# Patient Record
Sex: MALE | Race: WHITE | NOT HISPANIC OR LATINO | URBAN - METROPOLITAN AREA
[De-identification: names, ages, dates, MRNs, and addresses within clinical notes are randomized per-mention and may not be internally consistent; named-entity substitution may affect disease eponyms.]

---

## 2017-04-28 ENCOUNTER — INPATIENT (INPATIENT)
Facility: HOSPITAL | Age: 82
LOS: 0 days | Discharge: ANOTHER HOSPITAL | DRG: 305 | End: 2017-04-29
Attending: INTERNAL MEDICINE | Admitting: INTERNAL MEDICINE
Payer: MEDICARE

## 2017-04-28 VITALS
HEART RATE: 63 BPM | OXYGEN SATURATION: 99 % | WEIGHT: 145.95 LBS | TEMPERATURE: 98 F | DIASTOLIC BLOOD PRESSURE: 78 MMHG | SYSTOLIC BLOOD PRESSURE: 144 MMHG | HEIGHT: 63 IN | RESPIRATION RATE: 18 BRPM

## 2017-04-28 DIAGNOSIS — I25.10 ATHEROSCLEROTIC HEART DISEASE OF NATIVE CORONARY ARTERY WITHOUT ANGINA PECTORIS: ICD-10-CM

## 2017-04-28 DIAGNOSIS — Z90.5 ACQUIRED ABSENCE OF KIDNEY: Chronic | ICD-10-CM

## 2017-04-28 DIAGNOSIS — Z90.79 ACQUIRED ABSENCE OF OTHER GENITAL ORGAN(S): Chronic | ICD-10-CM

## 2017-04-28 DIAGNOSIS — Z95.1 PRESENCE OF AORTOCORONARY BYPASS GRAFT: Chronic | ICD-10-CM

## 2017-04-28 DIAGNOSIS — I10 ESSENTIAL (PRIMARY) HYPERTENSION: ICD-10-CM

## 2017-04-28 DIAGNOSIS — E03.9 HYPOTHYROIDISM, UNSPECIFIED: ICD-10-CM

## 2017-04-28 DIAGNOSIS — R53.1 WEAKNESS: ICD-10-CM

## 2017-04-28 DIAGNOSIS — K21.9 GASTRO-ESOPHAGEAL REFLUX DISEASE WITHOUT ESOPHAGITIS: ICD-10-CM

## 2017-04-28 DIAGNOSIS — I21.4 NON-ST ELEVATION (NSTEMI) MYOCARDIAL INFARCTION: ICD-10-CM

## 2017-04-28 DIAGNOSIS — Z29.9 ENCOUNTER FOR PROPHYLACTIC MEASURES, UNSPECIFIED: ICD-10-CM

## 2017-04-28 DIAGNOSIS — E11.9 TYPE 2 DIABETES MELLITUS WITHOUT COMPLICATIONS: ICD-10-CM

## 2017-04-28 DIAGNOSIS — N17.9 ACUTE KIDNEY FAILURE, UNSPECIFIED: ICD-10-CM

## 2017-04-28 DIAGNOSIS — D64.9 ANEMIA, UNSPECIFIED: ICD-10-CM

## 2017-04-28 LAB
ALBUMIN SERPL ELPH-MCNC: 3.2 G/DL — LOW (ref 3.5–5)
ALP SERPL-CCNC: 71 U/L — SIGNIFICANT CHANGE UP (ref 40–120)
ALT FLD-CCNC: 16 U/L DA — SIGNIFICANT CHANGE UP (ref 10–60)
ANION GAP SERPL CALC-SCNC: 6 MMOL/L — SIGNIFICANT CHANGE UP (ref 5–17)
APTT BLD: 29.3 SEC — SIGNIFICANT CHANGE UP (ref 27.5–37.4)
AST SERPL-CCNC: 13 U/L — SIGNIFICANT CHANGE UP (ref 10–40)
BASOPHILS # BLD AUTO: 0.1 K/UL — SIGNIFICANT CHANGE UP (ref 0–0.2)
BASOPHILS NFR BLD AUTO: 1.9 % — SIGNIFICANT CHANGE UP (ref 0–2)
BILIRUB SERPL-MCNC: 0.3 MG/DL — SIGNIFICANT CHANGE UP (ref 0.2–1.2)
BUN SERPL-MCNC: 31 MG/DL — HIGH (ref 7–18)
CALCIUM SERPL-MCNC: 9.3 MG/DL — SIGNIFICANT CHANGE UP (ref 8.4–10.5)
CHLORIDE SERPL-SCNC: 113 MMOL/L — HIGH (ref 96–108)
CK MB BLD-MCNC: 1.9 % — SIGNIFICANT CHANGE UP (ref 0–3.5)
CK MB BLD-MCNC: 2 % — SIGNIFICANT CHANGE UP (ref 0–3.5)
CK MB CFR SERPL CALC: 1.1 NG/ML — SIGNIFICANT CHANGE UP (ref 0–3.6)
CK MB CFR SERPL CALC: 1.1 NG/ML — SIGNIFICANT CHANGE UP (ref 0–3.6)
CK SERPL-CCNC: 54 U/L — SIGNIFICANT CHANGE UP (ref 35–232)
CK SERPL-CCNC: 57 U/L — SIGNIFICANT CHANGE UP (ref 35–232)
CO2 SERPL-SCNC: 24 MMOL/L — SIGNIFICANT CHANGE UP (ref 22–31)
CREAT SERPL-MCNC: 1.32 MG/DL — HIGH (ref 0.5–1.3)
EOSINOPHIL # BLD AUTO: 0.1 K/UL — SIGNIFICANT CHANGE UP (ref 0–0.5)
EOSINOPHIL NFR BLD AUTO: 2 % — SIGNIFICANT CHANGE UP (ref 0–6)
GLUCOSE SERPL-MCNC: 136 MG/DL — HIGH (ref 70–99)
HCT VFR BLD CALC: 30.9 % — LOW (ref 39–50)
HGB BLD-MCNC: 9.6 G/DL — LOW (ref 13–17)
INR BLD: 0.98 RATIO — SIGNIFICANT CHANGE UP (ref 0.88–1.16)
LIDOCAIN IGE QN: 572 U/L — HIGH (ref 73–393)
LYMPHOCYTES # BLD AUTO: 0.9 K/UL — LOW (ref 1–3.3)
LYMPHOCYTES # BLD AUTO: 16.1 % — SIGNIFICANT CHANGE UP (ref 13–44)
MCHC RBC-ENTMCNC: 29.6 PG — SIGNIFICANT CHANGE UP (ref 27–34)
MCHC RBC-ENTMCNC: 31 GM/DL — LOW (ref 32–36)
MCV RBC AUTO: 95.4 FL — SIGNIFICANT CHANGE UP (ref 80–100)
MONOCYTES # BLD AUTO: 0.4 K/UL — SIGNIFICANT CHANGE UP (ref 0–0.9)
MONOCYTES NFR BLD AUTO: 8.4 % — SIGNIFICANT CHANGE UP (ref 2–14)
NEUTROPHILS # BLD AUTO: 3.8 K/UL — SIGNIFICANT CHANGE UP (ref 1.8–7.4)
NEUTROPHILS NFR BLD AUTO: 71.6 % — SIGNIFICANT CHANGE UP (ref 43–77)
PLATELET # BLD AUTO: 188 K/UL — SIGNIFICANT CHANGE UP (ref 150–400)
POTASSIUM SERPL-MCNC: 4.1 MMOL/L — SIGNIFICANT CHANGE UP (ref 3.5–5.3)
POTASSIUM SERPL-SCNC: 4.1 MMOL/L — SIGNIFICANT CHANGE UP (ref 3.5–5.3)
PROT SERPL-MCNC: 6.4 G/DL — SIGNIFICANT CHANGE UP (ref 6–8.3)
PROTHROM AB SERPL-ACNC: 10.7 SEC — SIGNIFICANT CHANGE UP (ref 9.8–12.7)
RAPID RVP RESULT: DETECTED
RBC # BLD: 3.24 M/UL — LOW (ref 4.2–5.8)
RBC # FLD: 15.5 % — HIGH (ref 10.3–14.5)
RV+EV RNA SPEC QL NAA+PROBE: DETECTED
SODIUM SERPL-SCNC: 143 MMOL/L — SIGNIFICANT CHANGE UP (ref 135–145)
TROPONIN I SERPL-MCNC: 0.05 NG/ML — HIGH (ref 0–0.04)
TROPONIN I SERPL-MCNC: 0.07 NG/ML — HIGH (ref 0–0.04)
WBC # BLD: 5.3 K/UL — SIGNIFICANT CHANGE UP (ref 3.8–10.5)
WBC # FLD AUTO: 5.3 K/UL — SIGNIFICANT CHANGE UP (ref 3.8–10.5)

## 2017-04-28 PROCEDURE — 99285 EMERGENCY DEPT VISIT HI MDM: CPT

## 2017-04-28 PROCEDURE — 74176 CT ABD & PELVIS W/O CONTRAST: CPT | Mod: 26

## 2017-04-28 PROCEDURE — 71020: CPT | Mod: 26

## 2017-04-28 RX ORDER — DEXTROSE 50 % IN WATER 50 %
25 SYRINGE (ML) INTRAVENOUS ONCE
Qty: 0 | Refills: 0 | Status: DISCONTINUED | OUTPATIENT
Start: 2017-04-28 | End: 2017-04-29

## 2017-04-28 RX ORDER — PANTOPRAZOLE SODIUM 20 MG/1
40 TABLET, DELAYED RELEASE ORAL
Qty: 0 | Refills: 0 | Status: DISCONTINUED | OUTPATIENT
Start: 2017-04-28 | End: 2017-04-29

## 2017-04-28 RX ORDER — GLUCAGON INJECTION, SOLUTION 0.5 MG/.1ML
1 INJECTION, SOLUTION SUBCUTANEOUS ONCE
Qty: 0 | Refills: 0 | Status: DISCONTINUED | OUTPATIENT
Start: 2017-04-28 | End: 2017-04-29

## 2017-04-28 RX ORDER — PSYLLIUM SEED (WITH DEXTROSE)
0 POWDER (GRAM) ORAL
Qty: 0 | Refills: 0 | COMMUNITY

## 2017-04-28 RX ORDER — SODIUM CHLORIDE 9 MG/ML
3 INJECTION INTRAMUSCULAR; INTRAVENOUS; SUBCUTANEOUS ONCE
Qty: 0 | Refills: 0 | Status: COMPLETED | OUTPATIENT
Start: 2017-04-28 | End: 2017-04-28

## 2017-04-28 RX ORDER — SODIUM CHLORIDE 9 MG/ML
1000 INJECTION, SOLUTION INTRAVENOUS
Qty: 0 | Refills: 0 | Status: DISCONTINUED | OUTPATIENT
Start: 2017-04-28 | End: 2017-04-29

## 2017-04-28 RX ORDER — DEXTROSE 50 % IN WATER 50 %
1 SYRINGE (ML) INTRAVENOUS ONCE
Qty: 0 | Refills: 0 | Status: DISCONTINUED | OUTPATIENT
Start: 2017-04-28 | End: 2017-04-29

## 2017-04-28 RX ORDER — DAPAGLIFLOZIN 10 MG/1
0 TABLET, FILM COATED ORAL
Qty: 0 | Refills: 0 | COMMUNITY

## 2017-04-28 RX ORDER — ATORVASTATIN CALCIUM 80 MG/1
80 TABLET, FILM COATED ORAL AT BEDTIME
Qty: 0 | Refills: 0 | Status: DISCONTINUED | OUTPATIENT
Start: 2017-04-28 | End: 2017-04-29

## 2017-04-28 RX ORDER — LOSARTAN POTASSIUM 100 MG/1
0 TABLET, FILM COATED ORAL
Qty: 0 | Refills: 0 | COMMUNITY

## 2017-04-28 RX ORDER — LEVOTHYROXINE SODIUM 125 MCG
0 TABLET ORAL
Qty: 0 | Refills: 0 | COMMUNITY

## 2017-04-28 RX ORDER — SENNA PLUS 8.6 MG/1
2 TABLET ORAL AT BEDTIME
Qty: 0 | Refills: 0 | Status: DISCONTINUED | OUTPATIENT
Start: 2017-04-28 | End: 2017-04-29

## 2017-04-28 RX ORDER — ASPIRIN/CALCIUM CARB/MAGNESIUM 324 MG
300 TABLET ORAL ONCE
Qty: 0 | Refills: 0 | Status: COMPLETED | OUTPATIENT
Start: 2017-04-28 | End: 2017-04-28

## 2017-04-28 RX ORDER — LEVOTHYROXINE SODIUM 125 MCG
50 TABLET ORAL DAILY
Qty: 0 | Refills: 0 | Status: DISCONTINUED | OUTPATIENT
Start: 2017-04-28 | End: 2017-04-29

## 2017-04-28 RX ORDER — DOCUSATE SODIUM 100 MG
100 CAPSULE ORAL
Qty: 0 | Refills: 0 | Status: DISCONTINUED | OUTPATIENT
Start: 2017-04-28 | End: 2017-04-29

## 2017-04-28 RX ORDER — METFORMIN HYDROCHLORIDE 850 MG/1
0 TABLET ORAL
Qty: 0 | Refills: 0 | COMMUNITY

## 2017-04-28 RX ORDER — DEXTROSE 50 % IN WATER 50 %
12.5 SYRINGE (ML) INTRAVENOUS ONCE
Qty: 0 | Refills: 0 | Status: DISCONTINUED | OUTPATIENT
Start: 2017-04-28 | End: 2017-04-29

## 2017-04-28 RX ORDER — SODIUM CHLORIDE 9 MG/ML
1000 INJECTION INTRAMUSCULAR; INTRAVENOUS; SUBCUTANEOUS ONCE
Qty: 0 | Refills: 0 | Status: COMPLETED | OUTPATIENT
Start: 2017-04-28 | End: 2017-04-28

## 2017-04-28 RX ORDER — INSULIN LISPRO 100/ML
VIAL (ML) SUBCUTANEOUS
Qty: 0 | Refills: 0 | Status: DISCONTINUED | OUTPATIENT
Start: 2017-04-28 | End: 2017-04-29

## 2017-04-28 RX ORDER — PANTOPRAZOLE SODIUM 20 MG/1
0 TABLET, DELAYED RELEASE ORAL
Qty: 0 | Refills: 0 | COMMUNITY

## 2017-04-28 RX ORDER — SODIUM CHLORIDE 9 MG/ML
500 INJECTION INTRAMUSCULAR; INTRAVENOUS; SUBCUTANEOUS
Qty: 0 | Refills: 0 | Status: DISCONTINUED | OUTPATIENT
Start: 2017-04-28 | End: 2017-04-29

## 2017-04-28 RX ADMIN — Medication 300 MILLIGRAM(S): at 16:31

## 2017-04-28 RX ADMIN — ATORVASTATIN CALCIUM 80 MILLIGRAM(S): 80 TABLET, FILM COATED ORAL at 21:17

## 2017-04-28 RX ADMIN — SENNA PLUS 2 TABLET(S): 8.6 TABLET ORAL at 21:17

## 2017-04-28 RX ADMIN — SODIUM CHLORIDE 1000 MILLILITER(S): 9 INJECTION INTRAMUSCULAR; INTRAVENOUS; SUBCUTANEOUS at 12:26

## 2017-04-28 RX ADMIN — SODIUM CHLORIDE 3 MILLILITER(S): 9 INJECTION INTRAMUSCULAR; INTRAVENOUS; SUBCUTANEOUS at 12:26

## 2017-04-28 NOTE — H&P ADULT. - PROBLEM SELECTOR PLAN 9
DVT prophylaxis- IMPROVE VTE score-2, no indication for chemical prophylaxis, will continue SCD for now.

## 2017-04-28 NOTE — H&P ADULT. - NEGATIVE NEUROLOGICAL SYMPTOMS
no loss of sensation/no tremors/no generalized seizures/no syncope/no headache/no loss of consciousness/no paresthesias/no focal seizures/no transient paralysis

## 2017-04-28 NOTE — H&P ADULT. - NEGATIVE MUSCULOSKELETAL SYMPTOMS
no arthritis/no joint swelling/no muscle cramps/no arthralgia/no myalgia/no neck pain/no muscle weakness/no stiffness

## 2017-04-28 NOTE — H&P ADULT. - PROBLEM SELECTOR PLAN 1
Patient presented with generalized weakness, with dizziness and difficulty ambulation since 3 weeks  likely due to viral etiology vs underlying anemia vs underlying ? malignancy given Hx of unintentional weight loss  Will f/u RVP  CXR- negative, will f/u UA to r/o infectious etiology.  Ct abdomen and pelvis s/o Thickening of the walls of the second portion of the duodenum with slight infiltration of the adjacent fat likely reflective of a duodenitis.  Moderate-sized left inguinal hernia. Large right-sided inguinal hernia. Reniform shaped structure overlying the right psoas musculature below the right kidney measuring up to 4.4 x 3.7 cm which may represent an accessory kidney, old hematoma, or may represent a mass with internal macroscopic fat.   primary team to get surgery consult in AM Patient presented with generalized weakness, with dizziness and difficulty ambulation since 3 weeks  likely due to viral etiology vs underlying anemia vs underlying ? malignancy given Hx of unintentional weight loss  Will f/u RVP  CXR- negative, will f/u UA to r/o infectious etiology.  Ct abdomen and pelvis s/o Thickening of the walls of the second portion of the duodenum with slight infiltration of the adjacent fat likely reflective of a duodenitis.  Moderate-sized left inguinal hernia. Large right-sided inguinal hernia. Reniform shaped structure overlying the right psoas musculature below the right kidney measuring up to 4.4 x 3.7 cm which may represent an accessory kidney, old hematoma, or may represent a mass with internal macroscopic fat.   primary team to get surgery consult in AM  patient has slightly elevated lipase but clinically no symptoms and CT abdomen no findings s/o pancreatitis, so will start on diet.

## 2017-04-28 NOTE — H&P ADULT. - PROBLEM SELECTOR PLAN 2
Patient had endoscopy yesterday as outpatient which was said to be normal except for duodenal ulcer but patient is unsure, primary team to reach GI for results.  Patient H/H on admission is 9.6/30.9  will get anemia panel and f/u stool for occult blood.  plan for transfusion if H/H below 8.  Patient scheduled for tentative colonoscopy next month with GI as outpatient.

## 2017-04-28 NOTE — H&P ADULT. - MUSCULOSKELETAL
details… detailed exam no joint erythema/normal strength/no joint warmth/no joint swelling/no calf tenderness/ROM intact

## 2017-04-28 NOTE — ED PROVIDER NOTE - OBJECTIVE STATEMENT
88 y/o male with PMHx of DM, HTN, Hypothyroid, GERD and PSHx of Bypass, Partial Nephrectomy, and TURP presents to the ED c/o weakness to legs and abd pain x 3 weeks, worsening. Pt has recent endoscopy done which was normal. Pt denies fever, chills, CP, SOB, or     any other complaints. NKDA.

## 2017-04-28 NOTE — H&P ADULT. - ATTENDING COMMENTS
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

## 2017-04-28 NOTE — H&P ADULT. - HISTORY OF PRESENT ILLNESS
A 87 year old male from home, ambulates without support, PMH of DM, HTN, CAD s/p CABG, RCC s/p partial nephrectomy, Basal cell carcinoma, s/p TURP, presented with c/o worsening weakness, dizziness and difficulty ambulation for last 3 weeks. Patient has been experiencing these symptoms for last 3 weeks, but still able to carry on with his work, works as  at [employer], this morning his symptoms got more worse and so finally he decided to come to ED. patient also c/o associated chest pain - retrosternal, mild 2-3 intensity, non radiating, associated with exertional dyspnea, no aggravating or relieving factors, not associated with palpitations, diaphoresis. patient also endorses that 2 weeks ago he had diarrhea which lasted for 2-3 days after he took laxatives for constipation, which resolved with medications. patient reports unintentional weight loss of about 30 pounds in last 2 years. patient had scheduled endoscopy with GI yesterday and was said that he had duodenal ulcer, but unsure about the exact findings. He had colonoscopy 5 years ago, which was said to be normal and next month he have an appointment with GI to decide regarding colonoscopy. Patient denies fever, chills, abdominal pain, currently no diarrhea but c/o constipation since 2 days, no urinary symptoms, no weakness or tingling sensation in extremities.    in ED, patient had EKG s/o sinus bradycardia to 51 bpm with 1st degree AV block and PAC., Q waves in 1, 2, avl, v5,v6, given 1 dose of aspirin and 1 L bolus of normal saline.

## 2017-04-28 NOTE — H&P ADULT. - PROBLEM SELECTOR PLAN 5
Patient had Rt partial nephrectomy for RCC, unknown baseline creatinine levels.  BUN/Creatinine levels are 31/1.32  likely pre renal, will give gentle hydration and f/u BMP in AM.  if renal function worsens, consider urine lytes and USG renal.

## 2017-04-28 NOTE — ACUTE INTERFACILITY TRANSFER NOTE - HOSPITAL COURSE
A 87 year old male from home, ambulates without support, PMH of DM, HTN, CAD s/p CABG, RCC s/p partial nephrectomy, Basal cell carcinoma, s/p TURP, presented with c/o worsening weakness, dizziness and difficulty ambulation for last 3 weeks. Patient has been experiencing these symptoms for last 3 weeks, but still able to carry on with his work, works as  at [employer], this morning his symptoms got more worse and so finally he decided to come to ED. patient also c/o associated chest pain - retrosternal, mild 2-3 intensity, non radiating, associated with exertional dyspnea, no aggravating or relieving factors, not associated with palpitations, diaphoresis. patient also endorses that 2 weeks ago he had diarrhea which lasted for 2-3 days after he took laxatives for constipation, which resolved with medications. patient reports unintentional weight loss of about 30 pounds in last 2 years. patient had scheduled endoscopy with GI yesterday and was said that he had duodenal ulcer, but unsure about the exact findings. He had colonoscopy 5 years ago, which was said to be normal and next month he have an appointment with GI to decide regarding colonoscopy. Patient denies fever, chills, abdominal pain, currently no diarrhea but c/o constipation since 2 days, no urinary symptoms, no weakness or tingling sensation in extremities.    in ED, patient had EKG s/o sinus bradycardia to 51 bpm with 1st degree AV block and PAC., Q waves in 1, 2, avl, v5,v6, given 1 dose of aspirin and 1 L bolus of normal saline. Pt later showed heart block type 2 and will be transfer to [facility] for possible pacemaker placement. A 87 year old male from home, ambulates without support, PMH of DM, HTN, CAD s/p CABG, RCC s/p partial nephrectomy, Basal cell carcinoma, s/p TURP, presented with c/o worsening weakness, dizziness and difficulty ambulation for last 3 weeks. Patient has been experiencing these symptoms for last 3 weeks, but still able to carry on with his work, works as  at [employer], this morning his symptoms got more worse and so finally he decided to come to ED. patient also c/o associated chest pain - retrosternal, mild 2-3 intensity, non radiating, associated with exertional dyspnea, no aggravating or relieving factors, not associated with palpitations, diaphoresis. patient also endorses that 2 weeks ago he had diarrhea which lasted for 2-3 days after he took laxatives for constipation, which resolved with medications. patient reports unintentional weight loss of about 30 pounds in last 2 years. patient had scheduled endoscopy with GI yesterday and was said that he had duodenal ulcer, but unsure about the exact findings. He had colonoscopy 5 years ago, which was said to be normal and next month he have an appointment with GI to decide regarding colonoscopy. Patient denies fever, chills, abdominal pain, currently no diarrhea but c/o constipation since 2 days, no urinary symptoms, no weakness or tingling sensation in extremities.    in ED, patient had EKG s/o sinus bradycardia to 51 bpm with 1st degree AV block and PAC., Q waves in 1, 2, avl, v5,v6, given 1 dose of aspirin and 1 L bolus of normal saline. Pt later showed heart block type 2 and will be transfer to [facility] for possible EP eval and possible pacemaker placement.

## 2017-04-28 NOTE — H&P ADULT. - PROBLEM SELECTOR PLAN 3
Patient has Hx of CAD s/p CABG.  on rosuvastatin at home, will give lipitor 80 mg once daily and will hold aspirin for concern for likely Duodenal ulcer.  please follow up with primary cardiologist.  EKG s/o sinus bradycardia to 51 bpm with 1st degree AV block and PAC, Q waves in 1, 2, avl, v5,v6, given 1 dose of aspirin suppository in ED.  cardiac enzymes T1-0.047, T2-0.074, will f/u T3.  f/u Echo  Dr. Manuel is cardiology. Patient has Hx of CAD s/p CABG.  on rosuvastatin at home, will give lipitor 80 mg once daily and will hold aspirin for concern for likely Duodenal ulcer.  please follow up with primary cardiologist.  EKG s/o sinus bradycardia to 51 bpm with 1st degree AV block and PAC, Q waves in 1, 2, avl, v5,v6, given 1 dose of aspirin suppository in ED.  cardiac enzymes T1-0.047, T2-0.074, will f/u T3.  will avoid beta blockers.  f/u Echo  Dr. Manuel is cardiology.

## 2017-04-28 NOTE — H&P ADULT. - PRIMARY CARE PROVIDER
Dr. Crow Casarez  [phone], Dr. Yoel Markham PCP [phone], Dr. Dorene Candelario cardio [phone]

## 2017-04-28 NOTE — H&P ADULT. - ASSESSMENT
A 87 year old male from home, ambulates without support, PMH of DM, HTN, CAD s/p CABG, RCC s/p partial nephrectomy, Basal cell carcinoma, s/p TURP, presented with c/o worsening weakness, dizziness and difficulty ambulation for last 3 weeks.    In ED, patient had EKG s/o sinus bradycardia to 51 bpm with 1st degree AV block and PAC, Q waves in 1, 2, avl, v5,v6, given 1 dose of aspirin and 1 L bolus of normal saline. Patient is being admitted to telemetry.

## 2017-04-28 NOTE — H&P ADULT. - PROBLEM SELECTOR PLAN 8
Patient takes metformin 1 gm BID and farxiga 5 mg 1 tab OD.  will hold oral medications and start on humalog sliding scale.  f/u Hba1c Patient takes metformin 1 gm BID, januvia 100mg once daily and farxiga 5 mg 1 tab OD.  will hold oral medications and start on humalog sliding scale.  f/u Hba1c

## 2017-04-28 NOTE — H&P ADULT. - RS GEN PE MLT RESP DETAILS PC
normal/airway patent/no chest wall tenderness/good air movement/breath sounds equal/clear to auscultation bilaterally

## 2017-04-28 NOTE — ED PROVIDER NOTE - CARE PLAN
Principal Discharge DX:	NSTEMI (non-ST elevated myocardial infarction)  Secondary Diagnosis:	Pancreatitis

## 2017-04-28 NOTE — H&P ADULT. - PROBLEM SELECTOR PLAN 7
Patient takes losartan 50 mg once daily at home, will hold it for now due to LIGIA and please restart if BP is uncontrolled and kidney function improves.

## 2017-04-28 NOTE — ED PROVIDER NOTE - NS ED MD SCRIBE ATTENDING SCRIBE SECTIONS
DISPOSITION/VITAL SIGNS( Pullset)/HISTORY OF PRESENT ILLNESS/REVIEW OF SYSTEMS/HIV/PHYSICAL EXAM/PAST MEDICAL/SURGICAL/SOCIAL HISTORY

## 2017-04-28 NOTE — H&P ADULT. - NEGATIVE OPHTHALMOLOGIC SYMPTOMS
no blurred vision L/no lacrimation R/no photophobia/no blurred vision R/no diplopia/no lacrimation L

## 2017-04-28 NOTE — H&P ADULT. - PROBLEM SELECTOR PLAN 6
patient has Hx of GERD and takes protonix at home, and was diagnosed with ?duodenal ulcer, CT abdomen s/o duodenitis.  will continue protonix 40 mg once daily.  Avoid NSAIDS.

## 2017-04-29 ENCOUNTER — INPATIENT (INPATIENT)
Facility: HOSPITAL | Age: 82
LOS: 2 days | Discharge: HOME | End: 2017-05-02
Attending: INTERNAL MEDICINE | Admitting: INTERNAL MEDICINE
Payer: COMMERCIAL

## 2017-04-29 VITALS
RESPIRATION RATE: 15 BRPM | SYSTOLIC BLOOD PRESSURE: 159 MMHG | DIASTOLIC BLOOD PRESSURE: 88 MMHG | TEMPERATURE: 98 F | OXYGEN SATURATION: 100 % | HEART RATE: 62 BPM

## 2017-04-29 VITALS
OXYGEN SATURATION: 99 % | SYSTOLIC BLOOD PRESSURE: 121 MMHG | TEMPERATURE: 98 F | HEART RATE: 59 BPM | RESPIRATION RATE: 18 BRPM | DIASTOLIC BLOOD PRESSURE: 55 MMHG

## 2017-04-29 DIAGNOSIS — E03.9 HYPOTHYROIDISM, UNSPECIFIED: ICD-10-CM

## 2017-04-29 DIAGNOSIS — Z90.5 ACQUIRED ABSENCE OF KIDNEY: Chronic | ICD-10-CM

## 2017-04-29 DIAGNOSIS — I50.9 HEART FAILURE, UNSPECIFIED: ICD-10-CM

## 2017-04-29 DIAGNOSIS — K21.9 GASTRO-ESOPHAGEAL REFLUX DISEASE WITHOUT ESOPHAGITIS: ICD-10-CM

## 2017-04-29 DIAGNOSIS — I10 ESSENTIAL (PRIMARY) HYPERTENSION: ICD-10-CM

## 2017-04-29 DIAGNOSIS — I45.9 CONDUCTION DISORDER, UNSPECIFIED: ICD-10-CM

## 2017-04-29 DIAGNOSIS — E11.9 TYPE 2 DIABETES MELLITUS WITHOUT COMPLICATIONS: ICD-10-CM

## 2017-04-29 DIAGNOSIS — Z90.79 ACQUIRED ABSENCE OF OTHER GENITAL ORGAN(S): Chronic | ICD-10-CM

## 2017-04-29 DIAGNOSIS — Z95.1 PRESENCE OF AORTOCORONARY BYPASS GRAFT: Chronic | ICD-10-CM

## 2017-04-29 LAB
ALBUMIN SERPL ELPH-MCNC: 3.4 G/DL — SIGNIFICANT CHANGE UP (ref 3.3–5)
ALP SERPL-CCNC: 55 U/L — SIGNIFICANT CHANGE UP (ref 40–120)
ALT FLD-CCNC: 10 U/L — SIGNIFICANT CHANGE UP (ref 4–41)
APTT BLD: 29.5 SEC — SIGNIFICANT CHANGE UP (ref 27.5–37.4)
AST SERPL-CCNC: 14 U/L — SIGNIFICANT CHANGE UP (ref 4–40)
BASOPHILS # BLD AUTO: 0.02 K/UL — SIGNIFICANT CHANGE UP (ref 0–0.2)
BASOPHILS NFR BLD AUTO: 0.4 % — SIGNIFICANT CHANGE UP (ref 0–2)
BILIRUB SERPL-MCNC: 0.3 MG/DL — SIGNIFICANT CHANGE UP (ref 0.2–1.2)
BUN SERPL-MCNC: 26 MG/DL — HIGH (ref 7–23)
C DIFF TOX GENS STL QL NAA+PROBE: SIGNIFICANT CHANGE UP
CALCIUM SERPL-MCNC: 8.9 MG/DL — SIGNIFICANT CHANGE UP (ref 8.4–10.5)
CHLORIDE SERPL-SCNC: 108 MMOL/L — HIGH (ref 98–107)
CHOLEST SERPL-MCNC: 110 MG/DL — LOW (ref 120–199)
CHOLEST SERPL-MCNC: 116 MG/DL — LOW (ref 120–199)
CK MB BLD-MCNC: 1.97 NG/ML — SIGNIFICANT CHANGE UP (ref 1–6.6)
CK MB BLD-MCNC: 2 % — SIGNIFICANT CHANGE UP (ref 0–3.5)
CK MB BLD-MCNC: SIGNIFICANT CHANGE UP (ref 0–2.5)
CK MB CFR SERPL CALC: 1.1 NG/ML — SIGNIFICANT CHANGE UP (ref 0–3.6)
CK SERPL-CCNC: 52 U/L — SIGNIFICANT CHANGE UP (ref 30–200)
CK SERPL-CCNC: 55 U/L — SIGNIFICANT CHANGE UP (ref 35–232)
CO2 SERPL-SCNC: 20 MMOL/L — LOW (ref 22–31)
CREAT SERPL-MCNC: 1.12 MG/DL — SIGNIFICANT CHANGE UP (ref 0.5–1.3)
EOSINOPHIL # BLD AUTO: 0.15 K/UL — SIGNIFICANT CHANGE UP (ref 0–0.5)
EOSINOPHIL NFR BLD AUTO: 3 % — SIGNIFICANT CHANGE UP (ref 0–6)
GLUCOSE SERPL-MCNC: 132 MG/DL — HIGH (ref 70–99)
HBA1C BLD-MCNC: 6.6 % — HIGH (ref 4–5.6)
HCT VFR BLD CALC: 27.7 % — LOW (ref 39–50)
HCT VFR BLD CALC: 29.4 % — LOW (ref 39–50)
HDLC SERPL-MCNC: 48 MG/DL — SIGNIFICANT CHANGE UP (ref 35–55)
HDLC SERPL-MCNC: 49 MG/DL — SIGNIFICANT CHANGE UP (ref 35–55)
HGB BLD-MCNC: 8.6 G/DL — LOW (ref 13–17)
HGB BLD-MCNC: 9.1 G/DL — LOW (ref 13–17)
IMM GRANULOCYTES NFR BLD AUTO: 0.2 % — SIGNIFICANT CHANGE UP (ref 0–1.5)
INR BLD: 0.99 — SIGNIFICANT CHANGE UP (ref 0.88–1.17)
LIPID PNL WITH DIRECT LDL SERPL: 46 MG/DL — SIGNIFICANT CHANGE UP
LIPID PNL WITH DIRECT LDL SERPL: 50 MG/DL — SIGNIFICANT CHANGE UP
LYMPHOCYTES # BLD AUTO: 0.92 K/UL — LOW (ref 1–3.3)
LYMPHOCYTES # BLD AUTO: 18.5 % — SIGNIFICANT CHANGE UP (ref 13–44)
MAGNESIUM SERPL-MCNC: 1.7 MG/DL — SIGNIFICANT CHANGE UP (ref 1.6–2.6)
MCHC RBC-ENTMCNC: 29.4 PG — SIGNIFICANT CHANGE UP (ref 27–34)
MCHC RBC-ENTMCNC: 29.5 PG — SIGNIFICANT CHANGE UP (ref 27–34)
MCHC RBC-ENTMCNC: 31 % — LOW (ref 32–36)
MCHC RBC-ENTMCNC: 31 % — LOW (ref 32–36)
MCV RBC AUTO: 94.8 FL — SIGNIFICANT CHANGE UP (ref 80–100)
MCV RBC AUTO: 94.9 FL — SIGNIFICANT CHANGE UP (ref 80–100)
MONOCYTES # BLD AUTO: 0.41 K/UL — SIGNIFICANT CHANGE UP (ref 0–0.9)
MONOCYTES NFR BLD AUTO: 8.3 % — SIGNIFICANT CHANGE UP (ref 2–14)
NEUTROPHILS # BLD AUTO: 3.45 K/UL — SIGNIFICANT CHANGE UP (ref 1.8–7.4)
NEUTROPHILS NFR BLD AUTO: 69.6 % — SIGNIFICANT CHANGE UP (ref 43–77)
PHOSPHATE SERPL-MCNC: 2.7 MG/DL — SIGNIFICANT CHANGE UP (ref 2.5–4.5)
PLATELET # BLD AUTO: 176 K/UL — SIGNIFICANT CHANGE UP (ref 150–400)
PLATELET # BLD AUTO: 193 K/UL — SIGNIFICANT CHANGE UP (ref 150–400)
PMV BLD: 9.7 FL — SIGNIFICANT CHANGE UP (ref 7–13)
PMV BLD: 9.9 FL — SIGNIFICANT CHANGE UP (ref 7–13)
POTASSIUM SERPL-MCNC: 4.5 MMOL/L — SIGNIFICANT CHANGE UP (ref 3.5–5.3)
POTASSIUM SERPL-SCNC: 4.5 MMOL/L — SIGNIFICANT CHANGE UP (ref 3.5–5.3)
PROT SERPL-MCNC: 5.7 G/DL — LOW (ref 6–8.3)
PROTHROM AB SERPL-ACNC: 11.1 SEC — SIGNIFICANT CHANGE UP (ref 9.8–13.1)
RBC # BLD: 2.92 M/UL — LOW (ref 4.2–5.8)
RBC # BLD: 3.1 M/UL — LOW (ref 4.2–5.8)
RBC # FLD: 15.9 % — HIGH (ref 10.3–14.5)
RBC # FLD: 16.1 % — HIGH (ref 10.3–14.5)
SODIUM SERPL-SCNC: 140 MMOL/L — SIGNIFICANT CHANGE UP (ref 135–145)
T3 SERPL-MCNC: 56.9 NG/DL — LOW (ref 80–200)
T4 FREE SERPL-MCNC: 1.01 NG/DL — SIGNIFICANT CHANGE UP (ref 0.9–1.8)
TRIGL SERPL-MCNC: 121 MG/DL — SIGNIFICANT CHANGE UP (ref 10–149)
TRIGL SERPL-MCNC: 98 MG/DL — SIGNIFICANT CHANGE UP (ref 10–149)
TROPONIN I SERPL-MCNC: 0.07 NG/ML — HIGH (ref 0–0.04)
TROPONIN T SERPL-MCNC: < 0.06 NG/ML — SIGNIFICANT CHANGE UP (ref 0–0.06)
TSH SERPL-MCNC: 7.15 UIU/ML — HIGH (ref 0.27–4.2)
WBC # BLD: 4.96 K/UL — SIGNIFICANT CHANGE UP (ref 3.8–10.5)
WBC # BLD: 5.08 K/UL — SIGNIFICANT CHANGE UP (ref 3.8–10.5)
WBC # FLD AUTO: 4.96 K/UL — SIGNIFICANT CHANGE UP (ref 3.8–10.5)
WBC # FLD AUTO: 5.08 K/UL — SIGNIFICANT CHANGE UP (ref 3.8–10.5)

## 2017-04-29 PROCEDURE — 85610 PROTHROMBIN TIME: CPT

## 2017-04-29 PROCEDURE — 74176 CT ABD & PELVIS W/O CONTRAST: CPT

## 2017-04-29 PROCEDURE — 99223 1ST HOSP IP/OBS HIGH 75: CPT | Mod: GC

## 2017-04-29 PROCEDURE — 71046 X-RAY EXAM CHEST 2 VIEWS: CPT

## 2017-04-29 PROCEDURE — 82550 ASSAY OF CK (CPK): CPT

## 2017-04-29 PROCEDURE — 93005 ELECTROCARDIOGRAM TRACING: CPT

## 2017-04-29 PROCEDURE — 87486 CHLMYD PNEUM DNA AMP PROBE: CPT

## 2017-04-29 PROCEDURE — 82553 CREATINE MB FRACTION: CPT

## 2017-04-29 PROCEDURE — 87633 RESP VIRUS 12-25 TARGETS: CPT

## 2017-04-29 PROCEDURE — 87798 DETECT AGENT NOS DNA AMP: CPT

## 2017-04-29 PROCEDURE — 99285 EMERGENCY DEPT VISIT HI MDM: CPT | Mod: 25

## 2017-04-29 PROCEDURE — 85730 THROMBOPLASTIN TIME PARTIAL: CPT

## 2017-04-29 PROCEDURE — 87581 M.PNEUMON DNA AMP PROBE: CPT

## 2017-04-29 PROCEDURE — 83690 ASSAY OF LIPASE: CPT

## 2017-04-29 PROCEDURE — 93306 TTE W/DOPPLER COMPLETE: CPT | Mod: 26

## 2017-04-29 PROCEDURE — 93010 ELECTROCARDIOGRAM REPORT: CPT

## 2017-04-29 PROCEDURE — 80053 COMPREHEN METABOLIC PANEL: CPT

## 2017-04-29 PROCEDURE — 85027 COMPLETE CBC AUTOMATED: CPT

## 2017-04-29 PROCEDURE — 84484 ASSAY OF TROPONIN QUANT: CPT

## 2017-04-29 RX ORDER — MAGNESIUM SULFATE 500 MG/ML
2 VIAL (ML) INJECTION ONCE
Qty: 0 | Refills: 0 | Status: COMPLETED | OUTPATIENT
Start: 2017-04-29 | End: 2017-04-29

## 2017-04-29 RX ORDER — PANTOPRAZOLE SODIUM 20 MG/1
40 TABLET, DELAYED RELEASE ORAL
Qty: 0 | Refills: 0 | Status: DISCONTINUED | OUTPATIENT
Start: 2017-04-29 | End: 2017-04-29

## 2017-04-29 RX ORDER — DEXTROSE 50 % IN WATER 50 %
1 SYRINGE (ML) INTRAVENOUS ONCE
Qty: 0 | Refills: 0 | Status: DISCONTINUED | OUTPATIENT
Start: 2017-04-29 | End: 2017-04-30

## 2017-04-29 RX ORDER — DEXTROSE 50 % IN WATER 50 %
25 SYRINGE (ML) INTRAVENOUS ONCE
Qty: 0 | Refills: 0 | Status: DISCONTINUED | OUTPATIENT
Start: 2017-04-29 | End: 2017-05-02

## 2017-04-29 RX ORDER — INSULIN LISPRO 100/ML
VIAL (ML) SUBCUTANEOUS AT BEDTIME
Qty: 0 | Refills: 0 | Status: DISCONTINUED | OUTPATIENT
Start: 2017-04-29 | End: 2017-05-02

## 2017-04-29 RX ORDER — HEPARIN SODIUM 5000 [USP'U]/ML
5000 INJECTION INTRAVENOUS; SUBCUTANEOUS EVERY 12 HOURS
Qty: 0 | Refills: 0 | Status: DISCONTINUED | OUTPATIENT
Start: 2017-04-29 | End: 2017-04-30

## 2017-04-29 RX ORDER — DEXTROSE 50 % IN WATER 50 %
12.5 SYRINGE (ML) INTRAVENOUS ONCE
Qty: 0 | Refills: 0 | Status: DISCONTINUED | OUTPATIENT
Start: 2017-04-29 | End: 2017-04-30

## 2017-04-29 RX ORDER — GLUCAGON INJECTION, SOLUTION 0.5 MG/.1ML
1 INJECTION, SOLUTION SUBCUTANEOUS ONCE
Qty: 0 | Refills: 0 | Status: DISCONTINUED | OUTPATIENT
Start: 2017-04-29 | End: 2017-04-30

## 2017-04-29 RX ORDER — DEXTROSE 50 % IN WATER 50 %
25 SYRINGE (ML) INTRAVENOUS ONCE
Qty: 0 | Refills: 0 | Status: DISCONTINUED | OUTPATIENT
Start: 2017-04-29 | End: 2017-04-30

## 2017-04-29 RX ORDER — LOPERAMIDE HCL 2 MG
2 TABLET ORAL ONCE
Qty: 0 | Refills: 0 | Status: COMPLETED | OUTPATIENT
Start: 2017-04-29 | End: 2017-04-29

## 2017-04-29 RX ORDER — SENNA PLUS 8.6 MG/1
2 TABLET ORAL AT BEDTIME
Qty: 0 | Refills: 0 | Status: DISCONTINUED | OUTPATIENT
Start: 2017-04-29 | End: 2017-05-02

## 2017-04-29 RX ORDER — ATORVASTATIN CALCIUM 80 MG/1
80 TABLET, FILM COATED ORAL AT BEDTIME
Qty: 0 | Refills: 0 | Status: DISCONTINUED | OUTPATIENT
Start: 2017-04-29 | End: 2017-05-02

## 2017-04-29 RX ORDER — LOSARTAN POTASSIUM 100 MG/1
50 TABLET, FILM COATED ORAL DAILY
Qty: 0 | Refills: 0 | Status: DISCONTINUED | OUTPATIENT
Start: 2017-04-29 | End: 2017-05-02

## 2017-04-29 RX ORDER — SODIUM CHLORIDE 9 MG/ML
1000 INJECTION, SOLUTION INTRAVENOUS
Qty: 0 | Refills: 0 | Status: DISCONTINUED | OUTPATIENT
Start: 2017-04-29 | End: 2017-04-30

## 2017-04-29 RX ORDER — INSULIN LISPRO 100/ML
VIAL (ML) SUBCUTANEOUS
Qty: 0 | Refills: 0 | Status: DISCONTINUED | OUTPATIENT
Start: 2017-04-29 | End: 2017-05-02

## 2017-04-29 RX ORDER — PANTOPRAZOLE SODIUM 20 MG/1
40 TABLET, DELAYED RELEASE ORAL
Qty: 0 | Refills: 0 | Status: DISCONTINUED | OUTPATIENT
Start: 2017-04-29 | End: 2017-05-02

## 2017-04-29 RX ORDER — LEVOTHYROXINE SODIUM 125 MCG
50 TABLET ORAL DAILY
Qty: 0 | Refills: 0 | Status: DISCONTINUED | OUTPATIENT
Start: 2017-04-29 | End: 2017-05-02

## 2017-04-29 RX ORDER — DOCUSATE SODIUM 100 MG
100 CAPSULE ORAL
Qty: 0 | Refills: 0 | Status: DISCONTINUED | OUTPATIENT
Start: 2017-04-29 | End: 2017-05-02

## 2017-04-29 RX ADMIN — Medication 100 MILLIGRAM(S): at 06:32

## 2017-04-29 RX ADMIN — PANTOPRAZOLE SODIUM 40 MILLIGRAM(S): 20 TABLET, DELAYED RELEASE ORAL at 08:10

## 2017-04-29 RX ADMIN — HEPARIN SODIUM 5000 UNIT(S): 5000 INJECTION INTRAVENOUS; SUBCUTANEOUS at 06:32

## 2017-04-29 RX ADMIN — Medication 50 GRAM(S): at 06:32

## 2017-04-29 RX ADMIN — Medication 50 MICROGRAM(S): at 08:10

## 2017-04-29 RX ADMIN — Medication 2 MILLIGRAM(S): at 11:37

## 2017-04-29 RX ADMIN — ATORVASTATIN CALCIUM 80 MILLIGRAM(S): 80 TABLET, FILM COATED ORAL at 22:07

## 2017-04-29 RX ADMIN — HEPARIN SODIUM 5000 UNIT(S): 5000 INJECTION INTRAVENOUS; SUBCUTANEOUS at 18:02

## 2017-04-29 RX ADMIN — LOSARTAN POTASSIUM 50 MILLIGRAM(S): 100 TABLET, FILM COATED ORAL at 06:33

## 2017-04-29 NOTE — TRANSFER ACCEPTANCE NOTE - ASSESSMENT
This is a 87yoM with PMHx DM, HTN, CAD s/p CABG, RCC s/p partial nephrectomy, basal cell carcinoma, h/o TURP presented [place] [facility] with worsening weakness and difficulty ambulating x 3 weeks a/w non radiating retrosternal chest pain and TEMPLETON.  In [place] ED found to have sinus bradycardia HR 50's

## 2017-04-29 NOTE — TRANSFER ACCEPTANCE NOTE - HISTORY OF PRESENT ILLNESS
This is a 87yoM with PMHx DM, HTN, CAD s/p CABG, RCC s/p partial nephrectomy, basal cell carcinoma, h/o TURP presented [facility] with worsening weakness and difficulty ambulating x 3 weeks a/w non radiating retrosternal chest pain and TEMPLETON.  In [place] ED found to have sinus bradycardia HR 50's, given 1 dose of ASA and 1L NS bolus.  Patient transferred to [facility] CCU for further management of bradycardia.  Upon arrival to unit, patient was asymptomatic denies chest pain or discomfort, lightheadedness, dizziness.  EKG shows 1st degree AV block with occasional escape beats.  /63 HR 40-70's. This is a 87yoM with PMHx DM, HTN, CAD s/p CABG, RCC s/p partial nephrectomy, basal cell carcinoma, h/o TURP presented [facility] with worsening weakness and difficulty ambulating x 3 weeks a/w non radiating retrosternal chest pain and TEMPLETON.  In [place] ED found to have sinus bradycardia HR 50's, given 1 dose of ASA and 1L NS bolus.  Patient transferred to [facility] CCU for further management of bradycardia.  Upon arrival to unit, patient was asymptomatic denies chest pain or discomfort, lightheadedness, dizziness.  EKG shows 2nd  degree AV block type I .  /63 HR 40-70's.

## 2017-04-29 NOTE — TRANSFER ACCEPTANCE NOTE - PROBLEM SELECTOR PLAN 1
Patient found to be in 1st degree HB with frequent escape beats; asymptomatic   -Transfer to CCU for close monitoring  -Keeps pads on patient attached to Zoll   -Have atropine at the bedside  -EP workup for possible PPM  -Transcutaneous pacing/TVR for symptomatic bradycardia Patient found to be in 2nd degree HB type I , asymptomatic   -Transfer to CCU for close monitoring  -Keeps pads on patient attached to Zoll   -Have atropine at the bedside  -EP workup for possible PPM  -Transcutaneous pacing/TVR for symptomatic bradycardia

## 2017-04-30 LAB
ALBUMIN SERPL ELPH-MCNC: 3.8 G/DL — SIGNIFICANT CHANGE UP (ref 3.3–5)
ALP SERPL-CCNC: 64 U/L — SIGNIFICANT CHANGE UP (ref 40–120)
ALT FLD-CCNC: 10 U/L — SIGNIFICANT CHANGE UP (ref 4–41)
AST SERPL-CCNC: 14 U/L — SIGNIFICANT CHANGE UP (ref 4–40)
BASOPHILS # BLD AUTO: 0.03 K/UL — SIGNIFICANT CHANGE UP (ref 0–0.2)
BASOPHILS NFR BLD AUTO: 0.5 % — SIGNIFICANT CHANGE UP (ref 0–2)
BILIRUB SERPL-MCNC: 0.5 MG/DL — SIGNIFICANT CHANGE UP (ref 0.2–1.2)
BUN SERPL-MCNC: 23 MG/DL — SIGNIFICANT CHANGE UP (ref 7–23)
CALCIUM SERPL-MCNC: 9.4 MG/DL — SIGNIFICANT CHANGE UP (ref 8.4–10.5)
CHLORIDE SERPL-SCNC: 105 MMOL/L — SIGNIFICANT CHANGE UP (ref 98–107)
CO2 SERPL-SCNC: 24 MMOL/L — SIGNIFICANT CHANGE UP (ref 22–31)
CREAT SERPL-MCNC: 1.12 MG/DL — SIGNIFICANT CHANGE UP (ref 0.5–1.3)
EOSINOPHIL # BLD AUTO: 0.18 K/UL — SIGNIFICANT CHANGE UP (ref 0–0.5)
EOSINOPHIL NFR BLD AUTO: 3.1 % — SIGNIFICANT CHANGE UP (ref 0–6)
GLUCOSE SERPL-MCNC: 124 MG/DL — HIGH (ref 70–99)
HCT VFR BLD CALC: 32.5 % — LOW (ref 39–50)
HGB BLD-MCNC: 10.3 G/DL — LOW (ref 13–17)
IMM GRANULOCYTES NFR BLD AUTO: 0.2 % — SIGNIFICANT CHANGE UP (ref 0–1.5)
LYMPHOCYTES # BLD AUTO: 1.02 K/UL — SIGNIFICANT CHANGE UP (ref 1–3.3)
LYMPHOCYTES # BLD AUTO: 17.4 % — SIGNIFICANT CHANGE UP (ref 13–44)
MAGNESIUM SERPL-MCNC: 2 MG/DL — SIGNIFICANT CHANGE UP (ref 1.6–2.6)
MCHC RBC-ENTMCNC: 30.3 PG — SIGNIFICANT CHANGE UP (ref 27–34)
MCHC RBC-ENTMCNC: 31.7 % — LOW (ref 32–36)
MCV RBC AUTO: 95.6 FL — SIGNIFICANT CHANGE UP (ref 80–100)
MONOCYTES # BLD AUTO: 0.58 K/UL — SIGNIFICANT CHANGE UP (ref 0–0.9)
MONOCYTES NFR BLD AUTO: 9.9 % — SIGNIFICANT CHANGE UP (ref 2–14)
NEUTROPHILS # BLD AUTO: 4.04 K/UL — SIGNIFICANT CHANGE UP (ref 1.8–7.4)
NEUTROPHILS NFR BLD AUTO: 68.9 % — SIGNIFICANT CHANGE UP (ref 43–77)
PHOSPHATE SERPL-MCNC: 3 MG/DL — SIGNIFICANT CHANGE UP (ref 2.5–4.5)
PLATELET # BLD AUTO: 196 K/UL — SIGNIFICANT CHANGE UP (ref 150–400)
PMV BLD: 10.1 FL — SIGNIFICANT CHANGE UP (ref 7–13)
POTASSIUM SERPL-MCNC: 4.1 MMOL/L — SIGNIFICANT CHANGE UP (ref 3.5–5.3)
POTASSIUM SERPL-SCNC: 4.1 MMOL/L — SIGNIFICANT CHANGE UP (ref 3.5–5.3)
PROT SERPL-MCNC: 6.4 G/DL — SIGNIFICANT CHANGE UP (ref 6–8.3)
RBC # BLD: 3.4 M/UL — LOW (ref 4.2–5.8)
RBC # FLD: 15.9 % — HIGH (ref 10.3–14.5)
SODIUM SERPL-SCNC: 142 MMOL/L — SIGNIFICANT CHANGE UP (ref 135–145)
WBC # BLD: 5.86 K/UL — SIGNIFICANT CHANGE UP (ref 3.8–10.5)
WBC # FLD AUTO: 5.86 K/UL — SIGNIFICANT CHANGE UP (ref 3.8–10.5)

## 2017-04-30 PROCEDURE — 93010 ELECTROCARDIOGRAM REPORT: CPT

## 2017-04-30 RX ORDER — HEPARIN SODIUM 5000 [USP'U]/ML
5000 INJECTION INTRAVENOUS; SUBCUTANEOUS EVERY 12 HOURS
Qty: 0 | Refills: 0 | Status: DISCONTINUED | OUTPATIENT
Start: 2017-04-30 | End: 2017-04-30

## 2017-04-30 RX ORDER — LOPERAMIDE HCL 2 MG
2 TABLET ORAL ONCE
Qty: 0 | Refills: 0 | Status: COMPLETED | OUTPATIENT
Start: 2017-04-30 | End: 2017-04-30

## 2017-04-30 RX ADMIN — HEPARIN SODIUM 5000 UNIT(S): 5000 INJECTION INTRAVENOUS; SUBCUTANEOUS at 06:33

## 2017-04-30 RX ADMIN — HEPARIN SODIUM 5000 UNIT(S): 5000 INJECTION INTRAVENOUS; SUBCUTANEOUS at 17:56

## 2017-04-30 RX ADMIN — Medication 50 MICROGRAM(S): at 06:33

## 2017-04-30 RX ADMIN — PANTOPRAZOLE SODIUM 40 MILLIGRAM(S): 20 TABLET, DELAYED RELEASE ORAL at 06:33

## 2017-04-30 RX ADMIN — Medication 2 MILLIGRAM(S): at 22:51

## 2017-04-30 RX ADMIN — LOSARTAN POTASSIUM 50 MILLIGRAM(S): 100 TABLET, FILM COATED ORAL at 06:33

## 2017-05-01 PROCEDURE — 71010: CPT | Mod: 26

## 2017-05-01 PROCEDURE — 93010 ELECTROCARDIOGRAM REPORT: CPT | Mod: 77

## 2017-05-01 PROCEDURE — 93010 ELECTROCARDIOGRAM REPORT: CPT

## 2017-05-01 RX ORDER — MAGNESIUM SULFATE 500 MG/ML
2 VIAL (ML) INJECTION ONCE
Qty: 0 | Refills: 0 | Status: COMPLETED | OUTPATIENT
Start: 2017-05-01 | End: 2017-05-01

## 2017-05-01 RX ORDER — VANCOMYCIN HCL 1 G
1000 VIAL (EA) INTRAVENOUS ONCE
Qty: 0 | Refills: 0 | Status: COMPLETED | OUTPATIENT
Start: 2017-05-01 | End: 2017-05-01

## 2017-05-01 RX ADMIN — ATORVASTATIN CALCIUM 80 MILLIGRAM(S): 80 TABLET, FILM COATED ORAL at 22:03

## 2017-05-01 RX ADMIN — Medication 50 MICROGRAM(S): at 06:26

## 2017-05-01 RX ADMIN — Medication 1: at 13:42

## 2017-05-01 RX ADMIN — LOSARTAN POTASSIUM 50 MILLIGRAM(S): 100 TABLET, FILM COATED ORAL at 06:26

## 2017-05-01 RX ADMIN — Medication 250 MILLIGRAM(S): at 22:04

## 2017-05-01 RX ADMIN — Medication 50 GRAM(S): at 06:25

## 2017-05-01 RX ADMIN — PANTOPRAZOLE SODIUM 40 MILLIGRAM(S): 20 TABLET, DELAYED RELEASE ORAL at 06:26

## 2017-05-01 NOTE — DISCHARGE NOTE ADULT - MEDICATION SUMMARY - MEDICATIONS TO TAKE
I will START or STAY ON the medications listed below when I get home from the hospital:    losartan 50 mg oral tablet  -- 1 tab(s) by mouth once a day  -- Indication: For HTN (hypertension)    metFORMIN 1000 mg oral tablet  -- 1 tab(s) by mouth 2 times a day  -- Indication: For Diabetes    Farxiga 5 mg oral tablet  -- 1 tab(s) by mouth once a day  -- Indication: For Diabetes    Januvia 100 mg oral tablet  -- 1 tab(s) by mouth once a day  -- Indication: For Diabetes    atorvastatin 80 mg oral tablet  -- 1 tab(s) by mouth once a day (at bedtime)  -- Indication: For Hyperlipidemia     pantoprazole 40 mg oral delayed release tablet  -- 1 tab(s) by mouth once a day  -- Indication: For GERD (gastroesophageal reflux disease)    levothyroxine 50 mcg (0.05 mg) oral tablet  -- 1 tab(s) by mouth once a day  -- Indication: For Hypothyroid

## 2017-05-01 NOTE — DISCHARGE NOTE ADULT - CARE PLAN
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 Follow up with  () within a week after discharge.  Secondary Diagnosis:	Diabetes  Instructions for follow-up, activity and diet:	Continue with your home diabetic medications. Follow up with your primary physician within a week after discharge.  Secondary Diagnosis:	GERD (gastroesophageal reflux disease)  Instructions for follow-up, activity and diet:	Continue with PPI as prescribed.  Secondary Diagnosis:	HTN (hypertension)  Instructions for follow-up, activity and diet:	Continue with losartan.  Secondary Diagnosis:	Hypothyroid  Instructions for follow-up, activity and diet:	Continue with synthroid. 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 Follow up with Dr. Wright 5/11 at 1:40pm  Secondary Diagnosis:	Diabetes  Instructions for follow-up, activity and diet:	Continue with your home diabetic medications. Follow up with your primary physician within a week after discharge.  Secondary Diagnosis:	GERD (gastroesophageal reflux disease)  Instructions for follow-up, activity and diet:	Continue with PPI as prescribed.  Secondary Diagnosis:	HTN (hypertension)  Instructions for follow-up, activity and diet:	Continue with losartan.  Secondary Diagnosis:	Hypothyroid  Instructions for follow-up, activity and diet:	Continue with synthroid.

## 2017-05-01 NOTE — DISCHARGE NOTE ADULT - NS AS ACTIVITY OBS
Walking-Indoors allowed/activity as tolerated/Showering allowed/Stairs allowed/Walking-Outdoors allowed

## 2017-05-01 NOTE — DISCHARGE NOTE ADULT - PATIENT PORTAL LINK FT
“You can access the [employer] Patient Portal, offered by [facility], by registering with the following website: [email or web address]”

## 2017-05-01 NOTE — DISCHARGE NOTE ADULT - CARE PROVIDER_API CALL
Ema Arellano), Cardiovascular Disease; Internal Medicine  [address] [facility] [address] [place] [address]  Phone: [phone]  Fax: [phone]

## 2017-05-01 NOTE — DISCHARGE NOTE ADULT - PLAN OF CARE
No bradycardia Pt got pacemaker placed during this hospitalization. Follow up with  () within a week after discharge. Continue with your home diabetic medications. Follow up with your primary physician within a week after discharge. Continue with PPI as prescribed. Continue with losartan. Continue with synthroid. Pt got pacemaker placed during this hospitalization. Follow up with Dr. Wright 5/11 at 1:40pm

## 2017-05-02 VITALS — WEIGHT: 146.83 LBS

## 2017-05-02 LAB
ALBUMIN SERPL ELPH-MCNC: 3.4 G/DL — SIGNIFICANT CHANGE UP (ref 3.3–5)
ALP SERPL-CCNC: 59 U/L — SIGNIFICANT CHANGE UP (ref 40–120)
ALT FLD-CCNC: 9 U/L — SIGNIFICANT CHANGE UP (ref 4–41)
AST SERPL-CCNC: 13 U/L — SIGNIFICANT CHANGE UP (ref 4–40)
BASOPHILS # BLD AUTO: 0.03 K/UL — SIGNIFICANT CHANGE UP (ref 0–0.2)
BASOPHILS NFR BLD AUTO: 0.5 % — SIGNIFICANT CHANGE UP (ref 0–2)
BILIRUB SERPL-MCNC: 0.4 MG/DL — SIGNIFICANT CHANGE UP (ref 0.2–1.2)
BUN SERPL-MCNC: 21 MG/DL — SIGNIFICANT CHANGE UP (ref 7–23)
CALCIUM SERPL-MCNC: 9 MG/DL — SIGNIFICANT CHANGE UP (ref 8.4–10.5)
CHLORIDE SERPL-SCNC: 105 MMOL/L — SIGNIFICANT CHANGE UP (ref 98–107)
CO2 SERPL-SCNC: 23 MMOL/L — SIGNIFICANT CHANGE UP (ref 22–31)
CREAT SERPL-MCNC: 1.03 MG/DL — SIGNIFICANT CHANGE UP (ref 0.5–1.3)
EOSINOPHIL # BLD AUTO: 0.19 K/UL — SIGNIFICANT CHANGE UP (ref 0–0.5)
EOSINOPHIL NFR BLD AUTO: 3.4 % — SIGNIFICANT CHANGE UP (ref 0–6)
GLUCOSE SERPL-MCNC: 126 MG/DL — HIGH (ref 70–99)
HCT VFR BLD CALC: 29.2 % — LOW (ref 39–50)
HGB BLD-MCNC: 9.1 G/DL — LOW (ref 13–17)
IMM GRANULOCYTES NFR BLD AUTO: 0.2 % — SIGNIFICANT CHANGE UP (ref 0–1.5)
LYMPHOCYTES # BLD AUTO: 0.83 K/UL — LOW (ref 1–3.3)
LYMPHOCYTES # BLD AUTO: 15.1 % — SIGNIFICANT CHANGE UP (ref 13–44)
MAGNESIUM SERPL-MCNC: 2 MG/DL — SIGNIFICANT CHANGE UP (ref 1.6–2.6)
MCHC RBC-ENTMCNC: 29.6 PG — SIGNIFICANT CHANGE UP (ref 27–34)
MCHC RBC-ENTMCNC: 31.2 % — LOW (ref 32–36)
MCV RBC AUTO: 95.1 FL — SIGNIFICANT CHANGE UP (ref 80–100)
MONOCYTES # BLD AUTO: 0.48 K/UL — SIGNIFICANT CHANGE UP (ref 0–0.9)
MONOCYTES NFR BLD AUTO: 8.7 % — SIGNIFICANT CHANGE UP (ref 2–14)
NEUTROPHILS # BLD AUTO: 3.97 K/UL — SIGNIFICANT CHANGE UP (ref 1.8–7.4)
NEUTROPHILS NFR BLD AUTO: 72.1 % — SIGNIFICANT CHANGE UP (ref 43–77)
PHOSPHATE SERPL-MCNC: 3.1 MG/DL — SIGNIFICANT CHANGE UP (ref 2.5–4.5)
PLATELET # BLD AUTO: 199 K/UL — SIGNIFICANT CHANGE UP (ref 150–400)
PMV BLD: 9.6 FL — SIGNIFICANT CHANGE UP (ref 7–13)
POTASSIUM SERPL-MCNC: 4 MMOL/L — SIGNIFICANT CHANGE UP (ref 3.5–5.3)
POTASSIUM SERPL-SCNC: 4 MMOL/L — SIGNIFICANT CHANGE UP (ref 3.5–5.3)
PROT SERPL-MCNC: 5.7 G/DL — LOW (ref 6–8.3)
RBC # BLD: 3.07 M/UL — LOW (ref 4.2–5.8)
RBC # FLD: 16.1 % — HIGH (ref 10.3–14.5)
SODIUM SERPL-SCNC: 140 MMOL/L — SIGNIFICANT CHANGE UP (ref 135–145)
WBC # BLD: 5.51 K/UL — SIGNIFICANT CHANGE UP (ref 3.8–10.5)
WBC # FLD AUTO: 5.51 K/UL — SIGNIFICANT CHANGE UP (ref 3.8–10.5)

## 2017-05-02 RX ORDER — PSYLLIUM SEED (WITH DEXTROSE)
4 POWDER (GRAM) ORAL
Qty: 0 | Refills: 0 | COMMUNITY

## 2017-05-02 RX ORDER — ATORVASTATIN CALCIUM 80 MG/1
1 TABLET, FILM COATED ORAL
Qty: 40 | Refills: 0
Start: 2017-05-02 | End: 2017-06-11

## 2017-05-02 RX ADMIN — Medication 1: at 11:34

## 2017-05-02 RX ADMIN — LOSARTAN POTASSIUM 50 MILLIGRAM(S): 100 TABLET, FILM COATED ORAL at 05:29

## 2017-05-02 RX ADMIN — PANTOPRAZOLE SODIUM 40 MILLIGRAM(S): 20 TABLET, DELAYED RELEASE ORAL at 05:29

## 2017-05-02 RX ADMIN — Medication 50 MICROGRAM(S): at 05:29

## 2017-05-12 DIAGNOSIS — I10 ESSENTIAL (PRIMARY) HYPERTENSION: ICD-10-CM

## 2017-09-18 ENCOUNTER — EMERGENCY (EMERGENCY)
Facility: HOSPITAL | Age: 82
LOS: 1 days | Discharge: HOME | End: 2017-09-18
Attending: EMERGENCY MEDICINE
Payer: COMMERCIAL

## 2017-09-18 VITALS
WEIGHT: 145.06 LBS | HEIGHT: 63 IN | SYSTOLIC BLOOD PRESSURE: 167 MMHG | DIASTOLIC BLOOD PRESSURE: 83 MMHG | RESPIRATION RATE: 18 BRPM | OXYGEN SATURATION: 98 % | TEMPERATURE: 98 F | HEART RATE: 88 BPM

## 2017-09-18 VITALS
OXYGEN SATURATION: 98 % | DIASTOLIC BLOOD PRESSURE: 70 MMHG | RESPIRATION RATE: 18 BRPM | HEART RATE: 80 BPM | TEMPERATURE: 98 F | SYSTOLIC BLOOD PRESSURE: 144 MMHG

## 2017-09-18 DIAGNOSIS — Z90.5 ACQUIRED ABSENCE OF KIDNEY: ICD-10-CM

## 2017-09-18 DIAGNOSIS — I10 ESSENTIAL (PRIMARY) HYPERTENSION: ICD-10-CM

## 2017-09-18 DIAGNOSIS — Z90.5 ACQUIRED ABSENCE OF KIDNEY: Chronic | ICD-10-CM

## 2017-09-18 DIAGNOSIS — Z95.1 PRESENCE OF AORTOCORONARY BYPASS GRAFT: ICD-10-CM

## 2017-09-18 DIAGNOSIS — Z95.1 PRESENCE OF AORTOCORONARY BYPASS GRAFT: Chronic | ICD-10-CM

## 2017-09-18 DIAGNOSIS — E11.9 TYPE 2 DIABETES MELLITUS WITHOUT COMPLICATIONS: ICD-10-CM

## 2017-09-18 DIAGNOSIS — Z79.84 LONG TERM (CURRENT) USE OF ORAL HYPOGLYCEMIC DRUGS: ICD-10-CM

## 2017-09-18 DIAGNOSIS — K59.00 CONSTIPATION, UNSPECIFIED: ICD-10-CM

## 2017-09-18 DIAGNOSIS — Z90.79 ACQUIRED ABSENCE OF OTHER GENITAL ORGAN(S): Chronic | ICD-10-CM

## 2017-09-18 DIAGNOSIS — E03.9 HYPOTHYROIDISM, UNSPECIFIED: ICD-10-CM

## 2017-09-18 PROCEDURE — 99284 EMERGENCY DEPT VISIT MOD MDM: CPT

## 2017-09-18 PROCEDURE — 99283 EMERGENCY DEPT VISIT LOW MDM: CPT

## 2017-09-18 RX ORDER — SOD SULF/SODIUM/NAHCO3/KCL/PEG
4000 SOLUTION, RECONSTITUTED, ORAL ORAL ONCE
Qty: 0 | Refills: 0 | Status: COMPLETED | OUTPATIENT
Start: 2017-09-18 | End: 2017-09-18

## 2017-09-18 RX ADMIN — Medication 4000 MILLILITER(S): at 18:26

## 2017-09-18 RX ADMIN — Medication 1 ENEMA: at 18:26

## 2017-09-18 NOTE — ED ADULT NURSE NOTE - ED STAT RN HANDOFF DETAILS
endorsed to FUAD Nathan in G1 in stable condition for continuation of care. pt a&ox3, here for constipation. fleet enema and go-lytely given. pending MD dispo.

## 2017-09-18 NOTE — ED ADULT NURSE NOTE - OBJECTIVE STATEMENT
pt a&ox3, here with c/o abdominal pain. pt states constipation this morning. last BM was yesterday. took 2 senna tabs this morning. denies n/v, abdominal pain.

## 2017-09-18 NOTE — ED PROVIDER NOTE - OBJECTIVE STATEMENT
88 y/o M pt with PMHx of DM, GERD, HTN, and Hypothyroidism and PSHx of Heart Bypass Surgery, Partial Nephrectomy, and TURP presents to ED c/o constant abd pain x3 days and constipation x4 days. Pt denies nausea, vomiting, diarrhea, or any other complaints. NKDA.

## 2017-09-18 NOTE — ED PROVIDER NOTE - MEDICAL DECISION MAKING DETAILS
86 y/o M pt presents with abd pain and constipation. Plan for fleet enema and Golytely, and then reassess.

## 2018-08-07 ENCOUNTER — APPOINTMENT (OUTPATIENT)
Dept: HEART AND VASCULAR | Facility: CLINIC | Age: 83
End: 2018-08-07
Payer: COMMERCIAL

## 2018-08-07 VITALS — SYSTOLIC BLOOD PRESSURE: 118 MMHG | DIASTOLIC BLOOD PRESSURE: 60 MMHG | HEART RATE: 76 BPM

## 2018-08-07 VITALS
HEIGHT: 61 IN | HEART RATE: 76 BPM | DIASTOLIC BLOOD PRESSURE: 62 MMHG | SYSTOLIC BLOOD PRESSURE: 120 MMHG | BODY MASS INDEX: 26.62 KG/M2 | WEIGHT: 141 LBS

## 2018-08-07 DIAGNOSIS — I25.10 ATHEROSCLEROTIC HEART DISEASE OF NATIVE CORONARY ARTERY W/OUT ANGINA PECTORIS: ICD-10-CM

## 2018-08-07 DIAGNOSIS — I10 ESSENTIAL (PRIMARY) HYPERTENSION: ICD-10-CM

## 2018-08-07 DIAGNOSIS — Z87.19 PERSONAL HISTORY OF OTHER DISEASES OF THE DIGESTIVE SYSTEM: ICD-10-CM

## 2018-08-07 DIAGNOSIS — E78.5 HYPERLIPIDEMIA, UNSPECIFIED: ICD-10-CM

## 2018-08-07 DIAGNOSIS — Z95.0 PRESENCE OF CARDIAC PACEMAKER: ICD-10-CM

## 2018-08-07 DIAGNOSIS — E11.9 TYPE 2 DIABETES MELLITUS W/OUT COMPLICATIONS: ICD-10-CM

## 2018-08-07 PROBLEM — K21.9 GASTRO-ESOPHAGEAL REFLUX DISEASE WITHOUT ESOPHAGITIS: Chronic | Status: ACTIVE | Noted: 2017-04-28

## 2018-08-07 PROBLEM — E03.9 HYPOTHYROIDISM, UNSPECIFIED: Chronic | Status: ACTIVE | Noted: 2017-04-28

## 2018-08-07 PROCEDURE — 93306 TTE W/DOPPLER COMPLETE: CPT

## 2018-08-07 PROCEDURE — 99215 OFFICE O/P EST HI 40 MIN: CPT | Mod: 25

## 2018-08-07 PROCEDURE — 93000 ELECTROCARDIOGRAM COMPLETE: CPT

## 2018-08-07 RX ORDER — MULTIVIT-MIN/FOLIC/VIT K/LYCOP 400-300MCG
50 MCG TABLET ORAL DAILY
Refills: 0 | Status: ACTIVE | COMMUNITY

## 2018-08-07 RX ORDER — SITAGLIPTIN 100 MG/1
100 TABLET, FILM COATED ORAL DAILY
Refills: 0 | Status: ACTIVE | COMMUNITY

## 2018-08-07 RX ORDER — ROSUVASTATIN CALCIUM 40 MG/1
40 TABLET, FILM COATED ORAL DAILY
Refills: 0 | Status: ACTIVE | COMMUNITY

## 2018-08-07 RX ORDER — METFORMIN HYDROCHLORIDE 1000 MG/1
1000 TABLET, FILM COATED ORAL TWICE DAILY
Refills: 0 | Status: ACTIVE | COMMUNITY

## 2018-08-07 RX ORDER — ASPIRIN 81 MG/1
81 TABLET, CHEWABLE ORAL DAILY
Refills: 0 | Status: ACTIVE | COMMUNITY

## 2018-08-07 RX ORDER — LEVOTHYROXINE SODIUM 0.05 MG/1
50 TABLET ORAL DAILY
Refills: 0 | Status: ACTIVE | COMMUNITY

## 2018-08-10 PROBLEM — E11.9 DIABETES MELLITUS: Status: ACTIVE | Noted: 2018-08-10

## 2018-08-10 PROBLEM — Z95.0 PACEMAKER: Status: ACTIVE | Noted: 2018-08-10

## 2018-08-10 PROBLEM — Z87.19 HISTORY OF DUODENAL ULCER: Status: RESOLVED | Noted: 2018-08-10 | Resolved: 2018-08-10

## 2018-08-10 PROBLEM — E78.5 DYSLIPIDEMIA: Status: ACTIVE | Noted: 2018-08-10

## 2018-08-10 PROBLEM — I25.10 CAD (CORONARY ARTERY DISEASE): Status: ACTIVE | Noted: 2018-08-10

## 2018-08-10 RX ORDER — LOSARTAN POTASSIUM 50 MG/1
50 TABLET, FILM COATED ORAL DAILY
Refills: 0 | Status: DISCONTINUED | COMMUNITY
End: 2018-08-10

## 2018-08-21 ENCOUNTER — APPOINTMENT (OUTPATIENT)
Dept: HEART AND VASCULAR | Facility: CLINIC | Age: 83
End: 2018-08-21
Payer: MEDICARE

## 2018-08-21 PROCEDURE — ZZZZZ: CPT

## 2018-10-22 NOTE — DISCHARGE NOTE ADULT - CONDITIONS AT DISCHARGE
70M with past medical history Hypertension, diabetes mellitus, presents with acute onset R sided neck pain this afternoon.  Feels like cramp, pain w/w movement of head.  Unable to turn head to right.   Was sitting down when it started today.  Never before experienced. stable

## 2019-03-22 ENCOUNTER — RECORD ABSTRACTING (OUTPATIENT)
Age: 84
End: 2019-03-22

## 2019-04-16 ENCOUNTER — APPOINTMENT (OUTPATIENT)
Dept: HEART AND VASCULAR | Facility: CLINIC | Age: 84
End: 2019-04-16

## 2019-06-03 ENCOUNTER — RX RENEWAL (OUTPATIENT)
Age: 84
End: 2019-06-03

## 2019-06-03 RX ORDER — LOSARTAN POTASSIUM 25 MG/1
25 TABLET, FILM COATED ORAL DAILY
Qty: 90 | Refills: 3 | Status: ACTIVE | COMMUNITY
Start: 2018-08-10 | End: 1900-01-01

## 2021-09-20 NOTE — ED ADULT NURSE NOTE - CHPI ED SYMPTOMS NEG
Medicare Wellness Visit  Plan for Preventive Care    A good way for you to stay healthy is to use preventive care.  Medicare covers many services that can help you stay healthy.* The goal of these services is to find any health problems as quickly as possible. Finding problems early can help make them easier to treat.  Your personal plan below lists the services you may need and when they are due.     Health Maintenance Summary     Shingles Vaccine (2 of 3)  Postponed until 9/21/2022    DTaP/Tdap/Td Vaccine (2 - Td or Tdap)  Next due on 8/13/2022    Medicare Wellness Visit (Yearly)  Next due on 9/20/2022    Depression Screening (Yearly)  Next due on 9/20/2022    Breast Cancer Screening (Every 2 Years)  Next due on 2/10/2023    Colorectal Cancer Screen- (Colonoscopy - Every 10 Years)  Next due on 10/16/2027    Osteoporosis Screening   Completed    Hepatitis C Screening   Completed    Pneumococcal Vaccine 65+   Completed    COVID-19 Vaccine   Completed    Influenza Vaccine   Completed    Hepatitis B Vaccine   Aged Out    Meningococcal Vaccine   Aged Out    HPV Vaccine   Aged Out           Preventive Care for Women and Men    Heart Screenings (Cardiovascular):  · Blood tests are used to check your cholesterol, lipid and triglyceride levels. High levels can increase your risk for heart disease and stroke. High levels can be treated with medications, diet and exercise. Lowering your levels can help keep your heart and blood vessels healthy.  Your provider will order these tests if they are needed.    · An ultrasound is done to see if you have an abdominal aortic aneurysm (AAA).  This is an enlargement of one of the main blood vessels that delivers blood to the body.   In the [place], 9,000 deaths are caused by AAA.  You may not even know you have this problem and as many as 1 in 3 people will have a serious problem if it is not treated.  Early diagnosis allows for more effective treatment and cure.  If you have a  family history of AAA or are a male age 65-75 who has smoked, you are at higher risk of an AAA.  Your provider can order this test, if needed.    Colorectal Screening:  · There are many tests that are used to check for cancer of your colon and rectum. You and your provider should discuss what test is best for you and when to have it done.  Options include:  · Screening Colonoscopy: exam of the entire colon, seen through a flexible lighted tube.  · Flexible Sigmoidoscopy: exam of the last third (sigmoid portion) of the colon and rectum, seen through a flexible lighted tube.  · Cologuard DNA stool test: a sample of your stool is used to screen for cancer and unseen blood in your stool.  · Fecal Occult Blood Test: a sample of your stool is studied to find any unseen blood    Flu Shot:  · An immunization that helps to prevent influenza (the flu). You should get this every year. The best time to get the shot is in the fall.    Pneumococcal Shot:  • Vaccines are available that can help prevent pneumococcal disease, which is any type of infection caused by Streptococcus pneumoniae bacteria.   Their use can prevent some cases of pneumonia, meningitis, and sepsis. There are two types of pneumococcal vaccines:   o Conjugate vaccines (PCV-13 or Prevnar 13®) - helps protect against the 13 types of pneumococcal bacteria that are the most common causes of serious infections in children and adults.    o Polysaccharide vaccine (PPSV23 or Xertuonco19®) - helps protect against 23 types of pneumococcal bacteria for patients who are recommended to get it.  These vaccines should be given at least 12 months apart.  A booster is usually not needed.     Hepatitis B Shot:  · An immunization that helps to protect people from getting Hepatitis B. Hepatitis B is a virus that spreads through contact with infected blood or body fluids. Many people with the virus do not have symptoms.  The virus can lead to serious problems, such as liver  disease. Some people are at higher risk than others. Your doctor will tell you if you need this shot.     Diabetes Screening:  · A test to measure sugar (glucose) in your blood is called a fasting blood sugar. Fasting means you cannot have food or drink for at least 8 hours before the test. This test can detect diabetes long before you may notice symptoms.    Glaucoma Screening:  · Glaucoma screening is performed by your eye doctor. The test measures the fluid pressure inside your eyes to determine if you have glaucoma.     Hepatitis C Screening:  · A blood test to see if you have the hepatitis C virus.  Hepatitis C attacks the liver and is a major cause of chronic liver disease.  Medicare will cover a single screening for all adults born between 1945 & 1965, or high risk patients (people who have injected illegal drugs or people who have had blood transfusions).  High risk patients who continue to inject illegal drugs can be screened for Hepatitis C every year.    Smoking and Tobacco-Use Cessation Counseling:  · Tobacco is the single greatest cause of disease and early death in our country today. Medication and counseling together can increase a person’s chance of quitting for good.   · Medicare covers two quitting attempts per year, with four counseling sessions per attempt (eight sessions in a 12 month period)    Preventive Screening tests for Women    Screening Mammograms and Breast Exams:  · An x-ray of your breasts to check for breast cancer before you or your doctor may be able to feel it.  If breast cancer is found early it can usually be treated with success.    Pelvic Exams and Pap Tests:  · An exam to check for cervical and vaginal cancer. A Pap test is a lab test in which cells are taken from your cervix and sent to the lab to look for signs of cervical cancer. If cancer of the cervix is found early, chances for a cure are good. Testing can generally end at age 65, or if a woman has a hysterectomy for a  benign condition. Your provider may recommend more frequent testing if certain abnormal results are found.    Bone Mass Measurements:  · A painless x-ray of your bone density to see if you are at risk for a broken bone. Bone density refers to the thickness of bones or how tightly the bone tissue is packed.    Preventive Screening tests for Men    Prostate Screening:  · Should you have a prostate cancer test (PSA)?  It is up to you to decide if you want a prostate cancer test. Talk to your clinician to find out if the test is right for you.  Things for you to consider and talk about should include:  · Benefits and harms of the test  · Your family history  · How your race/ethnicity may influence the test  · If the test may impact other medical conditions you have  · Your values on screenings and treatments    *Medicare pays for many preventive services to keep you healthy. For some of these services, you might have to pay a deductible, coinsurance, and / or copayment.  The amounts vary depending on the type of services you need and the kind of Medicare health plan you have.    For further details on screenings offered by Medicare please visit: https://www.medicare.gov/coverage/preventive-screening-services      no chills/no fever/no vomiting/no nausea

## 2021-10-06 PROBLEM — I10 ESSENTIAL HYPERTENSION: Status: ACTIVE | Noted: 2018-08-10

## 2022-10-28 ENCOUNTER — INPATIENT (INPATIENT)
Facility: HOSPITAL | Age: 87
LOS: 12 days | Discharge: NURSING, REHAB OR LONG-TERM CARE | DRG: 280 | End: 2022-11-10
Payer: MEDICARE

## 2022-10-28 VITALS
HEART RATE: 135 BPM | RESPIRATION RATE: 20 BRPM | DIASTOLIC BLOOD PRESSURE: 92 MMHG | TEMPERATURE: 98 F | WEIGHT: 145.06 LBS | SYSTOLIC BLOOD PRESSURE: 132 MMHG | OXYGEN SATURATION: 97 % | HEIGHT: 63 IN

## 2022-10-28 DIAGNOSIS — Z95.1 PRESENCE OF AORTOCORONARY BYPASS GRAFT: Chronic | ICD-10-CM

## 2022-10-28 DIAGNOSIS — Z90.5 ACQUIRED ABSENCE OF KIDNEY: Chronic | ICD-10-CM

## 2022-10-28 DIAGNOSIS — Z90.79 ACQUIRED ABSENCE OF OTHER GENITAL ORGAN(S): Chronic | ICD-10-CM

## 2022-10-28 LAB
ALBUMIN SERPL ELPH-MCNC: 3.3 G/DL — LOW (ref 3.5–5)
ALP SERPL-CCNC: 139 U/L — HIGH (ref 40–120)
ALT FLD-CCNC: 33 U/L DA — SIGNIFICANT CHANGE UP (ref 10–60)
ANION GAP SERPL CALC-SCNC: 8 MMOL/L — SIGNIFICANT CHANGE UP (ref 5–17)
APPEARANCE UR: CLEAR — SIGNIFICANT CHANGE UP
APTT BLD: 34.1 SEC — SIGNIFICANT CHANGE UP (ref 27.5–35.5)
AST SERPL-CCNC: 26 U/L — SIGNIFICANT CHANGE UP (ref 10–40)
B PERT DNA SPEC QL NAA+PROBE: SIGNIFICANT CHANGE UP
BACTERIA # UR AUTO: ABNORMAL /HPF
BASE EXCESS BLDV CALC-SCNC: -7 MMOL/L — SIGNIFICANT CHANGE UP
BASOPHILS # BLD AUTO: 0.05 K/UL — SIGNIFICANT CHANGE UP (ref 0–0.2)
BASOPHILS NFR BLD AUTO: 0.8 % — SIGNIFICANT CHANGE UP (ref 0–2)
BILIRUB SERPL-MCNC: 0.4 MG/DL — SIGNIFICANT CHANGE UP (ref 0.2–1.2)
BILIRUB UR-MCNC: NEGATIVE — SIGNIFICANT CHANGE UP
BUN SERPL-MCNC: 42 MG/DL — HIGH (ref 7–18)
C PNEUM DNA SPEC QL NAA+PROBE: SIGNIFICANT CHANGE UP
CALCIUM SERPL-MCNC: 9.4 MG/DL — SIGNIFICANT CHANGE UP (ref 8.4–10.5)
CHLORIDE SERPL-SCNC: 113 MMOL/L — HIGH (ref 96–108)
CO2 SERPL-SCNC: 19 MMOL/L — LOW (ref 22–31)
COLOR SPEC: YELLOW — SIGNIFICANT CHANGE UP
CREAT SERPL-MCNC: 1.85 MG/DL — HIGH (ref 0.5–1.3)
DIFF PNL FLD: ABNORMAL
EGFR: 34 ML/MIN/1.73M2 — LOW
EOSINOPHIL # BLD AUTO: 0.12 K/UL — SIGNIFICANT CHANGE UP (ref 0–0.5)
EOSINOPHIL NFR BLD AUTO: 1.9 % — SIGNIFICANT CHANGE UP (ref 0–6)
EPI CELLS # UR: ABNORMAL /HPF
FLUAV H1 2009 PAND RNA SPEC QL NAA+PROBE: SIGNIFICANT CHANGE UP
FLUAV H1 RNA SPEC QL NAA+PROBE: SIGNIFICANT CHANGE UP
FLUAV H3 RNA SPEC QL NAA+PROBE: SIGNIFICANT CHANGE UP
FLUAV SUBTYP SPEC NAA+PROBE: SIGNIFICANT CHANGE UP
FLUBV RNA SPEC QL NAA+PROBE: SIGNIFICANT CHANGE UP
GLUCOSE SERPL-MCNC: 159 MG/DL — HIGH (ref 70–99)
GLUCOSE UR QL: 1000 MG/DL
GRAN CASTS # UR COMP ASSIST: ABNORMAL /LPF
HADV DNA SPEC QL NAA+PROBE: SIGNIFICANT CHANGE UP
HCO3 BLDV-SCNC: 20 MMOL/L — LOW (ref 22–29)
HCOV PNL SPEC NAA+PROBE: SIGNIFICANT CHANGE UP
HCT VFR BLD CALC: 35.2 % — LOW (ref 39–50)
HGB BLD-MCNC: 10.5 G/DL — LOW (ref 13–17)
HMPV RNA SPEC QL NAA+PROBE: SIGNIFICANT CHANGE UP
HPIV1 RNA SPEC QL NAA+PROBE: SIGNIFICANT CHANGE UP
HPIV2 RNA SPEC QL NAA+PROBE: SIGNIFICANT CHANGE UP
HPIV3 RNA SPEC QL NAA+PROBE: SIGNIFICANT CHANGE UP
HPIV4 RNA SPEC QL NAA+PROBE: SIGNIFICANT CHANGE UP
HYALINE CASTS # UR AUTO: ABNORMAL /LPF
IMM GRANULOCYTES NFR BLD AUTO: 0.5 % — SIGNIFICANT CHANGE UP (ref 0–0.9)
INR BLD: 1.18 RATIO — HIGH (ref 0.88–1.16)
KETONES UR-MCNC: NEGATIVE — SIGNIFICANT CHANGE UP
LACTATE SERPL-SCNC: 2 MMOL/L — SIGNIFICANT CHANGE UP (ref 0.7–2)
LEUKOCYTE ESTERASE UR-ACNC: NEGATIVE — SIGNIFICANT CHANGE UP
LYMPHOCYTES # BLD AUTO: 1.07 K/UL — SIGNIFICANT CHANGE UP (ref 1–3.3)
LYMPHOCYTES # BLD AUTO: 17.1 % — SIGNIFICANT CHANGE UP (ref 13–44)
MCHC RBC-ENTMCNC: 28.1 PG — SIGNIFICANT CHANGE UP (ref 27–34)
MCHC RBC-ENTMCNC: 29.8 GM/DL — LOW (ref 32–36)
MCV RBC AUTO: 94.1 FL — SIGNIFICANT CHANGE UP (ref 80–100)
MONOCYTES # BLD AUTO: 0.48 K/UL — SIGNIFICANT CHANGE UP (ref 0–0.9)
MONOCYTES NFR BLD AUTO: 7.7 % — SIGNIFICANT CHANGE UP (ref 2–14)
NEUTROPHILS # BLD AUTO: 4.51 K/UL — SIGNIFICANT CHANGE UP (ref 1.8–7.4)
NEUTROPHILS NFR BLD AUTO: 72 % — SIGNIFICANT CHANGE UP (ref 43–77)
NITRITE UR-MCNC: NEGATIVE — SIGNIFICANT CHANGE UP
NRBC # BLD: 0 /100 WBCS — SIGNIFICANT CHANGE UP (ref 0–0)
NT-PROBNP SERPL-SCNC: HIGH PG/ML (ref 0–450)
PCO2 BLDV: 46 MMHG — SIGNIFICANT CHANGE UP (ref 42–55)
PH BLDV: 7.25 — LOW (ref 7.32–7.43)
PH UR: 5 — SIGNIFICANT CHANGE UP (ref 5–8)
PLATELET # BLD AUTO: 194 K/UL — SIGNIFICANT CHANGE UP (ref 150–400)
PO2 BLDV: 24 MMHG — SIGNIFICANT CHANGE UP
POTASSIUM SERPL-MCNC: 4.1 MMOL/L — SIGNIFICANT CHANGE UP (ref 3.5–5.3)
POTASSIUM SERPL-SCNC: 4.1 MMOL/L — SIGNIFICANT CHANGE UP (ref 3.5–5.3)
PROT SERPL-MCNC: 6.9 G/DL — SIGNIFICANT CHANGE UP (ref 6–8.3)
PROT UR-MCNC: 30 MG/DL
PROTHROM AB SERPL-ACNC: 14.1 SEC — HIGH (ref 10.5–13.4)
RAPID RVP RESULT: SIGNIFICANT CHANGE UP
RBC # BLD: 3.74 M/UL — LOW (ref 4.2–5.8)
RBC # FLD: 18.3 % — HIGH (ref 10.3–14.5)
RBC CASTS # UR COMP ASSIST: SIGNIFICANT CHANGE UP /HPF (ref 0–2)
RSV RNA SPEC QL NAA+PROBE: SIGNIFICANT CHANGE UP
RV+EV RNA SPEC QL NAA+PROBE: SIGNIFICANT CHANGE UP
SAO2 % BLDV: 28 % — SIGNIFICANT CHANGE UP
SARS-COV-2 RNA SPEC QL NAA+PROBE: SIGNIFICANT CHANGE UP
SODIUM SERPL-SCNC: 140 MMOL/L — SIGNIFICANT CHANGE UP (ref 135–145)
SP GR SPEC: 1.02 — SIGNIFICANT CHANGE UP (ref 1.01–1.02)
TROPONIN I, HIGH SENSITIVITY RESULT: 60.6 NG/L — SIGNIFICANT CHANGE UP
UROBILINOGEN FLD QL: NEGATIVE — SIGNIFICANT CHANGE UP
WBC # BLD: 6.26 K/UL — SIGNIFICANT CHANGE UP (ref 3.8–10.5)
WBC # FLD AUTO: 6.26 K/UL — SIGNIFICANT CHANGE UP (ref 3.8–10.5)
WBC UR QL: SIGNIFICANT CHANGE UP /HPF (ref 0–5)

## 2022-10-28 PROCEDURE — 70450 CT HEAD/BRAIN W/O DYE: CPT | Mod: 26,MA

## 2022-10-28 PROCEDURE — 71045 X-RAY EXAM CHEST 1 VIEW: CPT | Mod: 26

## 2022-10-28 PROCEDURE — 72170 X-RAY EXAM OF PELVIS: CPT | Mod: 26

## 2022-10-28 PROCEDURE — 72125 CT NECK SPINE W/O DYE: CPT | Mod: 26,MA

## 2022-10-28 PROCEDURE — 99285 EMERGENCY DEPT VISIT HI MDM: CPT | Mod: CS

## 2022-10-28 RX ORDER — SODIUM CHLORIDE 9 MG/ML
500 INJECTION INTRAMUSCULAR; INTRAVENOUS; SUBCUTANEOUS ONCE
Refills: 0 | Status: COMPLETED | OUTPATIENT
Start: 2022-10-28 | End: 2022-10-28

## 2022-10-28 RX ADMIN — SODIUM CHLORIDE 1000 MILLILITER(S): 9 INJECTION INTRAMUSCULAR; INTRAVENOUS; SUBCUTANEOUS at 20:53

## 2022-10-28 NOTE — ED PROVIDER NOTE - NS ED ROS FT
Review of Systems    Constitutional: (-) fever, (-) chills, (+) fatigue  HEENT: (-) sore throat, (-) hearing loss, (-) nasal congestion  Cardiovascular: (-) chest pain, (-) syncope  Respiratory: (-) cough, (+) shortness of breath  Gastrointestinal: (-) vomiting, (-) diarrhea, (-) abdominal pain  Musculoskeletal: (-) neck pain, (-) back pain, (-) joint pain  Integumentary: (-) rash, (-) edema, (-) wound  Neurological: (-) headache, (-) altered mental status    Except as documented in the HPI, all other systems are negative.

## 2022-10-28 NOTE — ED PROVIDER NOTE - PROGRESS NOTE DETAILS
Lucks-DO: pt with persistent tachycardia, paced rhythm. CXR showing signs of fluid overload, lasix ordered. Discussed with hospitalist, accepted for admission.

## 2022-10-28 NOTE — ED PROVIDER NOTE - PHYSICAL EXAMINATION
CONSTITUTIONAL: non-toxic, well appearing  SKIN: no rash, no petechiae.  EYES: PERRL, EOMI, pink conjunctiva, anicteric  ENT: tongue and uvular midline, no exudates, moist mucous membranes  NECK: Supple; no meningismus, no JVD  CARD: RRR, no murmurs, equal radial pulses bilaterally 2+  RESP: diminished breath sounds at bases, no respiratory distress  ABD: Soft, non-tender, non-distended, no peritoneal signs  EXT: Normal ROM x4. No edema.  NEURO: Alert, oriented. Neuro exam nonfocal.  PSYCH: Cooperative, appropriate.

## 2022-10-28 NOTE — ED ADULT TRIAGE NOTE - CHIEF COMPLAINT QUOTE
patient states " many years I have problem in chest, today I am trying to get out of my couch and I fell "  as per EMS wife states he slipped and fell landed on his behind "  No LOC, Denies head injury  Patient denies chest pains.
none

## 2022-10-28 NOTE — ED ADULT NURSE NOTE - ED STAT RN HANDOFF DETAILS
pt. remained stable. on tel box 20 with sinus tachycardia. hr133 dr. carty aware. no acute distress noted

## 2022-10-28 NOTE — ED PROVIDER NOTE - CLINICAL SUMMARY MEDICAL DECISION MAKING FREE TEXT BOX
Lizandro: 89-year-old male with past medical history of hyperlipidemia, diabetes, atrial fibrillation on Eliquis presents with generalized weakness x 2 days.  Patient reports generalized weakness and mild shortness of breath of the past 2 days.  Patient states he tried to stand up from sitting today, felt generally weak, fell onto buttocks, unable to ambulate afterwards.  Denies any head strike or LOC.  Denies any fevers, chest pain, vomiting, diarrhea, bloody stools, black tarry stools, dysuria, numbness, weakness, or rash.  Denies any recent illness.  Likely infectious vs. metabolic. Extremities NVI, GCS 15. Will obtain labs, imaging r/o fx r/o ICH, likely admission.

## 2022-10-28 NOTE — ED ADULT NURSE NOTE - CHIEF COMPLAINT QUOTE
patient states " many years I have problem in chest, today I am trying to get out of my couch and I fell "  as per EMS wife states he slipped and fell landed on his behind "  No LOC, Denies head injury  Patient denies chest pains.

## 2022-10-28 NOTE — ED ADULT NURSE NOTE - OBJECTIVE STATEMENT
s/p fALL as per wife he slipped and fell landed on his behind. denies LOC &  head trauma. bld. drawn and sent to labs

## 2022-10-28 NOTE — ED PROVIDER NOTE - NSICDXPASTMEDICALHX_GEN_ALL_CORE_FT
PAST MEDICAL HISTORY:  Diabetes     GERD (gastroesophageal reflux disease)     HTN (hypertension)     Hypothyroid

## 2022-10-28 NOTE — ED PROVIDER NOTE - OBJECTIVE STATEMENT
89-year-old male with past medical history of hyperlipidemia, diabetes, atrial fibrillation on Eliquis presents with generalized weakness x 2 days.  Patient reports generalized weakness and mild shortness of breath of the past 2 days.  Patient states he tried to stand up from sitting today, felt generally weak, fell onto buttocks, unable to ambulate afterwards.  Denies any head strike or LOC.  Denies any fevers, chest pain, vomiting, diarrhea, bloody stools, black tarry stools, dysuria, numbness, weakness, or rash.  Denies any recent illness.  Denies any additional complaints.

## 2022-10-29 DIAGNOSIS — E87.20 ACIDOSIS, UNSPECIFIED: ICD-10-CM

## 2022-10-29 DIAGNOSIS — R19.7 DIARRHEA, UNSPECIFIED: ICD-10-CM

## 2022-10-29 DIAGNOSIS — R53.1 WEAKNESS: ICD-10-CM

## 2022-10-29 DIAGNOSIS — Z29.9 ENCOUNTER FOR PROPHYLACTIC MEASURES, UNSPECIFIED: ICD-10-CM

## 2022-10-29 DIAGNOSIS — N17.9 ACUTE KIDNEY FAILURE, UNSPECIFIED: ICD-10-CM

## 2022-10-29 DIAGNOSIS — R06.00 DYSPNEA, UNSPECIFIED: ICD-10-CM

## 2022-10-29 DIAGNOSIS — I48.91 UNSPECIFIED ATRIAL FIBRILLATION: ICD-10-CM

## 2022-10-29 LAB
ALBUMIN SERPL ELPH-MCNC: 2.6 G/DL — LOW (ref 3.5–5)
ALP SERPL-CCNC: 128 U/L — HIGH (ref 40–120)
ALT FLD-CCNC: 35 U/L DA — SIGNIFICANT CHANGE UP (ref 10–60)
APTT BLD: 112.6 SEC — HIGH (ref 27.5–35.5)
AST SERPL-CCNC: 40 U/L — SIGNIFICANT CHANGE UP (ref 10–40)
BASE EXCESS BLDV CALC-SCNC: -0.9 MMOL/L — SIGNIFICANT CHANGE UP
BASE EXCESS BLDV CALC-SCNC: -7.4 MMOL/L — SIGNIFICANT CHANGE UP
BASOPHILS # BLD AUTO: 0.02 K/UL — SIGNIFICANT CHANGE UP (ref 0–0.2)
BASOPHILS # BLD AUTO: 0.03 K/UL — SIGNIFICANT CHANGE UP (ref 0–0.2)
BASOPHILS NFR BLD AUTO: 0.3 % — SIGNIFICANT CHANGE UP (ref 0–2)
BASOPHILS NFR BLD AUTO: 0.4 % — SIGNIFICANT CHANGE UP (ref 0–2)
BILIRUB SERPL-MCNC: 0.6 MG/DL — SIGNIFICANT CHANGE UP (ref 0.2–1.2)
BUN SERPL-MCNC: 43 MG/DL — HIGH (ref 7–18)
CALCIUM SERPL-MCNC: 8.7 MG/DL — SIGNIFICANT CHANGE UP (ref 8.4–10.5)
CHLORIDE SERPL-SCNC: 103 MMOL/L — SIGNIFICANT CHANGE UP (ref 96–108)
CK MB BLD-MCNC: 4.7 % — HIGH (ref 0–3.5)
CK MB CFR SERPL CALC: 2.2 NG/ML — SIGNIFICANT CHANGE UP (ref 0–3.6)
CK MB CFR SERPL CALC: 3.2 NG/ML — SIGNIFICANT CHANGE UP (ref 0–3.6)
CK SERPL-CCNC: 68 U/L — SIGNIFICANT CHANGE UP (ref 35–232)
CO2 SERPL-SCNC: 13 MMOL/L — LOW (ref 22–31)
CREAT SERPL-MCNC: 1.83 MG/DL — HIGH (ref 0.5–1.3)
EGFR: 34 ML/MIN/1.73M2 — LOW
EOSINOPHIL # BLD AUTO: 0.04 K/UL — SIGNIFICANT CHANGE UP (ref 0–0.5)
EOSINOPHIL # BLD AUTO: 0.05 K/UL — SIGNIFICANT CHANGE UP (ref 0–0.5)
EOSINOPHIL NFR BLD AUTO: 0.5 % — SIGNIFICANT CHANGE UP (ref 0–6)
EOSINOPHIL NFR BLD AUTO: 0.8 % — SIGNIFICANT CHANGE UP (ref 0–6)
GAS PNL BLDA: SIGNIFICANT CHANGE UP
GAS PNL BLDV: SIGNIFICANT CHANGE UP
GLUCOSE BLDC GLUCOMTR-MCNC: 136 MG/DL — HIGH (ref 70–99)
GLUCOSE BLDC GLUCOMTR-MCNC: 179 MG/DL — HIGH (ref 70–99)
GLUCOSE BLDC GLUCOMTR-MCNC: 184 MG/DL — HIGH (ref 70–99)
GLUCOSE BLDC GLUCOMTR-MCNC: 239 MG/DL — HIGH (ref 70–99)
GLUCOSE BLDC GLUCOMTR-MCNC: 250 MG/DL — HIGH (ref 70–99)
GLUCOSE SERPL-MCNC: 270 MG/DL — HIGH (ref 70–99)
HCO3 BLDV-SCNC: 19 MMOL/L — LOW (ref 22–29)
HCO3 BLDV-SCNC: 25 MMOL/L — SIGNIFICANT CHANGE UP (ref 22–29)
HCT VFR BLD CALC: 31 % — LOW (ref 39–50)
HCT VFR BLD CALC: 31.1 % — LOW (ref 39–50)
HCT VFR BLD CALC: 34 % — LOW (ref 39–50)
HGB BLD-MCNC: 9.4 G/DL — LOW (ref 13–17)
HGB BLD-MCNC: 9.6 G/DL — LOW (ref 13–17)
HGB BLD-MCNC: 9.8 G/DL — LOW (ref 13–17)
HOROWITZ INDEX BLDV+IHG-RTO: 21 — SIGNIFICANT CHANGE UP
IMM GRANULOCYTES NFR BLD AUTO: 0.5 % — SIGNIFICANT CHANGE UP (ref 0–0.9)
IMM GRANULOCYTES NFR BLD AUTO: 0.6 % — SIGNIFICANT CHANGE UP (ref 0–0.9)
LYMPHOCYTES # BLD AUTO: 0.73 K/UL — LOW (ref 1–3.3)
LYMPHOCYTES # BLD AUTO: 1.34 K/UL — SIGNIFICANT CHANGE UP (ref 1–3.3)
LYMPHOCYTES # BLD AUTO: 20.9 % — SIGNIFICANT CHANGE UP (ref 13–44)
LYMPHOCYTES # BLD AUTO: 9.5 % — LOW (ref 13–44)
MAGNESIUM SERPL-MCNC: 2 MG/DL — SIGNIFICANT CHANGE UP (ref 1.6–2.6)
MCHC RBC-ENTMCNC: 28 PG — SIGNIFICANT CHANGE UP (ref 27–34)
MCHC RBC-ENTMCNC: 28.1 PG — SIGNIFICANT CHANGE UP (ref 27–34)
MCHC RBC-ENTMCNC: 28.6 PG — SIGNIFICANT CHANGE UP (ref 27–34)
MCHC RBC-ENTMCNC: 28.8 GM/DL — LOW (ref 32–36)
MCHC RBC-ENTMCNC: 30.2 GM/DL — LOW (ref 32–36)
MCHC RBC-ENTMCNC: 31 GM/DL — LOW (ref 32–36)
MCV RBC AUTO: 92.3 FL — SIGNIFICANT CHANGE UP (ref 80–100)
MCV RBC AUTO: 92.6 FL — SIGNIFICANT CHANGE UP (ref 80–100)
MCV RBC AUTO: 97.4 FL — SIGNIFICANT CHANGE UP (ref 80–100)
MONOCYTES # BLD AUTO: 0.38 K/UL — SIGNIFICANT CHANGE UP (ref 0–0.9)
MONOCYTES # BLD AUTO: 0.5 K/UL — SIGNIFICANT CHANGE UP (ref 0–0.9)
MONOCYTES NFR BLD AUTO: 5.9 % — SIGNIFICANT CHANGE UP (ref 2–14)
MONOCYTES NFR BLD AUTO: 6.5 % — SIGNIFICANT CHANGE UP (ref 2–14)
NEUTROPHILS # BLD AUTO: 4.58 K/UL — SIGNIFICANT CHANGE UP (ref 1.8–7.4)
NEUTROPHILS # BLD AUTO: 6.37 K/UL — SIGNIFICANT CHANGE UP (ref 1.8–7.4)
NEUTROPHILS NFR BLD AUTO: 71.5 % — SIGNIFICANT CHANGE UP (ref 43–77)
NEUTROPHILS NFR BLD AUTO: 82.6 % — HIGH (ref 43–77)
NRBC # BLD: 0 /100 WBCS — SIGNIFICANT CHANGE UP (ref 0–0)
NT-PROBNP SERPL-SCNC: HIGH PG/ML (ref 0–450)
PCO2 BLDV: 40 MMHG — LOW (ref 42–55)
PCO2 BLDV: 45 MMHG — SIGNIFICANT CHANGE UP (ref 42–55)
PH BLDV: 7.28 — LOW (ref 7.32–7.43)
PH BLDV: 7.35 — SIGNIFICANT CHANGE UP (ref 7.32–7.43)
PHOSPHATE SERPL-MCNC: 3.5 MG/DL — SIGNIFICANT CHANGE UP (ref 2.5–4.5)
PLATELET # BLD AUTO: 182 K/UL — SIGNIFICANT CHANGE UP (ref 150–400)
PLATELET # BLD AUTO: 187 K/UL — SIGNIFICANT CHANGE UP (ref 150–400)
PLATELET # BLD AUTO: 191 K/UL — SIGNIFICANT CHANGE UP (ref 150–400)
PO2 BLDV: 35 MMHG — SIGNIFICANT CHANGE UP
PO2 BLDV: 36 MMHG — SIGNIFICANT CHANGE UP
POTASSIUM SERPL-MCNC: 5 MMOL/L — SIGNIFICANT CHANGE UP (ref 3.5–5.3)
POTASSIUM SERPL-SCNC: 5 MMOL/L — SIGNIFICANT CHANGE UP (ref 3.5–5.3)
PROT SERPL-MCNC: 5.7 G/DL — LOW (ref 6–8.3)
RBC # BLD: 3.36 M/UL — LOW (ref 4.2–5.8)
RBC # BLD: 3.36 M/UL — LOW (ref 4.2–5.8)
RBC # BLD: 3.49 M/UL — LOW (ref 4.2–5.8)
RBC # FLD: 18.3 % — HIGH (ref 10.3–14.5)
RBC # FLD: 18.5 % — HIGH (ref 10.3–14.5)
RBC # FLD: 18.6 % — HIGH (ref 10.3–14.5)
SAO2 % BLDV: 54.6 % — SIGNIFICANT CHANGE UP
SAO2 % BLDV: 59 % — SIGNIFICANT CHANGE UP
SODIUM SERPL-SCNC: 128 MMOL/L — SIGNIFICANT CHANGE UP (ref 135–145)
TROPONIN I, HIGH SENSITIVITY RESULT: 177.2 NG/L — HIGH
TROPONIN I, HIGH SENSITIVITY RESULT: 43.1 NG/L — SIGNIFICANT CHANGE UP
TSH SERPL-MCNC: 6.68 UU/ML — HIGH (ref 0.34–4.82)
WBC # BLD: 6.41 K/UL — SIGNIFICANT CHANGE UP (ref 3.8–10.5)
WBC # BLD: 7.17 K/UL — SIGNIFICANT CHANGE UP (ref 3.8–10.5)
WBC # BLD: 7.71 K/UL — SIGNIFICANT CHANGE UP (ref 3.8–10.5)
WBC # FLD AUTO: 6.41 K/UL — SIGNIFICANT CHANGE UP (ref 3.8–10.5)
WBC # FLD AUTO: 7.17 K/UL — SIGNIFICANT CHANGE UP (ref 3.8–10.5)
WBC # FLD AUTO: 7.71 K/UL — SIGNIFICANT CHANGE UP (ref 3.8–10.5)

## 2022-10-29 PROCEDURE — 51702 INSERT TEMP BLADDER CATH: CPT

## 2022-10-29 PROCEDURE — 99291 CRITICAL CARE FIRST HOUR: CPT | Mod: 25

## 2022-10-29 PROCEDURE — 71045 X-RAY EXAM CHEST 1 VIEW: CPT | Mod: 26

## 2022-10-29 PROCEDURE — 99223 1ST HOSP IP/OBS HIGH 75: CPT | Mod: GC

## 2022-10-29 RX ORDER — DOBUTAMINE HCL 250MG/20ML
5 VIAL (ML) INTRAVENOUS
Qty: 500 | Refills: 0 | Status: DISCONTINUED | OUTPATIENT
Start: 2022-10-29 | End: 2022-10-30

## 2022-10-29 RX ORDER — SODIUM CHLORIDE 9 MG/ML
1000 INJECTION, SOLUTION INTRAVENOUS
Refills: 0 | Status: DISCONTINUED | OUTPATIENT
Start: 2022-10-29 | End: 2022-10-29

## 2022-10-29 RX ORDER — NOREPINEPHRINE BITARTRATE/D5W 8 MG/250ML
0.05 PLASTIC BAG, INJECTION (ML) INTRAVENOUS
Qty: 16 | Refills: 0 | Status: DISCONTINUED | OUTPATIENT
Start: 2022-10-29 | End: 2022-10-29

## 2022-10-29 RX ORDER — SODIUM CHLORIDE 9 MG/ML
500 INJECTION, SOLUTION INTRAVENOUS ONCE
Refills: 0 | Status: COMPLETED | OUTPATIENT
Start: 2022-10-29 | End: 2022-10-29

## 2022-10-29 RX ORDER — APIXABAN 2.5 MG/1
2.5 TABLET, FILM COATED ORAL EVERY 12 HOURS
Refills: 0 | Status: DISCONTINUED | OUTPATIENT
Start: 2022-10-29 | End: 2022-10-29

## 2022-10-29 RX ORDER — METFORMIN HYDROCHLORIDE 850 MG/1
1 TABLET ORAL
Qty: 0 | Refills: 0 | DISCHARGE

## 2022-10-29 RX ORDER — METOPROLOL TARTRATE 50 MG
2.5 TABLET ORAL ONCE
Refills: 0 | Status: DISCONTINUED | OUTPATIENT
Start: 2022-10-29 | End: 2022-10-29

## 2022-10-29 RX ORDER — CARVEDILOL PHOSPHATE 80 MG/1
25 CAPSULE, EXTENDED RELEASE ORAL EVERY 12 HOURS
Refills: 0 | Status: DISCONTINUED | OUTPATIENT
Start: 2022-10-29 | End: 2022-10-30

## 2022-10-29 RX ORDER — ATORVASTATIN CALCIUM 80 MG/1
1 TABLET, FILM COATED ORAL
Qty: 0 | Refills: 0 | DISCHARGE

## 2022-10-29 RX ORDER — SACUBITRIL AND VALSARTAN 24; 26 MG/1; MG/1
1 TABLET, FILM COATED ORAL
Refills: 0 | Status: DISCONTINUED | OUTPATIENT
Start: 2022-10-29 | End: 2022-10-29

## 2022-10-29 RX ORDER — LEVOTHYROXINE SODIUM 125 MCG
1 TABLET ORAL
Qty: 0 | Refills: 0 | DISCHARGE

## 2022-10-29 RX ORDER — SODIUM BICARBONATE 1 MEQ/ML
100 SYRINGE (ML) INTRAVENOUS ONCE
Refills: 0 | Status: COMPLETED | OUTPATIENT
Start: 2022-10-29 | End: 2022-10-29

## 2022-10-29 RX ORDER — AMIODARONE HYDROCHLORIDE 400 MG/1
0.5 TABLET ORAL
Qty: 900 | Refills: 0 | Status: DISCONTINUED | OUTPATIENT
Start: 2022-10-29 | End: 2022-10-30

## 2022-10-29 RX ORDER — FUROSEMIDE 40 MG
20 TABLET ORAL ONCE
Refills: 0 | Status: COMPLETED | OUTPATIENT
Start: 2022-10-29 | End: 2022-10-29

## 2022-10-29 RX ORDER — DAPAGLIFLOZIN 10 MG/1
1 TABLET, FILM COATED ORAL
Qty: 0 | Refills: 0 | DISCHARGE

## 2022-10-29 RX ORDER — CARVEDILOL PHOSPHATE 80 MG/1
1 CAPSULE, EXTENDED RELEASE ORAL
Qty: 0 | Refills: 0 | DISCHARGE

## 2022-10-29 RX ORDER — METOPROLOL TARTRATE 50 MG
5 TABLET ORAL ONCE
Refills: 0 | Status: COMPLETED | OUTPATIENT
Start: 2022-10-29 | End: 2022-10-29

## 2022-10-29 RX ORDER — ASPIRIN/CALCIUM CARB/MAGNESIUM 324 MG
1 TABLET ORAL
Qty: 0 | Refills: 0 | DISCHARGE

## 2022-10-29 RX ORDER — ATORVASTATIN CALCIUM 80 MG/1
40 TABLET, FILM COATED ORAL AT BEDTIME
Refills: 0 | Status: DISCONTINUED | OUTPATIENT
Start: 2022-10-29 | End: 2022-11-10

## 2022-10-29 RX ORDER — INSULIN LISPRO 100/ML
VIAL (ML) SUBCUTANEOUS
Refills: 0 | Status: DISCONTINUED | OUTPATIENT
Start: 2022-10-29 | End: 2022-11-02

## 2022-10-29 RX ORDER — METOPROLOL TARTRATE 50 MG
2.5 TABLET ORAL ONCE
Refills: 0 | Status: COMPLETED | OUTPATIENT
Start: 2022-10-29 | End: 2022-10-29

## 2022-10-29 RX ORDER — LEVOTHYROXINE SODIUM 125 MCG
75 TABLET ORAL DAILY
Refills: 0 | Status: DISCONTINUED | OUTPATIENT
Start: 2022-10-29 | End: 2022-11-02

## 2022-10-29 RX ORDER — PANTOPRAZOLE SODIUM 20 MG/1
1 TABLET, DELAYED RELEASE ORAL
Qty: 0 | Refills: 0 | DISCHARGE

## 2022-10-29 RX ORDER — SODIUM BICARBONATE 1 MEQ/ML
650 SYRINGE (ML) INTRAVENOUS THREE TIMES A DAY
Refills: 0 | Status: DISCONTINUED | OUTPATIENT
Start: 2022-10-29 | End: 2022-10-30

## 2022-10-29 RX ORDER — HEPARIN SODIUM 5000 [USP'U]/ML
INJECTION INTRAVENOUS; SUBCUTANEOUS
Qty: 25000 | Refills: 0 | Status: DISCONTINUED | OUTPATIENT
Start: 2022-10-29 | End: 2022-11-02

## 2022-10-29 RX ORDER — DIGOXIN 250 MCG
500 TABLET ORAL ONCE
Refills: 0 | Status: COMPLETED | OUTPATIENT
Start: 2022-10-29 | End: 2022-10-29

## 2022-10-29 RX ORDER — SITAGLIPTIN 50 MG/1
1 TABLET, FILM COATED ORAL
Qty: 0 | Refills: 0 | DISCHARGE

## 2022-10-29 RX ORDER — LOSARTAN POTASSIUM 100 MG/1
1 TABLET, FILM COATED ORAL
Qty: 0 | Refills: 0 | DISCHARGE

## 2022-10-29 RX ORDER — AMIODARONE HYDROCHLORIDE 400 MG/1
150 TABLET ORAL ONCE
Refills: 0 | Status: COMPLETED | OUTPATIENT
Start: 2022-10-29 | End: 2022-10-29

## 2022-10-29 RX ORDER — FUROSEMIDE 40 MG
40 TABLET ORAL ONCE
Refills: 0 | Status: COMPLETED | OUTPATIENT
Start: 2022-10-29 | End: 2022-10-29

## 2022-10-29 RX ORDER — ASPIRIN/CALCIUM CARB/MAGNESIUM 324 MG
81 TABLET ORAL DAILY
Refills: 0 | Status: DISCONTINUED | OUTPATIENT
Start: 2022-10-29 | End: 2022-10-29

## 2022-10-29 RX ORDER — VASOPRESSIN 20 [USP'U]/ML
0.04 INJECTION INTRAVENOUS
Qty: 40 | Refills: 0 | Status: DISCONTINUED | OUTPATIENT
Start: 2022-10-29 | End: 2022-10-29

## 2022-10-29 RX ORDER — FUROSEMIDE 40 MG
80 TABLET ORAL ONCE
Refills: 0 | Status: COMPLETED | OUTPATIENT
Start: 2022-10-29 | End: 2022-10-29

## 2022-10-29 RX ORDER — AMIODARONE HYDROCHLORIDE 400 MG/1
1 TABLET ORAL
Qty: 900 | Refills: 0 | Status: DISCONTINUED | OUTPATIENT
Start: 2022-10-29 | End: 2022-10-30

## 2022-10-29 RX ADMIN — CARVEDILOL PHOSPHATE 25 MILLIGRAM(S): 80 CAPSULE, EXTENDED RELEASE ORAL at 17:53

## 2022-10-29 RX ADMIN — Medication 2: at 15:11

## 2022-10-29 RX ADMIN — Medication 75 MICROGRAM(S): at 06:45

## 2022-10-29 RX ADMIN — APIXABAN 2.5 MILLIGRAM(S): 2.5 TABLET, FILM COATED ORAL at 06:47

## 2022-10-29 RX ADMIN — Medication 9.87 MICROGRAM(S)/KG/MIN: at 11:33

## 2022-10-29 RX ADMIN — Medication 2.5 MILLIGRAM(S): at 03:58

## 2022-10-29 RX ADMIN — Medication 1: at 21:59

## 2022-10-29 RX ADMIN — AMIODARONE HYDROCHLORIDE 600 MILLIGRAM(S): 400 TABLET ORAL at 13:50

## 2022-10-29 RX ADMIN — Medication 1: at 17:53

## 2022-10-29 RX ADMIN — SODIUM CHLORIDE 1000 MILLILITER(S): 9 INJECTION, SOLUTION INTRAVENOUS at 03:44

## 2022-10-29 RX ADMIN — CARVEDILOL PHOSPHATE 25 MILLIGRAM(S): 80 CAPSULE, EXTENDED RELEASE ORAL at 06:45

## 2022-10-29 RX ADMIN — SODIUM CHLORIDE 500 MILLILITER(S): 9 INJECTION INTRAMUSCULAR; INTRAVENOUS; SUBCUTANEOUS at 02:11

## 2022-10-29 RX ADMIN — Medication 100 MILLIEQUIVALENT(S): at 18:36

## 2022-10-29 RX ADMIN — AMIODARONE HYDROCHLORIDE 33.3 MG/MIN: 400 TABLET ORAL at 14:15

## 2022-10-29 RX ADMIN — Medication 2.5 MILLIGRAM(S): at 10:32

## 2022-10-29 RX ADMIN — HEPARIN SODIUM 1200 UNIT(S)/HR: 5000 INJECTION INTRAVENOUS; SUBCUTANEOUS at 12:00

## 2022-10-29 RX ADMIN — SODIUM CHLORIDE 100 MILLILITER(S): 9 INJECTION, SOLUTION INTRAVENOUS at 04:49

## 2022-10-29 RX ADMIN — HEPARIN SODIUM 1100 UNIT(S)/HR: 5000 INJECTION INTRAVENOUS; SUBCUTANEOUS at 18:21

## 2022-10-29 RX ADMIN — Medication 5 MILLIGRAM(S): at 06:45

## 2022-10-29 RX ADMIN — Medication 40 MILLIGRAM(S): at 01:00

## 2022-10-29 RX ADMIN — Medication 80 MILLIGRAM(S): at 11:06

## 2022-10-29 RX ADMIN — Medication 650 MILLIGRAM(S): at 21:59

## 2022-10-29 RX ADMIN — Medication 500 MICROGRAM(S): at 10:57

## 2022-10-29 RX ADMIN — ATORVASTATIN CALCIUM 40 MILLIGRAM(S): 80 TABLET, FILM COATED ORAL at 21:59

## 2022-10-29 NOTE — H&P ADULT - NSHPREVIEWOFSYSTEMS_GEN_ALL_CORE
REVIEW OF SYSTEMS:    CONSTITUTIONAL: +weakness. No fevers or chills  EYES/ENT: No visual changes;  No vertigo or throat pain   NECK: No pain or stiffness  RESPIRATORY: No cough, wheezing, hemoptysis; No shortness of breath  CARDIOVASCULAR: No chest pain or palpitations  GASTROINTESTINAL: +diarrhea, No abdominal or epigastric pain. No nausea, vomiting, or hematemesis;  No melena or hematochezia.  GENITOURINARY: No dysuria, frequency or hematuria  NEUROLOGICAL: No numbness or weakness  SKIN: No itching, rashes

## 2022-10-29 NOTE — CONSULT NOTE ADULT - ASSESSMENT
Assessment      PLAN:    -----------------CNS:------------------    -----------------CVS:------------------    -----------------RESPIRATORY:--------    -----------------GI:----------------------    -----------------ID:-----------------------    -----------------RENAL: ---------------    -----------------HEME/ONC: ---------------    -----------------ENDO: ---------------    -----------------EXTREMITIES:-------    -----------------PROPHYLAXIS: -------    DVT   GI    -----------------DISPOSITION-------- Assessment  This is a 89 yo M with pmhx of hyperlipidemia, diabetes, atrial fibrillation on Eliquis presents with generalized weakness x 2 days. Upon evaluation in the ED v/s: /92, , RR 20, SPO2: 97% on RA  (29 Oct 2022 04:15). Patient admitted initially to medicine for generalized weakness and diarrhea. Further evaluation including ct head and ct cervical spine was negative for acute pathologies.   Patient had RRT called around 10AM for tachycardia and hypotension. Patient noted to be lethargic and vitals at the time of the rapid was afebrile, BP of 130/61 and repeat BP showing reads of 50/40s. Patient noted to have heart rates in 140's. Patient placed on 15L NRBM, peripheral phenylephrine was started and metoprolol 2.5mg IV was given for heart rate. STAT EKG and labs including cardiac enzymes, coags and chemistries were sent. ICU was consulted and bedside POCUS done showing decreased left ventricular function, dilated IVC and Right atrium. Patient admitted to ICU for  cardiogenic shock r/o PE.     #Cardiogenic shock  #Atrial fibrillation with RVR  #R/O PE  #AHRF 2/2 to new onset CHF vs fluid overload   #HLD  #Diabetes   #LIGIA   #Hypothyroid  #Urinary retention     PLAN:    -----------------CNS:------------------  No acute issues, patient as per family is at baseline  CT head on admission is negative for acute stroke     -----------------CVS:------------------  - Cardiogenic shock  patient on POCUS noted to have dilated right atrium, dilated IVC  cardiac enzymes negative, EKG not showing no acute ST changes   start on dobutamine drip   f/u echo   Cardiology Dr. Dominguez    - Atrial fibrillation with RVR  - will start on digoxin  - f/u echo   - heparin drip   - Cardiology Dr. Dominguez     -----------------RESPIRATORY:--------  AHRF 2/2 fluid overload 2/2 to possible new onset CHF   - Patient noted to have echo in 2017 with EF 55%  - cxr showing vascular congestion, Lung auscultation, crackles  - s/p 80mg IV lasix  - Urology consulted for Gonzalez catheter   - strict I/O  - f/u echo     R/O PE  - patient with tachycardia, hypoxia  - noted to have dilated IVC on POCUS  - started on empiric heparin drip  - due to LIGIA Scr of 1.8 consider V/Q scan once patient is stabilized     -----------------GI:----------------------  Diarrhea  - f/u stool studies     -----------------ID:-----------------------  Diarrhea   - f/u urine and blood cultures       -----------------RENAL: ---------------  LIGIA  - patient noted to have new onset renal injury Scr of 1.85  - avoid nephrotoxic agents     -----------------HEME/ONC: ---------------  Anemia  - hgb 9.6  - transfuse if hgb<7  - monitor cbc     -----------------ENDO: ---------------  Hypothyroidism  - TSH noted to be 6.6  - continue with levothyroxine     Diabetes  - SSI     -----------------EXTREMITIES:-------  No acute issues     -----------------PROPHYLAXIS: -------    DVT: Heparin drip     -----------------DISPOSITION--------  Accepted to ICU

## 2022-10-29 NOTE — RAPID RESPONSE TEAM SUMMARY - NSSITUATIONBACKGROUNDRRT_GEN_ALL_CORE
Patient had RRT called around 10AM for tachycardia and hypotension. Patient noted to be lethargic and vitals at the time of the rapid was afebrile, BP of 130/61 and repeat BP showing reads of 50/40s. Patient noted to have heart rates in 140's. Patient placed on 15L NRBM, peripheral phenylephrine was started and metoprolol 2.5mg IV was given for heart rate. STAT EKG and labs including cardiac enzymes, coags and chemistries were sent. Another RRT called for tachycardia about five minutes later. ICU was consulted and bedside POCUS done showing decreased left ventricular function, dilated IVC and Right atrium. Patient admitted to ICU for  cardiogenic shock r/o PE and AHRF 2/2 suspected to new onset CHF. Family updated and Attending at bedside.

## 2022-10-29 NOTE — ED ADULT NURSE REASSESSMENT NOTE - NS ED NURSE REASSESS COMMENT FT1
pt received from the night shift . pt denies any distress pt  has low blood pressure pt on iv fluids . pt on the cardiac monitor and Tachy cardic 133/mt

## 2022-10-29 NOTE — CONSULT NOTE ADULT - SUBJECTIVE AND OBJECTIVE BOX
INTERVAL HPI/OVERNIGHT EVENTS: ***    PRESSORS: [ ] YES [ ] NO    Antimicrobial:    Cardiovascular:  carvedilol 25 milliGRAM(s) Oral every 12 hours  digoxin  Injectable 500 MICROGram(s) IV Push once  DOBUTamine Infusion 5 MICROgram(s)/kG/Min IV Continuous <Continuous>  furosemide   Injectable 80 milliGRAM(s) IV Push once    Pulmonary:    Hematologic:  heparin  Infusion.  Unit(s)/Hr IV Continuous <Continuous>    Other:  atorvastatin 40 milliGRAM(s) Oral at bedtime  insulin lispro (ADMELOG) corrective regimen sliding scale   SubCutaneous Before meals and at bedtime  lactated ringers. 1000 milliLiter(s) IV Continuous <Continuous>  levothyroxine 75 MICROGram(s) Oral daily    atorvastatin 40 milliGRAM(s) Oral at bedtime  carvedilol 25 milliGRAM(s) Oral every 12 hours  digoxin  Injectable 500 MICROGram(s) IV Push once  DOBUTamine Infusion 5 MICROgram(s)/kG/Min IV Continuous <Continuous>  furosemide   Injectable 80 milliGRAM(s) IV Push once  heparin  Infusion.  Unit(s)/Hr IV Continuous <Continuous>  insulin lispro (ADMELOG) corrective regimen sliding scale   SubCutaneous Before meals and at bedtime  lactated ringers. 1000 milliLiter(s) IV Continuous <Continuous>  levothyroxine 75 MICROGram(s) Oral daily    Drug Dosing Weight  Height (cm): 160 (28 Oct 2022 20:11)  Weight (kg): 65.8 (28 Oct 2022 20:11)  BMI (kg/m2): 25.7 (28 Oct 2022 20:11)  BSA (m2): 1.69 (28 Oct 2022 20:11)    CENTRAL LINE: [ ] YES [ ] NO  LOCATION:   DATE INSERTED:  REMOVE: [ ] YES [ ] NO  EXPLAIN:    BEE: [ ] YES [ ] NO    DATE INSERTED:  REMOVE:  [ ] YES [ ] NO  EXPLAIN:    A-LINE:  [ ] YES [ ] NO  LOCATION:   DATE INSERTED:  REMOVE:  [ ] YES [ ] NO  EXPLAIN:    PMH -reviewed admission note, no change since admission                  PHYSICAL EXAM:    GENERAL: NAD, well-groomed, well-developed  HEAD:  Atraumatic, Normocephalic  EYES: EOMI, PERRLA, conjunctiva and sclera clear  ENMT: No tonsillar erythema, exudates, or enlargement; Moist mucous membranes, Good dentition, [ ]No lesions  NECK: Supple, normal appearance, No JVD; Normal thyroid; Trachea midline  NERVOUS SYSTEM:  Alert & Oriented X3, Good concentration; Motor Strength 5/5 B/L upper and lower extremities; DTRs 2+ intact and symmetric  CHEST/LUNG: No chest deformity; Normal percussion bilaterally; No rales, rhonchi, wheezing   HEART: Regular rate and rhythm; No murmurs, rubs, or gallops  ABDOMEN: Soft, Nontender, Nondistended;Bowel sounds present  EXTREMITIES:  2+ Peripheral Pulses, No clubbing, cyanosis, or edema  LYMPH: No lymphadenopathy noted  SKIN: No rashes or lesions; Good capillary refill      LABS:  CBC Full  -  ( 28 Oct 2022 19:36 )  WBC Count : 6.26 K/uL  RBC Count : 3.74 M/uL  Hemoglobin : 10.5 g/dL  Hematocrit : 35.2 %  Platelet Count - Automated : 194 K/uL  Mean Cell Volume : 94.1 fl  Mean Cell Hemoglobin : 28.1 pg  Mean Cell Hemoglobin Concentration : 29.8 gm/dL  Auto Neutrophil # : 4.51 K/uL  Auto Lymphocyte # : 1.07 K/uL  Auto Monocyte # : 0.48 K/uL  Auto Eosinophil # : 0.12 K/uL  Auto Basophil # : 0.05 K/uL  Auto Neutrophil % : 72.0 %  Auto Lymphocyte % : 17.1 %  Auto Monocyte % : 7.7 %  Auto Eosinophil % : 1.9 %  Auto Basophil % : 0.8 %    10-28    140  |  113<H>  |  42<H>  ----------------------------<  159<H>  4.1   |  19<L>  |  1.85<H>    Ca    9.4      28 Oct 2022 19:36    TPro  6.9  /  Alb  3.3<L>  /  TBili  0.4  /  DBili  x   /  AST  26  /  ALT  33  /  AlkPhos  139<H>  10-28    PT/INR - ( 28 Oct 2022 19:36 )   PT: 14.1 sec;   INR: 1.18 ratio         PTT - ( 28 Oct 2022 19:36 )  PTT:34.1 sec  Urinalysis Basic - ( 28 Oct 2022 20:50 )    Color: Yellow / Appearance: Clear / S.020 / pH: x  Gluc: x / Ketone: Negative  / Bili: Negative / Urobili: Negative   Blood: x / Protein: 30 mg/dL / Nitrite: Negative   Leuk Esterase: Negative / RBC: 0-2 /HPF / WBC 0-2 /HPF   Sq Epi: x / Non Sq Epi: Occasional /HPF / Bacteria: Few /HPF          RADIOLOGY & ADDITIONAL STUDIES REVIEWED:  ***    [ ]GOALS OF CARE DISCUSSION WITH PATIENT/FAMILY/PROXY:    CRITICAL CARE TIME SPENT: 35 minutes Patient is a 89y old  Male who presents with a chief complaint of Fall (29 Oct 2022 10:46)      HPI:  This is a 89 yo M with pmhx of hyperlipidemia, diabetes, atrial fibrillation on Eliquis presents with generalized weakness x 2 days. Patient states that he was sitting on the couch yesterday when he sat up then fell on the ground. The episode was witnessed by his wife. He doesn't believe he lost consciousness or if he hit his head. He does mentions having 3 episodes of watery, nonbloody diarrhea for the past few days.    In ED v/s: /92, , RR 20, SPO2: 97% on RA  (29 Oct 2022 04:15). Patient admitted to medicine for generalized weakness and diarrhea. Further evaluation including ct head and ct cervical spine was negative for acute pathologies.     Patient had RRT called around 10AM for tachycardia and hypotension. Patient noted to be lethargic and vitals at the time of the rapid was afebrile, BP of 130/61 and repeat BP showing reads of 50/40s.       Allergies    No Known Allergies    Intolerances        MEDICATIONS  (STANDING):  atorvastatin 40 milliGRAM(s) Oral at bedtime  carvedilol 25 milliGRAM(s) Oral every 12 hours  DOBUTamine Infusion 5 MICROgram(s)/kG/Min (9.87 mL/Hr) IV Continuous <Continuous>  furosemide   Injectable 80 milliGRAM(s) IV Push once  heparin  Infusion.  Unit(s)/Hr (12 mL/Hr) IV Continuous <Continuous>  insulin lispro (ADMELOG) corrective regimen sliding scale   SubCutaneous Before meals and at bedtime  lactated ringers. 1000 milliLiter(s) (100 mL/Hr) IV Continuous <Continuous>  levothyroxine 75 MICROGram(s) Oral daily    MEDICATIONS  (PRN):      Daily Height in cm: 160.02 (28 Oct 2022 20:11)    Daily     Drug Dosing Weight  Height (cm): 160 (28 Oct 2022 20:11)  Weight (kg): 65.8 (28 Oct 2022 20:11)  BMI (kg/m2): 25.7 (28 Oct 2022 20:11)  BSA (m2): 1.69 (28 Oct 2022 20:11)    PAST MEDICAL & SURGICAL HISTORY:  Diabetes      HTN (hypertension)      Hypothyroid      GERD (gastroesophageal reflux disease)      H/O heart bypass surgery      H/O partial nephrectomy      S/P TURP          FAMILY HISTORY:  Family history of hypertension (Father, Mother)        SOCIAL HISTORY:    ADVANCE DIRECTIVES:    REVIEW OF SYSTEMS:    CONSTITUTIONAL: No fever, weight loss, or fatigue  EYES: No eye pain, visual disturbances, or discharge  ENMT:  No difficulty hearing, tinnitus, vertigo; No sinus or throat pain  NECK: No pain or stiffness  BREASTS: No pain, masses, or nipple discharge  RESPIRATORY: No cough, wheezing, chills or hemoptysis; No shortness of breath  CARDIOVASCULAR: No chest pain, palpitations, dizziness, or leg swelling  GASTROINTESTINAL: No abdominal or epigastric pain. No nausea, vomiting, or hematemesis; No diarrhea or constipation. No melena or hematochezia.  GENITOURINARY: No dysuria, frequency, hematuria, or incontinence  NEUROLOGICAL: No headaches, memory loss, loss of strength, numbness, or tremors  SKIN: No itching, burning, rashes, or lesions   LYMPH NODES: No enlarged glands  ENDOCRINE: No heat or cold intolerance; No hair loss  MUSCULOSKELETAL: No joint pain or swelling; No muscle, back, or extremity pain  PSYCHIATRIC: No depression, anxiety, mood swings, or difficulty sleeping  HEME/LYMPH: No easy bruising, or bleeding gums  ALLERGY AND IMMUNOLOGIC: No hives or eczema          ICU Vital Signs Last 24 Hrs  T(C): 36.7 (29 Oct 2022 10:37), Max: 36.9 (28 Oct 2022 22:00)  T(F): 98 (29 Oct 2022 10:37), Max: 98.5 (28 Oct 2022 22:00)  HR: 138 (29 Oct 2022 09:00) (130 - 138)  BP: 98/70 (29 Oct 2022 09:00) (78/55 - 133/81)  BP(mean): --  ABP: --  ABP(mean): --  RR: 18 (29 Oct 2022 09:00) (18 - 20)  SpO2: 98% (29 Oct 2022 09:00) (96% - 98%)    O2 Parameters below as of 29 Oct 2022 07:41  Patient On (Oxygen Delivery Method): room air                I&O's Detail      PHYSICAL EXAM:    GENERAL: NAD, well-groomed, well-developed  HEAD:  Atraumatic, Normocephalic  EYES: EOMI, PERRLA, conjunctiva and sclera clear  ENMT: No tonsillar erythema, exudates, or enlargement; Moist mucous membranes, Good dentition, No lesions  NECK: Supple, No JVD, Normal thyroid  NERVOUS SYSTEM:  Alert & Oriented X3, Good concentration; Motor Strength 5/5 B/L upper and lower extremities; DTRs 2+ intact and symmetric  CHEST/LUNG: Clear to percussion bilaterally; No rales, rhonchi, wheezing, or rubs  HEART: Regular rate and rhythm; No murmurs, rubs, or gallops  ABDOMEN: Soft, Nontender, Nondistended; Bowel sounds present  EXTREMITIES:  2+ Peripheral Pulses, No clubbing, cyanosis, or edema  LYMPH: No lymphadenopathy noted  SKIN: No rashes or lesions    LABS:  CBC Full  -  ( 29 Oct 2022 10:41 )  WBC Count : 6.41 K/uL  RBC Count : 3.49 M/uL  Hemoglobin : 9.8 g/dL  Hematocrit : 34.0 %  Platelet Count - Automated : 182 K/uL  Mean Cell Volume : 97.4 fl  Mean Cell Hemoglobin : 28.1 pg  Mean Cell Hemoglobin Concentration : 28.8 gm/dL  Auto Neutrophil # : 4.58 K/uL  Auto Lymphocyte # : 1.34 K/uL  Auto Monocyte # : 0.38 K/uL  Auto Eosinophil # : 0.05 K/uL  Auto Basophil # : 0.02 K/uL  Auto Neutrophil % : 71.5 %  Auto Lymphocyte % : 20.9 %  Auto Monocyte % : 5.9 %  Auto Eosinophil % : 0.8 %  Auto Basophil % : 0.3 %    10-28    140  |  113<H>  |  42<H>  ----------------------------<  159<H>  4.1   |  19<L>  |  1.85<H>    Ca    9.4      28 Oct 2022 19:36    TPro  6.9  /  Alb  3.3<L>  /  TBili  0.4  /  DBili  x   /  AST  26  /  ALT  33  /  AlkPhos  139<H>  10-28    CAPILLARY BLOOD GLUCOSE      POCT Blood Glucose.: 250 mg/dL (29 Oct 2022 09:53)    PT/INR - ( 28 Oct 2022 19:36 )   PT: 14.1 sec;   INR: 1.18 ratio         PTT - ( 28 Oct 2022 19:36 )  PTT:34.1 sec  Urinalysis Basic - ( 28 Oct 2022 20:50 )    Color: Yellow / Appearance: Clear / S.020 / pH: x  Gluc: x / Ketone: Negative  / Bili: Negative / Urobili: Negative   Blood: x / Protein: 30 mg/dL / Nitrite: Negative   Leuk Esterase: Negative / RBC: 0-2 /HPF / WBC 0-2 /HPF   Sq Epi: x / Non Sq Epi: Occasional /HPF / Bacteria: Few /HPF              EKG:    ECHO, US:    RADIOLOGY:    CRITICAL CARE TIME SPENT:   Patient is a 89y old  Male who presents with a chief complaint of Fall (29 Oct 2022 10:46)      HPI:  This is a 89 yo M with pmhx of hyperlipidemia, diabetes, atrial fibrillation on Eliquis presents with generalized weakness x 2 days. Patient states that he was sitting on the couch yesterday when he sat up then fell on the ground. The episode was witnessed by his wife. He doesn't believe he lost consciousness or if he hit his head. He does mentions having 3 episodes of watery, nonbloody diarrhea for the past few days.    In ED v/s: /92, , RR 20, SPO2: 97% on RA  (29 Oct 2022 04:15). Patient admitted to medicine for generalized weakness and diarrhea. Further evaluation including ct head and ct cervical spine was negative for acute pathologies.     Patient had RRT called around 10AM for tachycardia and hypotension. Patient noted to be lethargic and vitals at the time of the rapid was afebrile, BP of 130/61 and repeat BP showing reads of 50/40s. Patient noted to have heart rates in 140's. Patient placed on 15L NRBM, peripheral phenylephrine was started and metoprolol 2.5mg IV was given for heart rate. STAT EKG and labs including cardiac enzymes, coags and chemistries were sent.  Patient's son Anastacio Bateman [phone] contacted and patient would be FULL CODE as per son with vasopressor support and Intubation. ICU was consulted and bedside POCUS done showing decreased left ventricular function, dilated IVC and Right atrium. ICU accepted patient for cardiogenic shock r/o PE.       Allergies    No Known Allergies    Intolerances        MEDICATIONS  (STANDING):  atorvastatin 40 milliGRAM(s) Oral at bedtime  carvedilol 25 milliGRAM(s) Oral every 12 hours  DOBUTamine Infusion 5 MICROgram(s)/kG/Min (9.87 mL/Hr) IV Continuous <Continuous>  furosemide   Injectable 80 milliGRAM(s) IV Push once  heparin  Infusion.  Unit(s)/Hr (12 mL/Hr) IV Continuous <Continuous>  insulin lispro (ADMELOG) corrective regimen sliding scale   SubCutaneous Before meals and at bedtime  lactated ringers. 1000 milliLiter(s) (100 mL/Hr) IV Continuous <Continuous>  levothyroxine 75 MICROGram(s) Oral daily    MEDICATIONS  (PRN):      Daily Height in cm: 160.02 (28 Oct 2022 20:11)    Daily     Drug Dosing Weight  Height (cm): 160 (28 Oct 2022 20:11)  Weight (kg): 65.8 (28 Oct 2022 20:11)  BMI (kg/m2): 25.7 (28 Oct 2022 20:11)  BSA (m2): 1.69 (28 Oct 2022 20:11)    PAST MEDICAL & SURGICAL HISTORY:  Diabetes      HTN (hypertension)      Hypothyroid      GERD (gastroesophageal reflux disease)      H/O heart bypass surgery      H/O partial nephrectomy      S/P TURP          FAMILY HISTORY:  Family history of hypertension (Father, Mother)        SOCIAL HISTORY:    ADVANCE DIRECTIVES:    REVIEW OF SYSTEMS:    CONSTITUTIONAL: Unable to assess due to patient's clinical condition           ICU Vital Signs Last 24 Hrs  T(C): 36.7 (29 Oct 2022 10:37), Max: 36.9 (28 Oct 2022 22:00)  T(F): 98 (29 Oct 2022 10:37), Max: 98.5 (28 Oct 2022 22:00)  HR: 138 (29 Oct 2022 09:00) (130 - 138)  BP: 98/70 (29 Oct 2022 09:00) (78/55 - 133/81)  BP(mean): --  ABP: --  ABP(mean): --  RR: 18 (29 Oct 2022 09:00) (18 - 20)  SpO2: 98% (29 Oct 2022 09:00) (96% - 98%)    O2 Parameters below as of 29 Oct 2022 07:41  Patient On (Oxygen Delivery Method): room air                I&O's Detail      PHYSICAL EXAM:    GENERAL: Noted to be in moderate distress, saturating in 90's on 15LNRBM   HEAD:  Atraumatic, Normocephalic  EYES: EOMI, PERRLA, conjunctiva and sclera clear  ENMT: No tonsillar erythema, exudates, or enlargemenT  NECK: Supple  NERVOUS SYSTEM:  Alert & Oriented X2-3, Good concentration; Unable to assess strength   CHEST/LUNG: Crackles noted throughout anterior and posterior lung fields   HEART: Tachycardia noted S1,S2 No MGR   ABDOMEN: Soft, Nontender, Nondistended; Bowel sounds present  EXTREMITIES:  2+ Peripheral Pulses, No clubbing, cyanosis, or edema. Cold extremities noted   LYMPH: No lymphadenopathy noted  SKIN: No rashes or lesions    LABS:  CBC Full  -  ( 29 Oct 2022 10:41 )  WBC Count : 6.41 K/uL  RBC Count : 3.49 M/uL  Hemoglobin : 9.8 g/dL  Hematocrit : 34.0 %  Platelet Count - Automated : 182 K/uL  Mean Cell Volume : 97.4 fl  Mean Cell Hemoglobin : 28.1 pg  Mean Cell Hemoglobin Concentration : 28.8 gm/dL  Auto Neutrophil # : 4.58 K/uL  Auto Lymphocyte # : 1.34 K/uL  Auto Monocyte # : 0.38 K/uL  Auto Eosinophil # : 0.05 K/uL  Auto Basophil # : 0.02 K/uL  Auto Neutrophil % : 71.5 %  Auto Lymphocyte % : 20.9 %  Auto Monocyte % : 5.9 %  Auto Eosinophil % : 0.8 %  Auto Basophil % : 0.3 %    10-28    140  |  113<H>  |  42<H>  ----------------------------<  159<H>  4.1   |  19<L>  |  1.85<H>    Ca    9.4      28 Oct 2022 19:36    TPro  6.9  /  Alb  3.3<L>  /  TBili  0.4  /  DBili  x   /  AST  26  /  ALT  33  /  AlkPhos  139<H>  10-28    CAPILLARY BLOOD GLUCOSE      POCT Blood Glucose.: 250 mg/dL (29 Oct 2022 09:53)    PT/INR - ( 28 Oct 2022 19:36 )   PT: 14.1 sec;   INR: 1.18 ratio         PTT - ( 28 Oct 2022 19:36 )  PTT:34.1 sec  Urinalysis Basic - ( 28 Oct 2022 20:50 )    Color: Yellow / Appearance: Clear / S.020 / pH: x  Gluc: x / Ketone: Negative  / Bili: Negative / Urobili: Negative   Blood: x / Protein: 30 mg/dL / Nitrite: Negative   Leuk Esterase: Negative / RBC: 0-2 /HPF / WBC 0-2 /HPF   Sq Epi: x / Non Sq Epi: Occasional /HPF / Bacteria: Few /HPF              EKG:    ECHO, US:    RADIOLOGY:    CRITICAL CARE TIME SPENT:

## 2022-10-29 NOTE — H&P ADULT - HISTORY OF PRESENT ILLNESS
This is a 89 yo M with pmhx of hyperlipidemia, diabetes, atrial fibrillation on Eliquis presents with generalized weakness x 2 days. Patient states that he was sitting on the couch yesterday when he sat up then fell on the ground. The episode was witnessed by his wife. He doesn't believe he lost consciousness or if he hit his head. He does mentions having 3 episodes of watery, nonbloody diarrhea for the past few days.    In ED v/s: /92, , RR 20, SPO2: 97% on RA

## 2022-10-29 NOTE — H&P ADULT - PROBLEM SELECTOR PLAN 1
Pt presenting with Afib w/ RVR, likely in setting of dehydration  EKG: ventricularly paced rhythm  Trop x1 negative f/u trop x2  s/p Lopressor 2.5  Will place on LR 100cc/hr x10hrs  F/u TTE  Cardio consult Pt presenting with Afib w/ RVR, likely in setting of dehydration  EKG: ventricularly paced rhythm  Trop x1 negative f/u trop x2  s/p Lopressor 2.5  Will place on LR 100cc/hr x10hrs  F/u TTE  Cardio consulted Dr. Dominguez

## 2022-10-29 NOTE — PROGRESS NOTE ADULT - ASSESSMENT
MICU team accepted patient onto their service. They are concerned for cardiogenic shock. They started dobutamine. They started amiodarone infusion. Cardiology & nephrology called. Gonzalez placed by them. On exam appeared altered. Cool extremities. Discussed with NP at bedside. NP also discussed with son and granddaughter at bedside. Further management per MICU.

## 2022-10-29 NOTE — GOALS OF CARE CONVERSATION - ADVANCED CARE PLANNING - CONVERSATION DETAILS
Spoke with Anastacio Bateman at  who is the HCP.  HCP is aware of patient's current medical condition and that pt's condition may continue to worsen given the complexity of his diagnosis.  HCP wants pt to remain FULL CODE at present time and including pressure and full respiratory support with mechanical ventilation.

## 2022-10-29 NOTE — PROGRESS NOTE ADULT - SUBJECTIVE AND OBJECTIVE BOX
I picked up patient overnight and prior to my arrival a rapid had been called. I had done a chart review before hand. It seems patient presented for weakness attributed to diarrhea? Initial admitting team thought patient was hypovolemic and started IV hydration, though ED had given patient Lasix for possible volume overload. Likely very nuanced what his volume status was overall.    He does have a history of HFrEF with AICD and currently paced. He had runs of atrial fibrillation and compromised hemodynamics. Follows with [facility] [place] (cardiology). His son appears to be a heme-oncologist. During th ED course had worsening tachycardia, hypotension, and mentation. Rapid called for compromised hemodynamics. On arrival patient with periodic agonal breathing and lethargy, but maintained a pulse and arouseable with tactile stimuli. Initially started on phenylephrine drip. MICU team came by and performed POCUS. I personally saw images. Appears to have dilated LV ventricle, grossly reduced EF. IVC with minimal change with respiration and appears quite plump.

## 2022-10-29 NOTE — PROCEDURE NOTE - NSURITECHNIQUE_GU_A_CORE
Proper hand hygiene was performed/Sterile gloves were worn for the duration of the procedure/A sterile drape was used to cover all adjacent areas/The site was cleaned with soap/water and sterile solution (betadine)/The catheter was appropriately lubricated/The catheter was secured with a securement device (e.g. StatLock)/The collection bag is below the level of the patient and urinary bladder

## 2022-10-29 NOTE — H&P ADULT - NSHPPHYSICALEXAM_GEN_ALL_CORE
LOS:     VITALS:   T(C): 36.9 (10-29-22 @ 01:12), Max: 36.9 (10-28-22 @ 22:00)  HR: 133 (10-29-22 @ 01:12) (130 - 135)  BP: 126/88 (10-29-22 @ 01:12) (126/88 - 132/92)  RR: 18 (10-29-22 @ 01:12) (18 - 20)  SpO2: 97% (10-29-22 @ 01:12) (97% - 98%)    GENERAL: NAD, lying in bed comfortably  HEAD:  Atraumatic, Normocephalic  EYES: EOMI, PERRLA, conjunctiva and sclera clear  ENT: Moist mucous membranes  NECK: Supple, No JVD  CHEST/LUNG: Clear to auscultation bilaterally; No rales, rhonchi, wheezing, or rubs. Unlabored respirations  HEART: irregular rate and rhythm, No murmurs, rubs, or gallops  ABDOMEN: BSx4; Soft, nontender, nondistended  EXTREMITIES:  2+ Peripheral Pulses, brisk capillary refill. No clubbing, cyanosis, or edema  NERVOUS SYSTEM:  A&Ox3, no focal deficits   SKIN: No rashes or lesions

## 2022-10-29 NOTE — CONSULT NOTE ADULT - ASSESSMENT
This is a 89 yo M with pmhx of hyperlipidemia, diabetes, atrial fibrillation on Eliquis presents with generalized weakness x 2 days. Patient states that he was sitting on the couch yesterday when he sat up then fell on the ground. The episode was witnessed by his wife. He doesn't believe he lost consciousness or if he hit his head. He does mentions having 3 episodes of watery, nonbloody diarrhea for the past few days. 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  2) Hypervolemic hyponatremia likely Cardio-renal disease along with vasopressin use  3) NAGMA  4) Atrial fibrillation/A/flutter with RVR with cardiogenic shock with systolic/diastolic CHF exacerbation with volume overload  5) Electrolytes disorders  6) Hypoalbuminemia  7) Anemia of chronic renal disease  8) History of RCC with Partial Nephrectomy  9) Hypothyroidism    Recommend:  Patient critically ill in ICU  Strict I/o  Avoid Nephrotoxic agents  Check urinalysis, urine electrolytes  Bilateral renal and bladder ultrasound  Gonzalez's catheter placement for urine output monitoring  Hold ACEI/ARB  Optimal rate control for A.fib with RVR with amiodarone/beta blocker   BP medications with holding parameters with maintenance of MAP>65-70 mm Hg with IV inotropic agents/ IV pressors as needed  IV/po sodium bicarbonate with goal serum bicarbonate>22  Follow septic work up  Antibiotics as per renal dose adjustment  Check CKD/Anemia work up as per ordered  Replete electrolytes with goal K>4 and <5, Mag>2 and Phos 2.5 to 3.5  Na level 128  Monitor BMP/electrolytes every 6 hrly  Treatment of underlying sepsis/A.fib with RVR/ Cardiogenic shock per primary/Cardiology team  Possible Lasix/Albumin challenge once blood pressure/HR better control  No urgent need for RRT/HD  Will follow This is a 89 yo M with pmhx of hyperlipidemia, diabetes, atrial fibrillation on Eliquis presents with generalized weakness x 2 days. Patient states that he was sitting on the couch yesterday when he sat up then fell on the ground. The episode was witnessed by his wife. He doesn't believe he lost consciousness or if he hit his head. He does mentions having 3 episodes of watery, nonbloody diarrhea for the past few days. 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  2) Hypervolemic hyponatremia likely Cardio-renal disease along with vasopressin use  3) NAGMA  4) Atrial fibrillation/A/flutter with RVR with cardiogenic shock with systolic/diastolic CHF exacerbation with volume overload  5) Electrolytes disorders  6) Hypoalbuminemia  7) Anemia of chronic renal disease  8) History of RCC with Partial Nephrectomy  9) Hypothyroidism    Recommend:  Patient critically ill in ICU  Strict I/o  Avoid Nephrotoxic agents  Check urinalysis, urine electrolytes  Bilateral renal and bladder ultrasound  Gonzalez's catheter placement for urine output monitoring  Hold ACEI/ARB  Optimal rate control for A.fib with RVR with amiodarone/beta blocker   BP medications with holding parameters with maintenance of MAP>65-70 mm Hg with IV inotropic agents/ IV pressors as needed  IV/po sodium bicarbonate with goal serum bicarbonate>22  Follow septic work up  Antibiotics as per renal dose adjustment  Check CKD/Anemia work up as per ordered  AC/Antiplatelet agents per primary/cardiology team  Replete electrolytes with goal K>4 and <5, Mag>2 and Phos 2.5 to 3.5  Na level 128  Monitor BMP/electrolytes every 6 hrly  Treatment of underlying sepsis/A.fib with RVR/ Cardiogenic shock per primary/Cardiology team  Consider Lasix/Albumin challenge once blood pressure/HR better control  No urgent need for RRT/HD  Will follow

## 2022-10-29 NOTE — PROGRESS NOTE ADULT - NSPROGADDITIONALINFOA_GEN_ALL_CORE
I personally performed E/M service that is separate, independent and medically required from other evaluations on this day.

## 2022-10-29 NOTE — H&P ADULT - ATTENDING COMMENTS
Discussed with [facility] PGY2 Dr. Garcia.    This is a 89 y/o male with PMHx of Afib, DM and HLD presenting with weakness after multiple episodes of diarrhea. Patient noted to be in afib with rvr in ED likely 2/2 to hypovolemia. Will admit to telemetry for supportive management with fluids and control of afib.    Vital Signs Last 24 Hrs  T(C): 36.8 (29 Oct 2022 06:14), Max: 36.9 (28 Oct 2022 22:00)  T(F): 98.2 (29 Oct 2022 06:14), Max: 98.5 (28 Oct 2022 22:00)  HR: 133 (29 Oct 2022 06:14) (130 - 135)  BP: 133/81 (29 Oct 2022 06:14) (126/88 - 133/81)  BP(mean): --  RR: 18 (29 Oct 2022 06:14) (18 - 20)  SpO2: 96% (29 Oct 2022 06:14) (96% - 98%)    Parameters below as of 29 Oct 2022 06:14  Patient On (Oxygen Delivery Method): room air    PEx  as above    A/P:  #Weakness  #Afib with RVR  #Diarrhea  #Non anion gap metabolic acidosis  #LIGIA vs CKD  #DM  #HLD  #CHF  #Prophylactic measure    - admit to tele  - gentle IV hydration  - diet as tolerated  - lopressor 5mg IV push x1 now as HR still >130  - monitor renal function closely; baseline creatinine unknown  - can consider dig loading if HR still does not respond  - TTE  - cardiology consult

## 2022-10-29 NOTE — CONSULT NOTE ADULT - SUBJECTIVE AND OBJECTIVE BOX
CHIEF COMPLAINT:Patient is a 89y old  Male who presents with a chief complaint of Fall (29 Oct 2022 10:46)      HPI:  This is a 89 yo M with pmhx of CAD-s/p CABG,s/p PPM, hyperlipidemia, diabetes, atrial fibrillation on Eliquis,Renal cell Ca-s/p partial nephrectomy,Hypothyroid  who  presents with generalized weakness x 2 days. Patient states that he was sitting on the couch yesterday when he sat up then fell on the ground. The episode was witnessed by his wife. He doesn't believe he lost consciousness or if he hit his head. He does mentions having 3 episodes of watery, nonbloody diarrhea for the past few days.  In ED v/s: /92, , RR 20, SPO2: 97% on RA  (29 Oct 2022 04:15).Rapid response called for hypotension, pt now in ICU on pressor support.      PAST MEDICAL & SURGICAL HISTORY:  Diabetes      HTN (hypertension)      Hypothyroid      GERD (gastroesophageal reflux disease)      H/O heart bypass surgery      H/O partial nephrectomy      S/P TURP      S/P PPM      Renal cell CA-s/p partial nephrectomy    MEDICATIONS  (STANDING):  atorvastatin 40 milliGRAM(s) Oral at bedtime  carvedilol 25 milliGRAM(s) Oral every 12 hours  DOBUTamine Infusion 5 MICROgram(s)/kG/Min (9.87 mL/Hr) IV Continuous <Continuous>  heparin  Infusion.  Unit(s)/Hr (12 mL/Hr) IV Continuous <Continuous>  insulin lispro (ADMELOG) corrective regimen sliding scale   SubCutaneous Before meals and at bedtime  lactated ringers. 1000 milliLiter(s) (100 mL/Hr) IV Continuous <Continuous>  levothyroxine 75 MICROGram(s) Oral daily  norepinephrine Infusion 0.05 MICROgram(s)/kG/Min (3.08 mL/Hr) IV Continuous <Continuous>  vasopressin Infusion 0.04 Unit(s)/Min (6 mL/Hr) IV Continuous <Continuous>    MEDICATIONS  (PRN):      FAMILY HISTORY:  Family history of hypertension (Father, Mother)        SOCIAL HISTORY:    [x ] Non-smoker  [ x] Alcohol-denies    Allergies    No Known Allergies    Intolerances    	    REVIEW OF SYSTEMS:  CONSTITUTIONAL: No fever, weight loss, or fatigue  EYES: No eye pain, visual disturbances, or discharge  ENT:  No difficulty hearing, tinnitus, vertigo; No sinus or throat pain  NECK: No pain or stiffness  RESPIRATORY: No cough, wheezing, chills or hemoptysis; No Shortness of Breath  CARDIOVASCULAR: No chest pain, palpitations, passing out, dizziness, or leg swelling  GASTROINTESTINAL: No abdominal or epigastric pain. No nausea, vomiting, or hematemesis; No diarrhea or constipation. No melena or hematochezia.  GENITOURINARY: No dysuria, frequency, hematuria, or incontinence  NEUROLOGICAL: No headaches, memory loss, loss of strength, numbness, or tremors  SKIN: No itching, burning, rashes, or lesions   LYMPH Nodes: No enlarged glands  ENDOCRINE: No heat or cold intolerance; No hair loss  MUSCULOSKELETAL: No joint pain or swelling; No muscle, back, or extremity pain  PSYCHIATRIC: No depression, anxiety, mood swings, or difficulty sleeping  HEME/LYMPH: No easy bruising, or bleeding gums  ALLERGY AND IMMUNOLOGIC: No hives or eczema	    PHYSICAL EXAM:  T(C): 36.7 (10-29-22 @ 10:37), Max: 36.9 (10-28-22 @ 22:00)  HR: 138 (10-29-22 @ 09:00) (130 - 138)  BP: 98/70 (10-29-22 @ 09:00) (78/55 - 133/81)  RR: 18 (10-29-22 @ 09:00) (18 - 20)  SpO2: 98% (10-29-22 @ 09:00) (96% - 98%)  Wt(kg): --  I&O's Summary      Appearance: Normal	  HEENT:   Normal oral mucosa, PERRL, EOMI	  Lymphatic: No lymphadenopathy  Cardiovascular: Normal S1 S2, No JVD, No murmurs, No edema  Respiratory: Lungs clear to auscultation	  Psychiatry: A & O x 3, Mood & affect appropriate  Gastrointestinal:  Soft, Non-tender, + BS	  Skin: No rashes, No ecchymoses, No cyanosis	  Neurologic: Non-focal  Extremities: Normal range of motion, No clubbing, cyanosis or edema  Vascular: Peripheral pulses palpable 2+ bilaterally        ECG:afib v paced at 133 bpm  	   	  	  LABS:	 	    CARDIAC MARKERS:  CARDIAC MARKERS ( 29 Oct 2022 10:41 )  x     / x     / x     / x     / 2.2 ng/mL      Troponin I, High Sensitivity Result: 43.1 ng/L (10-29-22 @ 10:41)  Troponin I, High Sensitivity Result: 60.6 ng/L (10-28-22 @ 19:36)                          9.8    6.41  )-----------( 182      ( 29 Oct 2022 10:41 )             34.0     10-29    128  |  103  |  43<H>  ----------------------------<  270<H>  5    |  13<L>  |  1.83<H>    Ca    8.7      29 Oct 2022 10:41  Phos  3.5     10-29  Mg     2.0     10-29    TPro  5.7<L>  /  Alb  2.6<L>  /  TBili  0.6  /  DBili  x   /  AST  40  /  ALT  35  /  AlkPhos  128<H>  10-29    proBNP: Serum Pro-Brain Natriuretic Peptide: 18208 pg/mL (10-28 @ 19:36)      TSH: Thyroid Stimulating Hormone, Serum: 6.68 uU/mL (10-29 @ 10:41)          < from: CT Cervical Spine No Cont (10.28.22 @ 21:54) >  ACC: [id number] EXAM:  CT CERVICAL SPINE                        ACC: [id number] EXAM:  CT BRAIN                          PROCEDURE DATE:  10/28/2022          INTERPRETATION:  CLINICAL INFORMATION: Status post fall on Eliquis.    TECHNIQUE: CT of the head was performed in multiple axial sections from   the base of the skull to the vertex with coronal and sagittal reformats.    CT of the cervical spine was performed in bone and soft tissue windows   with coronal and sagittal reformats. No intravenouscontrast was   administered. Beam hardening artifact slightly obscures evaluation of the   posterior fossa and brainstem.    COMPARISON: CT of the head and cervical spine from 9/11/2013.    FINDINGS:    Head:  Parenchymal volume loss is noted with prominent ventricles and sulci.   Chronic microvascular ischemic changes are identified. Old lacunar   infarcts are seen in the left lentiform nucleus. No acute territorial   infarct is demonstrated.    There is no evidence of an acute hemorrhage or mass-effect in the   posterior fossa or in the supratentorial region.    Evaluation of the osseous structures with the appropriate window appears   unremarkable. Vascular calcifications are noted. Sequela of right lens   surgery is seen.Mild mucosal thickening is seen in bilateral maxillary   sinuses. The remaining visualized paranasal sinuses and mastoid air cells   are clear.    Cervical spine:  Bones: Straightening of the normal cervical lordosis is seen which is   likely due to muscle spasm versus patient positioning. Grade 1   anterolisthesis of C3 on C4 and C7 on T1 are not significantly changed.   The cervical vertebral body heights are maintained. No fracture is   demonstrated. Severe disc space narrowing at C4-C5 has worsened with   partial fusion of the C4 and C5 vertebral bodies. Moderate to severe disc   space narrowing at C5-C6 and C6-C7 and mild disc space narrowing at C7-T1   is again seen. Marginal osteophyte formation is noted from C3 to T1.   Multilevel posterior disc osteophyte complexes are seen without evidence   of severe spinal canal stenosis. Multilevel neural foraminal stenosis is   noted.    Soft tissues: The prevertebral soft tissues are unremarkable. The thyroid   is atrophic or absent. Partially visualized pacemaker leads and   sternotomy wires are noted.    Lung apices: A right pleural effusion is seen.    IMPRESSION:  1. No acute intracranial hemorrhage, territorial infarct, mass effect or   calvarial fracture.  2. Multilevel degenerative changes of the cervical spine without evidence   of a fracture.    < end of copied text >  < from: CT Head No Cont (10.28.22 @ 21:53) >  ACC: [id number] EXAM:  CT CERVICAL SPINE                        ACC: [id number] EXAM:  CT BRAIN                          PROCEDURE DATE:  10/28/2022          INTERPRETATION:  CLINICAL INFORMATION: Status post fall on Eliquis.    TECHNIQUE: CT of the head was performed in multiple axial sections from   the base of the skull to the vertex with coronal and sagittal reformats.    CT of the cervical spine was performed in bone and soft tissue windows   with coronal and sagittal reformats. No intravenouscontrast was   administered. Beam hardening artifact slightly obscures evaluation of the   posterior fossa and brainstem.    COMPARISON: CT of the head and cervical spine from 9/11/2013.    FINDINGS:    Head:  Parenchymal volume loss is noted with prominent ventricles and sulci.   Chronic microvascular ischemic changes are identified. Old lacunar   infarcts are seen in the left lentiform nucleus. No acute territorial   infarct is demonstrated.    There is no evidence of an acute hemorrhage or mass-effect in the   posterior fossa or in the supratentorial region.    Evaluation of the osseous structures with the appropriate window appears   unremarkable. Vascular calcifications are noted. Sequela of right lens   surgery is seen.Mild mucosal thickening is seen in bilateral maxillary   sinuses. The remaining visualized paranasal sinuses and mastoid air cells   are clear.    Cervical spine:  Bones: Straightening of the normal cervical lordosis is seen which is   likely due to muscle spasm versus patient positioning. Grade 1   anterolisthesis of C3 on C4 and C7 on T1 are not significantly changed.   The cervical vertebral body heights are maintained. No fracture is   demonstrated. Severe disc space narrowing at C4-C5 has worsened with   partial fusion of the C4 and C5 vertebral bodies. Moderate to severe disc   space narrowing at C5-C6 and C6-C7 and mild disc space narrowing at C7-T1   is again seen. Marginal osteophyte formation is noted from C3 to T1.   Multilevel posterior disc osteophyte complexes are seen without evidence   of severe spinal canal stenosis. Multilevel neural foraminal stenosis is   noted.    Soft tissues: The prevertebral soft tissues are unremarkable. The thyroid   is atrophic or absent. Partially visualized pacemaker leads and   sternotomy wires are noted.    Lung apices: A right pleural effusion is seen.    IMPRESSION:  1. No acute intracranial hemorrhage, territorial infarct, mass effect or   calvarial fracture.  2. Multilevel degenerative changes of the cervical spine without evidence   of a fracture.    < end of copied text >    < from: Transthoracic Echocardiogram (04.29.17 @ 10:03) >  OBSERVATIONS:  Mitral Valve: Mitral annular calcification, otherwise  normal mitral valve. Mild-moderate mitral regurgitation.  Aortic Root: Normal aortic root.  Aortic Valve: Calcified trileaflet aortic valve with normal  opening. Mild aortic regurgitation.  Left Atrium: Severely dilated left atrium.  LA volume index  = 49 cc/m2.  Left Ventricle: Normal left ventricular systolic function.  No segmental wall motion abnormalities. Normal left  ventricular internal dimensions and wall thicknesses.  Right Heart: Normal right atrium. The right ventricle is  not well visualized; grossly normal right ventricular  systolic function. Normal tricuspid valve.  Mild-moderate  tricuspid regurgitation. Normal pulmonic valve.  Pericardium/PleuraNormal pericardium with no pericardial  effusion.  Hemodynamic: Estimated right ventricular systolic pressure  equals 50 mm Hg, assuming right atrial pressure equals 10  mm Hg, consistent with moderate pulmonary hypertension.

## 2022-10-29 NOTE — RAPID RESPONSE TEAM SUMMARY - NSMEDICATIONSRRT_GEN_ALL_CORE
IVF  Metoprolol 2.5mg   Lasix 80mg IV   Peripheral phenylephrine started, discontinued dobutamine started  Heparin drip

## 2022-10-29 NOTE — H&P ADULT - ASSESSMENT
This is a 89 yo M with pmhx of hyperlipidemia, diabetes, atrial fibrillation on Eliquis presents with generalized weakness x 2 days likely in the setting of dehydration 2/2 diarrhea      PRIMARY TEAM TO CONFIRM MEDICATIONS IN AM This is a 89 yo M with pmhx of hyperlipidemia, diabetes, atrial fibrillation on Eliquis presents with generalized weakness x 2 days likely in the setting of dehydration 2/2 diarrhea      PRIMARY TEAM TO CONFIRM MEDICATIONS IN AM, COMPLETED VIA SURESCRIPTS

## 2022-10-29 NOTE — CHART NOTE - NSCHARTNOTEFT_GEN_A_CORE
Spoke with patient, his son, and his daughter, and had an extensive conversation regarding the patient's current status, diagnostics, treatment plan, and overall prognosis. All questions answered.

## 2022-10-29 NOTE — CONSULT NOTE ADULT - SUBJECTIVE AND OBJECTIVE BOX
Ernesto Bennett MD (Nephrology)  [address], [facility],  [address],  [id number]  TEl: [phone]  Cell: [phone]    Patient is a 89y old  Male who presents with a chief complaint of Fall (29 Oct 2022 12:48)      HPI:  This is a 89 yo M with pmhx of hyperlipidemia, diabetes, atrial fibrillation on Eliquis presents with generalized weakness x 2 days. Patient states that he was sitting on the couch yesterday when he sat up then fell on the ground. The episode was witnessed by his wife. He doesn't believe he lost consciousness or if he hit his head. He does mentions having 3 episodes of watery, nonbloody diarrhea for the past few days.    In ED v/s: /92, , RR 20, SPO2: 97% on RA  (29 Oct 2022 04:15)      PAST MEDICAL & SURGICAL HISTORY:  Diabetes      HTN (hypertension)      Hypothyroid      GERD (gastroesophageal reflux disease)      H/O heart bypass surgery      H/O partial nephrectomy      S/P TURP            Allergies:  No Known Allergies      Home Medications:   aMIOdarone Infusion 1 mG/Min IV Continuous <Continuous>  aMIOdarone Infusion 0.5 mG/Min IV Continuous <Continuous>  atorvastatin 40 milliGRAM(s) Oral at bedtime  carvedilol 25 milliGRAM(s) Oral every 12 hours  DOBUTamine Infusion 5 MICROgram(s)/kG/Min IV Continuous <Continuous>  heparin  Infusion.  Unit(s)/Hr IV Continuous <Continuous>  insulin lispro (ADMELOG) corrective regimen sliding scale   SubCutaneous Before meals and at bedtime  levothyroxine 75 MICROGram(s) Oral daily  vasopressin Infusion 0.04 Unit(s)/Min IV Continuous <Continuous>      Hospital Medications:   MEDICATIONS  (STANDING):  aMIOdarone Infusion 1 mG/Min (33.3 mL/Hr) IV Continuous <Continuous>  aMIOdarone Infusion 0.5 mG/Min (16.7 mL/Hr) IV Continuous <Continuous>  atorvastatin 40 milliGRAM(s) Oral at bedtime  carvedilol 25 milliGRAM(s) Oral every 12 hours  DOBUTamine Infusion 5 MICROgram(s)/kG/Min (9.87 mL/Hr) IV Continuous <Continuous>  heparin  Infusion.  Unit(s)/Hr (12 mL/Hr) IV Continuous <Continuous>  insulin lispro (ADMELOG) corrective regimen sliding scale   SubCutaneous Before meals and at bedtime  levothyroxine 75 MICROGram(s) Oral daily  vasopressin Infusion 0.04 Unit(s)/Min (6 mL/Hr) IV Continuous <Continuous>      SOCIAL HISTORY:  Denies ETOh, Smoking,     Family History:  FAMILY HISTORY:  Family history of hypertension (Father, Mother)        ROS:  Limited  Confused and disoriented lying in bed on nasal cannula oxygen      VITALS:  T(F): 98 (10-29-22 @ 10:37), Max: 98.5 (10-28-22 @ 22:00)  HR: 138 (10-29-22 @ 14:30)  BP: 97/65 (10-29-22 @ 14:00)  RR: 17 (10-29-22 @ 14:30)  SpO2: 100% (10-29-22 @ 14:30)  Wt(kg): --    Height (cm): 160 (10-28 @ 20:11)  Weight (kg): 65.8 (10-28 @ 20:11)  BMI (kg/m2): 25.7 (10-28 @ 20:11)  BSA (m2): 1.69 (10-28 @ 20:11)  CAPILLARY BLOOD GLUCOSE      POCT Blood Glucose.: 239 mg/dL (29 Oct 2022 14:31)  POCT Blood Glucose.: 250 mg/dL (29 Oct 2022 09:53)  POCT Blood Glucose.: 136 mg/dL (29 Oct 2022 06:16)  POCT Blood Glucose.: 140 mg/dL (28 Oct 2022 21:05)        PHYSICAL EXAM:  GENERAL: Alert and awake but confused and disoriented on nasal cannula oxygen  HEENT: JOSE ARMANDO, EOMI, neck supple, no JVP  CHEST/LUNG: Bilateral decreased breath sounds, bibasilar rales and rhonchi, Right>left  HEART: Regular rate and rhythm, FREIDA II/VI at LPSB, no gallops, no rub   ABDOMEN: Soft, nontender, non distended, bowel sounds present  : No flank or supra pubic tenderness.  EXTREMITIES: Bilateral trace edema  Musculoskeletal: No joint or spinal tenderness  Neurology: AAOx1-2, no focal neurological deficit  SKIN: No rash or skin lesion    LABS:  10-29    128  |  103  |  43<H>  ----------------------------<  270<H>  5    |  13<L>  |  1.83<H>    Ca    8.7      29 Oct 2022 10:41  Phos  3.5     10-29  Mg     2.0     10-29    TPro  5.7<L>  /  Alb  2.6<L>  /  TBili  0.6  /  DBili      /  AST  40  /  ALT  35  /  AlkPhos  128<H>  10-29    Creatinine Trend: 1.83 <--, 1.85 <--                        9.8    6.41  )-----------( 182      ( 29 Oct 2022 10:41 )             34.0     Urine Studies:  Urinalysis Basic - ( 28 Oct 2022 20:50 )    Color: Yellow / Appearance: Clear / S.020 / pH:   Gluc:  / Ketone: Negative  / Bili: Negative / Urobili: Negative   Blood:  / Protein: 30 mg/dL / Nitrite: Negative   Leuk Esterase: Negative / RBC: 0-2 /HPF / WBC 0-2 /HPF   Sq Epi:  / Non Sq Epi: Occasional /HPF / Bacteria: Few /HPF          RADIOLOGY & ADDITIONAL STUDIES:  rad< from: Xray Chest 1 View-PORTABLE IMMEDIATE (Xray Chest 1 View-PORTABLE IMMEDIATE .) (10.29.22 @ 13:23) >    ACC: [id number] EXAM:  XR CHEST PORTABLE IMMED 1V                          PROCEDURE DATE:  10/29/2022          INTERPRETATION:  AP chest on 2022 at 1:06 PM. Patient had   right jugular line insertion.    Heart possibly enlarged. Sternotomy and left-sided pacemaker again noted.    Present film shows a moderate right effusion increased from    earlier study.    There is also a right jugular line inserted in good position.    IMPRESSION: Increasing right effusion. Right jugular line inserted.    --- End of Report ---            BHAVANI CONLEY MD; Attending Radiologist  This document has been electronically signed. Oct 29 2022  1:38PM    < end of copied text >    EKG findings reviewed.    Imaging Personally Reviewed:  [x] YES  [ ] NO    Consultant(s) and primary physician Notes Reviewed:  [x] YES  [ ] NO    Care Discussed with Primary team/ Consultants/Other Providers [x] YES  [ ] NO

## 2022-10-29 NOTE — CONSULT NOTE ADULT - ASSESSMENT
89 yo M with pmhx of CAD-s/p CABG,s/p PPM, hyperlipidemia, diabetes, atrial fibrillation on Eliquis,Renal cell Ca-s/p partial nephrectomy ,Hypothyroid who  presents with generalized weakness x 2 day, now in ICU with afib with RVR and hypotension.  1.ICU monitoring.  2.Hypotension and suspected CHF-on dobutamine.  3.Afib with RVR-amio and dig loading, eliquis changed to heparin drip.  4.CAD-hold b blocker due to hypotension,statin.  5.DM-Insulin.  6.PPI.  7.CRI-f/u lytes.  8.Hypothyroid-synthroid.  9.Will interrogate PPM.

## 2022-10-30 LAB
A1C WITH ESTIMATED AVERAGE GLUCOSE RESULT: 6.8 % — HIGH (ref 4–5.6)
ALBUMIN SERPL ELPH-MCNC: 2.9 G/DL — LOW (ref 3.5–5)
ALP SERPL-CCNC: 214 U/L — HIGH (ref 40–120)
ALT FLD-CCNC: 120 U/L DA — HIGH (ref 10–60)
ANION GAP SERPL CALC-SCNC: 8 MMOL/L — SIGNIFICANT CHANGE UP (ref 5–17)
APTT BLD: 120.5 SEC — CRITICAL HIGH (ref 27.5–35.5)
APTT BLD: 144.1 SEC — CRITICAL HIGH (ref 27.5–35.5)
APTT BLD: 82.1 SEC — HIGH (ref 27.5–35.5)
APTT BLD: 87.6 SEC — HIGH (ref 27.5–35.5)
AST SERPL-CCNC: 118 U/L — HIGH (ref 10–40)
BILIRUB SERPL-MCNC: 0.4 MG/DL — SIGNIFICANT CHANGE UP (ref 0.2–1.2)
BUN SERPL-MCNC: 44 MG/DL — HIGH (ref 7–18)
CALCIUM SERPL-MCNC: 9 MG/DL — SIGNIFICANT CHANGE UP (ref 8.4–10.5)
CHLORIDE SERPL-SCNC: 109 MMOL/L — HIGH (ref 96–108)
CO2 SERPL-SCNC: 24 MMOL/L — SIGNIFICANT CHANGE UP (ref 22–31)
CREAT SERPL-MCNC: 1.85 MG/DL — HIGH (ref 0.5–1.3)
CULTURE RESULTS: SIGNIFICANT CHANGE UP
EGFR: 34 ML/MIN/1.73M2 — LOW
ESTIMATED AVERAGE GLUCOSE: 148 MG/DL — HIGH (ref 68–114)
GLUCOSE BLDC GLUCOMTR-MCNC: 136 MG/DL — HIGH (ref 70–99)
GLUCOSE BLDC GLUCOMTR-MCNC: 152 MG/DL — HIGH (ref 70–99)
GLUCOSE BLDC GLUCOMTR-MCNC: 156 MG/DL — HIGH (ref 70–99)
GLUCOSE BLDC GLUCOMTR-MCNC: 176 MG/DL — HIGH (ref 70–99)
GLUCOSE SERPL-MCNC: 169 MG/DL — HIGH (ref 70–99)
HCT VFR BLD CALC: 28.7 % — LOW (ref 39–50)
HCT VFR BLD CALC: 28.8 % — LOW (ref 39–50)
HGB BLD-MCNC: 8.9 G/DL — LOW (ref 13–17)
HGB BLD-MCNC: 9 G/DL — LOW (ref 13–17)
IRON SATN MFR SERPL: 10 % — LOW (ref 20–55)
IRON SATN MFR SERPL: 24 UG/DL — LOW (ref 65–170)
MAGNESIUM SERPL-MCNC: 1.8 MG/DL — SIGNIFICANT CHANGE UP (ref 1.6–2.6)
MCHC RBC-ENTMCNC: 28 PG — SIGNIFICANT CHANGE UP (ref 27–34)
MCHC RBC-ENTMCNC: 28.6 PG — SIGNIFICANT CHANGE UP (ref 27–34)
MCHC RBC-ENTMCNC: 31 GM/DL — LOW (ref 32–36)
MCHC RBC-ENTMCNC: 31.3 GM/DL — LOW (ref 32–36)
MCV RBC AUTO: 90.3 FL — SIGNIFICANT CHANGE UP (ref 80–100)
MCV RBC AUTO: 91.4 FL — SIGNIFICANT CHANGE UP (ref 80–100)
NRBC # BLD: 0 /100 WBCS — SIGNIFICANT CHANGE UP (ref 0–0)
NRBC # BLD: 0 /100 WBCS — SIGNIFICANT CHANGE UP (ref 0–0)
PHOSPHATE SERPL-MCNC: 3.5 MG/DL — SIGNIFICANT CHANGE UP (ref 2.5–4.5)
PLATELET # BLD AUTO: 184 K/UL — SIGNIFICANT CHANGE UP (ref 150–400)
PLATELET # BLD AUTO: 186 K/UL — SIGNIFICANT CHANGE UP (ref 150–400)
POTASSIUM SERPL-MCNC: 3.6 MMOL/L — SIGNIFICANT CHANGE UP (ref 3.5–5.3)
POTASSIUM SERPL-SCNC: 3.6 MMOL/L — SIGNIFICANT CHANGE UP (ref 3.5–5.3)
PROT SERPL-MCNC: 5.7 G/DL — LOW (ref 6–8.3)
RBC # BLD: 3.15 M/UL — LOW (ref 4.2–5.8)
RBC # BLD: 3.18 M/UL — LOW (ref 4.2–5.8)
RBC # FLD: 18.1 % — HIGH (ref 10.3–14.5)
RBC # FLD: 18.2 % — HIGH (ref 10.3–14.5)
SODIUM SERPL-SCNC: 141 MMOL/L — SIGNIFICANT CHANGE UP (ref 135–145)
SPECIMEN SOURCE: SIGNIFICANT CHANGE UP
TIBC SERPL-MCNC: 239 UG/DL — LOW (ref 250–450)
UIBC SERPL-MCNC: 215 UG/DL — SIGNIFICANT CHANGE UP (ref 110–370)
WBC # BLD: 7.44 K/UL — SIGNIFICANT CHANGE UP (ref 3.8–10.5)
WBC # BLD: 7.66 K/UL — SIGNIFICANT CHANGE UP (ref 3.8–10.5)
WBC # FLD AUTO: 7.44 K/UL — SIGNIFICANT CHANGE UP (ref 3.8–10.5)
WBC # FLD AUTO: 7.66 K/UL — SIGNIFICANT CHANGE UP (ref 3.8–10.5)

## 2022-10-30 RX ORDER — BUMETANIDE 0.25 MG/ML
1 INJECTION INTRAMUSCULAR; INTRAVENOUS ONCE
Refills: 0 | Status: COMPLETED | OUTPATIENT
Start: 2022-10-30 | End: 2022-10-30

## 2022-10-30 RX ORDER — DIGOXIN 250 MCG
250 TABLET ORAL ONCE
Refills: 0 | Status: COMPLETED | OUTPATIENT
Start: 2022-10-30 | End: 2022-10-30

## 2022-10-30 RX ORDER — SODIUM BICARBONATE 1 MEQ/ML
650 SYRINGE (ML) INTRAVENOUS
Refills: 0 | Status: DISCONTINUED | OUTPATIENT
Start: 2022-10-30 | End: 2022-11-01

## 2022-10-30 RX ADMIN — HEPARIN SODIUM 800 UNIT(S)/HR: 5000 INJECTION INTRAVENOUS; SUBCUTANEOUS at 13:34

## 2022-10-30 RX ADMIN — HEPARIN SODIUM 800 UNIT(S)/HR: 5000 INJECTION INTRAVENOUS; SUBCUTANEOUS at 18:58

## 2022-10-30 RX ADMIN — HEPARIN SODIUM 800 UNIT(S)/HR: 5000 INJECTION INTRAVENOUS; SUBCUTANEOUS at 17:28

## 2022-10-30 RX ADMIN — HEPARIN SODIUM 0 UNIT(S)/HR: 5000 INJECTION INTRAVENOUS; SUBCUTANEOUS at 01:57

## 2022-10-30 RX ADMIN — Medication 250 MICROGRAM(S): at 01:02

## 2022-10-30 RX ADMIN — Medication 75 MICROGRAM(S): at 05:50

## 2022-10-30 RX ADMIN — Medication 1: at 13:35

## 2022-10-30 RX ADMIN — Medication 650 MILLIGRAM(S): at 05:50

## 2022-10-30 RX ADMIN — CARVEDILOL PHOSPHATE 25 MILLIGRAM(S): 80 CAPSULE, EXTENDED RELEASE ORAL at 05:50

## 2022-10-30 RX ADMIN — ATORVASTATIN CALCIUM 40 MILLIGRAM(S): 80 TABLET, FILM COATED ORAL at 21:08

## 2022-10-30 RX ADMIN — Medication 650 MILLIGRAM(S): at 17:32

## 2022-10-30 RX ADMIN — HEPARIN SODIUM 900 UNIT(S)/HR: 5000 INJECTION INTRAVENOUS; SUBCUTANEOUS at 03:04

## 2022-10-30 RX ADMIN — Medication 1: at 21:08

## 2022-10-30 RX ADMIN — HEPARIN SODIUM 800 UNIT(S)/HR: 5000 INJECTION INTRAVENOUS; SUBCUTANEOUS at 10:02

## 2022-10-30 RX ADMIN — BUMETANIDE 1 MILLIGRAM(S): 0.25 INJECTION INTRAMUSCULAR; INTRAVENOUS at 11:57

## 2022-10-30 RX ADMIN — Medication 1: at 06:04

## 2022-10-30 NOTE — PROGRESS NOTE ADULT - SUBJECTIVE AND OBJECTIVE BOX
CHIEF COMPLAINT:Patient is a 89y old  Male who presents with a chief complaint of Fall.Pt appears comfortable.    	  REVIEW OF SYSTEMS:  CONSTITUTIONAL: No fever, weight loss, or fatigue  EYES: No eye pain, visual disturbances, or discharge  ENT:  No difficulty hearing, tinnitus, vertigo; No sinus or throat pain  NECK: No pain or stiffness  RESPIRATORY: No cough, wheezing, chills or hemoptysis; No Shortness of Breath  CARDIOVASCULAR: No chest pain, palpitations, passing out, dizziness, or leg swelling  GASTROINTESTINAL: No abdominal or epigastric pain. No nausea, vomiting, or hematemesis; No diarrhea or constipation. No melena or hematochezia.  GENITOURINARY: No dysuria, frequency, hematuria, or incontinence  NEUROLOGICAL: No headaches, memory loss, loss of strength, numbness, or tremors  SKIN: No itching, burning, rashes, or lesions   LYMPH Nodes: No enlarged glands  ENDOCRINE: No heat or cold intolerance; No hair loss  MUSCULOSKELETAL: No joint pain or swelling; No muscle, back, or extremity pain  PSYCHIATRIC: No depression, anxiety, mood swings, or difficulty sleeping  HEME/LYMPH: No easy bruising, or bleeding gums  ALLERGY AND IMMUNOLOGIC: No hives or eczema	      PHYSICAL EXAM:  T(C): 36.6 (10-30-22 @ 05:45), Max: 36.6 (10-30-22 @ 05:45)  HR: 70 (10-30-22 @ 11:56) (70 - 144)  BP: 95/52 (10-30-22 @ 08:45) (86/58 - 126/75)  RR: 17 (10-30-22 @ 10:30) (13 - 26)  SpO2: 99% (10-30-22 @ 11:56) (96% - 100%)  Wt(kg): --  I&O's Summary    29 Oct 2022 07:01  -  30 Oct 2022 07:00  --------------------------------------------------------  IN: 670.2 mL / OUT: 1785 mL / NET: -1114.8 mL    30 Oct 2022 07:01  -  30 Oct 2022 13:08  --------------------------------------------------------  IN: 58.9 mL / OUT: 0 mL / NET: 58.9 mL        Appearance: Normal	  HEENT:   Normal oral mucosa, PERRL, EOMI	  Lymphatic: No lymphadenopathy  Cardiovascular: Normal S1 S2, No JVD, No murmurs, No edema  Respiratory: Lungs clear to auscultation	  Psychiatry: A & O x 3, Mood & affect appropriate  Gastrointestinal:  Soft, Non-tender, + BS	  Skin: No rashes, No ecchymoses, No cyanosis	  Neurologic: Non-focal  Extremities: Normal range of motion, No clubbing, cyanosis or edema  Vascular: Peripheral pulses palpable 2+ bilaterally    MEDICATIONS  (STANDING):  aMIOdarone Infusion 1 mG/Min (33.3 mL/Hr) IV Continuous <Continuous>  aMIOdarone Infusion 0.5 mG/Min (16.7 mL/Hr) IV Continuous <Continuous>  atorvastatin 40 milliGRAM(s) Oral at bedtime  DOBUTamine Infusion 5 MICROgram(s)/kG/Min (9.87 mL/Hr) IV Continuous <Continuous>  heparin  Infusion.  Unit(s)/Hr (12 mL/Hr) IV Continuous <Continuous>  insulin lispro (ADMELOG) corrective regimen sliding scale   SubCutaneous Before meals and at bedtime  levothyroxine 75 MICROGram(s) Oral daily  sodium bicarbonate 650 milliGRAM(s) Oral two times a day      TELEMETRY: a fib v paced	      	  LABS:	 	  CARDIAC MARKERS ( 29 Oct 2022 12:55 )  x     / x     / 68 U/L / x     / 3.2 ng/mL  CARDIAC MARKERS ( 29 Oct 2022 10:41 )  x     / x     / x     / x     / 2.2 ng/mL      Troponin I, High Sensitivity Result: 177.2 ng/L (10-29 @ 12:55)  Troponin I, High Sensitivity Result: 43.1 ng/L (10-29 @ 10:41)  Troponin I, High Sensitivity Result: 60.6 ng/L (10-28 @ 19:36)                            9.0    7.66  )-----------( 186      ( 30 Oct 2022 08:40 )             28.8     10-30    141  |  109<H>  |  44<H>  ----------------------------<  169<H>  3.6   |  24  |  1.85<H>    Ca    9.0      30 Oct 2022 05:13  Phos  3.5     10-30  Mg     1.8     10-30    TPro  5.7<L>  /  Alb  2.9<L>  /  TBili  0.4  /  DBili  x   /  AST  118<H>  /  ALT  120<H>  /  AlkPhos  214<H>  10-30    proBNP: Serum Pro-Brain Natriuretic Peptide: 95619 pg/mL (10-29 @ 12:55)  Serum Pro-Brain Natriuretic Peptide: 91617 pg/mL (10-28 @ 19:36)      TSH: Thyroid Stimulating Hormone, Serum: 6.68 uU/mL (10-29 @ 10:41)

## 2022-10-30 NOTE — PROGRESS NOTE ADULT - ASSESSMENT
89 yo M with pmhx of CAD-s/p CABG,s/p PPM, hyperlipidemia, diabetes,Parox atrial fibrillation on Eliquis,Renal cell Ca-s/p partial nephrectomy ,Hypothyroid who  presents with generalized weakness x 2 day, now in ICU with afib with RVR and hypotension.  1.ICU monitoring.  2.Hypotension and suspected CHF-on dobutamine.  3.Afib with RVR-amio  loading, eliquis changed to heparin drip by ICU.EP saniya called by ICU, PPM settings changed. Pt with known PAF on eliquis as outpatient, will need antiarrythmic and AC.Will d/w his outpatient cardiologist.  4.CAD-hold b blocker due to hypotension,statin.  5.DM-Insulin.  6.PPI.  7.CRI-f/u lytes.  8.Hypothyroid-synthroid.  9.Will interrogate PPM.  10.Wean off dobutamine.  11.Shock liver, f/u LFT's.  12.Echoacrdogram.

## 2022-10-30 NOTE — PROGRESS NOTE ADULT - SUBJECTIVE AND OBJECTIVE BOX
Ernesto Bennett MD (Nephrology progress note)  [address], [facility],  [address],  [facility]  TEl: [phone]  Cell: [phone]    Patient is a 89y Male seen and evaluated at bedside. Vital signs, laboratory data, imaging studies, consult notes reviewed done within past 24 hours. Overnight events noted. Patient alert and awake in no distress on nasal cannula oxygen. Denies any chest pain, SOB. Interval stable renal function with non oliguria on IV Amiodarone/ Dobutamine. Received Intermittent Bumex/Lasix. BP remains stable    Allergies    No Known Allergies    Intolerances        ROS:  CONSTITUTIONAL: No fever or chills.  HEENT: No headache or visual disturbances.  RESPIRATORY: Mild SOB but denies cough, hemoptysis or wheezing  CARDIOVASCULAR: Dyspnea but denies chest pain, syncope,orthopnea.  GASTROINTESTINAL: No abdominal pain, nausea, vomiting, diarrhea, hematemesis or blood per rectum.  GENITOURINARY: No flank or supra pubic pain  NEUROLOGICAL: No headache, focal limb weakness, tingling or numbness  SKIN: No skin rash or lesion  MUSCULOSKELETAL: No leg pain, calf pain or swelling    VITALS:    T(C): 36.6 (10-30-22 @ 05:45), Max: 36.6 (10-30-22 @ 05:45)  HR: 84 (10-30-22 @ 10:30) (84 - 144)  BP: 95/52 (10-30-22 @ 08:45) (86/58 - 126/75)  RR: 17 (10-30-22 @ 10:30) (13 - 26)  SpO2: 99% (10-30-22 @ 10:30) (96% - 100%)  CAPILLARY BLOOD GLUCOSE      POCT Blood Glucose.: 152 mg/dL (30 Oct 2022 05:54)  POCT Blood Glucose.: 179 mg/dL (29 Oct 2022 21:51)  POCT Blood Glucose.: 184 mg/dL (29 Oct 2022 17:49)  POCT Blood Glucose.: 239 mg/dL (29 Oct 2022 14:31)      10-29-22 @ 07:01  -  10-30-22 @ 07:00  --------------------------------------------------------  IN: 670.2 mL / OUT: 1785 mL / NET: -1114.8 mL    10-30-22 @ 07:01  -  10-30-22 @ 11:50  --------------------------------------------------------  IN: 58.9 mL / OUT: 0 mL / NET: 58.9 mL      MEDICATIONS  (STANDING):  aMIOdarone Infusion 1 mG/Min (33.3 mL/Hr) IV Continuous <Continuous>  aMIOdarone Infusion 0.5 mG/Min (16.7 mL/Hr) IV Continuous <Continuous>  atorvastatin 40 milliGRAM(s) Oral at bedtime  buMETAnide Injectable 1 milliGRAM(s) IV Push once  DOBUTamine Infusion 5 MICROgram(s)/kG/Min (9.87 mL/Hr) IV Continuous <Continuous>  heparin  Infusion.  Unit(s)/Hr (12 mL/Hr) IV Continuous <Continuous>  insulin lispro (ADMELOG) corrective regimen sliding scale   SubCutaneous Before meals and at bedtime  levothyroxine 75 MICROGram(s) Oral daily  sodium bicarbonate 650 milliGRAM(s) Oral three times a day    MEDICATIONS  (PRN):      PHYSICAL EXAM:  GENERAL: Alert, awake and oriented x2-3 in no distress  HEENT: JOSE ARMANDO, EOMI, neck supple, no JVP, no carotid bruit, oral mucosa moist and pale  CHEST/LUNG: Bilateral decreased breath sounds, bibasilar rales and rhonchi   HEART: irregular rate and rhythm, FREIDA II/VI at LPSB, no gallops, no rub, Left upper chest PPM noted  ABDOMEN: Soft, nontender, non distended, bowel sounds present, no palpable organomegaly  : No flank or supra pubic tenderness.  EXTREMITIES: Peripheral pulses are palpable, no pedal edema  Neurology: AAOx2-3, no focal neurological deficit  SKIN: No rash or skin lesion  Musculoskeletal: No joint deformity or spinal tenderness.      Vascular ACCESS: None    LABS:                        9.0    7.66  )-----------( 186      ( 30 Oct 2022 08:40 )             28.8     10-30    141  |  109<H>  |  44<H>  ----------------------------<  169<H>  3.6   |  24  |  1.85<H>    Ca    9.0      30 Oct 2022 05:13  Phos  3.5     10-30  Mg     1.8     10-30    TPro  5.7<L>  /  Alb  2.9<L>  /  TBili  0.4  /  DBili  x   /  AST  118<H>  /  ALT  120<H>  /  AlkPhos  214<H>  10-30      PT/INR - ( 28 Oct 2022 19:36 )   PT: 14.1 sec;   INR: 1.18 ratio         PTT - ( 30 Oct 2022 08:40 )  PTT:120.5 sec  Urinalysis Basic - ( 28 Oct 2022 20:50 )    Color: Yellow / Appearance: Clear / S.020 / pH: x  Gluc: x / Ketone: Negative  / Bili: Negative / Urobili: Negative   Blood: x / Protein: 30 mg/dL / Nitrite: Negative   Leuk Esterase: Negative / RBC: 0-2 /HPF / WBC 0-2 /HPF   Sq Epi: x / Non Sq Epi: Occasional /HPF / Bacteria: Few /HPF            RADIOLOGY & ADDITIONAL STUDIES:  rad< from: Xray Chest 1 View-PORTABLE IMMEDIATE (Xray Chest 1 View-PORTABLE IMMEDIATE .) (10.29.22 @ 13:23) >    ACC: [id number] EXAM:  XR CHEST PORTABLE IMMED 1V                          PROCEDURE DATE:  10/29/2022          INTERPRETATION:  AP chest on 2022 at 1:06 PM. Patient had   right jugular line insertion.    Heart possibly enlarged. Sternotomy and left-sided pacemaker again noted.    Present film shows a moderate right effusion increased from    earlier study.    There is also a right jugular line inserted in good position.    IMPRESSION: Increasing right effusion. Right jugular line inserted.    --- End of Report ---            BHAVANI CONLEY MD; Attending Radiologist  This document has been electronically signed. Oct 29 2022  1:38PM    < end of copied text >    Imaging Personally Reviewed:  [x] YES  [ ] NO    Consultant(s) Notes Reviewed:  [x] YES  [ ] NO    Care Discussed with Primary team/ Other Providers [x] YES  [ ] NO

## 2022-10-30 NOTE — PROGRESS NOTE ADULT - ASSESSMENT
This is a 89 yo M with pmhx of hyperlipidemia, diabetes, atrial fibrillation on Eliquis presents with generalized weakness x 2 days. Upon evaluation in the ED v/s: /92, , RR 20, SPO2: 97% on RA  (29 Oct 2022 04:15). Patient admitted initially to medicine for generalized weakness and diarrhea. Further evaluation including ct head and ct cervical spine was negative for acute pathologies.   Patient had RRT called around 10AM for tachycardia and hypotension. Patient noted to be lethargic and vitals at the time of the rapid was afebrile, BP of 130/61 and repeat BP showing reads of 50/40s. Patient noted to have heart rates in 140's. Patient placed on 15L NRBM, peripheral phenylephrine was started and metoprolol 2.5mg IV was given for heart rate. STAT EKG and labs including cardiac enzymes, coags and chemistries were sent. ICU was consulted and bedside POCUS done showing decreased left ventricular function, dilated IVC and Right atrium. Patient admitted to ICU for  cardiogenic shock r/o PE.     #Cardiogenic shock  #Atrial fibrillation with RVR  #R/O PE  #AHRF 2/2 to new onset CHF vs fluid overload   #HLD  #Diabetes   #LIGIA   #Hypothyroid  #Urinary retention     PLAN:    -----------------CNS:------------------  No acute issues, patient as per family is at baseline  CT head on admission is negative for acute stroke     -----------------CVS:------------------  - Cardiogenic shock  patient on POCUS noted to have dilated right atrium, dilated IVC  cardiac enzymes negative, EKG not showing no acute ST changes   start on dobutamine drip   f/u echo   Cardiology Dr. Dominguez    - Atrial fibrillation with RVR  - will start on digoxin  - f/u echo   - heparin drip   - Cardiology Dr. Dominguez     -----------------RESPIRATORY:--------  AHRF 2/2 fluid overload 2/2 to possible new onset CHF   - Patient noted to have echo in 2017 with EF 55%  - cxr showing vascular congestion, Lung auscultation, crackles  - s/p 80mg IV lasix  - Urology consulted for Gonzalez catheter   - strict I/O  - f/u echo     R/O PE  - patient with tachycardia, hypoxia  - noted to have dilated IVC on POCUS  - started on empiric heparin drip  - due to LIGIA Scr of 1.8 consider V/Q scan once patient is stabilized     -----------------GI:----------------------  Diarrhea  - f/u stool studies     -----------------ID:-----------------------  Diarrhea   - f/u urine and blood cultures       -----------------RENAL: ---------------  LIGIA  - patient noted to have new onset renal injury Scr of 1.85  - avoid nephrotoxic agents     -----------------HEME/ONC: ---------------  Anemia  - hgb 9.6  - transfuse if hgb<7  - monitor cbc     -----------------ENDO: ---------------  Hypothyroidism  - TSH noted to be 6.6  - continue with levothyroxine     Diabetes  - SSI     -----------------EXTREMITIES:-------  No acute issues     -----------------PROPHYLAXIS: -------    DVT: Heparin drip     -----------------DISPOSITION--------  Accepted to ICU    This is a 89 yo M with pmhx of hyperlipidemia, diabetes, ?atrial fibrillation on Eliquis, PPM presents with generalized weakness x 2 days, admitted to medicine for evaluation, while in ED holding had hyptoension and worsening mental status, found to have cardiogenic shock, atrial tachyarryhtmia, and PPM-driven tachycardia.     #Cardiogenic shock  #Atrial tachyarrhythmia   #PPM mode adjustment   #AHRF 2/2 to new onset CHF vs fluid overload   #HLD  #Diabetes   #LIGIA   #Hypothyroid  #Urinary retention     PLAN:    -----------------CNS:------------------  No acute issues, patient as per family is at baseline  CT head on admission is negative for acute stroke     -----------------CVS:------------------  - Cardiogenic shock  patient on POCUS noted to have dilated right atrium, dilated IVC  cardiac enzymes negative, EKG not showing no acute ST changes   start on dobutamine drip   f/u echo   Cardiology Dr. Dominguez    - Atrial fibrillation with RVR  - will start on digoxin  - f/u echo   - heparin drip   - Cardiology Dr. Dominguez     -----------------RESPIRATORY:--------  AHRF 2/2 fluid overload 2/2 to possible new onset CHF   - Patient noted to have echo in 2017 with EF 55%  - cxr showing vascular congestion, Lung auscultation, crackles  - s/p 80mg IV lasix  - Urology consulted for Gonzalez catheter   - strict I/O  - f/u echo     R/O PE  - patient with tachycardia, hypoxia  - noted to have dilated IVC on POCUS  - started on empiric heparin drip  - due to LIGIA Scr of 1.8 consider V/Q scan once patient is stabilized     -----------------GI:----------------------  Diarrhea  - f/u stool studies     -----------------ID:-----------------------  Diarrhea   - f/u urine and blood cultures       -----------------RENAL: ---------------  LIGIA  - patient noted to have new onset renal injury Scr of 1.85  - avoid nephrotoxic agents     -----------------HEME/ONC: ---------------  Anemia  - hgb 9.6  - transfuse if hgb<7  - monitor cbc     -----------------ENDO: ---------------  Hypothyroidism  - TSH noted to be 6.6  - continue with levothyroxine     Diabetes  - SSI     -----------------EXTREMITIES:-------  No acute issues     -----------------PROPHYLAXIS: -------    DVT: Heparin drip     -----------------DISPOSITION--------  Accepted to ICU    This is a 89 yo M with pmhx of hyperlipidemia, diabetes, ?atrial fibrillation on Eliquis, PPM presents with generalized weakness x 2 days, admitted to medicine for evaluation, while in ED holding had hyptoension and worsening mental status, found to have cardiogenic shock, atrial tachyarrhythmia , and PPM-driven tachycardia.     #Cardiogenic shock  #Atrial tachyarrhythmia   #PPM mode adjustment   #AHRF 2/2 to new onset CHF vs fluid overload   #HLD  #Diabetes   #LIGIA   #Hypothyroid  #Urinary retention     PLAN:    -----------------CNS:------------------  No acute issues, patient as per family is at baseline  CT head on admission is negative for acute stroke   Mentation improved back to baseline.     -----------------CVS:------------------  #Cardiogenic shock  Down LV function on initial POCUS (10/29), dilated right atrium, dilated IVC.  Status post Dobutamine (10/29 - ) and diuresis with improvement. Weaned off  on 10/30 after PPM adjustment.   Formal TTE performed with read pending.   Cardiology Dr. Dominguez    #atrial tachyarrhythmia   #PPM-driven tachycardia.  On Eliquis and Lopressor at home, however family says no history of AFib?  Presented with AFib/Flutter/Tach with RVR to 140s.   Received Amio and Digoxin without improvement.   PPM interrogated, was sensing atrial rate, mode switched by EP to DDIR with resolution.   Discontinue rate/rhythm control agents.   Heparin infusion for AC  EP Dr. Ly following.     -----------------RESPIRATORY:--------  #AHRF  #Pulmonary Edema  Pleural Effusion   CXR and POCUS with diffuse pulmonary edema and moderate right sided pleural effusion.   Initially required BiPAP, now weaned off.   Receiving diuresis had improvement in respiratory status, monitor for resolution of effusion.     -----------------GI:----------------------  #Diarrhea  - f/u stool studies  #Nutrition  Normal diet    -----------------ID:-----------------------  #Diarrhea   - f/u urine and blood cultures       -----------------RENAL: ---------------  Oliguric Pre-Renal Acute Renal Failure  - Likely from volume overload.   - patient noted to have new onset renal injury Scr of 1.85  - avoid nephrotoxic agents     -----------------HEME/ONC: ---------------  Anemia  - hgb 9.6  - transfuse if hgb<7  - monitor cbc     -----------------ENDO: ---------------  Hypothyroidism  - TSH noted to be 6.6  - continue with levothyroxine     Diabetes  - SSI     -----------------EXTREMITIES:-------  No acute issues     -----------------PROPHYLAXIS: -------    DVT: Heparin drip     -----------------DISPOSITION--------  Accepted to ICU

## 2022-10-30 NOTE — CONSULT NOTE ADULT - SUBJECTIVE AND OBJECTIVE BOX
EP Attending    HISTORY OF PRESENT ILLNESS: HPI:  This is a 89 yo M with pmhx of hyperlipidemia, diabetes, atrial fibrillation on Eliquis presents with generalized weakness x 2 days. Patient states that he was sitting on the couch yesterday when he sat up then fell on the ground. The episode was witnessed by his wife. He doesn't believe he lost consciousness or if he hit his head. He does mentions having 3 episodes of watery, nonbloody diarrhea for the past few days.  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  Symptoms improved dramatically after pacemaker implant.  After brief followup, his care was transferred to [facility].  He presents with a rapid wide complex rhythm.  EP called to bedside to interrogate pacemaker and treat.  He is sleeping in bed and does not offer any spontaneous comments.  Family is at bedside to confirm above history.  ROS per them is otherwise negative.  Cannot recall date of last ppm check.      Prior to my arrival, we confirmed his active rhythm of V-pacing at 140-145bpm.  He is maintained on dobutamine for what appears to be symptomatic acute systolic heart failure with shock.  I requested a magnet be placed over the pacemaker, and his rhythm reverted to the 'factory rate' of 85bpm, with subsequent improvement in blood pressure.    PAST MEDICAL & SURGICAL HISTORY:  Diabetes  HTN (hypertension)  Hypothyroid  GERD (gastroesophageal reflux disease)  H/O heart bypass surgery  H/O partial nephrectomy  S/P TURP      MEDICATIONS  (STANDING):  aMIOdarone Infusion 1 mG/Min (33.3 mL/Hr) IV Continuous <Continuous>  aMIOdarone Infusion 0.5 mG/Min (16.7 mL/Hr) IV Continuous <Continuous>  atorvastatin 40 milliGRAM(s) Oral at bedtime  buMETAnide Injectable 1 milliGRAM(s) IV Push once  DOBUTamine Infusion 5 MICROgram(s)/kG/Min (9.87 mL/Hr) IV Continuous <Continuous>  heparin  Infusion.  Unit(s)/Hr (12 mL/Hr) IV Continuous <Continuous>  insulin lispro (ADMELOG) corrective regimen sliding scale   SubCutaneous Before meals and at bedtime  levothyroxine 75 MICROGram(s) Oral daily  sodium bicarbonate 650 milliGRAM(s) Oral two times a day    Allergies    No Known Allergies    Intolerances    FAMILY HISTORY:  Family history of hypertension (Father, Mother)      Non-contributary for premature coronary disease or sudden cardiac death    SOCIAL HISTORY:    [x ] Non-smoker  [ ] Smoker  [ ] Alcohol      PHYSICAL EXAM:  T(C): 36.6 (10-30-22 @ 05:45), Max: 36.6 (10-30-22 @ 05:45)  HR: 84 (10-30-22 @ 10:30) (84 - 144)  BP: 95/52 (10-30-22 @ 08:45) (86/58 - 126/75)  RR: 17 (10-30-22 @ 10:30) (13 - 26)  SpO2: 99% (10-30-22 @ 10:30) (96% - 100%)  Wt(kg): --    Appearance: thin frail elderly man resting in bed, nondiaphoretic.  HEENT:   Normal oral mucosa, PERRL, EOMI	  Lymphatic: No lymphadenopathy , no edema  Cardiovascular: rapid HR, + JVD, No murmurs , Peripheral pulses palpable 1+ bilaterally  Respiratory: Lungs with rales, R>L, normal effort 	  Gastrointestinal:  Soft, Non-tender, + BS	  Skin: No rashes, No ecchymoses, No cyanosis, limbs cool to touch.  Musculoskeletal: Normal range of motion, normal strength  Psychiatry:  Mood & affect appropriate    TELEMETRY: AF/AT, V-pacing at 140bpm.    Pacemaker interrogated at bedside.  MEDTRONIC dual chamber pacemaker, implanted 2017 (Dr Vick) for 2nd degree AV block.  Underlying rhythm is V-escape at 20-30bpm.  Battery life is estimated 2.5-3yrs.  AFib has been paroxysmal <1hr/day for the last year.  Became persistent ~6 weeks ago.  Upon removal of magnet, SBP dropped from 120mmHg to 80-85mmHg on arterial line.  Mode immediately changed from DDD to DDIR, 70-105bpm.  With V-pacing at 70bpm, SBP improved to 120mmHg.  Atrial sensing >1.5, V-sensing not tested due to complete AV block.  V capture threshold 0.75V at 0.4ms.  Underlying rhythm is AF (205-215ms cycle length) with 100% V-pacing.  	    	  LABS:	 	                          9.0    7.66  )-----------( 186      ( 30 Oct 2022 08:40 )             28.8     10-30    141  |  109<H>  |  44<H>  ----------------------------<  169<H>  3.6   |  24  |  1.85<H>    Ca    9.0      30 Oct 2022 05:13  Phos  3.5     10-30  Mg     1.8     10-30    TPro  5.7<L>  /  Alb  2.9<L>  /  TBili  0.4  /  DBili  x   /  AST  118<H>  /  ALT  120<H>  /  AlkPhos  214<H>  10-30    proBNP: Serum Pro-Brain Natriuretic Peptide: 25269 pg/mL (10-29 @ 12:55)    ASSESSMENT/PLAN: 	89y Male with cardiogenic shock requiring dobutamine, in the setting of new onset AFib and 100% V-pacing.  s/p pacemaker interrogation and mode change.    Set to DDIR, .  Currently V-pacing at 71-75bpm.  Dobutamine can be weaned off.  Start anticoagulation with heparin infusion.  Diuresis, then recommend ADRIAN-guided cardioversion.    Hold Amiodarone, as the emergency has passed and he is now rate-controlled.  Will follow with you.    Marciano Ly M.D.  Cardiac Electrophysiology    office [phone]  pager [phone]

## 2022-10-30 NOTE — PROGRESS NOTE ADULT - ATTENDING COMMENTS
IMP: This is a  89 yr old man with  hyperlipidemia, diabetes, atrial fibrillation on Eliquis presents with generalized weakness x 2 days. Upon evaluation in the ED v/s: /92, , RR 20, SPO2: 97% on RA  (29 Oct 2022 04:15). Patient admitted initially to medicine for generalized weakness and diarrhea. Further evaluation including ct head and ct cervical spine was negative for acute pathologies.   Patient had RRT called around 10AM for tachycardia and hypotension. Patient noted to be lethargic and vitals at the time of the rapid was afebrile, BP of 130/61 and repeat BP showing reads of 50/40s. Patient noted to have heart rates in 140's. Patient placed on 15L NRBM for severe hypoxic resp failure ,peripheral phenylephrine was started and metoprolol 2.5mg IV was given for heart rate. STAT EKG and labs including cardiac enzymes, coags and chemistries were sent. ICU was consulted and bedside POCUS done showing decreased left ventricular function, dilated IVC and Right atrium. Patient admitted to ICU for  cardiogenic shock     #Cardiogenic shock  #Atrial fibrillation with RVR  #R/O PE  #AHRF 2/2 to new onset CHF vs fluid overload   #HLD  #Diabetes   #LIGIA   #Hypothyroid  #Urinary retention       Plan   - No sedation   - Off  BiPaP , O2 supp as needed   - Doubt PE at this time as a cause of hypoxic resp failure   - Duiresis   - Dobutamine off   - EP cards eval noted , PPM mode reset and tachycardia resolved . BP improved . anticoa and my need ADRIAN as per EP   - OFF amiodarone as per EP  - Hemodynamic support   - Diet   - 2 DECHO   - Gonzalez   - Monitor I/O , keep fluid neg   - Therapeutic anticoag  - Fall precaution .    spoke with family at bedside

## 2022-10-30 NOTE — PROGRESS NOTE ADULT - ASSESSMENT
This is a 89 yo M with pmhx of hyperlipidemia, diabetes, atrial fibrillation on Eliquis presents with generalized weakness x 2 days. Patient states that he was sitting on the couch yesterday when he sat up then fell on the ground. The episode was witnessed by his wife. He doesn't believe he lost consciousness or if he hit his head. He does mentions having 3 episodes of watery, nonbloody diarrhea for the past few days. 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  2) NAGMA with improvement  3) Atrial fibrillation/A/flutter with Controlled VR with cardiogenic shock with systolic/diastolic CHF exacerbation with volume overload  4) Electrolytes disorders  7) Anemia of chronic renal disease  8) History of RCC with Partial Nephrectomy  9) Hypothyroidism    Recommend:  Patient critically ill in ICU  Strict I/o  Avoid Nephrotoxic agents  Urinalysis with 1+ protein  S cr remains stable 1.8 with non oliguria  Await Bilateral renal and bladder ultrasound  Hold ACEI/ARB  Optimal rate control for A.fib with RVR with amiodarone/beta blocker as tolerated by blood pressure  BP medications with holding parameters with maintenance of MAP>65-70 mm Hg on IV dobutamine/Amiodarone  Lower Sodium bicarbonate 650 mg po bid  Replete electrolytes with goal K>4 and <5, Mag>2 and Phos 2.5 to 3.5  AC/Antiplatelet agents per primary/cardiology team  Monitor BMP/electrolytes every 12 hrly  Treatment of underlying sepsis/A.fib with RVR/ Cardiogenic shock per primary/Cardiology team  Bumex IV with improving urine output  No urgent need for RRT/HD  Will follow

## 2022-10-30 NOTE — PROGRESS NOTE ADULT - SUBJECTIVE AND OBJECTIVE BOX
INTERVAL HPI/OVERNIGHT EVENTS: Patient continues to be tachycardic. Lowered Dobutamine dose. Patient also has hematuria     PRESSORS: [x ] YES [ ] NO  WHICH:    Antimicrobial:    Cardiovascular:  aMIOdarone Infusion 1 mG/Min IV Continuous <Continuous>  aMIOdarone Infusion 0.5 mG/Min IV Continuous <Continuous>  carvedilol 25 milliGRAM(s) Oral every 12 hours  DOBUTamine Infusion 5 MICROgram(s)/kG/Min IV Continuous <Continuous>    Pulmonary:    Hematalogic:  heparin  Infusion.  Unit(s)/Hr IV Continuous <Continuous>    Other:  atorvastatin 40 milliGRAM(s) Oral at bedtime  insulin lispro (ADMELOG) corrective regimen sliding scale   SubCutaneous Before meals and at bedtime  levothyroxine 75 MICROGram(s) Oral daily  sodium bicarbonate 650 milliGRAM(s) Oral three times a day    aMIOdarone Infusion 1 mG/Min IV Continuous <Continuous>  aMIOdarone Infusion 0.5 mG/Min IV Continuous <Continuous>  atorvastatin 40 milliGRAM(s) Oral at bedtime  carvedilol 25 milliGRAM(s) Oral every 12 hours  DOBUTamine Infusion 5 MICROgram(s)/kG/Min IV Continuous <Continuous>  heparin  Infusion.  Unit(s)/Hr IV Continuous <Continuous>  insulin lispro (ADMELOG) corrective regimen sliding scale   SubCutaneous Before meals and at bedtime  levothyroxine 75 MICROGram(s) Oral daily  sodium bicarbonate 650 milliGRAM(s) Oral three times a day    Drug Dosing Weight  Height (cm): 160 (28 Oct 2022 20:11)  Weight (kg): 65.8 (28 Oct 2022 20:11)  BMI (kg/m2): 25.7 (28 Oct 2022 20:11)  BSA (m2): 1.69 (28 Oct 2022 20:11)    CENTRAL LINE: [x ] YES [ ] NO  LOCATION:   DATE INSERTED:  REMOVE: [ ] YES [ ] NO  EXPLAIN:    BEE: [x ] YES [ ] NO    DATE INSERTED:  REMOVE:  [ ] YES [ ] NO  EXPLAIN:    A-LINE:  [x ] YES [ ] NO  LOCATION:   DATE INSERTED:  REMOVE:  [ ] YES [ ] NO  EXPLAIN:    PMH -reviewed admission note, no change since admission  PAST MEDICAL & SURGICAL HISTORY:  Diabetes      HTN (hypertension)      Hypothyroid      GERD (gastroesophageal reflux disease)      H/O heart bypass surgery      H/O partial nephrectomy      S/P TURP          ICU Vital Signs Last 24 Hrs  T(C): 36.2 (29 Oct 2022 23:00), Max: 36.8 (29 Oct 2022 06:14)  T(F): 97.1 (29 Oct 2022 23:00), Max: 98.2 (29 Oct 2022 06:14)  HR: 141 (30 Oct 2022 03:56) (131 - 144)  BP: 108/72 (30 Oct 2022 02:00) (78/55 - 133/81)  BP(mean): 81 (30 Oct 2022 02:00) (65 - 81)  ABP: 102/66 (30 Oct 2022 03:00) (86/56 - 128/76)  ABP(mean): 79 (30 Oct 2022 03:00) (65 - 91)  RR: 15 (30 Oct 2022 03:00) (14 - 26)  SpO2: 99% (30 Oct 2022 03:56) (96% - 100%)    O2 Parameters below as of 29 Oct 2022 07:41  Patient On (Oxygen Delivery Method): room air            ABG - ( 29 Oct 2022 13:03 )  pH, Arterial: 7.29  pH, Blood: x     /  pCO2: 34    /  pO2: 187   / HCO3: 16    / Base Excess: -9.3  /  SaO2: 99                PHYSICAL EXAM:    GENERAL: Noted to be in moderate distress, saturating in 90's on 15LNRBM   HEAD:  Atraumatic, Normocephalic  EYES: EOMI, PERRLA, conjunctiva and sclera clear  ENMT: No tonsillar erythema, exudates, or enlargemenT  NECK: Supple  NERVOUS SYSTEM:  Alert & Oriented X2-3, Good concentration; Unable to assess strength   CHEST/LUNG: Crackles noted throughout anterior and posterior lung fields   HEART: Tachycardia noted S1,S2 No MGR   ABDOMEN: Soft, Nontender, Nondistended; Bowel sounds present  EXTREMITIES:  2+ Peripheral Pulses, No clubbing, cyanosis, or edema. Cold extremities noted   LYMPH: No lymphadenopathy noted  SKIN: No rashes or           LABS:  CBC Full  -  ( 29 Oct 2022 23:20 )  WBC Count : 7.71 K/uL  RBC Count : 3.36 M/uL  Hemoglobin : 9.4 g/dL  Hematocrit : 31.1 %  Platelet Count - Automated : 187 K/uL  Mean Cell Volume : 92.6 fl  Mean Cell Hemoglobin : 28.0 pg  Mean Cell Hemoglobin Concentration : 30.2 gm/dL  Auto Neutrophil # : 6.37 K/uL  Auto Lymphocyte # : 0.73 K/uL  Auto Monocyte # : 0.50 K/uL  Auto Eosinophil # : 0.04 K/uL  Auto Basophil # : 0.03 K/uL  Auto Neutrophil % : 82.6 %  Auto Lymphocyte % : 9.5 %  Auto Monocyte % : 6.5 %  Auto Eosinophil % : 0.5 %  Auto Basophil % : 0.4 %    10-29    128  |  103  |  43<H>  ----------------------------<  270<H>  5    |  13<L>  |  1.83<H>    Ca    8.7      29 Oct 2022 10:41  Phos  3.5     10-29  Mg     2.0     10-29    TPro  5.7<L>  /  Alb  2.6<L>  /  TBili  0.6  /  DBili  x   /  AST  40  /  ALT  35  /  AlkPhos  128<H>  10-29    PT/INR - ( 28 Oct 2022 19:36 )   PT: 14.1 sec;   INR: 1.18 ratio         PTT - ( 30 Oct 2022 00:12 )  PTT:144.1 sec  Urinalysis Basic - ( 28 Oct 2022 20:50 )    Color: Yellow / Appearance: Clear / S.020 / pH: x  Gluc: x / Ketone: Negative  / Bili: Negative / Urobili: Negative   Blood: x / Protein: 30 mg/dL / Nitrite: Negative   Leuk Esterase: Negative / RBC: 0-2 /HPF / WBC 0-2 /HPF   Sq Epi: x / Non Sq Epi: Occasional /HPF / Bacteria: Few /HPF      Culture Results:   No growth to date. (10-28 @ 20:40)  Culture Results:   No growth to date. (10-28 @ 20:35)      RADIOLOGY & ADDITIONAL STUDIES REVIEWED:  ***    [ ]GOALS OF CARE DISCUSSION WITH PATIENT/FAMILY/PROXY:    CRITICAL CARE TIME SPENT: 35 minutes

## 2022-10-30 NOTE — PATIENT PROFILE ADULT - FALL HARM RISK - HARM RISK INTERVENTIONS

## 2022-10-30 NOTE — PATIENT PROFILE ADULT - HAS THE PATIENT RECEIVED THE INFLUENZA VACCINE THIS SEASON?
[facility]  Pre Procedure History & Physical    Chief Complaint:   History of polyps    Subjective     HPI:   Here for colonoscopy.  History of polyps    Past Medical History:   Past Medical History:   Diagnosis Date   • Diabetes mellitus (HCC)     Type 2   • Disease of thyroid gland    • GERD (gastroesophageal reflux disease)    • Hemorrhoids    • Hx of colonic polyps    • Hypercholesteremia        Past Surgical History:  Past Surgical History:   Procedure Laterality Date   • CHOLECYSTECTOMY     • COLONOSCOPY  01/17/2012    Colon polyp tissue not retrieved repeat exam in 5 years   • COLONOSCOPY  01/06/2009    adenomatous polyp x1   • COLONOSCOPY N/A 06/01/2017    Tubular adenoma Transvere colon, Diverticulosis repeat exam in 5 years   • ENDOSCOPY  06/25/2013    [facility]   • ENDOSCOPY N/A 01/29/2021    Multiple fundic gland polyps    • KNEE SURGERY     • SHOULDER SURGERY Right    • TONSILLECTOMY         Family History:  Family History   Problem Relation Age of Onset   • No Known Problems Mother    • No Known Problems Father    • Colon cancer Neg Hx    • Colon polyps Neg Hx        Social History:   reports that he has quit smoking. His smoking use included cigarettes. He has never used smokeless tobacco. He reports that he does not drink alcohol and does not use drugs.    Medications:   Prior to Admission medications    Medication Sig Start Date End Date Taking? Authorizing Provider   ALPRAZolam (XANAX) 0.5 MG tablet Take 1 tablet by mouth At Night As Needed for Anxiety. 4/18/22  Yes Ritu Dillon DO   esomeprazole (nexIUM) 40 MG capsule Take 1 capsule by mouth once daily 3/22/22  Yes Deepti Brito APRN   levothyroxine (SYNTHROID, LEVOTHROID) 50 MCG tablet Take 1 tablet by mouth once daily 3/1/22  Yes Aurelia Alvarenga APRN   metFORMIN ER (GLUCOPHAGE-XR) 750 MG 24 hr tablet Take 1 tablet by mouth 2 (Two) Times a Day. 6/16/22  Yes Ritu Dillon DO   acetaminophen  (TYLENOL) 500 MG tablet Take 500 mg by mouth Every 6 (Six) Hours As Needed for Mild Pain .    Farrukh Farias MD   aluminum hydroxide-mag carbonate (GAVISCON EXTRA RELIEF) 160-105 MG chewable tablet chewable tablet Chew Daily As Needed.    Farrukh Farias MD   aspirin (aspirin) 81 MG EC tablet Take 81 mg by mouth Daily.    Farrukh Farias MD   coenzyme Q10 100 MG capsule Take 200 mg by mouth Every Evening.    Farrukh Farias MD   cyclobenzaprine (FLEXERIL) 10 MG tablet Take 1 tablet by mouth 3 (Three) Times a Day As Needed for Muscle Spasms. 11/4/21   Drashan Garcias MD   diclofenac (VOLTAREN) 1 % gel gel Apply 4 g topically to the appropriate area as directed 4 (Four) Times a Day As Needed. 1-3 times daily    Farrukh Farias MD   finasteride (PROSCAR) 5 MG tablet Take 1 tablet by mouth Daily. 2/22/21   López Post MD   glipizide (Glucotrol XL) 2.5 MG 24 hr tablet Take 1 tablet by mouth Daily. 1/31/22   Darshan Garcias MD   glucose blood (Contour Test) test strip 1 each by Other route Daily. Use as instructed 2/10/22   Latia Gonzalez APRN   ibuprofen (ADVIL,MOTRIN) 200 MG tablet Take 200 mg by mouth Every 8 (Eight) Hours As Needed for Mild Pain .    Farrukh Farias MD   rosuvastatin (CRESTOR) 5 MG tablet Take 1 tablet by mouth Every Other Day. 10/12/21   Darshan Garcias MD   tamsulosin (FLOMAX) 0.4 MG capsule 24 hr capsule Take two 0.4 mg tamsulosin daily  Patient taking differently: Take 2 capsules by mouth Daily. Take two 0.4 mg tamsulosin daily 3/18/22   Romulo Gardner PA   traMADol (ULTRAM) 50 MG tablet Take 1 tablet by mouth Daily As Needed for Moderate Pain . 11/4/21   Darshan Garcias MD   polyethylene glycol (GoLYTELY) 236 g solution As Directed by office. May add flavor packet 6/7/22 7/20/22  Damien Odonnell MD       Allergies:  Hydrocodone    Objective     Blood pressure 150/75, pulse 60, temperature 96.9 °F (36.1 °C), temperature source Temporal,  "resp. rate 19, height 193 cm (76\"), weight 94.8 kg (209 lb), SpO2 98 %.    Physical Exam   Constitutional: Pt is oriented to person, place, and in no distress.   HENT: Mouth/Throat: Oropharynx is clear.   Cardiovascular: Normal rate, regular rhythm.    Pulmonary/Chest: Effort normal. No respiratory distress. No  wheezes.   Abdominal: Soft. Non-distended.  Skin: Skin is warm and dry.   Psychiatric: Mood, memory, affect and judgment appear normal.     Assessment & Plan     Diagnosis:  History of polyps    Anticipated Surgical Procedure:    Proceed with colonoscopy as scheduled    The following major R/B/A were discussed with the patient, however the list is not all inclusive . Risk:  Bleeding (immediate and delayed), perforation (rupture or tear), reaction to medication, missed lesion/cancer, pain during the procedure, infection, need for surgery, need for ostomy, need for mechanical ventilation (breathing machine), death.  Benefits: removal of polyp/tissue, burn/clip/or inject to stop bleeding, removal of foreign body, dilate any stricture.  Alternatives: Xray or CT, surgery, do nothing with associated risk   The patient was given time to ask question and received explanation, and agrees to proceed as per History and Physical.   No guarantee given or expressed.    EMR Dragon/transcription disclaimer: Much of this encounter note is an electronic transcription/translation of spoken language to printed text.  The electronic translation of spoken language may permit erroneous, or at times, nonsensical words or phrases to be inadvertently transcribed.  Although I have reviewed the note for such errors, some may still exist.    Damien Odonnell MD  10:39 CDT  7/20/2022    " no...

## 2022-10-31 LAB
ALBUMIN SERPL ELPH-MCNC: 2.5 G/DL — LOW (ref 3.5–5)
ALP SERPL-CCNC: 172 U/L — HIGH (ref 40–120)
ALT FLD-CCNC: 81 U/L DA — HIGH (ref 10–60)
ANION GAP SERPL CALC-SCNC: 7 MMOL/L — SIGNIFICANT CHANGE UP (ref 5–17)
APTT BLD: 74.4 SEC — HIGH (ref 27.5–35.5)
AST SERPL-CCNC: 54 U/L — HIGH (ref 10–40)
BASOPHILS # BLD AUTO: 0.02 K/UL — SIGNIFICANT CHANGE UP (ref 0–0.2)
BASOPHILS # BLD AUTO: 0.04 K/UL — SIGNIFICANT CHANGE UP (ref 0–0.2)
BASOPHILS NFR BLD AUTO: 0.3 % — SIGNIFICANT CHANGE UP (ref 0–2)
BASOPHILS NFR BLD AUTO: 0.6 % — SIGNIFICANT CHANGE UP (ref 0–2)
BILIRUB SERPL-MCNC: 0.4 MG/DL — SIGNIFICANT CHANGE UP (ref 0.2–1.2)
BUN SERPL-MCNC: 40 MG/DL — HIGH (ref 7–18)
CALCIUM SERPL-MCNC: 8.8 MG/DL — SIGNIFICANT CHANGE UP (ref 8.4–10.5)
CHLORIDE SERPL-SCNC: 112 MMOL/L — HIGH (ref 96–108)
CO2 SERPL-SCNC: 24 MMOL/L — SIGNIFICANT CHANGE UP (ref 22–31)
CREAT SERPL-MCNC: 1.83 MG/DL — HIGH (ref 0.5–1.3)
EGFR: 34 ML/MIN/1.73M2 — LOW
EOSINOPHIL # BLD AUTO: 0.13 K/UL — SIGNIFICANT CHANGE UP (ref 0–0.5)
EOSINOPHIL # BLD AUTO: 0.14 K/UL — SIGNIFICANT CHANGE UP (ref 0–0.5)
EOSINOPHIL NFR BLD AUTO: 1.9 % — SIGNIFICANT CHANGE UP (ref 0–6)
EOSINOPHIL NFR BLD AUTO: 2.3 % — SIGNIFICANT CHANGE UP (ref 0–6)
GLUCOSE BLDC GLUCOMTR-MCNC: 132 MG/DL — HIGH (ref 70–99)
GLUCOSE BLDC GLUCOMTR-MCNC: 136 MG/DL — HIGH (ref 70–99)
GLUCOSE BLDC GLUCOMTR-MCNC: 157 MG/DL — HIGH (ref 70–99)
GLUCOSE BLDC GLUCOMTR-MCNC: 177 MG/DL — HIGH (ref 70–99)
GLUCOSE SERPL-MCNC: 126 MG/DL — HIGH (ref 70–99)
HCT VFR BLD CALC: 29.2 % — LOW (ref 39–50)
HCT VFR BLD CALC: 29.9 % — LOW (ref 39–50)
HGB BLD-MCNC: 8.8 G/DL — LOW (ref 13–17)
HGB BLD-MCNC: 9.2 G/DL — LOW (ref 13–17)
IMM GRANULOCYTES NFR BLD AUTO: 0.3 % — SIGNIFICANT CHANGE UP (ref 0–0.9)
IMM GRANULOCYTES NFR BLD AUTO: 0.6 % — SIGNIFICANT CHANGE UP (ref 0–0.9)
LYMPHOCYTES # BLD AUTO: 0.79 K/UL — LOW (ref 1–3.3)
LYMPHOCYTES # BLD AUTO: 0.83 K/UL — LOW (ref 1–3.3)
LYMPHOCYTES # BLD AUTO: 11.4 % — LOW (ref 13–44)
LYMPHOCYTES # BLD AUTO: 13.4 % — SIGNIFICANT CHANGE UP (ref 13–44)
MAGNESIUM SERPL-MCNC: 1.9 MG/DL — SIGNIFICANT CHANGE UP (ref 1.6–2.6)
MCHC RBC-ENTMCNC: 27.7 PG — SIGNIFICANT CHANGE UP (ref 27–34)
MCHC RBC-ENTMCNC: 28.5 PG — SIGNIFICANT CHANGE UP (ref 27–34)
MCHC RBC-ENTMCNC: 30.1 GM/DL — LOW (ref 32–36)
MCHC RBC-ENTMCNC: 30.8 GM/DL — LOW (ref 32–36)
MCV RBC AUTO: 91.8 FL — SIGNIFICANT CHANGE UP (ref 80–100)
MCV RBC AUTO: 92.6 FL — SIGNIFICANT CHANGE UP (ref 80–100)
MONOCYTES # BLD AUTO: 0.47 K/UL — SIGNIFICANT CHANGE UP (ref 0–0.9)
MONOCYTES # BLD AUTO: 0.53 K/UL — SIGNIFICANT CHANGE UP (ref 0–0.9)
MONOCYTES NFR BLD AUTO: 6.8 % — SIGNIFICANT CHANGE UP (ref 2–14)
MONOCYTES NFR BLD AUTO: 8.6 % — SIGNIFICANT CHANGE UP (ref 2–14)
NEUTROPHILS # BLD AUTO: 4.64 K/UL — SIGNIFICANT CHANGE UP (ref 1.8–7.4)
NEUTROPHILS # BLD AUTO: 5.43 K/UL — SIGNIFICANT CHANGE UP (ref 1.8–7.4)
NEUTROPHILS NFR BLD AUTO: 75.1 % — SIGNIFICANT CHANGE UP (ref 43–77)
NEUTROPHILS NFR BLD AUTO: 78.7 % — HIGH (ref 43–77)
NRBC # BLD: 0 /100 WBCS — SIGNIFICANT CHANGE UP (ref 0–0)
NRBC # BLD: 0 /100 WBCS — SIGNIFICANT CHANGE UP (ref 0–0)
PHOSPHATE SERPL-MCNC: 3.2 MG/DL — SIGNIFICANT CHANGE UP (ref 2.5–4.5)
PLATELET # BLD AUTO: 196 K/UL — SIGNIFICANT CHANGE UP (ref 150–400)
PLATELET # BLD AUTO: 200 K/UL — SIGNIFICANT CHANGE UP (ref 150–400)
POTASSIUM SERPL-MCNC: 3.5 MMOL/L — SIGNIFICANT CHANGE UP (ref 3.5–5.3)
POTASSIUM SERPL-SCNC: 3.5 MMOL/L — SIGNIFICANT CHANGE UP (ref 3.5–5.3)
PROT SERPL-MCNC: 5.3 G/DL — LOW (ref 6–8.3)
RBC # BLD: 3.18 M/UL — LOW (ref 4.2–5.8)
RBC # BLD: 3.23 M/UL — LOW (ref 4.2–5.8)
RBC # FLD: 18.2 % — HIGH (ref 10.3–14.5)
RBC # FLD: 18.4 % — HIGH (ref 10.3–14.5)
SODIUM SERPL-SCNC: 143 MMOL/L — SIGNIFICANT CHANGE UP (ref 135–145)
WBC # BLD: 6.18 K/UL — SIGNIFICANT CHANGE UP (ref 3.8–10.5)
WBC # BLD: 6.9 K/UL — SIGNIFICANT CHANGE UP (ref 3.8–10.5)
WBC # FLD AUTO: 6.18 K/UL — SIGNIFICANT CHANGE UP (ref 3.8–10.5)
WBC # FLD AUTO: 6.9 K/UL — SIGNIFICANT CHANGE UP (ref 3.8–10.5)

## 2022-10-31 RX ORDER — CARVEDILOL PHOSPHATE 80 MG/1
3.12 CAPSULE, EXTENDED RELEASE ORAL EVERY 12 HOURS
Refills: 0 | Status: DISCONTINUED | OUTPATIENT
Start: 2022-10-31 | End: 2022-11-02

## 2022-10-31 RX ORDER — CHLORHEXIDINE GLUCONATE 213 G/1000ML
1 SOLUTION TOPICAL
Refills: 0 | Status: DISCONTINUED | OUTPATIENT
Start: 2022-10-31 | End: 2022-11-01

## 2022-10-31 RX ADMIN — Medication 75 MICROGRAM(S): at 06:05

## 2022-10-31 RX ADMIN — Medication 1: at 11:30

## 2022-10-31 RX ADMIN — ATORVASTATIN CALCIUM 40 MILLIGRAM(S): 80 TABLET, FILM COATED ORAL at 21:20

## 2022-10-31 RX ADMIN — Medication 650 MILLIGRAM(S): at 17:42

## 2022-10-31 RX ADMIN — HEPARIN SODIUM 800 UNIT(S)/HR: 5000 INJECTION INTRAVENOUS; SUBCUTANEOUS at 19:57

## 2022-10-31 RX ADMIN — HEPARIN SODIUM 800 UNIT(S)/HR: 5000 INJECTION INTRAVENOUS; SUBCUTANEOUS at 07:08

## 2022-10-31 RX ADMIN — CARVEDILOL PHOSPHATE 3.12 MILLIGRAM(S): 80 CAPSULE, EXTENDED RELEASE ORAL at 17:42

## 2022-10-31 RX ADMIN — HEPARIN SODIUM 800 UNIT(S)/HR: 5000 INJECTION INTRAVENOUS; SUBCUTANEOUS at 01:17

## 2022-10-31 RX ADMIN — Medication 650 MILLIGRAM(S): at 06:05

## 2022-10-31 RX ADMIN — HEPARIN SODIUM 800 UNIT(S)/HR: 5000 INJECTION INTRAVENOUS; SUBCUTANEOUS at 20:51

## 2022-10-31 RX ADMIN — Medication 1: at 16:36

## 2022-10-31 NOTE — PROGRESS NOTE ADULT - ASSESSMENT
89 yo M with pmhx of CAD-s/p CABG,s/p PPM, hyperlipidemia, diabetes,Parox atrial fibrillation on Eliquis,Renal cell Ca-s/p partial nephrectomy ,Hypothyroid who  presents with generalized weakness x 2 day, now in ICU with afib with RVR and hypotension.  1.ICU monitoring.  2.Add low dose b blocker lopressor 12.5 mg bid.  3.Afib with RVR-amio  loading, eliquis changed to heparin drip by ICU.Pt with known PAF on eliquis as outpatient, will need antiarrythmic and AC.Will d/w his outpatient cardiologist.  4.CAD-b blocker,statin.  5.DM-Insulin.  6.PPI.  7.CRI-f/u lytes.  8.Hypothyroid-synthroid.  9.Shock liver, f/u LFT's.  10.Hemayturea- eval.  11.LV dysfunction-segmental, will d/w family regarding ischemia eval.

## 2022-10-31 NOTE — PROGRESS NOTE ADULT - ATTENDING COMMENTS
IMP: This is a  89 yr old man with  hyperlipidemia, diabetes, atrial fibrillation on Eliquis presents with generalized weakness x 2 days. Upon evaluation in the ED v/s: /92, , RR 20, SPO2: 97% on RA  (29 Oct 2022 04:15). Patient admitted initially to medicine for generalized weakness and diarrhea. Further evaluation including ct head and ct cervical spine was negative for acute pathologies.   Patient had RRT called around 10AM for tachycardia and hypotension. Patient noted to be lethargic and vitals at the time of the rapid was afebrile, BP of 130/61 and repeat BP showing reads of 50/40s. Patient noted to have heart rates in 140's. Patient placed on 15L NRBM for severe hypoxic resp failure ,peripheral phenylephrine was started and metoprolol 2.5mg IV was given for heart rate. STAT EKG and labs including cardiac enzymes, coags and chemistries were sent. ICU was consulted and bedside POCUS done showing decreased left ventricular function, dilated IVC and Right atrium. Patient admitted to ICU for  cardiogenic shock     #Cardiogenic shock  #Atrial fibrillation with RVR  #R/O PE  #AHRF 2/2 to new onset CHF vs fluid overload   #HLD  #Diabetes   #LIGIA   #Hypothyroid  #Urinary retention       Plan   - Rate control for afib   - Beta blockers started   - O2 supp as needed   - Cont. Diuresis and maintain euvolemia  - EP cards eval noted , PPM mode reset and tachycardia resolved . BP improved . anticoagulation and my need ADRIAN as per EP   - OFF amiodarone as per EP  - Hemodynamic support   - Diet   - Hematuria noted, likely due to traumatic garza insertion  - Trend cbc   - Urology consult   - If worsening hematuria, may need to hold anticoagulation   - Cont Garza   - Monitor I/O , keep fluid neg   - Therapeutic anticoag  - Fall precaution .  - Transfer out of ICU IMP: This is a  89 yr old man with  hyperlipidemia, diabetes, atrial fibrillation on Eliquis presents with generalized weakness x 2 days. Upon evaluation in the ED v/s: /92, , RR 20, SPO2: 97% on RA  (29 Oct 2022 04:15). Patient admitted initially to medicine for generalized weakness and diarrhea. Further evaluation including ct head and ct cervical spine was negative for acute pathologies.   Patient had RRT called around 10AM for tachycardia and hypotension. Patient noted to be lethargic and vitals at the time of the rapid was afebrile, BP of 130/61 and repeat BP showing reads of 50/40s. Patient noted to have heart rates in 140's. Patient placed on 15L NRBM for severe hypoxic resp failure ,peripheral phenylephrine was started and metoprolol 2.5mg IV was given for heart rate. STAT EKG and labs including cardiac enzymes, coags and chemistries were sent. ICU was consulted and bedside POCUS done showing decreased left ventricular function, dilated IVC and Right atrium. Patient admitted to ICU for  cardiogenic shock     #Atrial fibrillation with RVR  #AHRF 2/2 to new onset CHF vs fluid overload   #HLD  #Diabetes   #LIGIA   #Hypothyroid  #Urinary retention       Plan   - Rate control for afib   - Beta blockers started   - O2 supp as needed   - Cont. Diuresis and maintain euvolemia  - EP cards eval noted , PPM mode reset and tachycardia resolved . BP improved . anticoagulation and my need ADRIAN as per EP   - OFF amiodarone as per EP  - Hemodynamic support   - Diet   - Hematuria noted, likely due to traumatic garza insertion  - Trend cbc   - Urology consult   - If worsening hematuria, may need to hold anticoagulation   - Cont Garza   - Monitor I/O , keep fluid neg   - Therapeutic anticoag  - Fall precaution .  - Transfer out of ICU

## 2022-10-31 NOTE — PHYSICAL THERAPY INITIAL EVALUATION ADULT - ACTIVE RANGE OF MOTION EXAMINATION, REHAB EVAL
sangeeta. upper extremity Active ROM was WNL (within normal limits)/bilateral upper extremity Active ROM was WFL (within functional limits)

## 2022-10-31 NOTE — PROGRESS NOTE ADULT - SUBJECTIVE AND OBJECTIVE BOX
Patient is a 89y old  Male who presents with a chief complaint of Fall (31 Oct 2022 11:01)      INTERVAL HPI/OVERNIGHT EVENTS:   No overnight events   Afebrile, hemodynamically stable     Subjective: Patient seen and examined at bedside.    ICU Vital Signs Last 24 Hrs  T(C): 36 (31 Oct 2022 08:00), Max: 36.6 (30 Oct 2022 17:49)  T(F): 96.8 (31 Oct 2022 08:00), Max: 97.8 (30 Oct 2022 17:49)  HR: 69 (31 Oct 2022 11:05) (69 - 79)  BP: 137/96 (31 Oct 2022 04:00) (113/60 - 137/96)  BP(mean): 105 (31 Oct 2022 04:00) (69 - 105)  ABP: 145/66 (31 Oct 2022 11:05) (105/50 - 145/66)  ABP(mean): -13 (31 Oct 2022 11:05) (-13 - 91)  RR: 24 (31 Oct 2022 11:05) (12 - 24)  SpO2: 96% (31 Oct 2022 11:05) (95% - 100%)    O2 Parameters below as of 30 Oct 2022 20:00  Patient On (Oxygen Delivery Method): nasal cannula  O2 Flow (L/min): 2        I&O's Summary    30 Oct 2022 07:01  -  31 Oct 2022 07:00  --------------------------------------------------------  IN: 477.2 mL / OUT: 1750 mL / NET: -1272.8 mL    31 Oct 2022 07:01  -  31 Oct 2022 11:40  --------------------------------------------------------  IN: 24 mL / OUT: 100 mL / NET: -76 mL          PHYSICAL EXAM:  GENERAL: No acute distress   HEAD:  Atraumatic, Normocephalic  EYES: EOMI, PERRLA, conjunctiva and sclera clear  ENMT: No tonsillar erythema, exudates, or enlargement; Moist mucous membranes  NECK: Supple, No JVD, Normal thyroid  HEART: Regular rate and rhythm; No murmurs, rubs, or gallops  RESPIRATORY: CTA B/L, No W/R/R  ABDOMEN: Soft, Nontender, Nondistended; Bowel sounds present  NEUROLOGY: A&Ox3, nonfocal, moving all extremities  EXTREMITIES:  2+ Peripheral Pulses, No clubbing, cyanosis, or edema  SKIN: warm, dry, normal color, no rash or abnormal lesions    LABS:                        9.2    6.90  )-----------( 196      ( 31 Oct 2022 11:14 )             29.9     10-31    143  |  112<H>  |  40<H>  ----------------------------<  126<H>  3.5   |  24  |  1.83<H>    Ca    8.8      31 Oct 2022 05:49  Phos  3.2     10-31  Mg     1.9     10-31    TPro  5.3<L>  /  Alb  2.5<L>  /  TBili  0.4  /  DBili  x   /  AST  54<H>  /  ALT  81<H>  /  AlkPhos  172<H>  10-31    PTT - ( 31 Oct 2022 11:14 )  PTT:74.4 sec    CAPILLARY BLOOD GLUCOSE      POCT Blood Glucose.: 177 mg/dL (31 Oct 2022 11:13)  POCT Blood Glucose.: 132 mg/dL (31 Oct 2022 06:05)  POCT Blood Glucose.: 156 mg/dL (30 Oct 2022 21:05)  POCT Blood Glucose.: 136 mg/dL (30 Oct 2022 16:16)  POCT Blood Glucose.: 176 mg/dL (30 Oct 2022 13:29)    ABG - ( 29 Oct 2022 13:03 )  pH, Arterial: 7.29  pH, Blood: x     /  pCO2: 34    /  pO2: 187   / HCO3: 16    / Base Excess: -9.3  /  SaO2: 99                  Consultant(s) Notes Reviewed:  [x ] YES  [ ] NO    MEDICATIONS  (STANDING):  atorvastatin 40 milliGRAM(s) Oral at bedtime  carvedilol 3.125 milliGRAM(s) Oral every 12 hours  chlorhexidine 2% Cloths 1 Application(s) Topical <User Schedule>  heparin  Infusion.  Unit(s)/Hr (12 mL/Hr) IV Continuous <Continuous>  insulin lispro (ADMELOG) corrective regimen sliding scale   SubCutaneous Before meals and at bedtime  levothyroxine 75 MICROGram(s) Oral daily  sodium bicarbonate 650 milliGRAM(s) Oral two times a day    MEDICATIONS  (PRN):      Care Discussed with Consultants/Other Providers [ x] YES  [ ] NO    RADIOLOGY & ADDITIONAL TESTS: Patient is a 89y old  Male who presents with a chief complaint of Fall (31 Oct 2022 11:01)      INTERVAL HPI/OVERNIGHT EVENTS:   No overnight events. Afebrile, hemodynamically stable, continues to have hematuria         ICU Vital Signs Last 24 Hrs  T(C): 36 (31 Oct 2022 08:00), Max: 36.6 (30 Oct 2022 17:49)  T(F): 96.8 (31 Oct 2022 08:00), Max: 97.8 (30 Oct 2022 17:49)  HR: 69 (31 Oct 2022 11:05) (69 - 79)  BP: 137/96 (31 Oct 2022 04:00) (113/60 - 137/96)  BP(mean): 105 (31 Oct 2022 04:00) (69 - 105)  ABP: 145/66 (31 Oct 2022 11:05) (105/50 - 145/66)  ABP(mean): -13 (31 Oct 2022 11:05) (-13 - 91)  RR: 24 (31 Oct 2022 11:05) (12 - 24)  SpO2: 96% (31 Oct 2022 11:05) (95% - 100%)    O2 Parameters below as of 30 Oct 2022 20:00  Patient On (Oxygen Delivery Method): nasal cannula  O2 Flow (L/min): 2        I&O's Summary    30 Oct 2022 07:01  -  31 Oct 2022 07:00  --------------------------------------------------------  IN: 477.2 mL / OUT: 1750 mL / NET: -1272.8 mL    31 Oct 2022 07:01  -  31 Oct 2022 11:40  --------------------------------------------------------  IN: 24 mL / OUT: 100 mL / NET: -76 mL          PHYSICAL EXAM:  GENERAL: No acute distress   HEAD:  Atraumatic, Normocephalic  EYES:  PERRLA, conjunctiva and sclera clear  ENMT: No tonsillar erythema, exudates, or enlargement; Moist mucous membranes  NECK: Supple, No JVD, Normal thyroid, RIJ  HEART: Regular rate and rhythm; No murmurs, rubs, or gallops  RESPIRATORY: CTA B/L, No W/R/R  ABDOMEN: Soft, Nontender, Nondistended; Bowel sounds present  NEUROLOGY: A&Ox3, nonfocal, moving all extremities  EXTREMITIES:  2+ Peripheral Pulses, No clubbing, cyanosis, or edema  SKIN: warm, dry, normal color, no rash or abnormal lesions    LABS:                        9.2    6.90  )-----------( 196      ( 31 Oct 2022 11:14 )             29.9     10-31    143  |  112<H>  |  40<H>  ----------------------------<  126<H>  3.5   |  24  |  1.83<H>    Ca    8.8      31 Oct 2022 05:49  Phos  3.2     10-31  Mg     1.9     10-31    TPro  5.3<L>  /  Alb  2.5<L>  /  TBili  0.4  /  DBili  x   /  AST  54<H>  /  ALT  81<H>  /  AlkPhos  172<H>  10-31    PTT - ( 31 Oct 2022 11:14 )  PTT:74.4 sec    CAPILLARY BLOOD GLUCOSE      POCT Blood Glucose.: 177 mg/dL (31 Oct 2022 11:13)  POCT Blood Glucose.: 132 mg/dL (31 Oct 2022 06:05)  POCT Blood Glucose.: 156 mg/dL (30 Oct 2022 21:05)  POCT Blood Glucose.: 136 mg/dL (30 Oct 2022 16:16)  POCT Blood Glucose.: 176 mg/dL (30 Oct 2022 13:29)    ABG - ( 29 Oct 2022 13:03 )  pH, Arterial: 7.29  pH, Blood: x     /  pCO2: 34    /  pO2: 187   / HCO3: 16    / Base Excess: -9.3  /  SaO2: 99                  Consultant(s) Notes Reviewed:  [x ] YES  [ ] NO    MEDICATIONS  (STANDING):  atorvastatin 40 milliGRAM(s) Oral at bedtime  carvedilol 3.125 milliGRAM(s) Oral every 12 hours  chlorhexidine 2% Cloths 1 Application(s) Topical <User Schedule>  heparin  Infusion.  Unit(s)/Hr (12 mL/Hr) IV Continuous <Continuous>  insulin lispro (ADMELOG) corrective regimen sliding scale   SubCutaneous Before meals and at bedtime  levothyroxine 75 MICROGram(s) Oral daily  sodium bicarbonate 650 milliGRAM(s) Oral two times a day    MEDICATIONS  (PRN):      Care Discussed with Consultants/Other Providers [ x] YES  [ ] NO    RADIOLOGY & ADDITIONAL TESTS:

## 2022-10-31 NOTE — PROGRESS NOTE ADULT - SUBJECTIVE AND OBJECTIVE BOX
CHIEF COMPLAINT:Patient is a 89y old  Male who presents with a chief complaint of Fall .Pt appears comfortable,still having hematurea.  	  REVIEW OF SYSTEMS:  CONSTITUTIONAL: No fever, weight loss, or fatigue  EYES: No eye pain, visual disturbances, or discharge  ENT:  No difficulty hearing, tinnitus, vertigo; No sinus or throat pain  NECK: No pain or stiffness  RESPIRATORY: No cough, wheezing, chills or hemoptysis; No Shortness of Breath  CARDIOVASCULAR: No chest pain, palpitations, passing out, dizziness, or leg swelling  GASTROINTESTINAL: No abdominal or epigastric pain. No nausea, vomiting, or hematemesis; No diarrhea or constipation. No melena or hematochezia.  GENITOURINARY: No dysuria, frequency, hematuria, or incontinence  NEUROLOGICAL: No headaches, memory loss, loss of strength, numbness, or tremors  SKIN: No itching, burning, rashes, or lesions   LYMPH Nodes: No enlarged glands  ENDOCRINE: No heat or cold intolerance; No hair loss  MUSCULOSKELETAL: No joint pain or swelling; No muscle, back, or extremity pain  PSYCHIATRIC: No depression, anxiety, mood swings, or difficulty sleeping  HEME/LYMPH: No easy bruising, or bleeding gums  ALLERGY AND IMMUNOLOGIC: No hives or eczema	      PHYSICAL EXAM:  T(C): 36 (10-31-22 @ 08:00), Max: 36.6 (10-30-22 @ 17:49)  HR: 70 (10-31-22 @ 10:00) (69 - 79)  BP: 137/96 (10-31-22 @ 04:00) (113/60 - 137/96)  RR: 16 (10-31-22 @ 10:00) (12 - 24)  SpO2: 99% (10-31-22 @ 10:00) (95% - 100%)  Wt(kg): --  I&O's Summary    30 Oct 2022 07:01  -  31 Oct 2022 07:00  --------------------------------------------------------  IN: 477.2 mL / OUT: 1750 mL / NET: -1272.8 mL    31 Oct 2022 07:01  -  31 Oct 2022 11:01  --------------------------------------------------------  IN: 24 mL / OUT: 100 mL / NET: -76 mL        Appearance: Normal	  HEENT:   Normal oral mucosa, PERRL, EOMI	  Lymphatic: No lymphadenopathy  Cardiovascular: Normal S1 S2, No JVD, No murmurs, No edema  Respiratory: Lungs clear to auscultation	  Psychiatry: A & O x 3, Mood & affect appropriate  Gastrointestinal:  Soft, Non-tender, + BS	  Skin: No rashes, No ecchymoses, No cyanosis	  Neurologic: Non-focal  Extremities: Normal range of motion, No clubbing, cyanosis or edema  Vascular: Peripheral pulses palpable 2+ bilaterally    MEDICATIONS  (STANDING):  atorvastatin 40 milliGRAM(s) Oral at bedtime  chlorhexidine 2% Cloths 1 Application(s) Topical <User Schedule>  heparin  Infusion.  Unit(s)/Hr (12 mL/Hr) IV Continuous <Continuous>  insulin lispro (ADMELOG) corrective regimen sliding scale   SubCutaneous Before meals and at bedtime  levothyroxine 75 MICROGram(s) Oral daily  sodium bicarbonate 650 milliGRAM(s) Oral two times a day      TELEMETRY: av and v paced intermittentlu	    ECG:  	  RADIOLOGY:  OTHER: 	  	  LABS:	 	    CARDIAC MARKERS:  CARDIAC MARKERS ( 29 Oct 2022 12:55 )  x     / x     / 68 U/L / x     / 3.2 ng/mL      Troponin I, High Sensitivity Result: 177.2 ng/L (10-29 @ 12:55)  Troponin I, High Sensitivity Result: 43.1 ng/L (10-29 @ 10:41)  Troponin I, High Sensitivity Result: 60.6 ng/L (10-28 @ 19:36)                            8.8    6.18  )-----------( 200      ( 31 Oct 2022 05:49 )             29.2     10-31    143  |  112<H>  |  40<H>  ----------------------------<  126<H>  3.5   |  24  |  1.83<H>    Ca    8.8      31 Oct 2022 05:49  Phos  3.2     10-31  Mg     1.9     10-31    TPro  5.3<L>  /  Alb  2.5<L>  /  TBili  0.4  /  DBili  x   /  AST  54<H>  /  ALT  81<H>  /  AlkPhos  172<H>  10-31    proBNP: Serum Pro-Brain Natriuretic Peptide: 17576 pg/mL (10-29 @ 12:55)  Serum Pro-Brain Natriuretic Peptide: 37488 pg/mL (10-28 @ 19:36)    TSH: Thyroid Stimulating Hormone, Serum: 6.68 uU/mL (10-29 @ 10:41)      	  PTT - ( 30 Oct 2022 23:20 )  PTT:82.1 sec      e< from: Transthoracic Echocardiogram (10.30.22 @ 08:44) >  OBSERVATIONS:  Mitral Valve: Normal mitral valve. Moderate mitral  regurgitation.  Aortic Root: Aortic Root: 3.6 cm.    Aortic Valve: Calcified trileaflet aortic valve with normal  opening. Mild aortic regurgitation.  Left Atrium: Normal left atrium.  LA volume index = 24  cc/m2.  Left Ventricle: Mild segmental left ventricular systolic  dysfunction.The inferior,infero-lateral walls and septum  are hypokinetic EF=40-45%. Normal left ventricular internal  dimensionsand wall thicknesses. Unable to adequately  assess diastolic function due to technical aspects of this  study.  Right Heart: Normal right atrium. Normal right ventricular  size and function (RV tissue Doppler .10m/s). A device lead  is visualized in the right heart. There is moderate-severe  tricuspid regurgitation. There is mild pulmonic  regurgitation.  Pericardium/PleuraNormal pericardium with no pericardial  effusion. Right pleural effusion.  Hemodynamic: RV systolic pressure is moderately increased  at  46 mm Hg.  ------------------------------------------------------------------------    < end of copied text >  < from: Transthoracic Echocardiogram (10.30.22 @ 08:44) >  OBSERVATIONS:  Mitral Valve: Normal mitral valve. Moderate mitral  regurgitation.  Aortic Root: Aortic Root: 3.6 cm.    Aortic Valve: Calcified trileaflet aortic valve with normal  opening. Mild aortic regurgitation.  Left Atrium: Normal left atrium.  LA volume index = 24  cc/m2.  Left Ventricle: Mild segmental left ventricular systolic  dysfunction.The inferior,infero-lateral walls and septum  are hypokinetic EF=40-45%. Normal left ventricular internal  dimensionsand wall thicknesses. Unable to adequately  assess diastolic function due to technical aspects of this  study.  Right Heart: Normal right atrium. Normal right ventricular  size and function (RV tissue Doppler .10m/s). A device lead  is visualized in the right heart. There is moderate-severe  tricuspid regurgitation. There is mild pulmonic  regurgitation.  Pericardium/PleuraNormal pericardium with no pericardial  effusion. Right pleural effusion.  Hemodynamic: RV systolic pressure is moderately increased  at  46 mm Hg.  ------------------------------------------------------------------------    < end of copied text >

## 2022-10-31 NOTE — PROGRESS NOTE ADULT - SUBJECTIVE AND OBJECTIVE BOX
Ernesto Bennett MD (Nephrology progress note)  [address], [facility],  [id number],  [id number]  TEl: [id number]  Cell: [phone]    Patient is a 89y Male seen and evaluated at bedside. Vital signs, laboratory data, imaging studies, consult notes reviewed done within past 24 hours. Overnight events noted.    Allergies    No Known Allergies    Intolerances        ROS:  CONSTITUTIONAL: No fever or chills.  HEENT: No headcahe or visual disturbances.  RESPIRATORY: No shortness of breath, cough, hemoptysis or wheezing  CARDIOVASCULAR: No Chest pain, shortness of breath, palpitations, dizziness, syncope, orthopnea, PND or leg swelling.  GASTROINTESTINAL: No abdominal pain, nausea, vomiting, diarrhea, hematemesis or blood per rectum.  GENITOURINARY: No urinary frequency, urgency, gross hematuria, dysuria, flank or supra pubic pain, difficulty passing urine.  NEUROLOGICAL: No headache, focal limb weakness, tingling or numbness or seizure like activity  SKIN: No skin rash or lesion  MUSCULOSKELETAL: No leg pain, calf pain or swelling    VITALS:    T(C): 36.4 (10-31-22 @ 16:43), Max: 36.6 (10-30-22 @ 17:49)  HR: 70 (10-31-22 @ 17:00) (69 - 79)  BP: 127/64 (10-31-22 @ 16:00) (108/54 - 137/96)  RR: 18 (10-31-22 @ 17:00) (12 - 24)  SpO2: 98% (10-31-22 @ 17:00) (95% - 100%)  CAPILLARY BLOOD GLUCOSE      POCT Blood Glucose.: 157 mg/dL (31 Oct 2022 16:02)  POCT Blood Glucose.: 177 mg/dL (31 Oct 2022 11:13)  POCT Blood Glucose.: 132 mg/dL (31 Oct 2022 06:05)  POCT Blood Glucose.: 156 mg/dL (30 Oct 2022 21:05)      10-30-22 @ 07:01  -  10-31-22 @ 07:00  --------------------------------------------------------  IN: 477.2 mL / OUT: 1750 mL / NET: -1272.8 mL    10-31-22 @ 07:01  -  10-31-22 @ 17:47  --------------------------------------------------------  IN: 72 mL / OUT: 360 mL / NET: -288 mL      MEDICATIONS  (STANDING):  atorvastatin 40 milliGRAM(s) Oral at bedtime  carvedilol 3.125 milliGRAM(s) Oral every 12 hours  chlorhexidine 2% Cloths 1 Application(s) Topical <User Schedule>  heparin  Infusion.  Unit(s)/Hr (12 mL/Hr) IV Continuous <Continuous>  insulin lispro (ADMELOG) corrective regimen sliding scale   SubCutaneous Before meals and at bedtime  levothyroxine 75 MICROGram(s) Oral daily  sodium bicarbonate 650 milliGRAM(s) Oral two times a day    MEDICATIONS  (PRN):      PHYSICAL EXAM:  GENERAL: Alert, awake and oriented x2-3 in no distress  HEENT: JOSE ARMANDO, EOMI, neck supple, mild JVP  CHEST/LUNG: Bilateral decreased breath sounds, bibasilar rales and crepitations, no wheezing  HEART: Regular rate and rhythm, FREIDA II/VI at LPSB, no gallops, no rub   ABDOMEN: Soft, nontender, non distended, bowel sounds present, no palpable organomegaly  : No flank or supra pubic tenderness.  EXTREMITIES: Peripheral pulses are palpable, no pedal edema  Neurology: AAOx2-3, no focal neurological deficit  SKIN: No rash or skin lesion  Musculoskeletal: No joint deformity or spinal tenderness.      Vascular ACCESS: None    LABS:                        9.2    6.90  )-----------( 196      ( 31 Oct 2022 11:14 )             29.9     10-31    143  |  112<H>  |  40<H>  ----------------------------<  126<H>  3.5   |  24  |  1.83<H>    Ca    8.8      31 Oct 2022 05:49  Phos  3.2     10-31  Mg     1.9     10-31    TPro  5.3<L>  /  Alb  2.5<L>  /  TBili  0.4  /  DBili  x   /  AST  54<H>  /  ALT  81<H>  /  AlkPhos  172<H>  10-31      PTT - ( 31 Oct 2022 11:14 )  PTT:74.4 sec          RADIOLOGY & ADDITIONAL STUDIES:  < from: Xray Chest 1 View-PORTABLE IMMEDIATE (Xray Chest 1 View-PORTABLE IMMEDIATE .) (10.29.22 @ 13:23) >    ACC: [id number] EXAM:  XR CHEST PORTABLE IMMED 1V                          PROCEDURE DATE:  10/29/2022          INTERPRETATION:  AP chest on October 29, 2022 at 1:06 PM. Patient had   right jugular line insertion.    Heart possibly enlarged. Sternotomy and left-sided pacemaker again noted.    Present film shows a moderate right effusion increased from October 28   earlier study.    There is also a right jugular line inserted in good position.    IMPRESSION: Increasing right effusion. Right jugular line inserted.    --- End of Report ---            BHAVANI CONLEY MD; Attending Radiologist  This document has been electronically signed. Oct 29 2022  1:38PM    < end of copied text >    Imaging Personally Reviewed:  [x] YES  [ ] NO    Consultant(s) Notes Reviewed:  [x] YES  [ ] NO    Care Discussed with Primary team/ Other Providers [x] YES  [ ] NO

## 2022-10-31 NOTE — PROGRESS NOTE ADULT - ASSESSMENT
This is a 89 yo M with pmhx of hyperlipidemia, diabetes, ?atrial fibrillation on Eliquis, PPM presents with generalized weakness x 2 days, admitted to medicine for evaluation, while in ED holding had hyptoension and worsening mental status, found to have cardiogenic shock, atrial tachyarrhythmia , and PPM-driven tachycardia.     #Cardiogenic shock  #Atrial tachyarrhythmia   #PPM mode adjustment   #AHRF 2/2 to new onset CHF vs fluid overload   #HLD  #Diabetes   #LIGIA   #Hypothyroid  #Urinary retention     PLAN:    -----------------CNS:------------------  No acute issues, patient as per family is at baseline  CT head on admission is negative for acute stroke   Mentation improved back to baseline.     -----------------CVS:------------------  #Cardiogenic shock  Down LV function on initial POCUS (10/29), dilated right atrium, dilated IVC.  Status post Dobutamine (10/29 - ) and diuresis with improvement. Weaned off  on 10/30 after PPM adjustment.   Formal TTE performed with read pending.   Cardiology Dr. Dominguez    #atrial tachyarrhythmia   #PPM-driven tachycardia.  On Eliquis and Lopressor at home, however family says no history of AFib?  Presented with AFib/Flutter/Tach with RVR to 140s.   Received Amio and Digoxin without improvement.   PPM interrogated, was sensing atrial rate, mode switched by EP to DDIR with resolution.   Discontinue rate/rhythm control agents.   Heparin infusion for AC  EP Dr. Ly following.     -----------------RESPIRATORY:--------  #AHRF  #Pulmonary Edema  Pleural Effusion   CXR and POCUS with diffuse pulmonary edema and moderate right sided pleural effusion.   Initially required BiPAP, now weaned off.   Receiving diuresis had improvement in respiratory status, monitor for resolution of effusion.     -----------------GI:----------------------  #Diarrhea  - f/u stool studies  #Nutrition  Normal diet    -----------------ID:-----------------------  #Diarrhea   - f/u urine and blood cultures       -----------------RENAL: ---------------  Oliguric Pre-Renal Acute Renal Failure  - Likely from volume overload.   - patient noted to have new onset renal injury Scr of 1.85  - avoid nephrotoxic agents     -----------------HEME/ONC: ---------------  Anemia  - hgb 9.6  - transfuse if hgb<7  - monitor cbc     -----------------ENDO: ---------------  Hypothyroidism  - TSH noted to be 6.6  - continue with levothyroxine     Diabetes  - SSI     -----------------EXTREMITIES:-------  No acute issues     -----------------PROPHYLAXIS: -------    DVT: Heparin drip     -----------------DISPOSITION--------  Accepted to ICU      This is a 89 yo M with pmhx of hyperlipidemia, diabetes, ?atrial fibrillation on Eliquis, PPM presents with generalized weakness x 2 days, admitted to medicine for evaluation, while in ED holding had hyptoension and worsening mental status, found to have cardiogenic shock, atrial tachyarrhythmia , and PPM-driven tachycardia.     #Cardiogenic shock  #Atrial tachyarrhythmia   #PPM mode adjustment   #AHRF 2/2 to new onset CHF vs fluid overload   #HLD  #Diabetes   #LIGIA   #Hypothyroid  #Urinary retention     PLAN:    -----------------CNS:------------------  No acute issues, patient as per family is at baseline  CT head on admission is negative for acute stroke   Mentation improved back to baseline.     -----------------CVS:------------------  #Cardiogenic shock  Down LV function on initial POCUS (10/29), dilated right atrium, dilated IVC.  Status post Dobutamine (10/29 - ) and diuresis with improvement. Weaned off  on 10/30 after PPM adjustment.   Formal TTE performed with read pending.   Cardiology Dr. Dominguez    #atrial tachyarrhythmia   #PPM-driven tachycardia.  On Eliquis and Lopressor at home, however family says no history of AFib?  Presented with AFib/Flutter/Tach with RVR to 140s.   Received Amio and Digoxin without improvement.   PPM interrogated, was sensing atrial rate, mode switched by EP to DDIR with resolution.   Discontinue rate/rhythm control agents.   Heparin infusion for AC  EP Dr. Ly following.   Started Coreg    -----------------RESPIRATORY:--------  #AHRF  #Pulmonary Edema  Pleural Effusion   CXR and POCUS with diffuse pulmonary edema and moderate right sided pleural effusion.   Initially required BiPAP, now weaned off.   Receiving diuresis had improvement in respiratory status, monitor for resolution of effusion.     -----------------GI:----------------------  #Diarrhea  - f/u stool studies  #Nutrition  Normal diet    -----------------ID:-----------------------  #Diarrhea   - f/u urine and blood cultures       -----------------RENAL: ---------------  Oliguric Pre-Renal Acute Renal Failure  - Likely from volume overload.   - patient noted to have new onset renal injury Scr of 1.85  - avoid nephrotoxic agents     #Hematuria:  urology following  hb is stable    -----------------HEME/ONC: ---------------  Anemia  - hgb 9.6  - transfuse if hgb<7  - monitor cbc     -----------------ENDO: ---------------  Hypothyroidism  - TSH noted to be 6.6  - continue with levothyroxine     Diabetes  - SSI     -----------------EXTREMITIES:-------  No acute issues     -----------------PROPHYLAXIS: -------    DVT: Heparin drip     -----------------DISPOSITION--------  Monitor in ICU

## 2022-10-31 NOTE — PROGRESS NOTE ADULT - ASSESSMENT
This is a 89 yo M with pmhx of hyperlipidemia, diabetes, atrial fibrillation on Eliquis presents with generalized weakness x 2 days. Patient states that he was sitting on the couch yesterday when he sat up then fell on the ground. The episode was witnessed by his wife. He doesn't believe he lost consciousness or if he hit his head. He does mentions having 3 episodes of watery, nonbloody diarrhea for the past few days. 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 and Cardio-renal disease with volume  overload  2) NAGMA with improvement  4) Atrial fibrillation/A/flutter with RVR with cardiogenic shock with systolic/diastolic CHF exacerbation with volume overload  5) Electrolytes disorders  6) Hypoalbuminemia  7) Anemia of chronic renal disease  8) History of RCC with Partial Nephrectomy  9) Hypothyroidism  10) Gross hematuria likely traumatic garza vs AC    Recommend:  Patient critically ill in ICU  Strict I/o  Avoid Nephrotoxic agents  Urine studies reviewed  Bilateral renal and bladder ultrasound when stable to do so  Garza's catheter placement  Hold ACEI/ARB  Optimal rate control for A.fib with RVR with amiodarone/beta blocker   BP medications with holding parameters with maintenance of MAP>65-70 mm Hg   Continue po sodium bicarbonate   Replete electrolytes with goal K>4 and <5, Mag>2 and Phos 2.5 to 3.5   Antibiotics as per renal dose adjustment  CKD/Anemia work up as per ordered  AC/Antiplatelet agents per primary/cardiology team  Monitor BMP/electrolytes daily  Monitor serial CBC  Consider  consult   Treatment of underlying A.fib/CHF per primary/cardiology team  Intermittent Lasix for clinical volume overload  No urgent need for RRT/HD  Will follow

## 2022-11-01 LAB
ALBUMIN SERPL ELPH-MCNC: 2.7 G/DL — LOW (ref 3.5–5)
ALP SERPL-CCNC: 163 U/L — HIGH (ref 40–120)
ALT FLD-CCNC: 66 U/L DA — HIGH (ref 10–60)
ANION GAP SERPL CALC-SCNC: 3 MMOL/L — LOW (ref 5–17)
APTT BLD: 77.7 SEC — HIGH (ref 27.5–35.5)
APTT BLD: 86 SEC — HIGH (ref 27.5–35.5)
AST SERPL-CCNC: 35 U/L — SIGNIFICANT CHANGE UP (ref 10–40)
BILIRUB SERPL-MCNC: 0.4 MG/DL — SIGNIFICANT CHANGE UP (ref 0.2–1.2)
BUN SERPL-MCNC: 37 MG/DL — HIGH (ref 7–18)
CALCIUM SERPL-MCNC: 8.9 MG/DL — SIGNIFICANT CHANGE UP (ref 8.4–10.5)
CHLORIDE SERPL-SCNC: 112 MMOL/L — HIGH (ref 96–108)
CO2 SERPL-SCNC: 27 MMOL/L — SIGNIFICANT CHANGE UP (ref 22–31)
CREAT SERPL-MCNC: 1.72 MG/DL — HIGH (ref 0.5–1.3)
EGFR: 37 ML/MIN/1.73M2 — LOW
GLUCOSE BLDC GLUCOMTR-MCNC: 137 MG/DL — HIGH (ref 70–99)
GLUCOSE BLDC GLUCOMTR-MCNC: 190 MG/DL — HIGH (ref 70–99)
GLUCOSE BLDC GLUCOMTR-MCNC: 193 MG/DL — HIGH (ref 70–99)
GLUCOSE SERPL-MCNC: 156 MG/DL — HIGH (ref 70–99)
HCT VFR BLD CALC: 29 % — LOW (ref 39–50)
HGB BLD-MCNC: 8.9 G/DL — LOW (ref 13–17)
MAGNESIUM SERPL-MCNC: 1.9 MG/DL — SIGNIFICANT CHANGE UP (ref 1.6–2.6)
MCHC RBC-ENTMCNC: 28.2 PG — SIGNIFICANT CHANGE UP (ref 27–34)
MCHC RBC-ENTMCNC: 30.7 GM/DL — LOW (ref 32–36)
MCV RBC AUTO: 91.8 FL — SIGNIFICANT CHANGE UP (ref 80–100)
MRSA PCR RESULT.: SIGNIFICANT CHANGE UP
NRBC # BLD: 0 /100 WBCS — SIGNIFICANT CHANGE UP (ref 0–0)
PHOSPHATE SERPL-MCNC: 2.8 MG/DL — SIGNIFICANT CHANGE UP (ref 2.5–4.5)
PLATELET # BLD AUTO: 196 K/UL — SIGNIFICANT CHANGE UP (ref 150–400)
POTASSIUM SERPL-MCNC: 3.8 MMOL/L — SIGNIFICANT CHANGE UP (ref 3.5–5.3)
POTASSIUM SERPL-SCNC: 3.8 MMOL/L — SIGNIFICANT CHANGE UP (ref 3.5–5.3)
PROT SERPL-MCNC: 5.9 G/DL — LOW (ref 6–8.3)
RBC # BLD: 3.16 M/UL — LOW (ref 4.2–5.8)
RBC # FLD: 18 % — HIGH (ref 10.3–14.5)
S AUREUS DNA NOSE QL NAA+PROBE: SIGNIFICANT CHANGE UP
SODIUM SERPL-SCNC: 142 MMOL/L — SIGNIFICANT CHANGE UP (ref 135–145)
WBC # BLD: 7.32 K/UL — SIGNIFICANT CHANGE UP (ref 3.8–10.5)
WBC # FLD AUTO: 7.32 K/UL — SIGNIFICANT CHANGE UP (ref 3.8–10.5)

## 2022-11-01 RX ORDER — SODIUM CHLORIDE 9 MG/ML
3 INJECTION INTRAMUSCULAR; INTRAVENOUS; SUBCUTANEOUS EVERY 6 HOURS
Refills: 0 | Status: DISCONTINUED | OUTPATIENT
Start: 2022-11-01 | End: 2022-11-04

## 2022-11-01 RX ORDER — FUROSEMIDE 40 MG
40 TABLET ORAL DAILY
Refills: 0 | Status: DISCONTINUED | OUTPATIENT
Start: 2022-11-01 | End: 2022-11-02

## 2022-11-01 RX ADMIN — ATORVASTATIN CALCIUM 40 MILLIGRAM(S): 80 TABLET, FILM COATED ORAL at 21:04

## 2022-11-01 RX ADMIN — SODIUM CHLORIDE 3 MILLILITER(S): 9 INJECTION INTRAMUSCULAR; INTRAVENOUS; SUBCUTANEOUS at 13:33

## 2022-11-01 RX ADMIN — HEPARIN SODIUM 800 UNIT(S)/HR: 5000 INJECTION INTRAVENOUS; SUBCUTANEOUS at 09:45

## 2022-11-01 RX ADMIN — CHLORHEXIDINE GLUCONATE 1 APPLICATION(S): 213 SOLUTION TOPICAL at 06:29

## 2022-11-01 RX ADMIN — Medication 75 MICROGRAM(S): at 06:30

## 2022-11-01 RX ADMIN — Medication 1: at 21:03

## 2022-11-01 RX ADMIN — Medication 40 MILLIGRAM(S): at 10:46

## 2022-11-01 RX ADMIN — HEPARIN SODIUM 800 UNIT(S)/HR: 5000 INJECTION INTRAVENOUS; SUBCUTANEOUS at 19:02

## 2022-11-01 RX ADMIN — CARVEDILOL PHOSPHATE 3.12 MILLIGRAM(S): 80 CAPSULE, EXTENDED RELEASE ORAL at 06:28

## 2022-11-01 RX ADMIN — Medication 1: at 10:46

## 2022-11-01 RX ADMIN — SODIUM CHLORIDE 3 MILLILITER(S): 9 INJECTION INTRAMUSCULAR; INTRAVENOUS; SUBCUTANEOUS at 18:04

## 2022-11-01 RX ADMIN — CARVEDILOL PHOSPHATE 3.12 MILLIGRAM(S): 80 CAPSULE, EXTENDED RELEASE ORAL at 17:04

## 2022-11-01 RX ADMIN — HEPARIN SODIUM 800 UNIT(S)/HR: 5000 INJECTION INTRAVENOUS; SUBCUTANEOUS at 22:13

## 2022-11-01 RX ADMIN — Medication 650 MILLIGRAM(S): at 06:31

## 2022-11-01 NOTE — PROGRESS NOTE ADULT - SUBJECTIVE AND OBJECTIVE BOX
INTERVAL HPI/OVERNIGHT EVENTS: ***    PRESSORS: [ ] YES [ ] NO  WHICH:    Antimicrobial:    Cardiovascular:  carvedilol 3.125 milliGRAM(s) Oral every 12 hours    Pulmonary:    Hematalogic:  heparin  Infusion.  Unit(s)/Hr IV Continuous <Continuous>    Other:  atorvastatin 40 milliGRAM(s) Oral at bedtime  chlorhexidine 2% Cloths 1 Application(s) Topical <User Schedule>  insulin lispro (ADMELOG) corrective regimen sliding scale   SubCutaneous Before meals and at bedtime  levothyroxine 75 MICROGram(s) Oral daily  sodium bicarbonate 650 milliGRAM(s) Oral two times a day    atorvastatin 40 milliGRAM(s) Oral at bedtime  carvedilol 3.125 milliGRAM(s) Oral every 12 hours  chlorhexidine 2% Cloths 1 Application(s) Topical <User Schedule>  heparin  Infusion.  Unit(s)/Hr IV Continuous <Continuous>  insulin lispro (ADMELOG) corrective regimen sliding scale   SubCutaneous Before meals and at bedtime  levothyroxine 75 MICROGram(s) Oral daily  sodium bicarbonate 650 milliGRAM(s) Oral two times a day    Drug Dosing Weight  Height (cm): 160 (28 Oct 2022 20:11)  Weight (kg): 65.8 (28 Oct 2022 20:11)  BMI (kg/m2): 25.7 (28 Oct 2022 20:11)  BSA (m2): 1.69 (28 Oct 2022 20:11)    CENTRAL LINE: [ ] YES [ ] NO  LOCATION:   DATE INSERTED:  REMOVE: [ ] YES [ ] NO  EXPLAIN:    BEE: [ ] YES [ ] NO    DATE INSERTED:  REMOVE:  [ ] YES [ ] NO  EXPLAIN:    A-LINE:  [ ] YES [ ] NO  LOCATION:   DATE INSERTED:  REMOVE:  [ ] YES [ ] NO  EXPLAIN:    PMH -reviewed admission note, no change since admission  PAST MEDICAL & SURGICAL HISTORY:  Diabetes      HTN (hypertension)      Hypothyroid      GERD (gastroesophageal reflux disease)      H/O heart bypass surgery      H/O partial nephrectomy      S/P TURP          ICU Vital Signs Last 24 Hrs  T(C): 36.4 (01 Nov 2022 04:00), Max: 36.6 (31 Oct 2022 20:00)  T(F): 97.6 (01 Nov 2022 04:00), Max: 97.8 (31 Oct 2022 20:00)  HR: 71 (01 Nov 2022 07:00) (69 - 71)  BP: 117/58 (01 Nov 2022 04:00) (108/54 - 130/61)  BP(mean): 75 (01 Nov 2022 04:00) (67 - 80)  ABP: 148/67 (01 Nov 2022 07:00) (119/49 - 149/78)  ABP(mean): 96 (01 Nov 2022 07:00) (67 - 96)  RR: 22 (01 Nov 2022 07:00) (16 - 25)  SpO2: 100% (01 Nov 2022 07:00) (95% - 100%)    O2 Parameters below as of 01 Nov 2022 07:00  Patient On (Oxygen Delivery Method): room air                  10-31 @ 07:01  -  11-01 @ 07:00  --------------------------------------------------------  IN: 192 mL / OUT: 805 mL / NET: -613 mL            PHYSICAL EXAM:  GENERAL: No acute distress   HEAD:  Atraumatic, Normocephalic  EYES:  PERRLA, conjunctiva and sclera clear  ENMT: No tonsillar erythema, exudates, or enlargement; Moist mucous membranes  NECK: Supple, No JVD, Normal thyroid, RIJ  HEART: Regular rate and rhythm; No murmurs, rubs, or gallops  RESPIRATORY: CTA B/L, No W/R/R  ABDOMEN: Soft, Nontender, Nondistended; Bowel sounds present  NEUROLOGY: A&Ox3, nonfocal, moving all extremities  EXTREMITIES:  2+ Peripheral Pulses, No clubbing, cyanosis, or edema  SKIN: warm, dry, normal color, no rash or abnormal     LABS:  CBC Full  -  ( 01 Nov 2022 03:55 )  WBC Count : 7.32 K/uL  RBC Count : 3.16 M/uL  Hemoglobin : 8.9 g/dL  Hematocrit : 29.0 %  Platelet Count - Automated : 196 K/uL  Mean Cell Volume : 91.8 fl  Mean Cell Hemoglobin : 28.2 pg  Mean Cell Hemoglobin Concentration : 30.7 gm/dL  Auto Neutrophil # : x  Auto Lymphocyte # : x  Auto Monocyte # : x  Auto Eosinophil # : x  Auto Basophil # : x  Auto Neutrophil % : x  Auto Lymphocyte % : x  Auto Monocyte % : x  Auto Eosinophil % : x  Auto Basophil % : x    11-01    142  |  112<H>  |  37<H>  ----------------------------<  156<H>  3.8   |  27  |  1.72<H>    Ca    8.9      01 Nov 2022 03:55  Phos  2.8     11-01  Mg     1.9     11-01    TPro  5.9<L>  /  Alb  2.7<L>  /  TBili  0.4  /  DBili  x   /  AST  35  /  ALT  66<H>  /  AlkPhos  163<H>  11-01    PTT - ( 01 Nov 2022 00:31 )  PTT:77.7 sec    Culture Results:   No growth to date. (10-29 @ 12:55)  Culture Results:   No growth to date. (10-29 @ 12:40)      RADIOLOGY & ADDITIONAL STUDIES REVIEWED:  ***    [ ]GOALS OF CARE DISCUSSION WITH PATIENT/FAMILY/PROXY:    CRITICAL CARE TIME SPENT: 35 minutes INTERVAL HPI/OVERNIGHT EVENTS: no acute overnight events. patient continues to have hematuria. Patient has no complaints     PRESSORS: [ ] YES [x ] NO  WHICH:    Antimicrobial:    Cardiovascular:  carvedilol 3.125 milliGRAM(s) Oral every 12 hours    Pulmonary:    Hematalogic:  heparin  Infusion.  Unit(s)/Hr IV Continuous <Continuous>    Other:  atorvastatin 40 milliGRAM(s) Oral at bedtime  chlorhexidine 2% Cloths 1 Application(s) Topical <User Schedule>  insulin lispro (ADMELOG) corrective regimen sliding scale   SubCutaneous Before meals and at bedtime  levothyroxine 75 MICROGram(s) Oral daily  sodium bicarbonate 650 milliGRAM(s) Oral two times a day    atorvastatin 40 milliGRAM(s) Oral at bedtime  carvedilol 3.125 milliGRAM(s) Oral every 12 hours  chlorhexidine 2% Cloths 1 Application(s) Topical <User Schedule>  heparin  Infusion.  Unit(s)/Hr IV Continuous <Continuous>  insulin lispro (ADMELOG) corrective regimen sliding scale   SubCutaneous Before meals and at bedtime  levothyroxine 75 MICROGram(s) Oral daily  sodium bicarbonate 650 milliGRAM(s) Oral two times a day    Drug Dosing Weight  Height (cm): 160 (28 Oct 2022 20:11)  Weight (kg): 65.8 (28 Oct 2022 20:11)  BMI (kg/m2): 25.7 (28 Oct 2022 20:11)  BSA (m2): 1.69 (28 Oct 2022 20:11)    CENTRAL LINE: [x ] YES [ ] NO  LOCATION:   DATE INSERTED:  REMOVE: [ ] YES [ ] NO  EXPLAIN:    BEE: [x ] YES [ ] NO    DATE INSERTED:  REMOVE:  [ ] YES [ ] NO  EXPLAIN:    A-LINE:  [x ] YES [ ] NO  LOCATION:   DATE INSERTED:  REMOVE:  [ ] YES [ ] NO  EXPLAIN:    PMH -reviewed admission note, no change since admission  PAST MEDICAL & SURGICAL HISTORY:  Diabetes      HTN (hypertension)      Hypothyroid      GERD (gastroesophageal reflux disease)      H/O heart bypass surgery      H/O partial nephrectomy      S/P TURP          ICU Vital Signs Last 24 Hrs  T(C): 36.4 (01 Nov 2022 04:00), Max: 36.6 (31 Oct 2022 20:00)  T(F): 97.6 (01 Nov 2022 04:00), Max: 97.8 (31 Oct 2022 20:00)  HR: 71 (01 Nov 2022 07:00) (69 - 71)  BP: 117/58 (01 Nov 2022 04:00) (108/54 - 130/61)  BP(mean): 75 (01 Nov 2022 04:00) (67 - 80)  ABP: 148/67 (01 Nov 2022 07:00) (119/49 - 149/78)  ABP(mean): 96 (01 Nov 2022 07:00) (67 - 96)  RR: 22 (01 Nov 2022 07:00) (16 - 25)  SpO2: 100% (01 Nov 2022 07:00) (95% - 100%)    O2 Parameters below as of 01 Nov 2022 07:00  Patient On (Oxygen Delivery Method): room air                  10-31 @ 07:01  -  11-01 @ 07:00  --------------------------------------------------------  IN: 192 mL / OUT: 805 mL / NET: -613 mL            PHYSICAL EXAM:  GENERAL: No acute distress   HEAD:  Atraumatic, Normocephalic  EYES:  PERRLA, conjunctiva and sclera clear  ENMT: No tonsillar erythema, exudates, or enlargement; Moist mucous membranes  NECK: Supple, No JVD, Normal thyroid, RIJ  HEART: Regular rate and rhythm; No murmurs, rubs, or gallops  RESPIRATORY: CTA B/L, No W/R/R  ABDOMEN: Soft, Nontender, Nondistended; Bowel sounds present  NEUROLOGY: A&Ox3, nonfocal, moving all extremities  EXTREMITIES:  2+ Peripheral Pulses, No clubbing, cyanosis, or edema  SKIN: warm, dry, normal color, no rash or abnormal     LABS:  CBC Full  -  ( 01 Nov 2022 03:55 )  WBC Count : 7.32 K/uL  RBC Count : 3.16 M/uL  Hemoglobin : 8.9 g/dL  Hematocrit : 29.0 %  Platelet Count - Automated : 196 K/uL  Mean Cell Volume : 91.8 fl  Mean Cell Hemoglobin : 28.2 pg  Mean Cell Hemoglobin Concentration : 30.7 gm/dL  Auto Neutrophil # : x  Auto Lymphocyte # : x  Auto Monocyte # : x  Auto Eosinophil # : x  Auto Basophil # : x  Auto Neutrophil % : x  Auto Lymphocyte % : x  Auto Monocyte % : x  Auto Eosinophil % : x  Auto Basophil % : x    11-01    142  |  112<H>  |  37<H>  ----------------------------<  156<H>  3.8   |  27  |  1.72<H>    Ca    8.9      01 Nov 2022 03:55  Phos  2.8     11-01  Mg     1.9     11-01    TPro  5.9<L>  /  Alb  2.7<L>  /  TBili  0.4  /  DBili  x   /  AST  35  /  ALT  66<H>  /  AlkPhos  163<H>  11-01    PTT - ( 01 Nov 2022 00:31 )  PTT:77.7 sec    Culture Results:   No growth to date. (10-29 @ 12:55)  Culture Results:   No growth to date. (10-29 @ 12:40)      RADIOLOGY & ADDITIONAL STUDIES REVIEWED:  ***    [ ]GOALS OF CARE DISCUSSION WITH PATIENT/FAMILY/PROXY:    CRITICAL CARE TIME SPENT: 35 minutes

## 2022-11-01 NOTE — PROGRESS NOTE ADULT - ASSESSMENT
89 yo M with pmhx of CAD-s/p CABG,s/p PPM, hyperlipidemia, diabetes,Parox atrial fibrillation on Eliquis,Renal cell Ca-s/p partial nephrectomy ,Hypothyroid who  presents with generalized weakness x 2 day, now in ICU with afib with RVR and hypotension.  1.ICU monitoring.  2.LV dysfunction-segmental, will d/w family regarding ischemia eval.  3.Afib with RVR-eliquis changed to heparin drip by ICU.Pt with known PAF on eliquis as outpatient, will need antiarrythmic and AC.Will d/w his outpatient cardiologist.  4.CAD-b blocker,statin.  5.DM-Insulin.  6.PPI.  7.CRI-f/u lytes.  8.Hypothyroid-synthroid.  9.Shock liver, f/u LFT's.  10.Hemayturea- eval.

## 2022-11-01 NOTE — CONSULT NOTE ADULT - ASSESSMENT
89 yo M with pmhx of hyperlipidemia, diabetes, ?atrial fibrillation on Eliquis, PPM presents with generalized weakness x 2 days, admitted to medicine for evaluation, while in ED holding had hyptoension and worsening mental status, found to have cardiogenic shock, atrial tachyarrhythmia , and PPM-driven tachycardia. Urology consulted due to gross hematuria that is new onset. H/H stable. Urine cleared up very quickly after 4-5 flushes with sterile water via 60cc syringe.  - Continue to monitor for gross hematuria  - Continue anticoagulation  - Monitor H&H  - Please flush garza PRN hematuria - no difficulty flushing until urine clears  - Patient can follow up with Dr. Boles outpatient for gross hematuria workup including outpatient cystoscopy and CT  - Discussed with Dr. Boles    Urology

## 2022-11-01 NOTE — PROGRESS NOTE ADULT - ATTENDING COMMENTS
IMP: This is a  89 yr old man with  hyperlipidemia, diabetes, atrial fibrillation on Eliquis presents with generalized weakness x 2 days. Upon evaluation in the ED v/s: /92, , RR 20, SPO2: 97% on RA  (29 Oct 2022 04:15). Patient admitted initially to medicine for generalized weakness and diarrhea. Further evaluation including ct head and ct cervical spine was negative for acute pathologies.   Patient had RRT called around 10AM for tachycardia and hypotension. Patient noted to be lethargic and vitals at the time of the rapid was afebrile, BP of 130/61 and repeat BP showing reads of 50/40s. Patient noted to have heart rates in 140's. Patient placed on 15L NRBM for severe hypoxic resp failure ,peripheral phenylephrine was started and metoprolol 2.5mg IV was given for heart rate. STAT EKG and labs including cardiac enzymes, coags and chemistries were sent. ICU was consulted and bedside POCUS done showing decreased left ventricular function, dilated IVC and Right atrium. Patient admitted to ICU for  cardiogenic shock     #Atrial fibrillation with RVR  #AHRF 2/2 to new onset CHF vs fluid overload   #HLD  #Diabetes   #LIGIA   #Hypothyroid  #Urinary retention       Plan   - Rate control for afib   - Beta blockers started   - O2 supp as needed   - Cont. Diuresis and maintain euvolemia  - EP cards eval noted , PPM mode reset and tachycardia resolved . BP improved . anticoagulation and my need ADRIAN as per EP   - OFF amiodarone as per EP  - Hemodynamic support   - Diet   - Hematuria noted, likely due to traumatic garza insertion  - Trend cbc   - Urology consult appreciated, hematuria is clearing up  - Cont Garza   - Monitor I/O , keep fluid neg   - Therapeutic anticoag  - D/c central line and arterial line  - Fall precaution .  - Transfer out of ICU

## 2022-11-01 NOTE — CHART NOTE - NSCHARTNOTEFT_GEN_A_CORE
89 yo M with pmhx of hyperlipidemia, diabetes, ?atrial fibrillation on Eliquis, PPM presents with generalized weakness x 2 days, admitted to medicine for evaluation, while in ED holding had hyptoension and worsening mental status, found to have cardiogenic shock, atrial tachyarrhythmia , and PPM-driven tachycardia.     ICU Course: Patient was started on Dobutamine for cardiogenic shock and was given amidarone for Afib with RVR. PPM was interrogated and the mode was changed by EP because it was pacing at 140. Echo showed EF 40-45%.  Patient developed hematuria, but hemoglobin has remained stable, so heparin was continued. Patient was able to be titrated off dobutamine drip and coreg was started for Afib    Pt was signed out to Dr. MOCTEZUMA, Attending and covering resident  XXXXX.       Things to follow:  monitor hb  monitor hematuria  f/u urology 89 yo M with pmhx of hyperlipidemia, diabetes, ?atrial fibrillation on Eliquis, PPM presents with generalized weakness x 2 days, admitted to medicine for evaluation, while in ED holding had hyptoension and worsening mental status, found to have cardiogenic shock, atrial tachyarrhythmia , and PPM-driven tachycardia.     ICU Course: Patient was started on Dobutamine for cardiogenic shock and was given amidarone for Afib with RVR. PPM was interrogated and the mode was changed by EP because it was pacing at 140. Echo showed EF 40-45%.  Patient developed hematuria, but hemoglobin has remained stable, so heparin was continued. Patient was able to be titrated off dobutamine drip and coreg was started for Afib    Pt was signed out to Dr. MOCTEZUMA, Attending and covering resident Dr. Neff.       Things to follow:  monitor hb  monitor hematuria  f/u urology  f/u cardiology for Ac. 89 yo M with pmhx of hyperlipidemia, diabetes, ?atrial fibrillation on Eliquis, PPM presents with generalized weakness x 2 days, admitted to medicine for evaluation, while in ED holding had hyptoension and worsening mental status, found to have cardiogenic shock, atrial tachyarrhythmia , and PPM-driven tachycardia.     ICU Course: Patient was started on Dobutamine for cardiogenic shock and was given amidarone for Afib with RVR. PPM was interrogated and the mode was changed by EP because it was pacing at 140. Echo showed EF 40-45%.  Patient developed hematuria, but hemoglobin has remained stable, so heparin was continued. Patient was able to be titrated off dobutamine drip and coreg was started for Afib    Pt was signed out to Dr. Rodríguez, Attending and covering resident Dr. Neff.       Things to follow:  monitor hb  monitor hematuria  f/u urology  f/u cardiology for Ac.

## 2022-11-01 NOTE — PROGRESS NOTE ADULT - SUBJECTIVE AND OBJECTIVE BOX
Ernesto Bennett MD (Nephrology progress note)  [address] [facility],  [address],  [facility] [id number]  TEl: [phone]  Cell: [phone]    Patient is a 89y Male seen and evaluated at bedside. Vital signs, laboratory data, imaging studies, consult notes reviewed done within past 24 hours. Overnight events noted. Patient lying in bed alert and awake in no distress feels better. Interval stable renal function with S cr 1.7 with non oliguria.    Allergies    No Known Allergies    Intolerances        ROS:  CONSTITUTIONAL: No fever or chills.  HEENT: No headache or visual disturbances.  RESPIRATORY: Mild SOB but denies cough, hemoptysis or wheezing  CARDIOVASCULAR:  Dyspnea and orthopnea but denies palpitations, dizziness, syncope or leg swelling.  GASTROINTESTINAL: No abdominal pain, nausea, vomiting, diarrhea, hematemesis or blood per rectum.  GENITOURINARY: No flank or supra pubic pain  NEUROLOGICAL: No headache, focal limb weakness, tingling or numbness or seizure like activity  SKIN: No skin rash or lesion  MUSCULOSKELETAL: No leg pain, calf pain or swelling    VITALS:    T(C): 36.4 (11-01-22 @ 04:00), Max: 36.6 (10-31-22 @ 20:00)  HR: 70 (11-01-22 @ 15:00) (70 - 105)  BP: 138/52 (11-01-22 @ 15:00) (110/58 - 138/52)  RR: 23 (11-01-22 @ 15:00) (18 - 25)  SpO2: 96% (11-01-22 @ 15:00) (91% - 100%)  CAPILLARY BLOOD GLUCOSE      POCT Blood Glucose.: 193 mg/dL (01 Nov 2022 10:41)  POCT Blood Glucose.: 136 mg/dL (31 Oct 2022 21:19)  POCT Blood Glucose.: 157 mg/dL (31 Oct 2022 16:02)      10-31-22 @ 07:01  -  11-01-22 @ 07:00  --------------------------------------------------------  IN: 192 mL / OUT: 805 mL / NET: -613 mL    11-01-22 @ 07:01  -  11-01-22 @ 16:01  --------------------------------------------------------  IN: 48 mL / OUT: 2075 mL / NET: -2027 mL      MEDICATIONS  (STANDING):  atorvastatin 40 milliGRAM(s) Oral at bedtime  carvedilol 3.125 milliGRAM(s) Oral every 12 hours  chlorhexidine 2% Cloths 1 Application(s) Topical <User Schedule>  furosemide   Injectable 40 milliGRAM(s) IV Push daily  heparin  Infusion.  Unit(s)/Hr (12 mL/Hr) IV Continuous <Continuous>  insulin lispro (ADMELOG) corrective regimen sliding scale   SubCutaneous Before meals and at bedtime  levothyroxine 75 MICROGram(s) Oral daily  sodium bicarbonate 650 milliGRAM(s) Oral two times a day  sodium chloride 0.9% lock flush 3 milliLiter(s) IV Push every 6 hours    MEDICATIONS  (PRN):      PHYSICAL EXAM:  GENERAL: Alert, awake and oriented x2-3 in no distress  HEENT: JOSE ARMANDO, EOMI, neck supple, no JVP, no carotid bruit, oral mucosa moist and pink.  CHEST/LUNG: Bilateral decreased breath sounds, bibasilar rales and crepitations, no wheezing  HEART: Regular rate and rhythm, FREIDA II/VI at LPSB, no gallops, no rub   ABDOMEN: Soft, nontender, non distended, bowel sounds present, no palpable organomegaly  : No flank or supra pubic tenderness.  EXTREMITIES: Peripheral pulses are palpable, no pedal edema  Neurology: AAOx2-3, no focal neurological deficit  SKIN: No rash or skin lesion  Musculoskeletal: No joint deformity or spinal tenderness.      Vascular ACCESS: None    LABS:                        8.9    7.32  )-----------( 196      ( 01 Nov 2022 03:55 )             29.0     11-01    142  |  112<H>  |  37<H>  ----------------------------<  156<H>  3.8   |  27  |  1.72<H>    Ca    8.9      01 Nov 2022 03:55  Phos  2.8     11-01  Mg     1.9     11-01    TPro  5.9<L>  /  Alb  2.7<L>  /  TBili  0.4  /  DBili  x   /  AST  35  /  ALT  66<H>  /  AlkPhos  163<H>  11-01      PTT - ( 01 Nov 2022 08:00 )  PTT:86.0 sec          RADIOLOGY & ADDITIONAL STUDIES:  None  Imaging Personally Reviewed:  [x] YES  [ ] NO    Consultant(s) Notes Reviewed:  [x] YES  [ ] NO    Care Discussed with Primary team/ Other Providers [x] YES  [ ] NO

## 2022-11-01 NOTE — PROGRESS NOTE ADULT - SUBJECTIVE AND OBJECTIVE BOX
CHIEF COMPLAINT:Patient is a 89y old  Male who presents with a chief complaint of Fall.Pt appears comfortable.    	  REVIEW OF SYSTEMS:  CONSTITUTIONAL: No fever, weight loss, or fatigue  EYES: No eye pain, visual disturbances, or discharge  ENT:  No difficulty hearing, tinnitus, vertigo; No sinus or throat pain  NECK: No pain or stiffness  RESPIRATORY: No cough, wheezing, chills or hemoptysis; No Shortness of Breath  CARDIOVASCULAR: No chest pain, palpitations, passing out, dizziness, or leg swelling  GASTROINTESTINAL: No abdominal or epigastric pain. No nausea, vomiting, or hematemesis; No diarrhea or constipation. No melena or hematochezia.  GENITOURINARY: No dysuria, frequency, hematuria, or incontinence  NEUROLOGICAL: No headaches, memory loss, loss of strength, numbness, or tremors  SKIN: No itching, burning, rashes, or lesions   LYMPH Nodes: No enlarged glands  ENDOCRINE: No heat or cold intolerance; No hair loss  MUSCULOSKELETAL: No joint pain or swelling; No muscle, back, or extremity pain  PSYCHIATRIC: No depression, anxiety, mood swings, or difficulty sleeping  HEME/LYMPH: No easy bruising, or bleeding gums  ALLERGY AND IMMUNOLOGIC: No hives or eczema	        PHYSICAL EXAM:  T(C): 36.4 (11-01-22 @ 04:00), Max: 36.6 (10-31-22 @ 20:00)  HR: 70 (11-01-22 @ 11:00) (69 - 71)  BP: 110/58 (11-01-22 @ 08:00) (108/54 - 130/61)  RR: 19 (11-01-22 @ 11:00) (18 - 25)  SpO2: 96% (11-01-22 @ 11:00) (95% - 100%)  Wt(kg): --  I&O's Summary    31 Oct 2022 07:01  -  01 Nov 2022 07:00  --------------------------------------------------------  IN: 192 mL / OUT: 805 mL / NET: -613 mL    01 Nov 2022 07:01  -  01 Nov 2022 11:55  --------------------------------------------------------  IN: 32 mL / OUT: 325 mL / NET: -293 mL        Appearance: Normal	  HEENT:   Normal oral mucosa, PERRL, EOMI	  Lymphatic: No lymphadenopathy  Cardiovascular: Normal S1 S2, No JVD, No murmurs, No edema  Respiratory: Lungs clear to auscultation	  Psychiatry: A & O x 3, Mood & affect appropriate  Gastrointestinal:  Soft, Non-tender, + BS	  Skin: No rashes, No ecchymoses, No cyanosis	  Neurologic: Non-focal  Extremities: Normal range of motion, No clubbing, cyanosis or edema  Vascular: Peripheral pulses palpable 2+ bilaterally    MEDICATIONS  (STANDING):  atorvastatin 40 milliGRAM(s) Oral at bedtime  carvedilol 3.125 milliGRAM(s) Oral every 12 hours  chlorhexidine 2% Cloths 1 Application(s) Topical <User Schedule>  furosemide   Injectable 40 milliGRAM(s) IV Push daily  heparin  Infusion.  Unit(s)/Hr (12 mL/Hr) IV Continuous <Continuous>  insulin lispro (ADMELOG) corrective regimen sliding scale   SubCutaneous Before meals and at bedtime  levothyroxine 75 MICROGram(s) Oral daily  sodium bicarbonate 650 milliGRAM(s) Oral two times a day  sodium chloride 0.9% lock flush 3 milliLiter(s) IV Push every 6 hours      	  	  LABS:	 	    Troponin I, High Sensitivity Result: 177.2 ng/L (10-29 @ 12:55)                            8.9    7.32  )-----------( 196      ( 01 Nov 2022 03:55 )             29.0     11-01    142  |  112<H>  |  37<H>  ----------------------------<  156<H>  3.8   |  27  |  1.72<H>    Ca    8.9      01 Nov 2022 03:55  Phos  2.8     11-01  Mg     1.9     11-01    TPro  5.9<L>  /  Alb  2.7<L>  /  TBili  0.4  /  DBili  x   /  AST  35  /  ALT  66<H>  /  AlkPhos  163<H>  11-01    proBNP: Serum Pro-Brain Natriuretic Peptide: 66580 pg/mL (10-29 @ 12:55)  Serum Pro-Brain Natriuretic Peptide: 30514 pg/mL (10-28 @ 19:36)      TSH: Thyroid Stimulating Hormone, Serum: 6.68 uU/mL (10-29 @ 10:41)      	  PTT - ( 01 Nov 2022 08:00 )  PTT:86.0 sec

## 2022-11-01 NOTE — CONSULT NOTE ADULT - SUBJECTIVE AND OBJECTIVE BOX
HPI  89 yo M with pmhx of hyperlipidemia, diabetes, atrial fibrillation on Eliquis presents with generalized weakness x 2 days. Patient states that he was sitting on the couch yesterday when he sat up then fell on the ground. The episode was witnessed by his wife. He doesn't believe he lost consciousness or if he hit his head. He does mentions having 3 episodes of watery, nonbloody diarrhea for the past few days. Admitted to ICU for cardiogenic shock management. 3 days ago, 20Fr garza was placed bedside by urology with no complications. Today, urology was consulted due to gross hematuria noted in the garza tubing. Patient states he has seen a urologist before but he is unsure why. He does not recall if he has ever had blood in the urine.    PAST MEDICAL & SURGICAL HISTORY:  Diabetes      HTN (hypertension)      Hypothyroid      GERD (gastroesophageal reflux disease)      H/O heart bypass surgery      H/O partial nephrectomy      S/P TURP          MEDICATIONS  (STANDING):  atorvastatin 40 milliGRAM(s) Oral at bedtime  carvedilol 3.125 milliGRAM(s) Oral every 12 hours  chlorhexidine 2% Cloths 1 Application(s) Topical <User Schedule>  heparin  Infusion.  Unit(s)/Hr (12 mL/Hr) IV Continuous <Continuous>  insulin lispro (ADMELOG) corrective regimen sliding scale   SubCutaneous Before meals and at bedtime  levothyroxine 75 MICROGram(s) Oral daily  sodium bicarbonate 650 milliGRAM(s) Oral two times a day    MEDICATIONS  (PRN):      FAMILY HISTORY:  Family history of hypertension (Father, Mother)        Allergies    No Known Allergies    Intolerances        SOCIAL HISTORY:    REVIEW OF SYSTEMS: Otherwise negative as stated in HPI    Physical Exam  Vital signs  T(C): 36.4 (11-01-22 @ 04:00), Max: 36.6 (10-31-22 @ 20:00)  HR: 70 (11-01-22 @ 10:00)  BP: 110/58 (11-01-22 @ 08:00)  SpO2: 96% (11-01-22 @ 10:00)  Wt(kg): --    Output    OUT:    Indwelling Catheter - Urethral (mL): 805 mL  Total OUT: 805 mL    Total NET: -805 mL          Gen: NAD  GI: S/ND/NT  : Garza in place draining clear light punch urine. Cleared up with flushing very quickly with only one small clot removed. Now draining clear yellow urine  MSK: moves all 4 limbs spontaneously    LABS:      11-01 @ 03:55    WBC 7.32  / Hct 29.0  / SCr 1.72     10-31 @ 11:14    WBC 6.90  / Hct 29.9  / SCr --       11-01    142  |  112<H>  |  37<H>  ----------------------------<  156<H>  3.8   |  27  |  1.72<H>    Ca    8.9      01 Nov 2022 03:55  Phos  2.8     11-01  Mg     1.9     11-01    TPro  5.9<L>  /  Alb  2.7<L>  /  TBili  0.4  /  DBili  x   /  AST  35  /  ALT  66<H>  /  AlkPhos  163<H>  11-01    PTT - ( 01 Nov 2022 08:00 )  PTT:86.0 sec

## 2022-11-01 NOTE — PROGRESS NOTE ADULT - ASSESSMENT
89 yo M with pmhx of hyperlipidemia, diabetes, ?atrial fibrillation on Eliquis, PPM presents with generalized weakness x 2 days, admitted to medicine for evaluation, while in ED holding had hyptoension and worsening mental status, found to have cardiogenic shock, atrial tachyarrhythmia , and PPM-driven tachycardia.     #Cardiogenic shock  #Atrial tachyarrhythmia   #PPM mode adjustment   #AHRF 2/2 to new onset CHF vs fluid overload   #HLD  #Diabetes   #LIGIA   #Hypothyroid  #Urinary retention     PLAN:    -----------------CNS:------------------  No acute issues, patient as per family is at baseline  CT head on admission is negative for acute stroke   Mentation improved back to baseline.     -----------------CVS:------------------  #Cardiogenic shock  Down LV function on initial POCUS (10/29), dilated right atrium, dilated IVC.  Status post Dobutamine (10/29 - ) and diuresis with improvement. Weaned off  on 10/30 after PPM adjustment.   Formal TTE performed with read pending.   Cardiology Dr. Dominguez    #atrial tachyarrhythmia   #PPM-driven tachycardia.  On Eliquis and Lopressor at home, however family says no history of AFib?  Presented with AFib/Flutter/Tach with RVR to 140s.   Received Amio and Digoxin without improvement.   PPM interrogated, was sensing atrial rate, mode switched by EP to DDIR with resolution.   Discontinue rate/rhythm control agents.   Heparin infusion for AC  EP Dr. Ly following.   Started Coreg    -----------------RESPIRATORY:--------  #AHRF  #Pulmonary Edema  Pleural Effusion   CXR and POCUS with diffuse pulmonary edema and moderate right sided pleural effusion.   Initially required BiPAP, now weaned off.   Receiving diuresis had improvement in respiratory status, monitor for resolution of effusion.     -----------------GI:----------------------  #Diarrhea  - f/u stool studies  #Nutrition  Normal diet    -----------------ID:-----------------------  #Diarrhea   - f/u urine and blood cultures       -----------------RENAL: ---------------  Oliguric Pre-Renal Acute Renal Failure  - Likely from volume overload.   - patient noted to have new onset renal injury Scr of 1.85  - avoid nephrotoxic agents     #Hematuria:  urology following  hb is stable    -----------------HEME/ONC: ---------------  Anemia  - hgb 9.6  - transfuse if hgb<7  - monitor cbc     -----------------ENDO: ---------------  Hypothyroidism  - TSH noted to be 6.6  - continue with levothyroxine     Diabetes  - SSI     -----------------EXTREMITIES:-------  No acute issues     -----------------PROPHYLAXIS: -------    DVT: Heparin drip     -----------------DISPOSITION--------  Monitor in ICU    89 yo M with pmhx of hyperlipidemia, diabetes, ?atrial fibrillation on Eliquis, PPM presents with generalized weakness x 2 days, admitted to medicine for evaluation, while in ED holding had hyptoension and worsening mental status, found to have cardiogenic shock, atrial tachyarrhythmia , and PPM-driven tachycardia.     #Cardiogenic shock  #Atrial tachyarrhythmia   #PPM mode adjustment   #AHRF 2/2 to new onset CHF vs fluid overload   #HLD  #Diabetes   #LIGIA   #Hypothyroid  #Urinary retention     PLAN:    -----------------CNS:------------------  No acute issues, patient as per family is at baseline  CT head on admission is negative for acute stroke   Mentation improved back to baseline.     -----------------CVS:------------------  #Cardiogenic shock  Down LV function on initial POCUS (10/29), dilated right atrium, dilated IVC.  Status post Dobutamine (10/29 - ) and diuresis with improvement. Weaned off  on 10/30 after PPM adjustment.   Cardiology Dr. Dominguez  TTE: EF=40-45%.    #atrial tachyarrhythmia   #PPM-driven tachycardia.  On Eliquis and Lopressor at home, however family says no history of AFib?  Presented with AFib/Flutter/Tach with RVR to 140s.   Received Amio and Digoxin without improvement.   PPM interrogated, was sensing atrial rate, mode switched by EP to DDIR with resolution.   Discontinue rate/rhythm control agents.   Heparin infusion for AC  EP Dr. Ly following.   C/w Coreg, Lasix    -----------------RESPIRATORY:--------  #AHRF  #Pulmonary Edema  Pleural Effusion   CXR and POCUS with diffuse pulmonary edema and moderate right sided pleural effusion.   Initially required BiPAP, now weaned off.   on Lasix 40    -----------------GI:----------------------  #Diarrhea  - resolved  #Nutrition  Normal diet    -----------------ID:-----------------------  #Diarrhea   - resolved       -----------------RENAL: ---------------  Oliguric Pre-Renal Acute Renal Failure  - Likely from volume overload.   - patient noted to have new onset renal injury Scr of 1.85  - avoid nephrotoxic agents     #Hematuria:  urology following  hb is stable    -----------------HEME/ONC: ---------------  Anemia  - hgb 9.6  - transfuse if hgb<7  - monitor cbc     -----------------ENDO: ---------------  Hypothyroidism  - TSH noted to be 6.6  - continue with levothyroxine     Diabetes  - SSI     -----------------EXTREMITIES:-------  No acute issues     -----------------PROPHYLAXIS: -------    DVT: Heparin drip     -----------------DISPOSITION--------  Monitor in ICU

## 2022-11-01 NOTE — PROGRESS NOTE ADULT - ASSESSMENT
This is a 89 yo M with pmhx of hyperlipidemia, diabetes, atrial fibrillation on Eliquis presents with generalized weakness x 2 days. Patient states that he was sitting on the couch yesterday when he sat up then fell on the ground. The episode was witnessed by his wife. He doesn't believe he lost consciousness or if he hit his head. He does mentions having 3 episodes of watery, nonbloody diarrhea for the past few days. 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 and Cardio-renal disease with volume  overload  2) NAGMA with improvement  4) Atrial fibrillation/A/flutter with with cardiogenic shock with systolic/diastolic CHF exacerbation with volume overload with improvement  5) Electrolytes disorders  6) Hypoalbuminemia  7) Anemia of chronic renal disease  8) History of RCC with Partial Nephrectomy  9) Hypothyroidism  10) Gross hematuria likely traumatic Gonzalez vs AC    Recommend:  Patient critically ill in ICU  Strict I/o  Avoid Nephrotoxic agents  Urine studies reviewed  Bilateral renal and bladder ultrasound when stable to do so  Gonzalez's catheter placement reviewed with gross hematuria   Urology consult reviewed  Hold ACEI/ARB  Optimal rate control for A.fib on po betablocker   Replete electrolytes with goal K>4 and <5, Mag>2 and Phos 2.5 to 3.5   CKD/ Anemia work up reviewed  AC/Antiplatelet agents per primary/cardiology team  Monitor BMP/electrolytes daily  Monitor serial CBC  Intermittent Lasix for pleural effusion  No urgent need for RRT/HD  Will follow

## 2022-11-02 ENCOUNTER — RESULT REVIEW (OUTPATIENT)
Age: 87
End: 2022-11-02

## 2022-11-02 DIAGNOSIS — I50.21 ACUTE SYSTOLIC (CONGESTIVE) HEART FAILURE: ICD-10-CM

## 2022-11-02 DIAGNOSIS — R31.9 HEMATURIA, UNSPECIFIED: ICD-10-CM

## 2022-11-02 DIAGNOSIS — E11.9 TYPE 2 DIABETES MELLITUS WITHOUT COMPLICATIONS: ICD-10-CM

## 2022-11-02 LAB
ALBUMIN SERPL ELPH-MCNC: 2.6 G/DL — LOW (ref 3.5–5)
ALP SERPL-CCNC: 473 U/L — HIGH (ref 40–120)
ALT FLD-CCNC: 144 U/L DA — HIGH (ref 10–60)
ANION GAP SERPL CALC-SCNC: 12 MMOL/L — SIGNIFICANT CHANGE UP (ref 5–17)
ANION GAP SERPL CALC-SCNC: 5 MMOL/L — SIGNIFICANT CHANGE UP (ref 5–17)
APTT BLD: 79.7 SEC — HIGH (ref 27.5–35.5)
AST SERPL-CCNC: 181 U/L — HIGH (ref 10–40)
B PERT IGG+IGM PNL SER: CLEAR — SIGNIFICANT CHANGE UP
BASE EXCESS BLDA CALC-SCNC: 6.1 MMOL/L — HIGH (ref -2–3)
BASOPHILS # BLD AUTO: 0.05 K/UL — SIGNIFICANT CHANGE UP (ref 0–0.2)
BASOPHILS NFR BLD AUTO: 0.5 % — SIGNIFICANT CHANGE UP (ref 0–2)
BILIRUB SERPL-MCNC: 0.6 MG/DL — SIGNIFICANT CHANGE UP (ref 0.2–1.2)
BLD GP AB SCN SERPL QL: SIGNIFICANT CHANGE UP
BLOOD GAS COMMENTS ARTERIAL: SIGNIFICANT CHANGE UP
BUN SERPL-MCNC: 32 MG/DL — HIGH (ref 7–18)
BUN SERPL-MCNC: 33 MG/DL — HIGH (ref 7–18)
CALCIUM SERPL-MCNC: 9.2 MG/DL — SIGNIFICANT CHANGE UP (ref 8.4–10.5)
CALCIUM SERPL-MCNC: 9.5 MG/DL — SIGNIFICANT CHANGE UP (ref 8.4–10.5)
CHLORIDE SERPL-SCNC: 105 MMOL/L — SIGNIFICANT CHANGE UP (ref 96–108)
CHLORIDE SERPL-SCNC: 107 MMOL/L — SIGNIFICANT CHANGE UP (ref 96–108)
CK MB BLD-MCNC: 1.9 % — SIGNIFICANT CHANGE UP (ref 0–3.5)
CK MB BLD-MCNC: 3.4 % — SIGNIFICANT CHANGE UP (ref 0–3.5)
CK MB CFR SERPL CALC: 1.4 NG/ML — SIGNIFICANT CHANGE UP (ref 0–3.6)
CK MB CFR SERPL CALC: 3.2 NG/ML — SIGNIFICANT CHANGE UP (ref 0–3.6)
CK SERPL-CCNC: 75 U/L — SIGNIFICANT CHANGE UP (ref 35–232)
CK SERPL-CCNC: 94 U/L — SIGNIFICANT CHANGE UP (ref 35–232)
CO2 SERPL-SCNC: 26 MMOL/L — SIGNIFICANT CHANGE UP (ref 22–31)
CO2 SERPL-SCNC: 31 MMOL/L — SIGNIFICANT CHANGE UP (ref 22–31)
COLOR FLD: YELLOW — SIGNIFICANT CHANGE UP
COMMENT - FLUIDS: SIGNIFICANT CHANGE UP
CREAT SERPL-MCNC: 1.57 MG/DL — HIGH (ref 0.5–1.3)
CREAT SERPL-MCNC: 1.85 MG/DL — HIGH (ref 0.5–1.3)
EGFR: 34 ML/MIN/1.73M2 — LOW
EGFR: 41 ML/MIN/1.73M2 — LOW
EOSINOPHIL # BLD AUTO: 0.16 K/UL — SIGNIFICANT CHANGE UP (ref 0–0.5)
EOSINOPHIL NFR BLD AUTO: 1.5 % — SIGNIFICANT CHANGE UP (ref 0–6)
FLUID INTAKE SUBSTANCE CLASS: SIGNIFICANT CHANGE UP
GLUCOSE BLDC GLUCOMTR-MCNC: 127 MG/DL — HIGH (ref 70–99)
GLUCOSE BLDC GLUCOMTR-MCNC: 137 MG/DL — HIGH (ref 70–99)
GLUCOSE BLDC GLUCOMTR-MCNC: 187 MG/DL — HIGH (ref 70–99)
GLUCOSE SERPL-MCNC: 131 MG/DL — HIGH (ref 70–99)
GLUCOSE SERPL-MCNC: 225 MG/DL — HIGH (ref 70–99)
GRAM STN FLD: SIGNIFICANT CHANGE UP
HCO3 BLDA-SCNC: 32 MMOL/L — HIGH (ref 21–28)
HCT VFR BLD CALC: 29 % — LOW (ref 39–50)
HCT VFR BLD CALC: 34.6 % — LOW (ref 39–50)
HGB BLD-MCNC: 10.4 G/DL — LOW (ref 13–17)
HGB BLD-MCNC: 9 G/DL — LOW (ref 13–17)
HOROWITZ INDEX BLDA+IHG-RTO: 100 — SIGNIFICANT CHANGE UP
IMM GRANULOCYTES NFR BLD AUTO: 0.8 % — SIGNIFICANT CHANGE UP (ref 0–0.9)
INR BLD: 1.14 RATIO — SIGNIFICANT CHANGE UP (ref 0.88–1.16)
LACTATE SERPL-SCNC: 1.8 MMOL/L — SIGNIFICANT CHANGE UP (ref 0.7–2)
LACTATE SERPL-SCNC: 3.7 MMOL/L — HIGH (ref 0.7–2)
LYMPHOCYTES # BLD AUTO: 2.38 K/UL — SIGNIFICANT CHANGE UP (ref 1–3.3)
LYMPHOCYTES # BLD AUTO: 21.7 % — SIGNIFICANT CHANGE UP (ref 13–44)
LYMPHOCYTES # FLD: 52 % — SIGNIFICANT CHANGE UP
MAGNESIUM SERPL-MCNC: 2 MG/DL — SIGNIFICANT CHANGE UP (ref 1.6–2.6)
MAGNESIUM SERPL-MCNC: 2 MG/DL — SIGNIFICANT CHANGE UP (ref 1.6–2.6)
MCHC RBC-ENTMCNC: 28.2 PG — SIGNIFICANT CHANGE UP (ref 27–34)
MCHC RBC-ENTMCNC: 28.5 PG — SIGNIFICANT CHANGE UP (ref 27–34)
MCHC RBC-ENTMCNC: 30.1 GM/DL — LOW (ref 32–36)
MCHC RBC-ENTMCNC: 31 GM/DL — LOW (ref 32–36)
MCV RBC AUTO: 91.8 FL — SIGNIFICANT CHANGE UP (ref 80–100)
MCV RBC AUTO: 93.8 FL — SIGNIFICANT CHANGE UP (ref 80–100)
MESOTHL CELL # FLD: 9 % — SIGNIFICANT CHANGE UP
MONOCYTES # BLD AUTO: 0.68 K/UL — SIGNIFICANT CHANGE UP (ref 0–0.9)
MONOCYTES NFR BLD AUTO: 6.2 % — SIGNIFICANT CHANGE UP (ref 2–14)
MONOS+MACROS # FLD: 14 % — SIGNIFICANT CHANGE UP
NEUTROPHILS # BLD AUTO: 7.59 K/UL — HIGH (ref 1.8–7.4)
NEUTROPHILS NFR BLD AUTO: 69.3 % — SIGNIFICANT CHANGE UP (ref 43–77)
NEUTROPHILS-BODY FLUID: 25 % — SIGNIFICANT CHANGE UP
NRBC # BLD: 0 /100 WBCS — SIGNIFICANT CHANGE UP (ref 0–0)
NRBC # BLD: 0 /100 WBCS — SIGNIFICANT CHANGE UP (ref 0–0)
PCO2 BLDA: 50 MMHG — HIGH (ref 35–48)
PH BLDA: 7.41 — SIGNIFICANT CHANGE UP (ref 7.35–7.45)
PHOSPHATE SERPL-MCNC: 2.7 MG/DL — SIGNIFICANT CHANGE UP (ref 2.5–4.5)
PHOSPHATE SERPL-MCNC: 3.8 MG/DL — SIGNIFICANT CHANGE UP (ref 2.5–4.5)
PLATELET # BLD AUTO: 190 K/UL — SIGNIFICANT CHANGE UP (ref 150–400)
PLATELET # BLD AUTO: 264 K/UL — SIGNIFICANT CHANGE UP (ref 150–400)
PO2 BLDA: 148 MMHG — HIGH (ref 83–108)
POTASSIUM SERPL-MCNC: 3.5 MMOL/L — SIGNIFICANT CHANGE UP (ref 3.5–5.3)
POTASSIUM SERPL-MCNC: 4 MMOL/L — SIGNIFICANT CHANGE UP (ref 3.5–5.3)
POTASSIUM SERPL-SCNC: 3.5 MMOL/L — SIGNIFICANT CHANGE UP (ref 3.5–5.3)
POTASSIUM SERPL-SCNC: 4 MMOL/L — SIGNIFICANT CHANGE UP (ref 3.5–5.3)
PROT SERPL-MCNC: 6.3 G/DL — SIGNIFICANT CHANGE UP (ref 6–8.3)
PROTHROM AB SERPL-ACNC: 13.6 SEC — HIGH (ref 10.5–13.4)
RBC # BLD: 3.16 M/UL — LOW (ref 4.2–5.8)
RBC # BLD: 3.69 M/UL — LOW (ref 4.2–5.8)
RBC # FLD: 17.4 % — HIGH (ref 10.3–14.5)
RBC # FLD: 17.7 % — HIGH (ref 10.3–14.5)
RCV VOL RI: 520 /UL — HIGH (ref 0–5)
SAO2 % BLDA: 99 % — SIGNIFICANT CHANGE UP
SODIUM SERPL-SCNC: 143 MMOL/L — SIGNIFICANT CHANGE UP (ref 135–145)
SODIUM SERPL-SCNC: 143 MMOL/L — SIGNIFICANT CHANGE UP (ref 135–145)
SPECIMEN SOURCE: SIGNIFICANT CHANGE UP
TOTAL NUCLEATED CELL COUNT, BODY FLUID: 435 /UL — SIGNIFICANT CHANGE UP
TROPONIN I, HIGH SENSITIVITY RESULT: 1406.8 NG/L — HIGH
TROPONIN I, HIGH SENSITIVITY RESULT: 1644.4 NG/L — HIGH
TUBE TYPE: SIGNIFICANT CHANGE UP
WBC # BLD: 10.95 K/UL — HIGH (ref 3.8–10.5)
WBC # BLD: 4.9 K/UL — SIGNIFICANT CHANGE UP (ref 3.8–10.5)
WBC # FLD AUTO: 10.95 K/UL — HIGH (ref 3.8–10.5)
WBC # FLD AUTO: 4.9 K/UL — SIGNIFICANT CHANGE UP (ref 3.8–10.5)

## 2022-11-02 PROCEDURE — 71045 X-RAY EXAM CHEST 1 VIEW: CPT | Mod: 26,77

## 2022-11-02 PROCEDURE — 88305 TISSUE EXAM BY PATHOLOGIST: CPT | Mod: 26

## 2022-11-02 PROCEDURE — 99233 SBSQ HOSP IP/OBS HIGH 50: CPT | Mod: GC

## 2022-11-02 PROCEDURE — 71045 X-RAY EXAM CHEST 1 VIEW: CPT | Mod: 26

## 2022-11-02 PROCEDURE — 88112 CYTOPATH CELL ENHANCE TECH: CPT | Mod: 26

## 2022-11-02 PROCEDURE — 71250 CT THORAX DX C-: CPT | Mod: 26

## 2022-11-02 PROCEDURE — 70450 CT HEAD/BRAIN W/O DYE: CPT | Mod: 26

## 2022-11-02 RX ORDER — PIPERACILLIN AND TAZOBACTAM 4; .5 G/20ML; G/20ML
3.38 INJECTION, POWDER, LYOPHILIZED, FOR SOLUTION INTRAVENOUS EVERY 12 HOURS
Refills: 0 | Status: COMPLETED | OUTPATIENT
Start: 2022-11-03 | End: 2022-11-09

## 2022-11-02 RX ORDER — FENTANYL CITRATE 50 UG/ML
50 INJECTION INTRAVENOUS ONCE
Refills: 0 | Status: DISCONTINUED | OUTPATIENT
Start: 2022-11-02 | End: 2022-11-02

## 2022-11-02 RX ORDER — DIGOXIN 250 MCG
125 TABLET ORAL EVERY OTHER DAY
Refills: 0 | Status: DISCONTINUED | OUTPATIENT
Start: 2022-11-02 | End: 2022-11-04

## 2022-11-02 RX ORDER — LEVOTHYROXINE SODIUM 125 MCG
56 TABLET ORAL AT BEDTIME
Refills: 0 | Status: DISCONTINUED | OUTPATIENT
Start: 2022-11-03 | End: 2022-11-05

## 2022-11-02 RX ORDER — PIPERACILLIN AND TAZOBACTAM 4; .5 G/20ML; G/20ML
3.38 INJECTION, POWDER, LYOPHILIZED, FOR SOLUTION INTRAVENOUS ONCE
Refills: 0 | Status: DISCONTINUED | OUTPATIENT
Start: 2022-11-02 | End: 2022-11-02

## 2022-11-02 RX ORDER — MIDAZOLAM HYDROCHLORIDE 1 MG/ML
2 INJECTION, SOLUTION INTRAMUSCULAR; INTRAVENOUS ONCE
Refills: 0 | Status: DISCONTINUED | OUTPATIENT
Start: 2022-11-02 | End: 2022-11-02

## 2022-11-02 RX ORDER — FENTANYL CITRATE 50 UG/ML
100 INJECTION INTRAVENOUS ONCE
Refills: 0 | Status: DISCONTINUED | OUTPATIENT
Start: 2022-11-02 | End: 2022-11-02

## 2022-11-02 RX ORDER — FENTANYL CITRATE 50 UG/ML
50 INJECTION INTRAVENOUS EVERY 4 HOURS
Refills: 0 | Status: DISCONTINUED | OUTPATIENT
Start: 2022-11-02 | End: 2022-11-02

## 2022-11-02 RX ORDER — CARVEDILOL PHOSPHATE 80 MG/1
6.25 CAPSULE, EXTENDED RELEASE ORAL EVERY 12 HOURS
Refills: 0 | Status: DISCONTINUED | OUTPATIENT
Start: 2022-11-02 | End: 2022-11-02

## 2022-11-02 RX ORDER — DEXMEDETOMIDINE HYDROCHLORIDE IN 0.9% SODIUM CHLORIDE 4 UG/ML
0.1 INJECTION INTRAVENOUS
Qty: 200 | Refills: 0 | Status: DISCONTINUED | OUTPATIENT
Start: 2022-11-02 | End: 2022-11-02

## 2022-11-02 RX ORDER — CHLORHEXIDINE GLUCONATE 213 G/1000ML
15 SOLUTION TOPICAL EVERY 12 HOURS
Refills: 0 | Status: DISCONTINUED | OUTPATIENT
Start: 2022-11-02 | End: 2022-11-04

## 2022-11-02 RX ORDER — INSULIN LISPRO 100/ML
VIAL (ML) SUBCUTANEOUS EVERY 6 HOURS
Refills: 0 | Status: ACTIVE | OUTPATIENT
Start: 2022-11-02 | End: 2023-10-01

## 2022-11-02 RX ORDER — DEXMEDETOMIDINE HYDROCHLORIDE IN 0.9% SODIUM CHLORIDE 4 UG/ML
0.5 INJECTION INTRAVENOUS
Qty: 400 | Refills: 0 | Status: DISCONTINUED | OUTPATIENT
Start: 2022-11-02 | End: 2022-11-02

## 2022-11-02 RX ORDER — MIDAZOLAM HYDROCHLORIDE 1 MG/ML
4 INJECTION, SOLUTION INTRAMUSCULAR; INTRAVENOUS ONCE
Refills: 0 | Status: DISCONTINUED | OUTPATIENT
Start: 2022-11-02 | End: 2022-11-02

## 2022-11-02 RX ORDER — DEXMEDETOMIDINE HYDROCHLORIDE IN 0.9% SODIUM CHLORIDE 4 UG/ML
0.5 INJECTION INTRAVENOUS
Qty: 400 | Refills: 0 | Status: DISCONTINUED | OUTPATIENT
Start: 2022-11-02 | End: 2022-11-05

## 2022-11-02 RX ORDER — PANTOPRAZOLE SODIUM 20 MG/1
40 TABLET, DELAYED RELEASE ORAL DAILY
Refills: 0 | Status: DISCONTINUED | OUTPATIENT
Start: 2022-11-02 | End: 2022-11-05

## 2022-11-02 RX ORDER — PIPERACILLIN AND TAZOBACTAM 4; .5 G/20ML; G/20ML
3.38 INJECTION, POWDER, LYOPHILIZED, FOR SOLUTION INTRAVENOUS ONCE
Refills: 0 | Status: COMPLETED | OUTPATIENT
Start: 2022-11-02 | End: 2022-11-02

## 2022-11-02 RX ORDER — NOREPINEPHRINE BITARTRATE/D5W 8 MG/250ML
0.15 PLASTIC BAG, INJECTION (ML) INTRAVENOUS
Qty: 8 | Refills: 0 | Status: DISCONTINUED | OUTPATIENT
Start: 2022-11-02 | End: 2022-11-03

## 2022-11-02 RX ORDER — SODIUM CHLORIDE 9 MG/ML
1000 INJECTION, SOLUTION INTRAVENOUS ONCE
Refills: 0 | Status: COMPLETED | OUTPATIENT
Start: 2022-11-02 | End: 2022-11-02

## 2022-11-02 RX ADMIN — PANTOPRAZOLE SODIUM 40 MILLIGRAM(S): 20 TABLET, DELAYED RELEASE ORAL at 18:23

## 2022-11-02 RX ADMIN — FENTANYL CITRATE 50 MICROGRAM(S): 50 INJECTION INTRAVENOUS at 14:13

## 2022-11-02 RX ADMIN — MIDAZOLAM HYDROCHLORIDE 4 MILLIGRAM(S): 1 INJECTION, SOLUTION INTRAMUSCULAR; INTRAVENOUS at 15:30

## 2022-11-02 RX ADMIN — DEXMEDETOMIDINE HYDROCHLORIDE IN 0.9% SODIUM CHLORIDE 8.23 MICROGRAM(S)/KG/HR: 4 INJECTION INTRAVENOUS at 18:49

## 2022-11-02 RX ADMIN — Medication 75 MICROGRAM(S): at 05:53

## 2022-11-02 RX ADMIN — HEPARIN SODIUM 800 UNIT(S)/HR: 5000 INJECTION INTRAVENOUS; SUBCUTANEOUS at 07:11

## 2022-11-02 RX ADMIN — CHLORHEXIDINE GLUCONATE 15 MILLILITER(S): 213 SOLUTION TOPICAL at 19:23

## 2022-11-02 RX ADMIN — FENTANYL CITRATE 50 MICROGRAM(S): 50 INJECTION INTRAVENOUS at 14:30

## 2022-11-02 RX ADMIN — FENTANYL CITRATE 100 MICROGRAM(S): 50 INJECTION INTRAVENOUS at 15:50

## 2022-11-02 RX ADMIN — CARVEDILOL PHOSPHATE 3.12 MILLIGRAM(S): 80 CAPSULE, EXTENDED RELEASE ORAL at 05:53

## 2022-11-02 RX ADMIN — SODIUM CHLORIDE 1000 MILLILITER(S): 9 INJECTION, SOLUTION INTRAVENOUS at 13:30

## 2022-11-02 RX ADMIN — SODIUM CHLORIDE 3 MILLILITER(S): 9 INJECTION INTRAMUSCULAR; INTRAVENOUS; SUBCUTANEOUS at 06:00

## 2022-11-02 RX ADMIN — FENTANYL CITRATE 100 MICROGRAM(S): 50 INJECTION INTRAVENOUS at 15:30

## 2022-11-02 RX ADMIN — Medication 40 MILLIGRAM(S): at 05:53

## 2022-11-02 RX ADMIN — SODIUM CHLORIDE 3 MILLILITER(S): 9 INJECTION INTRAMUSCULAR; INTRAVENOUS; SUBCUTANEOUS at 00:03

## 2022-11-02 RX ADMIN — SODIUM CHLORIDE 3 MILLILITER(S): 9 INJECTION INTRAMUSCULAR; INTRAVENOUS; SUBCUTANEOUS at 18:19

## 2022-11-02 RX ADMIN — HEPARIN SODIUM 800 UNIT(S)/HR: 5000 INJECTION INTRAVENOUS; SUBCUTANEOUS at 09:30

## 2022-11-02 RX ADMIN — Medication 2: at 18:22

## 2022-11-02 RX ADMIN — PIPERACILLIN AND TAZOBACTAM 200 GRAM(S): 4; .5 INJECTION, POWDER, LYOPHILIZED, FOR SOLUTION INTRAVENOUS at 18:24

## 2022-11-02 RX ADMIN — SODIUM CHLORIDE 3 MILLILITER(S): 9 INJECTION INTRAMUSCULAR; INTRAVENOUS; SUBCUTANEOUS at 23:28

## 2022-11-02 RX ADMIN — SODIUM CHLORIDE 3 MILLILITER(S): 9 INJECTION INTRAMUSCULAR; INTRAVENOUS; SUBCUTANEOUS at 13:00

## 2022-11-02 RX ADMIN — Medication 1: at 23:22

## 2022-11-02 NOTE — PROGRESS NOTE ADULT - ASSESSMENT
This is a 89 yo M with pmhx of hyperlipidemia, diabetes, atrial fibrillation on Eliquis presents with generalized weakness x 2 days. Patient states that he was sitting on the couch yesterday when he sat up then fell on the ground. The episode was witnessed by his wife. He doesn't believe he lost consciousness or if he hit his head. He does mentions having 3 episodes of watery, nonbloody diarrhea for the past few days. 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 and Cardio-renal disease with volume  overload  2) NAGMA with improvement  4) Atrial fibrillation/A/flutter s/p cardiogenic shock with systolic/diastolic CHF exacerbation with volume overload with improvement  5) Electrolytes disorders with improvement  6) Hypoalbuminemia  7) Anemia of chronic renal disease  8) History of RCC with Partial Nephrectomy  9) Hypothyroidism  10) Gross hematuria likely traumatic Gonzalez vs AC  11) Acute hypoxic respiratory failure on ventilatory support with aspiratory Pneumonia    Recommend:  Patient critically ill in ICU on ventilatory support  Strict I/o  Avoid Nephrotoxic agents  Urine studies reviewed  Bilateral renal and bladder ultrasound   Gonzalez's catheter placement reviewed with gross hematuria   Adjust antibiotics as per renal dose adjustment with IV Zosyn  Urology consult reviewed  Hold ACEI/ARB  Optimal rate control for A.fib on po betablocker   Consider add Flomax 0.4 mg po daily  Replete electrolytes with goal K>4 and <5, Mag>2 and Phos 2.5 to 3.5   AC/Antiplatelet agents per primary/cardiology team  Monitor BMP/electrolytes daily  Intermittent Lasix for clinical volume overload  No urgent need for RRT/HD  Will follow This is a 89 yo M with pmhx of hyperlipidemia, diabetes, atrial fibrillation on Eliquis presents with generalized weakness x 2 days. Patient states that he was sitting on the couch yesterday when he sat up then fell on the ground. The episode was witnessed by his wife. He doesn't believe he lost consciousness or if he hit his head. He does mentions having 3 episodes of watery, nonbloody diarrhea for the past few days. 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 and Cardio-renal disease with volume  overload  2) NAGMA with improvement  4) Atrial fibrillation/A/flutter s/p cardiogenic shock with systolic/diastolic CHF exacerbation with volume overload with improvement  5) Electrolytes disorders with improvement  6) Hypoalbuminemia  7) Anemia of chronic renal disease  8) History of RCC with Partial Nephrectomy  9) Hypothyroidism  10) Gross hematuria likely traumatic Gonzalez vs AC  11) Acute hypoxic respiratory failure on ventilatory support with aspiratory Pneumonia  12) Cardiac arrest s/p CPR with asystole    Recommend:  Patient critically ill in ICU on ventilatory support  Strict I/o  Avoid Nephrotoxic agents  Urine studies reviewed  Bilateral renal and bladder ultrasound   Gonzalez's catheter placement reviewed with gross hematuria   Adjust antibiotics as per renal dose adjustment with IV Zosyn  Urology consult reviewed  Hold ACEI/ARB  Optimal rate control for A.fib on po betablocker   Consider add Flomax 0.4 mg po daily  Replete electrolytes with goal K>4 and <5, Mag>2 and Phos 2.5 to 3.5   AC/Antiplatelet agents per primary/cardiology team  Monitor BMP/electrolytes daily  Intermittent Lasix for clinical volume overload  No urgent need for RRT/HD  Further work up per primary ICU/Neurology team to assess hypoxic brain injury  Will follow

## 2022-11-02 NOTE — PROGRESS NOTE ADULT - SUBJECTIVE AND OBJECTIVE BOX
PGY-1 Progress Note discussed with attending    PAGER #: [[phone]] TILL 5:00 PM  PLEASE CONTACT ON CALL TEAM:  - On Call Team (Please refer to Jasper) FROM 5:00 PM - 8:30PM  - Nightfloat Team FROM 8:30 -7:30 AM    OVERNIGHT EVENTS:   -     REVIEW OF SYSTEMS:  CONSTITUTIONAL: No fever, weight loss, or fatigue  RESPIRATORY: No cough, wheezing, chills or hemoptysis; No shortness of breath  CARDIOVASCULAR: No chest pain, palpitations, dizziness, or leg swelling  GASTROINTESTINAL: No abdominal pain. No nausea, vomiting, or hematemesis; No diarrhea or constipation. No melena or hematochezia.  GENITOURINARY: No dysuria or hematuria, urinary frequency  NEUROLOGICAL: No headaches, memory loss, loss of strength, numbness, or tremors  SKIN: No itching, burning, rashes, or lesions     MEDICATIONS  (STANDING):  atorvastatin 40 milliGRAM(s) Oral at bedtime  carvedilol 3.125 milliGRAM(s) Oral every 12 hours  furosemide   Injectable 40 milliGRAM(s) IV Push daily  heparin  Infusion.  Unit(s)/Hr (12 mL/Hr) IV Continuous <Continuous>  insulin lispro (ADMELOG) corrective regimen sliding scale   SubCutaneous Before meals and at bedtime  levothyroxine 75 MICROGram(s) Oral daily  sodium chloride 0.9% lock flush 3 milliLiter(s) IV Push every 6 hours    MEDICATIONS  (PRN):      Vital Signs Last 24 Hrs  T(C): 37.1 (02 Nov 2022 07:45), Max: 37.1 (02 Nov 2022 07:45)  T(F): 98.7 (02 Nov 2022 07:45), Max: 98.7 (02 Nov 2022 07:45)  HR: 69 (02 Nov 2022 04:49) (69 - 105)  BP: 137/73 (02 Nov 2022 04:49) (110/47 - 138/52)  BP(mean): 79 (01 Nov 2022 20:00) (64 - 79)  RR: 18 (02 Nov 2022 07:45) (18 - 24)  SpO2: 99% (02 Nov 2022 07:45) (91% - 100%)    Parameters below as of 02 Nov 2022 07:45  Patient On (Oxygen Delivery Method): nasal cannula  O2 Flow (L/min): 2      PHYSICAL EXAMINATION:  GENERAL: NAD, AAOx  HEAD: AT/NC  EYES: conjunctiva and sclera clear  NECK: supple, No JVD noted, Normal thyroid  CHEST/LUNG: CTABL; no rales, rhonchi, wheezing, or rubs  HEART: regular rate and rhythm; no murmurs, rubs, or gallops  ABDOMEN: soft, nontender, nondistended; Bowel sounds present  EXTREMITIES:  2+ Peripheral Pulses, No clubbing, cyanosis, or edema  SKIN: warm dry                          8.9    7.32  )-----------( 196      ( 01 Nov 2022 03:55 )             29.0     11-01    142  |  112<H>  |  37<H>  ----------------------------<  156<H>  3.8   |  27  |  1.72<H>    Ca    8.9      01 Nov 2022 03:55  Phos  2.8     11-01  Mg     1.9     11-01    TPro  5.9<L>  /  Alb  2.7<L>  /  TBili  0.4  /  DBili  x   /  AST  35  /  ALT  66<H>  /  AlkPhos  163<H>  11-01    LIVER FUNCTIONS - ( 01 Nov 2022 03:55 )  Alb: 2.7 g/dL / Pro: 5.9 g/dL / ALK PHOS: 163 U/L / ALT: 66 U/L DA / AST: 35 U/L / GGT: x               PTT - ( 01 Nov 2022 08:00 )  PTT:86.0 sec  SARS-CoV-2: NotDetec (28 Oct 2022 19:36)      CAPILLARY BLOOD GLUCOSE      POCT Blood Glucose.: 127 mg/dL (02 Nov 2022 07:57)  POCT Blood Glucose.: 190 mg/dL (01 Nov 2022 20:59)  POCT Blood Glucose.: 137 mg/dL (01 Nov 2022 16:27)  POCT Blood Glucose.: 193 mg/dL (01 Nov 2022 10:41)      RADIOLOGY & ADDITIONAL TESTS:                   PGY-1 Progress Note discussed with attending    PAGER #: [[phone]] TILL 5:00 PM  PLEASE CONTACT ON CALL TEAM:  - On Call Team (Please refer to Jasper) FROM 5:00 PM - 8:30PM  - Nightfloat Team FROM 8:30 -7:30 AM    OVERNIGHT EVENTS/INTERVAL:   - Pt downgraded from ICU.  - Pt seen bedside this AM. No new complaints. Gross blood remains visible in garza.    HPI:  89 yo M with Pmhx of hyperlipidemia, diabetes, ?atrial fibrillation on Eliquis, PPM, hypothyroidism, h/o RCC with partial nephrectomy, presents with generalized weakness x 2 days, admitted to medicine for evaluation, while in ED holding had hypotension and worsening mental status, found to have cardiogenic shock, atrial tachyarrhythmia , and PPM-driven tachycardia.     ICU Course: Patient was started on Dobutamine for cardiogenic shock and was given amidorone for Afib with RVR. PPM was interrogated and the mode was changed by EP because it was pacing at 140. Echo showed EF 40-45%.  Patient developed hematuria, but hemoglobin has remained stable, so heparin was continued. Patient was able to be titrated off dobutamine drip and coreg was started for Afib    REVIEW OF SYSTEMS:  CONSTITUTIONAL: No fever, weight loss, or fatigue  RESPIRATORY: No cough, wheezing, chills or hemoptysis; No shortness of breath  CARDIOVASCULAR: No chest pain, palpitations, dizziness, or leg swelling  GASTROINTESTINAL: No abdominal pain. No nausea, vomiting, or hematemesis; No diarrhea or constipation. No melena or hematochezia.  GENITOURINARY: Hematuria present. No dysuria or urinary frequency  NEUROLOGICAL: No headaches, memory loss, loss of strength, numbness, or tremors  SKIN: No itching, burning, rashes, or lesions     MEDICATIONS  (STANDING):  atorvastatin 40 milliGRAM(s) Oral at bedtime  carvedilol 3.125 milliGRAM(s) Oral every 12 hours  furosemide   Injectable 40 milliGRAM(s) IV Push daily  heparin  Infusion.  Unit(s)/Hr (12 mL/Hr) IV Continuous <Continuous>  insulin lispro (ADMELOG) corrective regimen sliding scale   SubCutaneous Before meals and at bedtime  levothyroxine 75 MICROGram(s) Oral daily  sodium chloride 0.9% lock flush 3 milliLiter(s) IV Push every 6 hours    MEDICATIONS  (PRN):      Vital Signs Last 24 Hrs  T(C): 37.1 (02 Nov 2022 07:45), Max: 37.1 (02 Nov 2022 07:45)  T(F): 98.7 (02 Nov 2022 07:45), Max: 98.7 (02 Nov 2022 07:45)  HR: 69 (02 Nov 2022 04:49) (69 - 105)  BP: 137/73 (02 Nov 2022 04:49) (110/47 - 138/52)  BP(mean): 79 (01 Nov 2022 20:00) (64 - 79)  RR: 18 (02 Nov 2022 07:45) (18 - 24)  SpO2: 99% (02 Nov 2022 07:45) (91% - 100%)    Parameters below as of 02 Nov 2022 07:45  Patient On (Oxygen Delivery Method): nasal cannula  O2 Flow (L/min): 2      PHYSICAL EXAMINATION:  GENERAL: NAD, AAOx2 (to person and time, not place)  HEAD: AT/NC  EYES: conjunctiva and sclera clear  NECK: supple, No JVD noted, Normal thyroid  CHEST/LUNG: B/l crackles. Equal air entry. Secretions.   HEART: regular rate and rhythm; no murmurs, rubs, or gallops  ABDOMEN: soft, nontender, nondistended; Bowel sounds present  EXTREMITIES:  2+ Peripheral Pulses, No clubbing, cyanosis, or edema  SKIN: warm dry                          8.9    7.32  )-----------( 196      ( 01 Nov 2022 03:55 )             29.0     11-01    142  |  112<H>  |  37<H>  ----------------------------<  156<H>  3.8   |  27  |  1.72<H>    Ca    8.9      01 Nov 2022 03:55  Phos  2.8     11-01  Mg     1.9     11-01    TPro  5.9<L>  /  Alb  2.7<L>  /  TBili  0.4  /  DBili  x   /  AST  35  /  ALT  66<H>  /  AlkPhos  163<H>  11-01    LIVER FUNCTIONS - ( 01 Nov 2022 03:55 )  Alb: 2.7 g/dL / Pro: 5.9 g/dL / ALK PHOS: 163 U/L / ALT: 66 U/L DA / AST: 35 U/L / GGT: x               PTT - ( 01 Nov 2022 08:00 )  PTT:86.0 sec  SARS-CoV-2: NotDetec (28 Oct 2022 19:36)      CAPILLARY BLOOD GLUCOSE      POCT Blood Glucose.: 127 mg/dL (02 Nov 2022 07:57)  POCT Blood Glucose.: 190 mg/dL (01 Nov 2022 20:59)  POCT Blood Glucose.: 137 mg/dL (01 Nov 2022 16:27)  POCT Blood Glucose.: 193 mg/dL (01 Nov 2022 10:41)      RADIOLOGY & ADDITIONAL TESTS:  CT head - no acute stroke  CT c-spine - no fracture

## 2022-11-02 NOTE — PROGRESS NOTE ADULT - SUBJECTIVE AND OBJECTIVE BOX
CHIEF COMPLAINT:Patient is a 89y old  Male who presents with a chief complaint of Fall.Pt appears comfortable.    	  REVIEW OF SYSTEMS:  CONSTITUTIONAL: No fever, weight loss, or fatigue  EYES: No eye pain, visual disturbances, or discharge  ENT:  No difficulty hearing, tinnitus, vertigo; No sinus or throat pain  NECK: No pain or stiffness  RESPIRATORY: No cough, wheezing, chills or hemoptysis; No Shortness of Breath  CARDIOVASCULAR: No chest pain, palpitations, passing out, dizziness, or leg swelling  GASTROINTESTINAL: No abdominal or epigastric pain. No nausea, vomiting, or hematemesis; No diarrhea or constipation. No melena or hematochezia.  GENITOURINARY: No dysuria, frequency, hematuria, or incontinence  NEUROLOGICAL: No headaches, memory loss, loss of strength, numbness, or tremors  SKIN: No itching, burning, rashes, or lesions   LYMPH Nodes: No enlarged glands  ENDOCRINE: No heat or cold intolerance; No hair loss  MUSCULOSKELETAL: No joint pain or swelling; No muscle, back, or extremity pain  PSYCHIATRIC: No depression, anxiety, mood swings, or difficulty sleeping  HEME/LYMPH: No easy bruising, or bleeding gums  ALLERGY AND IMMUNOLOGIC: No hives or eczema	        PHYSICAL EXAM:  T(C): 37.1 (11-02-22 @ 07:45), Max: 37.1 (11-02-22 @ 07:45)  HR: 69 (11-02-22 @ 04:49) (69 - 105)  BP: 137/73 (11-02-22 @ 04:49) (110/47 - 138/52)  RR: 18 (11-02-22 @ 07:45) (18 - 24)  SpO2: 99% (11-02-22 @ 07:45) (91% - 100%)  Wt(kg): --  I&O's Summary    01 Nov 2022 07:01  -  02 Nov 2022 07:00  --------------------------------------------------------  IN: 176 mL / OUT: 2755 mL / NET: -2579 mL        Appearance: Normal	  HEENT:   Normal oral mucosa, PERRL, EOMI	  Lymphatic: No lymphadenopathy  Cardiovascular: Normal S1 S2, No JVD, No murmurs, No edema  Respiratory: B/L ronchi  Psychiatry: A & O x 3, Mood & affect appropriate  Gastrointestinal:  Soft, Non-tender, + BS	  Skin: No rashes, No ecchymoses, No cyanosis	  Neurologic: Non-focal  Extremities: Normal range of motion, No clubbing, cyanosis or edema  Vascular: Peripheral pulses palpable 2+ bilaterally    MEDICATIONS  (STANDING):  atorvastatin 40 milliGRAM(s) Oral at bedtime  carvedilol 6.25 milliGRAM(s) Oral every 12 hours  digoxin     Tablet 125 MICROGram(s) Oral every other day  furosemide   Injectable 40 milliGRAM(s) IV Push daily  heparin  Infusion.  Unit(s)/Hr (12 mL/Hr) IV Continuous <Continuous>  insulin lispro (ADMELOG) corrective regimen sliding scale   SubCutaneous Before meals and at bedtime  levothyroxine 75 MICROGram(s) Oral daily  sodium chloride 0.9% lock flush 3 milliLiter(s) IV Push every 6 hours      TELEMETRY: 	av paced    	  	  LABS:	 	                        9.0    4.90  )-----------( 190      ( 02 Nov 2022 07:34 )             29.0     11-02    143  |  107  |  33<H>  ----------------------------<  131<H>  3.5   |  31  |  1.57<H>    Ca    9.5      02 Nov 2022 07:34  Phos  2.7     11-02  Mg     2.0     11-02    TPro  5.9<L>  /  Alb  2.7<L>  /  TBili  0.4  /  DBili  x   /  AST  35  /  ALT  66<H>  /  AlkPhos  163<H>  11-01    proBNP: Serum Pro-Brain Natriuretic Peptide: 50768 pg/mL (10-29 @ 12:55)  Serum Pro-Brain Natriuretic Peptide: 10611 pg/mL (10-28 @ 19:36)      TSH: Thyroid Stimulating Hormone, Serum: 6.68 uU/mL (10-29 @ 10:41)      	    PTT - ( 02 Nov 2022 07:34 )  PTT:79.7 sec

## 2022-11-02 NOTE — CONSULT NOTE ADULT - ATTENDING COMMENTS
IMP: This is a  89 yr old man with  hyperlipidemia, diabetes, atrial fibrillation on Eliquis presents with generalized weakness x 2 days. Upon evaluation in the ED v/s: /92, , RR 20, SPO2: 97% on RA  (29 Oct 2022 04:15). Patient admitted initially to medicine for generalized weakness and diarrhea. Further evaluation including ct head and ct cervical spine was negative for acute pathologies.   Patient had RRT called around 10AM for tachycardia and hypotension. Patient noted to be lethargic and vitals at the time of the rapid was afebrile, BP of 130/61 and repeat BP showing reads of 50/40s. Patient noted to have heart rates in 140's. Patient placed on 15L NRBM for severe hypoxic resp failure ,peripheral phenylephrine was started and metoprolol 2.5mg IV was given for heart rate. STAT EKG and labs including cardiac enzymes, coags and chemistries were sent. ICU was consulted and bedside POCUS done showing decreased left ventricular function, dilated IVC and Right atrium. Patient admitted to ICU for  cardiogenic shock     11/2: Cardiac arrest called on the floor. As per notes patient was noted to have vomited and aspirated prior to arrest. ROSC achieve in about 8 minutes. Paced rhythm on initial event. Patient intubated and transferred to ICU. Appears to be posturing. Remains with hematuria. Concern for possible neurologic event.    Assessment:  1. Cardiac arrest  2. Shock   3. Lactic acidosis  4. Atrial fibrillation with RVR  5. Acute respiratory failure  6. Aspiration pneumonia   7. Right pleural effusion   8. HLD   9. Diabetes Mellitus   10. LIGIA   11. Hypothyroidism  12. Hematuria     Plan   - Transfer to ICU   - Vasopressor support to maintain map > 65   - Obtain CT head and chest as concern for possible ICH and hemothorax   - POCUS showing complex right pleural effusion, given CPR and full dose anticoagulation patient may have a hemothorax  - Close neurologic monitoring  - Pain control  - Hold all antihypertensives given shock   - Mechanical ventilation  - Check post intubation ABG  - Cardiology followup   - Obtain sputum culture  - Start broad spectrum antibiotics  - EP cards eval noted , PPM mode reset and tachycardia resolved . BP improved . anticoagulation and my need ADRIAN as per EP   - Hemodynamic support   - NPO  - Hematuria noted, likely due to traumatic garza insertion  - Not initiated on hypothermia as active hematuria   - Trend cbc   - Urology follow up   - Cont Garza   - Monitor I/O , keep fluid neg   - Therapeutic anticoagulation  - Discussed with patient's son on the phone. Understands current clinical condition. States will visit later this evening.  - Prognosis is guarded at this time
IMP: This is a  89 yr old man with  hyperlipidemia, diabetes, atrial fibrillation on Eliquis presents with generalized weakness x 2 days. Upon evaluation in the ED v/s: /92, , RR 20, SPO2: 97% on RA  (29 Oct 2022 04:15). Patient admitted initially to medicine for generalized weakness and diarrhea. Further evaluation including ct head and ct cervical spine was negative for acute pathologies.   Patient had RRT called around 10AM for tachycardia and hypotension. Patient noted to be lethargic and vitals at the time of the rapid was afebrile, BP of 130/61 and repeat BP showing reads of 50/40s. Patient noted to have heart rates in 140's. Patient placed on 15L NRBM for severe hypoxic resp failure ,peripheral phenylephrine was started and metoprolol 2.5mg IV was given for heart rate. STAT EKG and labs including cardiac enzymes, coags and chemistries were sent. ICU was consulted and bedside POCUS done showing decreased left ventricular function, dilated IVC and Right atrium. Patient admitted to ICU for  cardiogenic shock     #Cardiogenic shock  #Atrial fibrillation with RVR  #R/O PE  #AHRF 2/2 to new onset CHF vs fluid overload   #HLD  #Diabetes   #LIGIA   #Hypothyroid  #Urinary retention       Plan   - Transfer to ICU   - No sedation   - Continue BiPaP as tolerated alternate with O2   - Doubt PE at this time as a cause of hypoxic resp failure   - Duiresis   - Dobutamine   - Hemodynamic support   - Cardiac enzymes   - Anticoag   - Dig given for rate control   - Amiodarone bolus   - NPO   - 2 DECHO   - Gonzalez   - Monitor I/O , keep fluid neg   - Therapeutic anticoag  - Fall precaution

## 2022-11-02 NOTE — PROGRESS NOTE ADULT - SUBJECTIVE AND OBJECTIVE BOX
Subjective  Patient feels well this AM with no complaints    Objective    Vital signs  T(F): , Max: 98.7 (11-02-22 @ 07:45)  HR: 70 (11-02-22 @ 11:46)  BP: 125/53 (11-02-22 @ 11:46)  SpO2: 99% (11-02-22 @ 11:46)  Wt(kg): --    Output     OUT:    Indwelling Catheter - Urethral (mL): 2755 mL  Total OUT: 2755 mL    Total NET: -2755 mL      OUT:    Indwelling Catheter - Urethral (mL): 1900 mL  Total OUT: 1900 mL    Total NET: -1900 mL          Gen: NAD  Abd: soft, nontender, nondistended  : garza secured in place, draining clear pink tinged urine    Labs      11-02 @ 07:34    WBC 4.90  / Hct 29.0  / SCr 1.57     11-01 @ 03:55    WBC 7.32  / Hct 29.0  / SCr 1.72         Culture - Blood (collected 10-29-22 @ 12:55)  Source: .Blood Blood-Venous  Preliminary Report (10-30-22 @ 19:02):    No growth to date.    Culture - Blood (collected 10-29-22 @ 12:40)  Source: .Blood Blood-Peripheral  Preliminary Report (10-30-22 @ 19:02):    No growth to date.    Culture - Urine (collected 10-28-22 @ 20:50)  Source: Clean Catch Clean Catch (Midstream)  Final Report (10-30-22 @ 07:13):    <10,000 CFU/mL Normal Urogenital Juanita    Culture - Blood (collected 10-28-22 @ 20:40)  Source: .Blood Blood-Peripheral  Preliminary Report (10-30-22 @ 02:02):    No growth to date.    Culture - Blood (collected 10-28-22 @ 20:35)  Source: .Blood Blood-Peripheral  Preliminary Report (10-30-22 @ 02:02):    No growth to date.

## 2022-11-02 NOTE — DIETITIAN INITIAL EVALUATION ADULT - OTHER INFO
Admit to hospital w/ weakness x 2 days (diarrhea/ dehydration). Re-admit to ICU this day s/p cardiac arrest.

## 2022-11-02 NOTE — RAPID RESPONSE TEAM SUMMARY - NSREASONFORCALLINGRRT_GEN_ALL_CORE
Abnormal heart rate/Acute mental status changes
Abnormal heart rate/Abnormal respiratory rate/Hypotension

## 2022-11-02 NOTE — DIETITIAN INITIAL EVALUATION ADULT - NSICDXPASTMEDICALHX_GEN_ALL_CORE_FT
PAST MEDICAL HISTORY:  Diabetes     GERD (gastroesophageal reflux disease)     HLD (hyperlipidemia)     HTN (hypertension)     Hypothyroid     Hypothyroidism

## 2022-11-02 NOTE — CONSULT NOTE ADULT - ASSESSMENT
Assessment: 89 yo M with pmhx of hyperlipidemia, diabetes, ?atrial fibrillation on Eliquis, PPM presents with generalized weakness x 2 days. Found to be in cardiogenic shock, atrial tachyarrhythmia , and PPM-driven tachycardia. s/p pacemaker interrogation. DG to medicine 11/1. CODE BLUE 11/2 after aspiration event.    #Cardiogenic shock  #Atrial tachyarrhythmia   #PPM mode adjustment   #AHRF 2/2 to new onset CHF vs fluid overload   #HLD  #Diabetes   #LIGIA   #Hypothyroid  #Urinary retention     PLAN:    -----------------CNS:------------------  Decerebrate posturing c/f intracerebral hemorrhage  Heparin drip held PRIOR to CODE BLUE compressions  - f/u CT head non-contrast    -----------------CVS:------------------  # s/o Cardiac arrest  # Cardiogenic shock  # Elevated troponin  down time ~8 min, s/p 2x Epi  - re-check tele pre - code to ensure PPM not malfunctioning  - started on peripheral levo post resuscitation  - trend trop  - HOLD heparin drip 2/2 bleeding concerns  Cardiology Dr. Dominguez  TTE 10/30 : EF=40-45%.    #Atrial tachyarrhythmia   #PPM-driven tachycardia.  On Eliquis and Lopressor at home, however family says no history of AFib?  Presented with AFib/Flutter/Tach with RVR to 140s.   Received Amio and Digoxin without improvement.   PPM interrogated, was sensing atrial rate, mode switched by EP to DDIR with resolution.   Discontinued rate/rhythm control agents.   EP Dr. Ly following.   - HOLDING Coreg, Lasix in setting of hypotension  - Heparin infusion for AC HELD    -----------------RESPIRATORY:--------  #Right lung infiltrate, c/f Aspiration  - started zosyn  - f/u sputum culture  - f/u blood cultures    #Right lung effusion  appears w/ + plankton sign on bedside POCUS  - f/u CT chest non con  - on Lasix 40 QD HOLDING    -----------------GI:----------------------  #Diarrhea  - resolved  #Nutrition  NPO  consider SUMP tube for decompression, held after 2 attempts with coiling in mouth    -----------------ID:-----------------------  #suspect aspiration w/ risk to develop PNA  - started zosyn 11/2, q 12 hrs    -----------------RENAL: ---------------  Oliguric Pre-Renal Acute Renal Failure  Cr was around 1.5 when out of ICU, now 1.85 post resuscitation   - avoid nephrotoxic agents   - improve renal perfusion    #Hematuria:  urology following  hb is stable  CBC q 12  HOLDING heparin drip    -----------------HEME/ONC: ---------------  Anemia  - hgb 9.6 > 10.4  - transfuse if hgb<7  - monitor cbc     -----------------ENDO: ---------------  Hypothyroidism  - TSH noted to be 6.6  - continue with levothyroxine as sc inj    Diabetes  - SSI     -----------------EXTREMITIES:-------  No acute issues     -----------------PROPHYLAXIS: -------  DVT: Heparin drip HOLDING  SCD  PPI    -----------------DISPOSITION--------  Transfer ICU

## 2022-11-02 NOTE — PROGRESS NOTE ADULT - ASSESSMENT
89 yo M with Pmhx of hyperlipidemia, diabetes, ?atrial fibrillation on Eliquis, PPM, hypothyroidism, h/o RCC with partial nephrectomy, presents with generalized weakness x 2 days, AMS and hypotension, admitted for cardiogenic shock, atrial tachyarrhythmia, and PPM driven tachycardia. S/p dobutamine in ICU and interrogation/mode adjustment of pacemaker, now downgraded from ICU to tele.

## 2022-11-02 NOTE — PROCEDURE NOTE - NSINDICATIONS_GEN_A_CORE
emergency venous access
strict intake/output during critical illness
critical illness/emergency venous access
critical patient/monitoring purposes
pleural effusion

## 2022-11-02 NOTE — RAPID RESPONSE TEAM SUMMARY - NSSITUATIONBACKGROUNDRRT_GEN_ALL_CORE
89 yo M hx hyperlipidemia, diabetes, ?atrial fibrillation on Eliquis, PPM who presented with generalized weakness x 2 days. In ED had hypotension and worsening mental status, found to have cardiogenic shock, atrial tachyarrhythmia , and PPM-driven tachycardia. ICU Course: Patient was started on Dobutamine for cardiogenic shock and was given digoxin and amiodarone for Afib with RVR. PPM was interrogated & found to be innapropriately pacing at 140 BPM. Changes made. Formal Echo showed EF 40-45%.  Patient developed hematuria, but hemoglobin has remained stable, so heparin was continued. Patient was able to be titrated off dobutamine drip and coreg was started for Afib. DG to medicine 11/1.     Rapid called on 11/2 at 12:27 for AMS. On arrival of call team patient was in PEA. CPR started with ROSC achieved in ~ 8 minutes. 2 epi given. Patient intubated and brought up to ICU. Started on peripheral levophed w/ 1L IVF bolus LR. Family called (wife) and informed by primary team via [place]  # [phone]. Unable to reach other family members, including HCP. Instructed wife to ask them to call back. 89 yo M hx hyperlipidemia, diabetes, ?atrial fibrillation on Eliquis, PPM who presented with generalized weakness x 2 days. In ED had hypotension and worsening mental status, found to have cardiogenic shock, atrial tachyarrhythmia , and PPM-driven tachycardia. ICU Course: Patient was started on Dobutamine for cardiogenic shock and was given digoxin and amiodarone for Afib with RVR. PPM was interrogated & found to be inappropriately pacing at 140 BPM. Changes made. Formal Echo showed EF 40-45%.  Patient developed hematuria, but hemoglobin has remained stable, so heparin was continued. Patient was able to be titrated off dobutamine drip and coreg was started for Afib. DG to medicine 11/1.     Rapid called on 11/2 at 12:27 for AMS after episode of emesis w/ suspected aspiration. On arrival of call team patient was in PEA. CPR started with ROSC achieved in ~ 8 minutes. 2 epi given. Patient intubated and brought up to ICU. Started on peripheral levophed w/ 1L IVF bolus LR. Family called (wife) and informed by primary team via [place]  # [phone]. Unable to reach other family members, including HCP. Instructed wife to ask them to call back.

## 2022-11-02 NOTE — PROCEDURE NOTE - NSPROCNAME_GEN_A_CORE
Chest Tube
Urinary Device Placement
Peripheral Line Insertion
Arterial Puncture/Cannulation
Central Line Insertion

## 2022-11-02 NOTE — CONSULT NOTE ADULT - SUBJECTIVE AND OBJECTIVE BOX
Patient is a 89y old  Male who presents with a chief complaint of Fall (02 Nov 2022 12:03)      HPI: 89 yo M hx hyperlipidemia, diabetes, ?atrial fibrillation on Eliquis, PPM who presented with generalized weakness x 2 days. In ED had hypotension and worsening mental status, found to have cardiogenic shock, atrial tachyarrhythmia , and PPM-driven tachycardia. ICU Course: Patient was started on Dobutamine for cardiogenic shock and was given digoxin and amiodarone for Afib with RVR. PPM was interrogated & found to be inappropriately pacing at 140 BPM. Changes made. Formal Echo showed EF 40-45%.  Patient developed hematuria, but hemoglobin has remained stable, so heparin was continued. Patient was able to be titrated off dobutamine drip and coreg was started for Afib. DG to medicine 11/1.     Rapid called on 11/2 at 12:27 for AMS after episode of emesis w/ suspected aspiration. On arrival of call team patient was in PEA. CPR started with ROSC achieved in ~ 8 minutes. 2 epi given. Patient intubated and started on peripheral levophed w/ 1L IVF bolus LR. Transferred to ICU.      PAST MEDICAL & SURGICAL HISTORY:  Diabetes  HTN (hypertension)  Hypothyroid  GERD (gastroesophageal reflux disease)  HLD (hyperlipidemia)  Hypothyroidism  H/O heart bypass surgery  H/O partial nephrectomy  S/P TURP    SOCIAL HX:   Smoking                         ETOH                            Other    FAMILY HISTORY:  Family history of hypertension (Father, Mother)    :  No known cardiovacular family hisotry     ROS:  See HPI     Allergies    No Known Allergies    Intolerances    PHYSICAL EXAM    ICU Vital Signs Last 24 Hrs  T(C): 36.7 (02 Nov 2022 11:19), Max: 37.1 (02 Nov 2022 07:45)  T(F): 98 (02 Nov 2022 11:19), Max: 98.7 (02 Nov 2022 07:45)  HR: 61 (02 Nov 2022 12:49) (61 - 72)  BP: 125/53 (02 Nov 2022 11:46) (110/47 - 138/52)  BP(mean): 79 (01 Nov 2022 20:00) (64 - 79)  ABP: --  ABP(mean): --  RR: 17 (02 Nov 2022 11:19) (17 - 24)  SpO2: 99% (02 Nov 2022 12:49) (94% - 100%)    O2 Parameters below as of 02 Nov 2022 11:46  Patient On (Oxygen Delivery Method): nasal cannula  O2 Flow (L/min): 2    General: intubated  HEENT:  PERR, pinpoint reactive,           Lymphatic system: No LN  Lungs: Bilateral BS, right low lung bs diminished at base  Cardiovascular: Regular  Gastrointestinal: Soft, Positive BS  Musculoskeletal:   Skin: Warm.  Intact  Neurological: Decerebrate posturing, +gag reflex intact      11-01-22 @ 07:01  -  11-02-22 @ 07:00  --------------------------------------------------------  IN:    Heparin Infusion: 176 mL  Total IN: 176 mL    OUT:    Indwelling Catheter - Urethral (mL): 2755 mL  Total OUT: 2755 mL    Total NET: -2579 mL    11-02-22 @ 07:01  -  11-02-22 @ 14:58  --------------------------------------------------------  IN:  Total IN: 0 mL    OUT:    Indwelling Catheter - Urethral (mL): 1900 mL  Total OUT: 1900 mL    Total NET: -1900 mL      LABS:                          10.4   10.95 )-----------( 264      ( 02 Nov 2022 13:29 )             34.6                                               11-02    143  |  105  |  32<H>  ----------------------------<  225<H>  4.0   |  26  |  1.85<H>    Ca    9.2      02 Nov 2022 13:29  Phos  3.8     11-02  Mg     2.0     11-02    TPro  6.3  /  Alb  2.6<L>  /  TBili  0.6  /  DBili  x   /  AST  181<H>  /  ALT  144<H>  /  AlkPhos  473<H>  11-02      PT/INR - ( 02 Nov 2022 13:29 )   PT: 13.6 sec;   INR: 1.14 ratio         PTT - ( 02 Nov 2022 07:34 )  PTT:79.7 sec                                           CARDIAC MARKERS ( 02 Nov 2022 13:29 )  x     / x     / 75 U/L / x     / 1.4 ng/mL                                            LIVER FUNCTIONS - ( 02 Nov 2022 13:29 )  Alb: 2.6 g/dL / Pro: 6.3 g/dL / ALK PHOS: 473 U/L / ALT: 144 U/L DA / AST: 181 U/L / GGT: x                                                                                           Mode: AC/ CMV (Assist Control/ Continuous Mandatory Ventilation)  RR (machine): 16  TV (machine): 400  FiO2: 100  PEEP: 5  ITime: 1  MAP: 13  PIP: 33                                          CXR: < from: Xray Chest 1 View-PORTABLE IMMEDIATE (Xray Chest 1 View-PORTABLE IMMEDIATE .) (11.02.22 @ 14:08) >    ACC: [id number] EXAM:  XR CHEST PORTABLE IMMED 1V                        ACC: [id number] EXAM:  XR CHEST PORTABLE URGENT 1V                          PROCEDURE DATE:  11/02/2022          INTERPRETATION:  AP chest semierect on November 2, 2022 at 11:51 AM.    Heart magnified by technique. Sternotomy and left-sided pacemaker again   noted.    There is a persistent moderate right base effusion and a slight left base   pleural pulmonary reaction.    Right jugular line seen on October 29 is been removed.Otherwise no   change.    Follow-up AP chest on November 2, 2022 at 1:36 PM. An endotracheal tube   is been inserted. Otherwise no change.    IMPRESSION: Persistent effusion in particular the right base. Latest film   shows intubation.    --- End of Report ---      BHAVANI CONLEY MD; Attending Radiologist    < end of copied text >      ECHO: < from: Transthoracic Echocardiogram (10.30.22 @ 08:44) >  ------------------------------------------------------------------------  CONCLUSIONS:  1. Normal mitral valve. Moderate mitral regurgitation.  2. Calcified trileaflet aortic valve with normal opening.  Mild aortic regurgitation.  3. Aortic Root: 3.6 cm.    4. Normal left atrium.  LA volume index = 24 cc/m2.  5. Normal left ventricular internal dimensions and wall  thicknesses.  6. Mild segmental left ventricular systolic dysfunction.The  inferior,infero-lateral walls and septum are hypokinetic  EF=40-45%.  7. Unable to adequately assess diastolic function due to  technical aspects of this study.  8. Normal right atrium.  9. Normal right ventricular size and function (RV tissue  Doppler .10m/s). A device lead is visualized in the right  heart.  10. RV systolic pressure is moderately increased at  46 mm  Hg.  11. There is moderate-severe tricuspid regurgitation.  12. There is mild pulmonic regurgitation.  13. Normal pericardium with no pericardial effusion.  14. Right pleural effusion.    < end of copied text >  < from: Transthoracic Echocardiogram (10.30.22 @ 08:44) >  Ejection Fraction Visual Estimate: 40-45 %    ------------------------------------------------------------------------    < end of copied text >      MEDICATIONS  (STANDING):  atorvastatin 40 milliGRAM(s) Oral at bedtime  digoxin     Tablet 125 MICROGram(s) Oral every other day  insulin lispro (ADMELOG) corrective regimen sliding scale   SubCutaneous Before meals and at bedtime  levothyroxine 75 MICROGram(s) Oral daily  norepinephrine Infusion 0.15 MICROgram(s)/kG/Min (18.5 mL/Hr) IV Continuous <Continuous>  piperacillin/tazobactam IVPB. 3.375 Gram(s) IV Intermittent once  sodium chloride 0.9% lock flush 3 milliLiter(s) IV Push every 6 hours    MEDICATIONS  (PRN):

## 2022-11-02 NOTE — DIETITIAN INITIAL EVALUATION ADULT - PROBLEM SELECTOR PROBLEM 4
Comment: Recommend CeraVe SA CREAM QD- BID
Render Risk Assessment In Note?: no
Detail Level: Simple
Diarrhea

## 2022-11-02 NOTE — PROCEDURE NOTE - NSPROCDETAILS_GEN_ALL_CORE
sterile technique, indwelling urinary device inserted
guidewire recovered/lumen(s) aspirated and flushed/sterile dressing applied/sterile technique, catheter placed/ultrasound guidance with use of sterile gel and probe cove
Seldinger technique/dressing applied/secured in place/thoracostomy tube placed percutaneously
location identified, draped/prepped, sterile technique used/blood seen on insertion/dressing applied/flushes easily/secured in place/sterile technique, catheter placed
location identified, draped/prepped, sterile technique used, needle inserted/introduced/positive blood return obtained via catheter/connected to a pressurized flush line/sutured in place/hemostasis with direct pressure, dressing applied/Seldinger technique/all materials/supplies accounted for at end of procedure

## 2022-11-02 NOTE — PROGRESS NOTE ADULT - ATTENDING COMMENTS
89 yr old man with  hyperlipidemia, diabetes, atrial fibrillation on Eliquis presents with generalized weakness x 2 days. Upon evaluation in the ED v/s: /92, , RR 20, SPO2: 97% on RA  (29 Oct 2022 04:15). Patient admitted initially to medicine for generalized weakness and diarrhea. Further evaluation including ct head and ct cervical spine was negative for acute pathologies.   Patient had RRT called around 10AM for tachycardia and hypotension. He was transferred to ICU w/ concerns of cardiogenic shock and tachyarrhythmia. Now down graded to Telemetry     Patient was seen and examined, [place]  services were utilized Mr. Dimas [id number]. Pt felt weak, c/o generalized weakness. Also c/o cough, denies other complaints.     Later this afternoon, RRT was called for unresponsiveness. He appeared pale/cyanotic, was unresponsiveness and had no pulse. Patient was noted with copious secretions around his mouth and food contents. He likely aspirated. Code blue was activated, ROSC achieved in 8 minutes, intubated during RRT and later transferred to ICU.     Further care as per ICU team. 89 yr old man with  hyperlipidemia, diabetes, atrial fibrillation on Eliquis presents with generalized weakness x 2 days. Upon evaluation in the ED v/s: /92, , RR 20, SPO2: 97% on RA  (29 Oct 2022 04:15). Patient admitted initially to medicine for generalized weakness and diarrhea. Further evaluation including ct head and ct cervical spine was negative for acute pathologies.   Patient had RRT called around 10AM on 10/29 for tachycardia and hypotension. He was transferred to ICU w/ concerns of cardiogenic shock and tachyarrhythmia. Now down graded to Telemetry     Patient was seen and examined, [employer]  services were utilized Mr. Dimas [id number]. Pt felt weak, c/o generalized weakness. Also c/o cough, denies other complaints.     Later this afternoon, RRT was called for unresponsiveness. He appeared pale/cyanotic, was unresponsiveness and had no pulse. Patient was noted with copious secretions around his mouth and food contents. He likely aspirated. Code blue was activated, ROSC achieved in 8 minutes, intubated during RRT and later transferred to ICU.     Further care as per ICU team.

## 2022-11-02 NOTE — PROGRESS NOTE ADULT - SUBJECTIVE AND OBJECTIVE BOX
Ernesto Bennett MD (Nephrology progress note)  [address], [facility],  [id number],  [facility]  TEl: [phone]  Cell: [phone]    Patient is a 89y Male seen and evaluated at bedside. Vital signs, laboratory data, imaging studies, consult notes reviewed done within past 24 hours. Overnight events noted. Patient with interval transfer to ICU after episode of code blue earlier now on ventilatory support. Interval stable renal function with non oliguria.     Allergies    No Known Allergies    Intolerances        ROS:  Limited  Sedated and intubated on ventilatory support    VITALS:    T(C): 36.7 (11-02-22 @ 11:19), Max: 37.1 (11-02-22 @ 07:45)  HR: 61 (11-02-22 @ 12:49) (61 - 71)  BP: 125/53 (11-02-22 @ 11:46) (110/47 - 137/73)  RR: 17 (11-02-22 @ 11:19) (17 - 23)  SpO2: 99% (11-02-22 @ 12:49) (94% - 100%)  CAPILLARY BLOOD GLUCOSE      POCT Blood Glucose.: 187 mg/dL (02 Nov 2022 12:29)  POCT Blood Glucose.: 137 mg/dL (02 Nov 2022 11:59)  POCT Blood Glucose.: 127 mg/dL (02 Nov 2022 07:57)  POCT Blood Glucose.: 190 mg/dL (01 Nov 2022 20:59)      11-01-22 @ 07:01  -  11-02-22 @ 07:00  --------------------------------------------------------  IN: 176 mL / OUT: 2755 mL / NET: -2579 mL    11-02-22 @ 07:01  -  11-02-22 @ 16:49  --------------------------------------------------------  IN: 0 mL / OUT: 1900 mL / NET: -1900 mL      MEDICATIONS  (STANDING):  atorvastatin 40 milliGRAM(s) Oral at bedtime  chlorhexidine 0.12% Liquid 15 milliLiter(s) Oral Mucosa every 12 hours  dexMEDEtomidine Infusion 0.5 MICROgram(s)/kG/Hr (8.23 mL/Hr) IV Continuous <Continuous>  digoxin     Tablet 125 MICROGram(s) Oral every other day  fentaNYL    Injectable 50 MICROGram(s) IV Push once  insulin lispro (ADMELOG) corrective regimen sliding scale   SubCutaneous Before meals and at bedtime  midazolam Injectable 2 milliGRAM(s) IV Push once  midazolam Injectable 4 milliGRAM(s) IV Push once  norepinephrine Infusion 0.15 MICROgram(s)/kG/Min (18.5 mL/Hr) IV Continuous <Continuous>  pantoprazole  Injectable 40 milliGRAM(s) IV Push daily  piperacillin/tazobactam IVPB. 3.375 Gram(s) IV Intermittent once  sodium chloride 0.9% lock flush 3 milliLiter(s) IV Push every 6 hours    MEDICATIONS  (PRN):  fentaNYL    Injectable 50 MICROGram(s) IV Push every 4 hours PRN Distress      PHYSICAL EXAM:  GENERAL: sedated and intubated on ventilatory support  HEENT: JOSE ARMANDO, EOMI, neck supple, no JVP  CHEST/LUNG: Bilateral decreased breath sounds, bibasilar rales and rhonchi, Right>left  HEART: Regular rate and rhythm, FREIDA II/VI at LPSB, no gallops, no rub   ABDOMEN: Soft, nontender, non distended, bowel sounds present  : No flank or supra pubic tenderness.  EXTREMITIES: Peripheral pulses are palpable, trace pedal edema  Neurology: AAOx3, no focal neurological deficit  SKIN: No rash or skin lesion  Musculoskeletal: No joint deformity    Vascular ACCESS: None    LABS:                        10.4   10.95 )-----------( 264      ( 02 Nov 2022 13:29 )             34.6     11-02    143  |  105  |  32<H>  ----------------------------<  225<H>  4.0   |  26  |  1.85<H>    Ca    9.2      02 Nov 2022 13:29  Phos  3.8     11-02  Mg     2.0     11-02    TPro  6.3  /  Alb  2.6<L>  /  TBili  0.6  /  DBili  x   /  AST  181<H>  /  ALT  144<H>  /  AlkPhos  473<H>  11-02      PT/INR - ( 02 Nov 2022 13:29 )   PT: 13.6 sec;   INR: 1.14 ratio         PTT - ( 02 Nov 2022 07:34 )  PTT:79.7 sec          RADIOLOGY & ADDITIONAL STUDIES:    Imaging Personally Reviewed:  [x] YES  [ ] NO    Consultant(s) Notes Reviewed:  [x] YES  [ ] NO    Care Discussed with Primary team/ Other Providers [x] YES  [ ] NO

## 2022-11-02 NOTE — PROGRESS NOTE ADULT - ASSESSMENT
89 yo M with pmhx of CAD-s/p CABG,s/p PPM, hyperlipidemia, diabetes,Parox atrial fibrillation on Eliquis,Renal cell Ca-s/p partial nephrectomy ,Hypothyroid who  presents with generalized weakness x 2 day, now in ICU with afib with RVR and hypotension with acute systolic HF..  1.Tele monitoring.  2.Acute systolic HF-IV lasix, LV dysfunction-segmental, will d/w family regarding ischemia eval.  3.Afib with RVR-eliquis changed to heparin drip by ICU.Pt with known PAF on eliquis as outpatient, will need antiarrythmic and AC.Will d/w his outpatient cardiologist.  4.CAD-b inc ,statin.  5.DM-Insulin.  6.PPI.  7.CRI-f/u lytes.  8.Hypothyroid-synthroid.  9.Shock liver, f/u LFT's.  10.Hemayturea- eval.    89 yo M with pmhx of CAD-s/p CABG,s/p PPM, hyperlipidemia, diabetes,Parox atrial fibrillation on Eliquis,Renal cell Ca-s/p partial nephrectomy ,Hypothyroid who  presents with generalized weakness x 2 day, now in ICU with afib with RVR and hypotension with acute systolic HF..  1.Tele monitoring.  2.Acute systolic HF-IV lasix, LV dysfunction-segmental, will d/w family regarding ischemia eval.  3.Afib with RVR-eliquis changed to heparin drip by ICU.Pt with known PAF on eliquis as outpatient, will need antiarrythmic and AC.Will d/w his outpatient cardiologist.  4.CAD-b inc ,statin.  5.DM-Insulin.  6.PPI.  7.CRI-f/u lytes.  8.Hypothyroid-synthroid.  9.Shock liver, f/u LFT's.  10.Hemayturea- eval noted.

## 2022-11-02 NOTE — AIRWAY PLACEMENT NOTE ADULT - AIRWAY COMMENTS:
Pt intubated without complication, Dr Adamson at bedside. Pt transported to ICU1 and placed on mechanical ventilation AC 16/400/100%/+5

## 2022-11-02 NOTE — PROCEDURE NOTE - NSSITEPREP_SKIN_A_CORE
chlorhexidine
povidone iodine (if allergic to chlorhexidine)
alcohol/chlorhexidine/Adherence to aseptic technique: hand hygiene prior to donning barriers (gown, gloves), don cap and mask, sterile drape over patient
alcohol/chlorhexidine/Adherence to aseptic technique: hand hygiene prior to donning barriers (gown, gloves), don cap and mask, sterile drape over patient
chlorhexidine/povidone iodine (if allergic to chlorhexidine)/Adherence to aseptic technique: hand hygiene prior to donning barriers (gown, gloves), don cap and mask, sterile drape over patient

## 2022-11-02 NOTE — DIETITIAN INITIAL EVALUATION ADULT - PERTINENT LABORATORY DATA
11-02    143  |  105  |  32<H>  ----------------------------<  225<H>  4.0   |  26  |  1.85<H>    Ca    9.2      02 Nov 2022 13:29  Phos  3.8     11-02  Mg     2.0     11-02    TPro  6.3  /  Alb  2.6<L>  /  TBili  0.6  /  DBili  x   /  AST  181<H>  /  ALT  144<H>  /  AlkPhos  473<H>  11-02  POCT Blood Glucose.: 187 mg/dL (11-02-22 @ 12:29)  A1C with Estimated Average Glucose Result: 6.8 % (10-30-22 @ 05:13)

## 2022-11-02 NOTE — PROGRESS NOTE ADULT - ASSESSMENT
89 yo M with pmhx of hyperlipidemia, diabetes, ?atrial fibrillation on Eliquis, PPM presents with generalized weakness x 2 days, admitted to medicine for evaluation, while in ED holding had hyptoension and worsening mental status, found to have cardiogenic shock, atrial tachyarrhythmia , and PPM-driven tachycardia. Urology consulted due to gross hematuria that is new onset. H/H stable. Urine cleared up very quickly after 4-5 flushes with sterile water via 60cc syringe.  - Continue to monitor for gross hematuria (color now improved and stable)  - Continue anticoagulation  - Monitor H&H - stable today  - Please flush garza PRN hematuria - no difficulty flushing until urine clears  - Patient can follow up with Dr. Boles outpatient for gross hematuria workup including outpatient cystoscopy and CT    Urology

## 2022-11-03 LAB
ALBUMIN FLD-MCNC: 1.2 G/DL — SIGNIFICANT CHANGE UP
ALBUMIN SERPL ELPH-MCNC: 1.3 G/DL — LOW (ref 3.5–5)
ALBUMIN SERPL ELPH-MCNC: 2.5 G/DL — LOW (ref 3.5–5)
ALP SERPL-CCNC: 203 U/L — HIGH (ref 40–120)
ALP SERPL-CCNC: 374 U/L — HIGH (ref 40–120)
ALT FLD-CCNC: 57 U/L DA — SIGNIFICANT CHANGE UP (ref 10–60)
ALT FLD-CCNC: 99 U/L DA — HIGH (ref 10–60)
ANION GAP SERPL CALC-SCNC: 6 MMOL/L — SIGNIFICANT CHANGE UP (ref 5–17)
ANION GAP SERPL CALC-SCNC: 7 MMOL/L — SIGNIFICANT CHANGE UP (ref 5–17)
APTT BLD: 65.1 SEC — HIGH (ref 27.5–35.5)
AST SERPL-CCNC: 47 U/L — HIGH (ref 10–40)
AST SERPL-CCNC: 73 U/L — HIGH (ref 10–40)
BASE EXCESS BLDA CALC-SCNC: 8.5 MMOL/L — HIGH (ref -2–3)
BILIRUB SERPL-MCNC: 0.5 MG/DL — SIGNIFICANT CHANGE UP (ref 0.2–1.2)
BILIRUB SERPL-MCNC: 0.8 MG/DL — SIGNIFICANT CHANGE UP (ref 0.2–1.2)
BLD GP AB SCN SERPL QL: SIGNIFICANT CHANGE UP
BLOOD GAS COMMENTS ARTERIAL: SIGNIFICANT CHANGE UP
BUN SERPL-MCNC: 21 MG/DL — HIGH (ref 7–18)
BUN SERPL-MCNC: 34 MG/DL — HIGH (ref 7–18)
CALCIUM SERPL-MCNC: 5.3 MG/DL — CRITICAL LOW (ref 8.4–10.5)
CALCIUM SERPL-MCNC: 9.5 MG/DL — SIGNIFICANT CHANGE UP (ref 8.4–10.5)
CHLORIDE SERPL-SCNC: 105 MMOL/L — SIGNIFICANT CHANGE UP (ref 96–108)
CHLORIDE SERPL-SCNC: 125 MMOL/L — HIGH (ref 96–108)
CHOLEST FLD-MCNC: 35 MG/DL — SIGNIFICANT CHANGE UP
CK MB BLD-MCNC: 2.2 % — SIGNIFICANT CHANGE UP (ref 0–3.5)
CK MB BLD-MCNC: 2.9 % — SIGNIFICANT CHANGE UP (ref 0–3.5)
CK MB CFR SERPL CALC: 1.5 NG/ML — SIGNIFICANT CHANGE UP (ref 0–3.6)
CK MB CFR SERPL CALC: 1.9 NG/ML — SIGNIFICANT CHANGE UP (ref 0–3.6)
CK SERPL-CCNC: 65 U/L — SIGNIFICANT CHANGE UP (ref 35–232)
CK SERPL-CCNC: 69 U/L — SIGNIFICANT CHANGE UP (ref 35–232)
CO2 SERPL-SCNC: 18 MMOL/L — LOW (ref 22–31)
CO2 SERPL-SCNC: 30 MMOL/L — SIGNIFICANT CHANGE UP (ref 22–31)
CREAT SERPL-MCNC: 0.75 MG/DL — SIGNIFICANT CHANGE UP (ref 0.5–1.3)
CREAT SERPL-MCNC: 1.65 MG/DL — HIGH (ref 0.5–1.3)
CULTURE RESULTS: SIGNIFICANT CHANGE UP
EGFR: 38 ML/MIN/1.73M2 — LOW
EGFR: 84 ML/MIN/1.73M2 — SIGNIFICANT CHANGE UP
GLUCOSE FLD-MCNC: 212 MG/DL — SIGNIFICANT CHANGE UP
GLUCOSE SERPL-MCNC: 119 MG/DL — HIGH (ref 70–99)
GLUCOSE SERPL-MCNC: 177 MG/DL — HIGH (ref 70–99)
GRAM STN FLD: SIGNIFICANT CHANGE UP
HCO3 BLDA-SCNC: 32 MMOL/L — HIGH (ref 21–28)
HCT VFR BLD CALC: 20.3 % — CRITICAL LOW (ref 39–50)
HCT VFR BLD CALC: 34.9 % — LOW (ref 39–50)
HGB BLD-MCNC: 10.6 G/DL — LOW (ref 13–17)
HGB BLD-MCNC: 5.8 G/DL — CRITICAL LOW (ref 13–17)
HOROWITZ INDEX BLDA+IHG-RTO: 40 — SIGNIFICANT CHANGE UP
LDH SERPL L TO P-CCNC: 233 U/L — HIGH (ref 120–225)
LDH SERPL L TO P-CCNC: 99 U/L — SIGNIFICANT CHANGE UP
MAGNESIUM SERPL-MCNC: 1 MG/DL — CRITICAL LOW (ref 1.6–2.6)
MAGNESIUM SERPL-MCNC: 1.9 MG/DL — SIGNIFICANT CHANGE UP (ref 1.6–2.6)
MCHC RBC-ENTMCNC: 28 PG — SIGNIFICANT CHANGE UP (ref 27–34)
MCHC RBC-ENTMCNC: 28.1 PG — SIGNIFICANT CHANGE UP (ref 27–34)
MCHC RBC-ENTMCNC: 28.6 GM/DL — LOW (ref 32–36)
MCHC RBC-ENTMCNC: 30.4 GM/DL — LOW (ref 32–36)
MCV RBC AUTO: 92.6 FL — SIGNIFICANT CHANGE UP (ref 80–100)
MCV RBC AUTO: 98.1 FL — SIGNIFICANT CHANGE UP (ref 80–100)
NRBC # BLD: 0 /100 WBCS — SIGNIFICANT CHANGE UP (ref 0–0)
NRBC # BLD: 0 /100 WBCS — SIGNIFICANT CHANGE UP (ref 0–0)
PCO2 BLDA: 38 MMHG — SIGNIFICANT CHANGE UP (ref 35–48)
PH BLDA: 7.53 — HIGH (ref 7.35–7.45)
PH FLD: 7.3 — SIGNIFICANT CHANGE UP
PHOSPHATE SERPL-MCNC: 1.4 MG/DL — LOW (ref 2.5–4.5)
PHOSPHATE SERPL-MCNC: 2.3 MG/DL — LOW (ref 2.5–4.5)
PLATELET # BLD AUTO: 112 K/UL — LOW (ref 150–400)
PLATELET # BLD AUTO: 221 K/UL — SIGNIFICANT CHANGE UP (ref 150–400)
PO2 BLDA: 76 MMHG — LOW (ref 83–108)
POTASSIUM SERPL-MCNC: 2.8 MMOL/L — CRITICAL LOW (ref 3.5–5.3)
POTASSIUM SERPL-MCNC: 3.6 MMOL/L — SIGNIFICANT CHANGE UP (ref 3.5–5.3)
POTASSIUM SERPL-SCNC: 2.8 MMOL/L — CRITICAL LOW (ref 3.5–5.3)
POTASSIUM SERPL-SCNC: 3.6 MMOL/L — SIGNIFICANT CHANGE UP (ref 3.5–5.3)
PROT FLD-MCNC: 2.2 G/DL — SIGNIFICANT CHANGE UP
PROT SERPL-MCNC: 3.3 G/DL — LOW (ref 6–8.3)
PROT SERPL-MCNC: 5.9 G/DL — LOW (ref 6–8.3)
RBC # BLD: 2.07 M/UL — LOW (ref 4.2–5.8)
RBC # BLD: 3.77 M/UL — LOW (ref 4.2–5.8)
RBC # FLD: 17.3 % — HIGH (ref 10.3–14.5)
RBC # FLD: 17.3 % — HIGH (ref 10.3–14.5)
SAO2 % BLDA: 97 % — SIGNIFICANT CHANGE UP
SODIUM SERPL-SCNC: 141 MMOL/L — SIGNIFICANT CHANGE UP (ref 135–145)
SODIUM SERPL-SCNC: 150 MMOL/L — HIGH (ref 135–145)
SPECIMEN SOURCE: SIGNIFICANT CHANGE UP
TROPONIN I, HIGH SENSITIVITY RESULT: 1485.6 NG/L — HIGH
TROPONIN I, HIGH SENSITIVITY RESULT: 847.6 NG/L — HIGH
WBC # BLD: 4.66 K/UL — SIGNIFICANT CHANGE UP (ref 3.8–10.5)
WBC # BLD: 6.84 K/UL — SIGNIFICANT CHANGE UP (ref 3.8–10.5)
WBC # FLD AUTO: 4.66 K/UL — SIGNIFICANT CHANGE UP (ref 3.8–10.5)
WBC # FLD AUTO: 6.84 K/UL — SIGNIFICANT CHANGE UP (ref 3.8–10.5)

## 2022-11-03 PROCEDURE — 71045 X-RAY EXAM CHEST 1 VIEW: CPT | Mod: 26

## 2022-11-03 RX ORDER — HYDROMORPHONE HYDROCHLORIDE 2 MG/ML
0.5 INJECTION INTRAMUSCULAR; INTRAVENOUS; SUBCUTANEOUS ONCE
Refills: 0 | Status: DISCONTINUED | OUTPATIENT
Start: 2022-11-03 | End: 2022-11-03

## 2022-11-03 RX ORDER — NOREPINEPHRINE BITARTRATE/D5W 8 MG/250ML
0.15 PLASTIC BAG, INJECTION (ML) INTRAVENOUS
Qty: 8 | Refills: 0 | Status: DISCONTINUED | OUTPATIENT
Start: 2022-11-03 | End: 2022-11-05

## 2022-11-03 RX ORDER — CHLORHEXIDINE GLUCONATE 213 G/1000ML
1 SOLUTION TOPICAL
Refills: 0 | Status: DISCONTINUED | OUTPATIENT
Start: 2022-11-03 | End: 2022-11-07

## 2022-11-03 RX ORDER — HEPARIN SODIUM 5000 [USP'U]/ML
5000 INJECTION INTRAVENOUS; SUBCUTANEOUS EVERY 8 HOURS
Refills: 0 | Status: DISCONTINUED | OUTPATIENT
Start: 2022-11-03 | End: 2022-11-04

## 2022-11-03 RX ORDER — FUROSEMIDE 40 MG
40 TABLET ORAL ONCE
Refills: 0 | Status: COMPLETED | OUTPATIENT
Start: 2022-11-03 | End: 2022-11-03

## 2022-11-03 RX ADMIN — PIPERACILLIN AND TAZOBACTAM 25 GRAM(S): 4; .5 INJECTION, POWDER, LYOPHILIZED, FOR SOLUTION INTRAVENOUS at 05:53

## 2022-11-03 RX ADMIN — HYDROMORPHONE HYDROCHLORIDE 0.5 MILLIGRAM(S): 2 INJECTION INTRAMUSCULAR; INTRAVENOUS; SUBCUTANEOUS at 20:50

## 2022-11-03 RX ADMIN — SODIUM CHLORIDE 3 MILLILITER(S): 9 INJECTION INTRAMUSCULAR; INTRAVENOUS; SUBCUTANEOUS at 16:00

## 2022-11-03 RX ADMIN — HYDROMORPHONE HYDROCHLORIDE 0.5 MILLIGRAM(S): 2 INJECTION INTRAMUSCULAR; INTRAVENOUS; SUBCUTANEOUS at 20:35

## 2022-11-03 RX ADMIN — DEXMEDETOMIDINE HYDROCHLORIDE IN 0.9% SODIUM CHLORIDE 8.23 MICROGRAM(S)/KG/HR: 4 INJECTION INTRAVENOUS at 02:40

## 2022-11-03 RX ADMIN — Medication 40 MILLIGRAM(S): at 14:24

## 2022-11-03 RX ADMIN — CHLORHEXIDINE GLUCONATE 15 MILLILITER(S): 213 SOLUTION TOPICAL at 05:53

## 2022-11-03 RX ADMIN — PIPERACILLIN AND TAZOBACTAM 25 GRAM(S): 4; .5 INJECTION, POWDER, LYOPHILIZED, FOR SOLUTION INTRAVENOUS at 18:57

## 2022-11-03 RX ADMIN — PANTOPRAZOLE SODIUM 40 MILLIGRAM(S): 20 TABLET, DELAYED RELEASE ORAL at 14:19

## 2022-11-03 RX ADMIN — Medication 1: at 06:48

## 2022-11-03 RX ADMIN — SODIUM CHLORIDE 3 MILLILITER(S): 9 INJECTION INTRAMUSCULAR; INTRAVENOUS; SUBCUTANEOUS at 23:53

## 2022-11-03 RX ADMIN — HEPARIN SODIUM 5000 UNIT(S): 5000 INJECTION INTRAVENOUS; SUBCUTANEOUS at 14:25

## 2022-11-03 RX ADMIN — HEPARIN SODIUM 5000 UNIT(S): 5000 INJECTION INTRAVENOUS; SUBCUTANEOUS at 23:31

## 2022-11-03 RX ADMIN — SODIUM CHLORIDE 3 MILLILITER(S): 9 INJECTION INTRAMUSCULAR; INTRAVENOUS; SUBCUTANEOUS at 18:36

## 2022-11-03 RX ADMIN — DEXMEDETOMIDINE HYDROCHLORIDE IN 0.9% SODIUM CHLORIDE 8.23 MICROGRAM(S)/KG/HR: 4 INJECTION INTRAVENOUS at 06:24

## 2022-11-03 RX ADMIN — CHLORHEXIDINE GLUCONATE 1 APPLICATION(S): 213 SOLUTION TOPICAL at 14:20

## 2022-11-03 RX ADMIN — Medication 56 MICROGRAM(S): at 23:31

## 2022-11-03 RX ADMIN — SODIUM CHLORIDE 3 MILLILITER(S): 9 INJECTION INTRAMUSCULAR; INTRAVENOUS; SUBCUTANEOUS at 06:40

## 2022-11-03 NOTE — PROGRESS NOTE ADULT - SUBJECTIVE AND OBJECTIVE BOX
CHIEF COMPLAINT:Patient is a 89y old  Male who presents with a chief complaint of Fall .Pt s/p PEA arrest due to suspected aspiration. Pt s/p intubation and CT placement.    	  REVIEW OF SYSTEMS:    [x ] Unable to obtain    PHYSICAL EXAM:  T(C): 36.2 (11-03-22 @ 08:00), Max: 36.2 (11-02-22 @ 23:48)  HR: 70 (11-03-22 @ 11:19) (61 - 80)  BP: 142/70 (11-03-22 @ 10:15) (67/44 - 164/85)  RR: 19 (11-03-22 @ 10:15) (0 - 25)  SpO2: 98% (11-03-22 @ 11:19) (96% - 100%)  Wt(kg): --  I&O's Summary    02 Nov 2022 07:01  -  03 Nov 2022 07:00  --------------------------------------------------------  IN: 492.5 mL / OUT: 3700 mL / NET: -3207.5 mL        Appearance: Normal	  HEENT:   Normal oral mucosa, PERRL, EOMI	  Lymphatic: No lymphadenopathy  Cardiovascular: Normal S1 S2, No JVD, No murmurs, No edema  Respiratory: B/L ronchi  Gastrointestinal:  Soft, Non-tender, + BS	  Skin: No rashes, No ecchymoses, No cyanosis	  Extremities: Normal range of motion, No clubbing, cyanosis or edema  Vascular: Peripheral pulses palpable 2+ bilaterally    MEDICATIONS  (STANDING):  atorvastatin 40 milliGRAM(s) Oral at bedtime  chlorhexidine 0.12% Liquid 15 milliLiter(s) Oral Mucosa every 12 hours  chlorhexidine 2% Cloths 1 Application(s) Topical <User Schedule>  dexMEDEtomidine Infusion 0.5 MICROgram(s)/kG/Hr (8.23 mL/Hr) IV Continuous <Continuous>  digoxin     Tablet 125 MICROGram(s) Oral every other day  heparin   Injectable 5000 Unit(s) SubCutaneous every 8 hours  insulin lispro (ADMELOG) corrective regimen sliding scale   SubCutaneous every 6 hours  levothyroxine Injectable 56 MICROGram(s) IV Push at bedtime  norepinephrine Infusion 0.15 MICROgram(s)/kG/Min (18.5 mL/Hr) IV Continuous <Continuous>  pantoprazole  Injectable 40 milliGRAM(s) IV Push daily  piperacillin/tazobactam IVPB.. 3.375 Gram(s) IV Intermittent every 12 hours  sodium chloride 0.9% lock flush 3 milliLiter(s) IV Push every 6 hours        LABS:	 	    CARDIAC MARKERS:  CARDIAC MARKERS ( 03 Nov 2022 05:30 )  x     / x     / 69 U/L / x     / 1.5 ng/mL  CARDIAC MARKERS ( 03 Nov 2022 04:00 )  x     / x     / 65 U/L / x     / 1.9 ng/mL  CARDIAC MARKERS ( 02 Nov 2022 21:11 )  x     / x     / 94 U/L / x     / 3.2 ng/mL  CARDIAC MARKERS ( 02 Nov 2022 13:29 )  x     / x     / 75 U/L / x     / 1.4 ng/mL      Troponin I, High Sensitivity Result: 1485.6 ng/L (11-03 @ 05:30)  Troponin I, High Sensitivity Result: 847.6 ng/L (11-03 @ 04:00)  Troponin I, High Sensitivity Result: 1644.4 ng/L (11-02 @ 21:11)  Troponin I, High Sensitivity Result: 1406.8 ng/L (11-02 @ 13:29)                            10.6   6.84  )-----------( 221      ( 03 Nov 2022 05:30 )             34.9     11-03    141  |  105  |  34<H>  ----------------------------<  177<H>  3.6   |  30  |  1.65<H>    Ca    9.5      03 Nov 2022 05:30  Phos  2.3     11-03  Mg     1.9     11-03    TPro  5.9<L>  /  Alb  2.5<L>  /  TBili  0.8  /  DBili  x   /  AST  73<H>  /  ALT  99<H>  /  AlkPhos  374<H>  11-03    proBNP: Serum Pro-Brain Natriuretic Peptide: 20412 pg/mL (10-29 @ 12:55)  Serum Pro-Brain Natriuretic Peptide: 93639 pg/mL (10-28 @ 19:36)      TSH: Thyroid Stimulating Hormone, Serum: 6.68 uU/mL (10-29 @ 10:41)      	  < from: Xray Chest 1 View-PORTABLE IMMEDIATE (Xray Chest 1 View-PORTABLE IMMEDIATE .) (11.03.22 @ 04:44) >  ACC: [id number] EXAM:  XR CHEST PORTABLE IMMED 1V                          PROCEDURE DATE:  11/03/2022          INTERPRETATION:  AP chest on November 3, 2022 at 4:30 AM. Patient is   short of breath.    Heart magnified by technique. Sternotomy and left-sided pacemaker again   noted. Catheter right chest tube remains. Endotracheal tube remains.    There is rather mild right base effusion showing improvement from   November 2.    No pneumothorax.    IMPRESSION: Mild right base effusion is somewhat improved.      < end of copied text >  < from: CT Chest No Cont (11.02.22 @ 16:39) >  ACC: [id number] EXAM:  CT CHEST                          PROCEDURE DATE:  11/02/2022          INTERPRETATION:  Clinical indication: Evaluate for hemothorax, altered   mental status.    Axial CT images of the chest are obtained without intravenous   administration of contrast.    No prior chest CTs are available for comparison.    ET tube has its tip above the lia. Left anterior chest wall cardiac   device has leads terminating within the right atrium and right ventricle.    No enlarged axillary lymph nodes. Small subcentimeter mediastinal lymph   nodes. Heart size is enlarged. No pericardial effusion. Status post CABG.   Arterial calcifications with involvement of the aorta. Aortic valve   calcifications.    Evaluation of the upper abdomendemonstrate trace perihepatic ascites.   Left adrenal gland thickening. Subcutaneous edema.    Small left partially loculated left pleural effusion. Moderate-sized   simple appearing right pleural effusion.    Evaluation of the lung parenchyma demonstrate consolidation completely   opacifying the right lower lobe with some internal air bronchograms   representing a combination of atelectasis and pneumonia. Consolidation   within the dependent portion of the left lower lobe also representing a   combination of atelectasis and pneumonia.    Multiple patchy and nodular opacities scattered within the right upper   lobe and to a lesser extent within the left upper lobe associated with   tree-in-bud opacities. No central endobronchial lesions.    Degenerative changes of the spine. Spinal scoliosis. Sternotomy wires.    IMPRESSION: Moderate right and small left pleural effusions.    Consolidation completely opacifying the right lower lobe representing a   combination of atelectasis and pneumonia.    Multiple other bilateral patchy and nodular opacities also are likely due   to multifocal pneumonia with endobronchial spread.    < end of copied text >

## 2022-11-03 NOTE — PROGRESS NOTE ADULT - ASSESSMENT
Assessment: 89 yo M with pmhx of hyperlipidemia, diabetes, ?atrial fibrillation on Eliquis, PPM presents with generalized weakness x 2 days. Found to be in cardiogenic shock, atrial tachyarrhythmia , and PPM-driven tachycardia. s/p pacemaker interrogation. DG to medicine 11/1. CODE BLUE 11/2 after aspiration event.    #Cardiogenic shock  #Atrial tachyarrhythmia   #PPM mode adjustment   #AHRF 2/2 to new onset CHF vs fluid overload   #HLD  #Diabetes   #LIGIA   #Hypothyroid  #Urinary retention     PLAN:    -----------------CNS:------------------  Decerebrate posturing c/f intracerebral hemorrhage  Heparin drip held PRIOR to CODE BLUE compressions  - f/u CT head non-contrast    -----------------CVS:------------------  # s/o Cardiac arrest  # Cardiogenic shock  # Elevated troponin  down time ~8 min, s/p 2x Epi  - re-check tele pre - code to ensure PPM not malfunctioning  - started on peripheral levo post resuscitation  - trend trop  - HOLD heparin drip 2/2 bleeding concerns  Cardiology Dr. Dominguez  TTE 10/30 : EF=40-45%.    #Atrial tachyarrhythmia   #PPM-driven tachycardia.  On Eliquis and Lopressor at home, however family says no history of AFib?  Presented with AFib/Flutter/Tach with RVR to 140s.   Received Amio and Digoxin without improvement.   PPM interrogated, was sensing atrial rate, mode switched by EP to DDIR with resolution.   Discontinued rate/rhythm control agents.   EP Dr. Ly following.   - HOLDING Coreg, Lasix in setting of hypotension  - Heparin infusion for AC HELD    -----------------RESPIRATORY:--------  #Right lung infiltrate, c/f Aspiration  - started zosyn  - f/u sputum culture  - f/u blood cultures    #Right lung effusion  appears w/ + plankton sign on bedside POCUS  - f/u CT chest non con  - on Lasix 40 QD HOLDING    -----------------GI:----------------------  #Diarrhea  - resolved  #Nutrition  NPO  consider SUMP tube for decompression, held after 2 attempts with coiling in mouth    -----------------ID:-----------------------  #suspect aspiration w/ risk to develop PNA  - started zosyn 11/2, q 12 hrs    -----------------RENAL: ---------------  Oliguric Pre-Renal Acute Renal Failure  Cr was around 1.5 when out of ICU, now 1.85 post resuscitation   - avoid nephrotoxic agents   - improve renal perfusion    #Hematuria:  urology following  hb is stable  CBC q 12  HOLDING heparin drip    -----------------HEME/ONC: ---------------  Anemia  - hgb 9.6 > 10.4  - transfuse if hgb<7  - monitor cbc     -----------------ENDO: ---------------  Hypothyroidism  - TSH noted to be 6.6  - continue with levothyroxine as sc inj    Diabetes  - SSI     -----------------EXTREMITIES:-------  No acute issues     -----------------PROPHYLAXIS: -------  DVT: Heparin drip HOLDING  SCD  PPI    -----------------DISPOSITION--------  Transfer ICU    Assessment: 89 yo M with pmhx of hyperlipidemia, diabetes, ?atrial fibrillation on Eliquis, PPM presents with generalized weakness x 2 days. Found to be in cardiogenic shock, atrial tachyarrhythmia , and PPM-driven tachycardia resolved s/p pacemaker interrogation and re-programming. DG to Medicine 11/1. CODE BLUE 11/2 after suspected aspiration event.    #Cardiogenic shock  #Atrial tachyarrhythmia   #PPM mode adjustment   #AHRF 2/2 to new onset CHF vs fluid overload   #HLD  #Diabetes   #LIGIA   #Hypothyroid  #Urinary retention     PLAN:    -----------------CNS:------------------  Decerebrate posturing c/f intracerebral hemorrhage  Heparin drip held PRIOR to CODE BLUE compressions  - CT head non-contrast NEG, posturing likely to hypercapnia/anoxia during arrest    -----------------CVS:------------------  # s/o Cardiac arrest  # Cardiogenic shock  #NSTEMI  down time ~8 min, s/p 2x Epi  - re-check tele pre - code to ensure PPM not malfunctioning  - peripheral levo titrated down, patient now on minimal dose 0.01mcg  - troponin peaked at 1644, now > 847 > 1485  - HOLD heparin drip 2/2 bleeding concerns  Cardiology Dr. Dominguez  TTE 10/30 : EF=40-45%.    #Atrial tachyarrhythmia   #PPM-driven tachycardia.  On Eliquis and Lopressor at home, however family says no history of AFib?  Presented with AFib/Flutter/Tach with RVR to 140s.   Received Amio and Digoxin without improvement.   PPM interrogated, was sensing atrial rate, mode switched by EP to DDIR with resolution.   Discontinued rate/rhythm control agents.   EP Dr. Ly following.   - HOLDING Coreg, Lasix in setting of hypotension  - Heparin infusion for AC HELD    -----------------RESPIRATORY:--------  #Right lung infiltrate, c/f Aspiration  - started zosyn  - f/u sputum culture  - f/u blood cultures    #Right lung effusion  appears w/ + plankton sign on bedside POCUS  - f/u CT chest non con  - on Lasix 40 QD HOLDING    -----------------GI:----------------------  #Diarrhea  - resolved  #Nutrition  NPO  consider SUMP tube for decompression, held after 2 attempts with coiling in mouth    -----------------ID:-----------------------  #suspect aspiration w/ risk to develop PNA  - started zosyn 11/2, q 12 hrs    -----------------RENAL: ---------------  Oliguric Pre-Renal Acute Renal Failure  Cr was around 1.5 when out of ICU, now 1.85 post resuscitation   - avoid nephrotoxic agents   - improve renal perfusion    #Hematuria:  urology following  hb is stable  CBC q 12  HOLDING heparin drip    -----------------HEME/ONC: ---------------  Anemia  - hgb 9.6 > 10.4  - transfuse if hgb<7  - monitor cbc     -----------------ENDO: ---------------  Hypothyroidism  - TSH noted to be 6.6  - continue with levothyroxine as sc inj    Diabetes  - SSI     -----------------EXTREMITIES:-------  No acute issues     -----------------PROPHYLAXIS: -------  DVT: Heparin drip HOLDING  SCD  PPI    -----------------DISPOSITION--------  Transfer ICU    Assessment: 89 yo M with pmhx of hyperlipidemia, diabetes, ?atrial fibrillation on Eliquis, PPM presents with generalized weakness x 2 days. Found to be in cardiogenic shock, atrial tachyarrhythmia , and PPM-driven tachycardia resolved s/p pacemaker interrogation and re-programming. DG to Medicine 11/1. CODE BLUE 11/2 after suspected aspiration event.    #Cardiogenic shock  #Atrial tachyarrhythmia   #PPM mode adjustment   #AHRF 2/2 to new onset CHF vs fluid overload   #HLD  #Diabetes   #LIGIA   #Hypothyroid  #Urinary retention     PLAN:    -----------------CNS:------------------  Decerebrate posturing c/f intracerebral hemorrhage  Heparin drip held PRIOR to CODE BLUE compressions  - CT head non-contrast NEG, posturing likely to hypercapnia/anoxia during arrest    -----------------CVS:------------------  # s/o Cardiac arrest  # Cardiogenic shock  #NSTEMI  down time ~8 min, s/p 2x Epi  - re-check tele pre - code to ensure PPM not malfunctioning  - peripheral levo titrated down, patient now on minimal dose 0.01mcg  - troponin peaked at 1644, now > 847 > 1485  - HOLD heparin drip 2/2 bleeding concerns status post compressions  Cardiology Dr. Dominguez  TTE 10/30 : EF=40-45%.    #Atrial tachyarrhythmia   #PPM-driven tachycardia.  On Eliquis and Lopressor at home, however family says no history of AFib?  Presented with AFib/Flutter/Tach with RVR to 140s.   Received Amio and Digoxin without improvement.   PPM interrogated, was sensing atrial rate, mode switched by EP to DDIR with resolution.   Discontinued rate/rhythm control agents.   EP Dr. Ly following.   - HOLDING Coreg, Lasix in setting of hypotension  - Heparin infusion for AC HELD      -----------------RESPIRATORY:--------  #Right lung infiltrate, c/f Aspiration  - started zosyn  - sputum culture sent, result pending  successfully extubated 11/3/2022      #Right lung effusion  appears w/ + plankton sign on bedside POCUS  -s/p 6th ICS Right chest  -non-con CT chest   -Lasix 40 QD HELD for hypotension    -----------------GI:----------------------  #Diarrhea  - resolved    #Nutrition  -due to concern for prior aspiration event NPO pending Speech and Swallow eval        -----------------ID:-----------------------  #suspect aspiration w/ risk to develop PNA  - started zosyn 11/2, q 12 hrs  -approval by Dr Leblanc    -----------------RENAL: ---------------  Oliguric Pre-Renal Acute Renal Failure  Cr was around 1.5 when out of ICU, now 1.85 post resuscitation   - avoid nephrotoxic agents  -levophed for BP support  -diuretics and BP meds held  -trend BUN/Cr    #Hematuria:  urology following  hb is stable  CBC q 12  HOLDING heparin drip    -----------------HEME/ONC: ---------------  Anemia  - hgb 9.6 > 10.4  - transfuse if hgb<7  - monitor cbc     -----------------ENDO: ---------------  Hypothyroidism  - TSH noted to be 6.6  - continue with levothyroxine     Diabetes  - SSI     -----------------EXTREMITIES:-------  No acute issues     -----------------PROPHYLAXIS: -------  DVT: Heparin drip HOLDING;   SCDs  PPI: pantoprazole 40mg IVP BID    -----------------DISPOSITION--------  ICU

## 2022-11-03 NOTE — PROGRESS NOTE ADULT - ASSESSMENT
89 yo M with pmhx of CAD-s/p CABG,s/p PPM, hyperlipidemia, diabetes,Parox atrial fibrillation on Eliquis,Renal cell Ca-s/p partial nephrectomy ,Hypothyroid who  presents with generalized weakness x 2 day, now in ICU with afib with RVR and hypotension with acute systolic HF, s/p PEA arrest due to aspiration pneumonia,Type II MI due to demand ischemia,acute anemia..  1.Vent and pressor support as per ICU.  2.Aspiration pneumonia abx.  3.PAF, off AC due to acute anemia.Dig qod.  4.DM-Insulin.  5.PPI.  6.AKII-f/u lytes.Renal f/u.  7.Hypothyroid-synthroid.  8.Type II MI due to demand ischemia.  9.GI and DVT prophylaxis.

## 2022-11-03 NOTE — PROGRESS NOTE ADULT - CRITICAL CARE ATTENDING COMMENT
IMP: This is a  89 yr old man with  hyperlipidemia, diabetes, atrial fibrillation on Eliquis presents with generalized weakness x 2 days. Upon evaluation in the ED v/s: /92, , RR 20, SPO2: 97% on RA  (29 Oct 2022 04:15). Patient admitted initially to medicine for generalized weakness and diarrhea. Further evaluation including ct head and ct cervical spine was negative for acute pathologies.   Patient had RRT called around 10AM for tachycardia and hypotension. Patient noted to be lethargic and vitals at the time of the rapid was afebrile, BP of 130/61 and repeat BP showing reads of 50/40s. Patient noted to have heart rates in 140's. Patient placed on 15L NRBM for severe hypoxic resp failure ,peripheral phenylephrine was started and metoprolol 2.5mg IV was given for heart rate. STAT EKG and labs including cardiac enzymes, coags and chemistries were sent. ICU was consulted and bedside POCUS done showing decreased left ventricular function, dilated IVC and Right atrium. Patient admitted to ICU for  cardiogenic shock     11/2: Cardiac arrest called on the floor. As per notes patient was noted to have vomited and aspirated prior to arrest. ROSC achieve in about 8 minutes. Paced rhythm on initial event. Patient intubated and transferred to ICU.    Assessment:  1. Cardiac arrest  2. Shock   3. Lactic acidosis  4. Atrial fibrillation with RVR  5. Acute respiratory failure  6. Aspiration pneumonia   7. Right pleural effusion   8. HLD   9. Diabetes Mellitus   10. LIGIA   11. Hypothyroidism  12. Hematuria     Plan   - Awakening trial, monitor neurologic status  - Vasopressor support to maintain map > 65   - CT head with out acute bleeding   - POCUS showing complex right pleural effusion, appears transudative  - Monitor chest tube output, does not appear bloody  - Pain control  - Hold all antihypertensives given shock   - Mechanical ventilation  - SBT today  - Cardiology followup   - Obtain sputum culture  - broad spectrum antibiotics  - EP cards eval noted , PPM mode reset and tachycardia resolved . BP improved . anticoagulation and my need ADRIAN as per EP   - Hemodynamic support   - Place NGT and start tube feedings  - Hematuria noted, likely due to traumatic garza insertion  - Hematuria improving  - Hold heparin for now  - Trend cbc   - Urology follow up   - Cont Garza   - Monitor I/O , keep fluid neg   - DVT and stress ulcer prophylaxis  - Prognosis is guarded at this time

## 2022-11-03 NOTE — PROGRESS NOTE ADULT - ASSESSMENT
89 yo M with pmhx of hyperlipidemia, diabetes, ?atrial fibrillation on Eliquis, PPM presents with generalized weakness x 2 days, admitted to medicine for evaluation, while in ED holding had hyptoension and worsening mental status, found to have cardiogenic shock, atrial tachyarrhythmia , and PPM-driven tachycardia. Urology consulted due to gross hematuria that is new onset. H/H stable. Urine cleared up very quickly after 4-5 flushes with sterile water via 60cc syringe.  - Continue to monitor for gross hematuria (color now improved and stable)  - Urine color now clear yellow  - Continue anticoagulation  - Monitor H&H - stable today  - Please flush garza PRN hematuria - no difficulty flushing until urine clears  - Patient can follow up with Dr. Boles outpatient for gross hematuria workup including outpatient cystoscopy and CT    Urology

## 2022-11-03 NOTE — PROGRESS NOTE ADULT - SUBJECTIVE AND OBJECTIVE BOX
Ernesto Bennett MD (Nephrology progress note)  [address], [facility],  [id number],  [id number]  TEl: [phone]  Cell: [phone]    Patient is a 89y Male seen and evaluated at bedside. Vital signs, laboratory data, imaging studies, consult notes reviewed done within past 24 hours. Overnight events noted. Patient remains critically ill on ventilatory support and IV pressors today morning in ICU. Interval right chest tube drain for pleural effusion. Renal function stable with S cr 1.6 with non oliguria.     Allergies    No Known Allergies    Intolerances        ROS:  Sedated and intubated on ventilatory support  Unable to follow verbal commands    VITALS:    T(C): 36.2 (11-03-22 @ 08:00), Max: 36.7 (11-02-22 @ 11:19)  HR: 70 (11-03-22 @ 08:25) (61 - 80)  BP: 118/66 (11-03-22 @ 08:15) (67/44 - 164/85)  RR: 14 (11-03-22 @ 08:15) (0 - 25)  SpO2: 100% (11-03-22 @ 08:25) (97% - 100%)  CAPILLARY BLOOD GLUCOSE      POCT Blood Glucose.: 176 mg/dL (02 Nov 2022 23:17)  POCT Blood Glucose.: 216 mg/dL (02 Nov 2022 18:11)  POCT Blood Glucose.: 187 mg/dL (02 Nov 2022 12:29)  POCT Blood Glucose.: 137 mg/dL (02 Nov 2022 11:59)      11-02-22 @ 07:01  -  11-03-22 @ 07:00  --------------------------------------------------------  IN: 492.5 mL / OUT: 3700 mL / NET: -3207.5 mL      MEDICATIONS  (STANDING):  atorvastatin 40 milliGRAM(s) Oral at bedtime  chlorhexidine 0.12% Liquid 15 milliLiter(s) Oral Mucosa every 12 hours  chlorhexidine 2% Cloths 1 Application(s) Topical <User Schedule>  dexMEDEtomidine Infusion 0.5 MICROgram(s)/kG/Hr (8.23 mL/Hr) IV Continuous <Continuous>  digoxin     Tablet 125 MICROGram(s) Oral every other day  heparin   Injectable 5000 Unit(s) SubCutaneous every 8 hours  insulin lispro (ADMELOG) corrective regimen sliding scale   SubCutaneous every 6 hours  levothyroxine Injectable 56 MICROGram(s) IV Push at bedtime  norepinephrine Infusion 0.15 MICROgram(s)/kG/Min (18.5 mL/Hr) IV Continuous <Continuous>  pantoprazole  Injectable 40 milliGRAM(s) IV Push daily  piperacillin/tazobactam IVPB.. 3.375 Gram(s) IV Intermittent every 12 hours  sodium chloride 0.9% lock flush 3 milliLiter(s) IV Push every 6 hours    MEDICATIONS  (PRN):      PHYSICAL EXAM:  GENERAL: Sedated and intubated on ventilatory support  HEENT: JOSE ARMANDO, EOMI, neck supple, JVP noted, ETT in place  CHEST/LUNG: Bilateral decreased breath sounds, bibasilar rhonchi, Right chest tube drain noted  HEART: Regular rate and rhythm, FREIDA II/VI at LPSB, no gallops, no rub   ABDOMEN: Soft, nontender, non distended, bowel sounds present  : No flank or supra pubic tenderness.  EXTREMITIES: Peripheral pulses are palpable, no pedal edema  Neurology: Sedated and intubated on ventilatory support  SKIN: No rash or skin lesion  Musculoskeletal: No joint deformity       Vascular ACCESS:  None    LABS:                        10.6   6.84  )-----------( 221      ( 03 Nov 2022 05:30 )             34.9     11-03    141  |  105  |  34<H>  ----------------------------<  177<H>  3.6   |  30  |  1.65<H>    Ca    9.5      03 Nov 2022 05:30  Phos  2.3     11-03  Mg     1.9     11-03    TPro  5.9<L>  /  Alb  2.5<L>  /  TBili  0.8  /  DBili  x   /  AST  73<H>  /  ALT  99<H>  /  AlkPhos  374<H>  11-03      PT/INR - ( 02 Nov 2022 13:29 )   PT: 13.6 sec;   INR: 1.14 ratio         PTT - ( 03 Nov 2022 04:00 )  PTT:65.1 sec          RADIOLOGY & ADDITIONAL STUDIES:  rad< from: CT Chest No Cont (11.02.22 @ 16:39) >    ACC: [id number] EXAM:  CT CHEST                          PROCEDURE DATE:  11/02/2022          INTERPRETATION:  Clinical indication: Evaluate for hemothorax, altered   mental status.    Axial CT images of the chest are obtained without intravenous   administration of contrast.    No prior chest CTs are available for comparison.    ET tube has its tip above the lia. Left anterior chest wall cardiac   device has leads terminating within the right atrium and right ventricle.    No enlarged axillary lymph nodes. Small subcentimeter mediastinal lymph   nodes. Heart size is enlarged. No pericardial effusion. Status post CABG.   Arterial calcifications with involvement of the aorta. Aortic valve   calcifications.    Evaluation of the upper abdomendemonstrate trace perihepatic ascites.   Left adrenal gland thickening. Subcutaneous edema.    Small left partially loculated left pleural effusion. Moderate-sized   simple appearing right pleural effusion.    Evaluation of the lung parenchyma demonstrate consolidation completely   opacifying the right lower lobe with some internal air bronchograms   representing a combination of atelectasis and pneumonia. Consolidation   within the dependent portion of the left lower lobe also representing a   combination of atelectasis and pneumonia.    Multiple patchy and nodular opacities scattered within the right upper   lobe and to a lesser extent within the left upper lobe associated with   tree-in-bud opacities. No central endobronchial lesions.    Degenerative changes of the spine. Spinal scoliosis. Sternotomy wires.    IMPRESSION: Moderate right and small left pleural effusions.    Consolidation completely opacifying the right lower lobe representing a   combination of atelectasis and pneumonia.    Multiple other bilateral patchy and nodular opacities also are likely due   to multifocal pneumonia with endobronchial spread.    --- End of Report ---            IRENA MAC MD; Attending Radiologist  This document has been electronically signed. Nov 2 2022  4:58PM    < end of copied text >    Imaging Personally Reviewed:  [x] YES  [ ] NO    Consultant(s) Notes Reviewed:  [x] YES  [ ] NO    Care Discussed with Primary team/ Other Providers [x] YES  [ ] NO

## 2022-11-03 NOTE — CHART NOTE - NSCHARTNOTEFT_GEN_A_CORE
Assessment:   Patient is a 89y old  Male who presents with a chief complaint of Fall (03 Nov 2022 12:13).Pt transferred to ICU 11/2 (see nutrition note 11/2),s/p cardiac arrest, intubated, NPO. Guarded prognosis noted. As per ICU IDR this AM, plan is for TF once NGT placed.       POCT Blood Glucose.: 131 mg/dL (03 Nov 2022 13:17)  POCT Blood Glucose.: 176 mg/dL (02 Nov 2022 23:17)  POCT Blood Glucose.: 216 mg/dL (02 Nov 2022 18:11)        Estimated Needs:   [x ] no change since previous assessment  [ ] recalculated:       Previous Nutrition Diagnosis:   [ ] Altered GI function  [x ]Inadequate Oral Intake [ ] Swallowing Difficulty   [ ] Altered nutrition related labs [ ] Increased Nutrient Needs [ ] Overweight/Obesity   [ ] Unintended Weight Loss [ ] Food & Nutrition Related Knowledge Deficit [ ] Malnutrition   [ ] Other:     Nutrition Diagnosis is [x ] ongoing  [ ] resolved [ ] not applicable       Interventions:   Recommend  [ ] Change Diet To:  [ ] Nutrition Supplement  [x ] Nutrition Support: Jevity 1.5 with an initial goal of 30x24 (720 ml, 1080 kcals, 46 gm protein). Add 1-2 Pkt Prosource/ day (each Pkt Prosource adds 15 gm protein, 60 kcals). MD to monitor. RD available.   [ ] Other:     Monitoring and Evaluation:    [ x ] Tolerance to diet prescription [ x ] weights [ x ] labs[ x ] follow up per protocol  [ ] other:

## 2022-11-03 NOTE — CHART NOTE - NSCHARTNOTEFT_GEN_A_CORE
Spoke with patient's son and granddaughter and had an extensive conversation regarding the patient's current status, diagnostics, treatment plan, and overall prognosis. All questions answered.

## 2022-11-03 NOTE — PROGRESS NOTE ADULT - SUBJECTIVE AND OBJECTIVE BOX
INTERVAL HPI/OVERNIGHT EVENTS:       PRESSORS: [ ] YES [ ] NO  WHICH:    ANTIBIOTICS:                  DATE STARTED:  ANTIBIOTICS:                  DATE STARTED:    Antimicrobial:  piperacillin/tazobactam IVPB.. 3.375 Gram(s) IV Intermittent every 12 hours    Cardiovascular:  digoxin     Tablet 125 MICROGram(s) Oral every other day  norepinephrine Infusion 0.15 MICROgram(s)/kG/Min IV Continuous <Continuous>    Pulmonary:    Hematalogic:    Other:  atorvastatin 40 milliGRAM(s) Oral at bedtime  chlorhexidine 0.12% Liquid 15 milliLiter(s) Oral Mucosa every 12 hours  dexMEDEtomidine Infusion 0.5 MICROgram(s)/kG/Hr IV Continuous <Continuous>  insulin lispro (ADMELOG) corrective regimen sliding scale   SubCutaneous every 6 hours  levothyroxine Injectable 56 MICROGram(s) IV Push at bedtime  pantoprazole  Injectable 40 milliGRAM(s) IV Push daily  sodium chloride 0.9% lock flush 3 milliLiter(s) IV Push every 6 hours    atorvastatin 40 milliGRAM(s) Oral at bedtime  chlorhexidine 0.12% Liquid 15 milliLiter(s) Oral Mucosa every 12 hours  dexMEDEtomidine Infusion 0.5 MICROgram(s)/kG/Hr IV Continuous <Continuous>  digoxin     Tablet 125 MICROGram(s) Oral every other day  insulin lispro (ADMELOG) corrective regimen sliding scale   SubCutaneous every 6 hours  levothyroxine Injectable 56 MICROGram(s) IV Push at bedtime  norepinephrine Infusion 0.15 MICROgram(s)/kG/Min IV Continuous <Continuous>  pantoprazole  Injectable 40 milliGRAM(s) IV Push daily  piperacillin/tazobactam IVPB.. 3.375 Gram(s) IV Intermittent every 12 hours  sodium chloride 0.9% lock flush 3 milliLiter(s) IV Push every 6 hours    Drug Dosing Weight  Height (cm): 160 (02 Nov 2022 16:34)  Weight (kg): 65.8 (28 Oct 2022 20:11)  BMI (kg/m2): 25.7 (02 Nov 2022 16:34)  BSA (m2): 1.69 (02 Nov 2022 16:34)    PHYSICAL EXAM:  GENERAL:   EYES: EOMI, PERRLA  NECK: Supple, No JVD; Normal thyroid; Trachea midline: No LAD   NERVOUS SYSTEM:    CHEST/LUNG: No rales, rhonchi, wheezing, breath sounds present bilaterally  HEART: irregular rate and rhythm; No murmurs, no gallops  ABDOMEN: Soft, Nontender, Nondistended; Bowel sounds present, no pain or masses on palpation  : voiding well, Bee in place  EXTREMITIES:  2+ Peripheral Pulses, No clubbing, cyanosis, or edema  SKIN: warm, intact, no lesions     LINES/DRAINS/DEVICES  CENTRAL LINE: [x ] YES [ ] NO  LOCATION:     BEE: [x ] YES [ ] NO     A-LINE:  [ ] YES [ ] NO  LOCATION:       ICU Vital Signs Last 24 Hrs  T(C): 36.1 (03 Nov 2022 04:32), Max: 36.7 (02 Nov 2022 11:19)  T(F): 96.9 (03 Nov 2022 04:32), Max: 98 (02 Nov 2022 11:19)  HR: 70 (03 Nov 2022 07:00) (61 - 80)  BP: 115/65 (03 Nov 2022 07:00) (67/44 - 164/85)  BP(mean): 81 (03 Nov 2022 07:00) (54 - 108)  ABP: --  ABP(mean): --  RR: 7 (03 Nov 2022 07:00) (0 - 25)  SpO2: 100% (03 Nov 2022 07:00) (97% - 100%)    O2 Parameters below as of 03 Nov 2022 00:00  Patient On (Oxygen Delivery Method): ventilator            ABG - ( 03 Nov 2022 03:29 )  pH, Arterial: 7.53  pH, Blood: x     /  pCO2: 38    /  pO2: 76    / HCO3: 32    / Base Excess: 8.5   /  SaO2: 97                    11-02 @ 07:01  -  11-03 @ 07:00  --------------------------------------------------------  IN: 492.5 mL / OUT: 3700 mL / NET: -3207.5 mL        Mode: AC/ CMV (Assist Control/ Continuous Mandatory Ventilation)  RR (machine): 16  TV (machine): 400  FiO2: 40  PEEP: 5  ITime: 1  MAP: 10  PIP: 23        LABS:  CBC Full  -  ( 03 Nov 2022 05:30 )  WBC Count : 6.84 K/uL  RBC Count : 3.77 M/uL  Hemoglobin : 10.6 g/dL  Hematocrit : 34.9 %  Platelet Count - Automated : 221 K/uL  Mean Cell Volume : 92.6 fl  Mean Cell Hemoglobin : 28.1 pg  Mean Cell Hemoglobin Concentration : 30.4 gm/dL  Auto Neutrophil # : x  Auto Lymphocyte # : x  Auto Monocyte # : x  Auto Eosinophil # : x  Auto Basophil # : x  Auto Neutrophil % : x  Auto Lymphocyte % : x  Auto Monocyte % : x  Auto Eosinophil % : x  Auto Basophil % : x    11-03    141  |  105  |  34<H>  ----------------------------<  177<H>  3.6   |  30  |  1.65<H>    Ca    9.5      03 Nov 2022 05:30  Phos  2.3     11-03  Mg     1.0     11-03    TPro  5.9<L>  /  Alb  2.5<L>  /  TBili  0.8  /  DBili  x   /  AST  73<H>  /  ALT  99<H>  /  AlkPhos  374<H>  11-03    PT/INR - ( 02 Nov 2022 13:29 )   PT: 13.6 sec;   INR: 1.14 ratio         PTT - ( 03 Nov 2022 04:00 )  PTT:65.1 sec        RADIOLOGY & ADDITIONAL STUDIES REVIEWED DURING TEAM ROUNDS    [ ]GOALS OF CARE DISCUSSION WITH PATIENT/FAMILY/PROXY:    CRITICAL CARE TIME SPENT: 35 minutes   INTERVAL HPI/OVERNIGHT EVENTS:       PRESSORS: [ ] YES [ ] NO  WHICH:    ANTIBIOTICS:                  DATE STARTED:  ANTIBIOTICS:                  DATE STARTED:    Antimicrobial:  piperacillin/tazobactam IVPB.. 3.375 Gram(s) IV Intermittent every 12 hours    Cardiovascular:  digoxin     Tablet 125 MICROGram(s) Oral every other day  norepinephrine Infusion 0.15 MICROgram(s)/kG/Min IV Continuous <Continuous>    Pulmonary:    Hematalogic:  heparin   Injectable 5000 Unit(s) SubCutaneous every 8 hours    Other:  atorvastatin 40 milliGRAM(s) Oral at bedtime  chlorhexidine 0.12% Liquid 15 milliLiter(s) Oral Mucosa every 12 hours  chlorhexidine 2% Cloths 1 Application(s) Topical <User Schedule>  dexMEDEtomidine Infusion 0.5 MICROgram(s)/kG/Hr IV Continuous <Continuous>  insulin lispro (ADMELOG) corrective regimen sliding scale   SubCutaneous every 6 hours  levothyroxine Injectable 56 MICROGram(s) IV Push at bedtime  pantoprazole  Injectable 40 milliGRAM(s) IV Push daily  sodium chloride 0.9% lock flush 3 milliLiter(s) IV Push every 6 hours    atorvastatin 40 milliGRAM(s) Oral at bedtime  chlorhexidine 0.12% Liquid 15 milliLiter(s) Oral Mucosa every 12 hours  chlorhexidine 2% Cloths 1 Application(s) Topical <User Schedule>  dexMEDEtomidine Infusion 0.5 MICROgram(s)/kG/Hr IV Continuous <Continuous>  digoxin     Tablet 125 MICROGram(s) Oral every other day  heparin   Injectable 5000 Unit(s) SubCutaneous every 8 hours  insulin lispro (ADMELOG) corrective regimen sliding scale   SubCutaneous every 6 hours  levothyroxine Injectable 56 MICROGram(s) IV Push at bedtime  norepinephrine Infusion 0.15 MICROgram(s)/kG/Min IV Continuous <Continuous>  pantoprazole  Injectable 40 milliGRAM(s) IV Push daily  piperacillin/tazobactam IVPB.. 3.375 Gram(s) IV Intermittent every 12 hours  sodium chloride 0.9% lock flush 3 milliLiter(s) IV Push every 6 hours    Drug Dosing Weight  Height (cm): 160 (02 Nov 2022 16:34)  Weight (kg): 65.8 (28 Oct 2022 20:11)  BMI (kg/m2): 25.7 (02 Nov 2022 16:34)  BSA (m2): 1.69 (02 Nov 2022 16:34)    PHYSICAL EXAM:  GENERAL: NAD  EYES: EOMI, PERRLA  NECK: Supple, No JVD; Normal thyroid; Trachea midline: No LAD   NERVOUS SYSTEM:  Alert & Oriented X3,  Motor Strength 5/5 B/L upper and lower extremities  CHEST/LUNG: rhonchi bilaterally  HEART: Regular rate and rhythm; No murmurs, no gallops  ABDOMEN: Soft, Nontender, Nondistended; Bowel sounds present, no pain or masses on palpation  : voiding well, Bee in place  EXTREMITIES:  2+ Peripheral Pulses, No clubbing, cyanosis, or edema  SKIN: warm, intact, no lesions     LINES/DRAINS/DEVICES  CENTRAL LINE: [x ] YES [ ] NO  LOCATION:     BEE: [x ] YES [ ] NO     A-LINE:  [ ] YES [ ] NO  LOCATION:       ICU Vital Signs Last 24 Hrs  T(C): 36.2 (03 Nov 2022 12:00), Max: 36.2 (02 Nov 2022 23:48)  T(F): 97.2 (03 Nov 2022 12:00), Max: 97.2 (03 Nov 2022 12:00)  HR: 70 (03 Nov 2022 15:45) (70 - 80)  BP: 105/58 (03 Nov 2022 15:30) (67/44 - 164/85)  BP(mean): 73 (03 Nov 2022 15:30) (54 - 107)  ABP: --  ABP(mean): --  RR: 18 (03 Nov 2022 15:45) (0 - 22)  SpO2: 98% (03 Nov 2022 15:45) (96% - 100%)    O2 Parameters below as of 03 Nov 2022 14:45  Patient On (Oxygen Delivery Method): nasal cannula  O2 Flow (L/min): 3          ABG - ( 03 Nov 2022 03:29 )  pH, Arterial: 7.53  pH, Blood: x     /  pCO2: 38    /  pO2: 76    / HCO3: 32    / Base Excess: 8.5   /  SaO2: 97                    11-02 @ 07:01  -  11-03 @ 07:00  --------------------------------------------------------  IN: 492.5 mL / OUT: 3700 mL / NET: -3207.5 mL        Mode: CPAP with PS  FiO2: 30  PEEP: 5  PS: 10        LABS:  CBC Full  -  ( 03 Nov 2022 05:30 )  WBC Count : 6.84 K/uL  RBC Count : 3.77 M/uL  Hemoglobin : 10.6 g/dL  Hematocrit : 34.9 %  Platelet Count - Automated : 221 K/uL  Mean Cell Volume : 92.6 fl  Mean Cell Hemoglobin : 28.1 pg  Mean Cell Hemoglobin Concentration : 30.4 gm/dL  Auto Neutrophil # : x  Auto Lymphocyte # : x  Auto Monocyte # : x  Auto Eosinophil # : x  Auto Basophil # : x  Auto Neutrophil % : x  Auto Lymphocyte % : x  Auto Monocyte % : x  Auto Eosinophil % : x  Auto Basophil % : x    11-03    141  |  105  |  34<H>  ----------------------------<  177<H>  3.6   |  30  |  1.65<H>    Ca    9.5      03 Nov 2022 05:30  Phos  2.3     11-03  Mg     1.9     11-03    TPro  5.9<L>  /  Alb  2.5<L>  /  TBili  0.8  /  DBili  x   /  AST  73<H>  /  ALT  99<H>  /  AlkPhos  374<H>  11-03    PT/INR - ( 02 Nov 2022 13:29 )   PT: 13.6 sec;   INR: 1.14 ratio         PTT - ( 03 Nov 2022 04:00 )  PTT:65.1 sec    Culture Results:   No growth (11-02 @ 18:00)  Culture Results:   Testing in progress (11-02 @ 18:00)      RADIOLOGY & ADDITIONAL STUDIES REVIEWED DURING TEAM ROUNDS    [ ]GOALS OF CARE DISCUSSION WITH PATIENT/FAMILY/PROXY:    CRITICAL CARE TIME SPENT: 35 minutes

## 2022-11-03 NOTE — PROGRESS NOTE ADULT - ASSESSMENT
This is a 89 yo M with pmhx of hyperlipidemia, diabetes, atrial fibrillation on Eliquis presents with generalized weakness x 2 days. Patient states that he was sitting on the couch yesterday when he sat up then fell on the ground. The episode was witnessed by his wife. He doesn't believe he lost consciousness or if he hit his head. He does mentions having 3 episodes of watery, nonbloody diarrhea for the past few days. 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 and Cardio-renal disease with volume  overload  2) NAGMA with improvement  4) Atrial fibrillation/A/flutter s/p cardiogenic shock with systolic/diastolic CHF exacerbation with volume overload with improvement  5) Electrolytes disorders with improvement  6) Hypoalbuminemia  7) Anemia of chronic renal disease  8) History of RCC with Partial Nephrectomy  9) Hypothyroidism  10) Gross hematuria likely traumatic Gonzalez vs AC  11) Acute hypoxic respiratory failure on ventilatory support with bilateral pneumonia/pleural effusion s/p Right chest tube drain  12) S/p Cardiac arrest s/p CPR with asystole    Recommend:  Patient critically ill in ICU on ventilatory support and IV pressors  Strict I/o  Avoid Nephrotoxic agents  S cr 1.6 stable with non oliguria  Continue Ventilatory support and IV pressors to maintain MAP>65-70 mm hg  Transfuse PRBC with goal Hgb>7  Adjust antibiotics as per renal dose adjustment with IV Zosyn for aspiration pneumonia  S/p Right chest tube drain  Continue Gonzalez's catheter  Replete electrolytes with goal K>4 and <5, Mag>2 and Phos 2.5 to 3.5   AC/Antiplatelet agents per primary/cardiology team  Monitor BMP/electrolytes daily  No urgent need for RRT/HD  Further work up per primary ICU/Neurology team/Cardiology team  Will follow

## 2022-11-03 NOTE — PROGRESS NOTE ADULT - SUBJECTIVE AND OBJECTIVE BOX
Subjective  Rapid called on 11/2 at 12:27 for AMS after episode of emesis w/ suspected aspiration. On arrival of call team patient was in PEA. CPR started with ROSC achieved in ~ 8 minutes. 2 epi given. Patient intubated and started on peripheral levophed w/ 1L IVF bolus LR. Transferred to ICU    At bedside, patient was intubated, sedated.    Objective    Vital signs  T(F): , Max: 97.1 (11-02-22 @ 23:48)  HR: 70 (11-03-22 @ 11:19)  BP: 142/70 (11-03-22 @ 10:15)  SpO2: 98% (11-03-22 @ 11:19)  Wt(kg): --    Output     OUT:    Indwelling Catheter - Urethral (mL): 2450 mL  Total OUT: 2450 mL    Total NET: -2450 mL          Gen: NAD  Abd: soft, nontender, nondistended  : garza secured in place, draining CYU    Labs      11-03 @ 05:30    WBC 6.84  / Hct 34.9  / SCr 1.65     11-03 @ 04:00    WBC 4.66  / Hct 20.3  / SCr 0.75         Culture - Acid Fast - Body Fluid w/Smear (collected 11-02-22 @ 18:00)  Source: .Body Fluid Plerual Fluid    Culture - Fungal, Body Fluid (collected 11-02-22 @ 18:00)  Source: Pleural Fl Pleural Fluid  Preliminary Report (11-03-22 @ 11:07):    Testing in progress    Culture - Body Fluid with Gram Stain (collected 11-02-22 @ 18:00)  Source: Pleural Fl Pleural Fluid  Gram Stain (11-02-22 @ 23:29):    polymorphonuclear leukocytes seen    No organisms seen    by cytocentrifuge    Culture - Blood (collected 10-29-22 @ 12:55)  Source: .Blood Blood-Venous  Preliminary Report (10-30-22 @ 19:02):    No growth to date.    Culture - Blood (collected 10-29-22 @ 12:40)  Source: .Blood Blood-Peripheral  Preliminary Report (10-30-22 @ 19:02):    No growth to date.    Culture - Urine (collected 10-28-22 @ 20:50)  Source: Clean Catch Clean Catch (Midstream)  Final Report (10-30-22 @ 07:13):    <10,000 CFU/mL Normal Urogenital Juanita    Culture - Blood (collected 10-28-22 @ 20:40)  Source: .Blood Blood-Peripheral  Final Report (11-03-22 @ 02:01):    No Growth Final    Culture - Blood (collected 10-28-22 @ 20:35)  Source: .Blood Blood-Peripheral  Final Report (11-03-22 @ 02:01):    No Growth Final

## 2022-11-04 LAB
ALBUMIN SERPL ELPH-MCNC: 2.4 G/DL — LOW (ref 3.5–5)
ALP SERPL-CCNC: 271 U/L — HIGH (ref 40–120)
ALT FLD-CCNC: 70 U/L DA — HIGH (ref 10–60)
ANION GAP SERPL CALC-SCNC: 6 MMOL/L — SIGNIFICANT CHANGE UP (ref 5–17)
AST SERPL-CCNC: 43 U/L — HIGH (ref 10–40)
BILIRUB SERPL-MCNC: 0.8 MG/DL — SIGNIFICANT CHANGE UP (ref 0.2–1.2)
BUN SERPL-MCNC: 37 MG/DL — HIGH (ref 7–18)
CALCIUM SERPL-MCNC: 9.6 MG/DL — SIGNIFICANT CHANGE UP (ref 8.4–10.5)
CHLORIDE SERPL-SCNC: 109 MMOL/L — HIGH (ref 96–108)
CO2 SERPL-SCNC: 29 MMOL/L — SIGNIFICANT CHANGE UP (ref 22–31)
CREAT SERPL-MCNC: 1.74 MG/DL — HIGH (ref 0.5–1.3)
EGFR: 36 ML/MIN/1.73M2 — LOW
GLUCOSE SERPL-MCNC: 155 MG/DL — HIGH (ref 70–99)
HCT VFR BLD CALC: 34.3 % — LOW (ref 39–50)
HGB BLD-MCNC: 10.3 G/DL — LOW (ref 13–17)
MAGNESIUM SERPL-MCNC: 2 MG/DL — SIGNIFICANT CHANGE UP (ref 1.6–2.6)
MCHC RBC-ENTMCNC: 27.9 PG — SIGNIFICANT CHANGE UP (ref 27–34)
MCHC RBC-ENTMCNC: 30 GM/DL — LOW (ref 32–36)
MCV RBC AUTO: 93 FL — SIGNIFICANT CHANGE UP (ref 80–100)
NRBC # BLD: 0 /100 WBCS — SIGNIFICANT CHANGE UP (ref 0–0)
PHOSPHATE SERPL-MCNC: 3.3 MG/DL — SIGNIFICANT CHANGE UP (ref 2.5–4.5)
PLATELET # BLD AUTO: 161 K/UL — SIGNIFICANT CHANGE UP (ref 150–400)
POTASSIUM SERPL-MCNC: 3.8 MMOL/L — SIGNIFICANT CHANGE UP (ref 3.5–5.3)
POTASSIUM SERPL-SCNC: 3.8 MMOL/L — SIGNIFICANT CHANGE UP (ref 3.5–5.3)
PROT SERPL-MCNC: 5.8 G/DL — LOW (ref 6–8.3)
RBC # BLD: 3.69 M/UL — LOW (ref 4.2–5.8)
RBC # FLD: 17.5 % — HIGH (ref 10.3–14.5)
SARS-COV-2 RNA SPEC QL NAA+PROBE: SIGNIFICANT CHANGE UP
SODIUM SERPL-SCNC: 144 MMOL/L — SIGNIFICANT CHANGE UP (ref 135–145)
WBC # BLD: 8.5 K/UL — SIGNIFICANT CHANGE UP (ref 3.8–10.5)
WBC # FLD AUTO: 8.5 K/UL — SIGNIFICANT CHANGE UP (ref 3.8–10.5)

## 2022-11-04 PROCEDURE — 71045 X-RAY EXAM CHEST 1 VIEW: CPT | Mod: 26

## 2022-11-04 RX ORDER — HYDROMORPHONE HYDROCHLORIDE 2 MG/ML
0.5 INJECTION INTRAMUSCULAR; INTRAVENOUS; SUBCUTANEOUS ONCE
Refills: 0 | Status: DISCONTINUED | OUTPATIENT
Start: 2022-11-04 | End: 2022-11-04

## 2022-11-04 RX ORDER — ENOXAPARIN SODIUM 100 MG/ML
65 INJECTION SUBCUTANEOUS EVERY 24 HOURS
Refills: 0 | Status: DISCONTINUED | OUTPATIENT
Start: 2022-11-04 | End: 2022-11-05

## 2022-11-04 RX ORDER — DIGOXIN 250 MCG
125 TABLET ORAL DAILY
Refills: 0 | Status: DISCONTINUED | OUTPATIENT
Start: 2022-11-04 | End: 2022-11-04

## 2022-11-04 RX ORDER — DIGOXIN 250 MCG
125 TABLET ORAL EVERY OTHER DAY
Refills: 0 | Status: DISCONTINUED | OUTPATIENT
Start: 2022-11-04 | End: 2022-11-06

## 2022-11-04 RX ORDER — FUROSEMIDE 40 MG
20 TABLET ORAL DAILY
Refills: 0 | Status: DISCONTINUED | OUTPATIENT
Start: 2022-11-04 | End: 2022-11-06

## 2022-11-04 RX ADMIN — HEPARIN SODIUM 5000 UNIT(S): 5000 INJECTION INTRAVENOUS; SUBCUTANEOUS at 05:45

## 2022-11-04 RX ADMIN — PANTOPRAZOLE SODIUM 40 MILLIGRAM(S): 20 TABLET, DELAYED RELEASE ORAL at 11:37

## 2022-11-04 RX ADMIN — ENOXAPARIN SODIUM 65 MILLIGRAM(S): 100 INJECTION SUBCUTANEOUS at 15:50

## 2022-11-04 RX ADMIN — Medication 56 MICROGRAM(S): at 22:32

## 2022-11-04 RX ADMIN — HYDROMORPHONE HYDROCHLORIDE 0.5 MILLIGRAM(S): 2 INJECTION INTRAMUSCULAR; INTRAVENOUS; SUBCUTANEOUS at 01:52

## 2022-11-04 RX ADMIN — PIPERACILLIN AND TAZOBACTAM 25 GRAM(S): 4; .5 INJECTION, POWDER, LYOPHILIZED, FOR SOLUTION INTRAVENOUS at 17:23

## 2022-11-04 RX ADMIN — Medication 125 MICROGRAM(S): at 11:37

## 2022-11-04 RX ADMIN — PIPERACILLIN AND TAZOBACTAM 25 GRAM(S): 4; .5 INJECTION, POWDER, LYOPHILIZED, FOR SOLUTION INTRAVENOUS at 05:45

## 2022-11-04 RX ADMIN — ATORVASTATIN CALCIUM 40 MILLIGRAM(S): 80 TABLET, FILM COATED ORAL at 22:32

## 2022-11-04 RX ADMIN — Medication 3: at 17:21

## 2022-11-04 RX ADMIN — Medication 20 MILLIGRAM(S): at 11:37

## 2022-11-04 RX ADMIN — HYDROMORPHONE HYDROCHLORIDE 0.5 MILLIGRAM(S): 2 INJECTION INTRAMUSCULAR; INTRAVENOUS; SUBCUTANEOUS at 01:37

## 2022-11-04 RX ADMIN — CHLORHEXIDINE GLUCONATE 1 APPLICATION(S): 213 SOLUTION TOPICAL at 05:46

## 2022-11-04 NOTE — PROGRESS NOTE ADULT - SUBJECTIVE AND OBJECTIVE BOX
INTERVAL HPI/OVERNIGHT EVENTS: ***    PRESSORS: [ ] YES [ ] NO  WHICH:    Antimicrobial:  piperacillin/tazobactam IVPB.. 3.375 Gram(s) IV Intermittent every 12 hours    Cardiovascular:  digoxin  Injectable 125 MICROGram(s) IV Push every other day  furosemide   Injectable 20 milliGRAM(s) IV Push daily  norepinephrine Infusion 0.15 MICROgram(s)/kG/Min IV Continuous <Continuous>    Pulmonary:    Hematalogic:  heparin   Injectable 5000 Unit(s) SubCutaneous every 8 hours    Other:  atorvastatin 40 milliGRAM(s) Oral at bedtime  chlorhexidine 2% Cloths 1 Application(s) Topical <User Schedule>  dexMEDEtomidine Infusion 0.5 MICROgram(s)/kG/Hr IV Continuous <Continuous>  insulin lispro (ADMELOG) corrective regimen sliding scale   SubCutaneous every 6 hours  levothyroxine Injectable 56 MICROGram(s) IV Push at bedtime  pantoprazole  Injectable 40 milliGRAM(s) IV Push daily    atorvastatin 40 milliGRAM(s) Oral at bedtime  chlorhexidine 2% Cloths 1 Application(s) Topical <User Schedule>  dexMEDEtomidine Infusion 0.5 MICROgram(s)/kG/Hr IV Continuous <Continuous>  digoxin  Injectable 125 MICROGram(s) IV Push every other day  furosemide   Injectable 20 milliGRAM(s) IV Push daily  heparin   Injectable 5000 Unit(s) SubCutaneous every 8 hours  insulin lispro (ADMELOG) corrective regimen sliding scale   SubCutaneous every 6 hours  levothyroxine Injectable 56 MICROGram(s) IV Push at bedtime  norepinephrine Infusion 0.15 MICROgram(s)/kG/Min IV Continuous <Continuous>  pantoprazole  Injectable 40 milliGRAM(s) IV Push daily  piperacillin/tazobactam IVPB.. 3.375 Gram(s) IV Intermittent every 12 hours    Drug Dosing Weight  Height (cm): 160 (02 Nov 2022 16:34)  Weight (kg): 65.8 (28 Oct 2022 20:11)  BMI (kg/m2): 25.7 (02 Nov 2022 16:34)  BSA (m2): 1.69 (02 Nov 2022 16:34)    CENTRAL LINE: [ ] YES [ ] NO  LOCATION:   DATE INSERTED:  REMOVE: [ ] YES [ ] NO  EXPLAIN:    BEE: [ ] YES [ ] NO    DATE INSERTED:  REMOVE:  [ ] YES [ ] NO  EXPLAIN:    A-LINE:  [ ] YES [ ] NO  LOCATION:   DATE INSERTED:  REMOVE:  [ ] YES [ ] NO  EXPLAIN:    PMH -reviewed admission note, no change since admission  PAST MEDICAL & SURGICAL HISTORY:  Diabetes      HTN (hypertension)      Hypothyroid      GERD (gastroesophageal reflux disease)      HLD (hyperlipidemia)      Hypothyroidism      H/O heart bypass surgery      H/O partial nephrectomy      S/P TURP          ICU Vital Signs Last 24 Hrs  T(C): 36.3 (04 Nov 2022 04:00), Max: 37.1 (03 Nov 2022 23:08)  T(F): 97.3 (04 Nov 2022 04:00), Max: 98.7 (03 Nov 2022 23:08)  HR: 70 (04 Nov 2022 06:45) (70 - 76)  BP: 128/55 (04 Nov 2022 06:45) (89/43 - 128/55)  BP(mean): 75 (04 Nov 2022 06:45) (54 - 81)  ABP: --  ABP(mean): --  RR: 21 (04 Nov 2022 06:45) (13 - 33)  SpO2: 95% (04 Nov 2022 06:45) (81% - 100%)    O2 Parameters below as of 04 Nov 2022 04:00  Patient On (Oxygen Delivery Method): nasal cannula  O2 Flow (L/min): 2          ABG - ( 03 Nov 2022 03:29 )  pH, Arterial: 7.53  pH, Blood: x     /  pCO2: 38    /  pO2: 76    / HCO3: 32    / Base Excess: 8.5   /  SaO2: 97                    11-03 @ 07:01  -  11-04 @ 07:00  --------------------------------------------------------  IN: 276.6 mL / OUT: 1490 mL / NET: -1213.4 mL        Mode: CPAP with PS  FiO2: 30  PEEP: 5  PS: 10      PHYSICAL EXAM:    GENERAL: NAD, well-groomed, well-developed  HEAD:  Atraumatic, Normocephalic  EYES: EOMI, PERRLA, conjunctiva and sclera clear  ENMT: No tonsillar erythema, exudates, or enlargement; Moist mucous membranes, Good dentition, No lesions  NECK: Supple, normal appearance, No JVD; Normal thyroid; Trachea midline  NERVOUS SYSTEM:  Alert & Oriented X3,  Motor Strength 5/5 B/L upper and lower extremities; DTRs 2+ intact and symmetric  CHEST/LUNG: No chest deformity; Normal percussion bilaterally; No rales, rhonchi, wheezing   HEART: Regular rate and rhythm; No murmurs, rubs, or gallops  ABDOMEN: Soft, Nontender, Nondistended; Bowel sounds present  EXTREMITIES:  2+ Peripheral Pulses, No clubbing, cyanosis, or edema  LYMPH: No lymphadenopathy noted  SKIN: No rashes or lesions;  Good capillary refill      LABS:  CBC Full  -  ( 04 Nov 2022 03:40 )  WBC Count : 8.50 K/uL  RBC Count : 3.69 M/uL  Hemoglobin : 10.3 g/dL  Hematocrit : 34.3 %  Platelet Count - Automated : 161 K/uL  Mean Cell Volume : 93.0 fl  Mean Cell Hemoglobin : 27.9 pg  Mean Cell Hemoglobin Concentration : 30.0 gm/dL  Auto Neutrophil # : x  Auto Lymphocyte # : x  Auto Monocyte # : x  Auto Eosinophil # : x  Auto Basophil # : x  Auto Neutrophil % : x  Auto Lymphocyte % : x  Auto Monocyte % : x  Auto Eosinophil % : x  Auto Basophil % : x    11-04    144  |  109<H>  |  37<H>  ----------------------------<  155<H>  3.8   |  29  |  1.74<H>    Ca    9.6      04 Nov 2022 03:40  Phos  3.3     11-04  Mg     2.0     11-04    TPro  5.8<L>  /  Alb  2.4<L>  /  TBili  0.8  /  DBili  x   /  AST  43<H>  /  ALT  70<H>  /  AlkPhos  271<H>  11-04    PT/INR - ( 02 Nov 2022 13:29 )   PT: 13.6 sec;   INR: 1.14 ratio         PTT - ( 03 Nov 2022 04:00 )  PTT:65.1 sec    Culture Results:   No growth (11-02 @ 18:00)  Culture Results:   Testing in progress (11-02 @ 18:00)      RADIOLOGY & ADDITIONAL STUDIES REVIEWED:  ***    [ ]GOALS OF CARE DISCUSSION WITH PATIENT/FAMILY/PROXY:    CRITICAL CARE TIME SPENT: 35 minutes INTERVAL HPI/OVERNIGHT EVENTS: patient was extubated yesterday. No acute overnight events. Patient was seen and examined at bedside, in NAD. Has no complaints     PRESSORS: [ x] YES [ ] NO  WHICH: Levophed    Antimicrobial:  piperacillin/tazobactam IVPB.. 3.375 Gram(s) IV Intermittent every 12 hours    Cardiovascular:  digoxin  Injectable 125 MICROGram(s) IV Push every other day  furosemide   Injectable 20 milliGRAM(s) IV Push daily  norepinephrine Infusion 0.15 MICROgram(s)/kG/Min IV Continuous <Continuous>    Pulmonary:    Hematalogic:  heparin   Injectable 5000 Unit(s) SubCutaneous every 8 hours    Other:  atorvastatin 40 milliGRAM(s) Oral at bedtime  chlorhexidine 2% Cloths 1 Application(s) Topical <User Schedule>  dexMEDEtomidine Infusion 0.5 MICROgram(s)/kG/Hr IV Continuous <Continuous>  insulin lispro (ADMELOG) corrective regimen sliding scale   SubCutaneous every 6 hours  levothyroxine Injectable 56 MICROGram(s) IV Push at bedtime  pantoprazole  Injectable 40 milliGRAM(s) IV Push daily    atorvastatin 40 milliGRAM(s) Oral at bedtime  chlorhexidine 2% Cloths 1 Application(s) Topical <User Schedule>  dexMEDEtomidine Infusion 0.5 MICROgram(s)/kG/Hr IV Continuous <Continuous>  digoxin  Injectable 125 MICROGram(s) IV Push every other day  furosemide   Injectable 20 milliGRAM(s) IV Push daily  heparin   Injectable 5000 Unit(s) SubCutaneous every 8 hours  insulin lispro (ADMELOG) corrective regimen sliding scale   SubCutaneous every 6 hours  levothyroxine Injectable 56 MICROGram(s) IV Push at bedtime  norepinephrine Infusion 0.15 MICROgram(s)/kG/Min IV Continuous <Continuous>  pantoprazole  Injectable 40 milliGRAM(s) IV Push daily  piperacillin/tazobactam IVPB.. 3.375 Gram(s) IV Intermittent every 12 hours    Drug Dosing Weight  Height (cm): 160 (02 Nov 2022 16:34)  Weight (kg): 65.8 (28 Oct 2022 20:11)  BMI (kg/m2): 25.7 (02 Nov 2022 16:34)  BSA (m2): 1.69 (02 Nov 2022 16:34)    CENTRAL LINE: [x ] YES [ ] NO  LOCATION:   DATE INSERTED:  REMOVE: [ ] YES [ ] NO  EXPLAIN:    BEE: [x ] YES [ ] NO    DATE INSERTED:  REMOVE:  [ ] YES [ ] NO  EXPLAIN:    A-LINE:  [x ] YES [ ] NO  LOCATION:   DATE INSERTED:  REMOVE:  [ ] YES [ ] NO  EXPLAIN:    PMH -reviewed admission note, no change since admission  PAST MEDICAL & SURGICAL HISTORY:  Diabetes      HTN (hypertension)      Hypothyroid      GERD (gastroesophageal reflux disease)      HLD (hyperlipidemia)      Hypothyroidism      H/O heart bypass surgery      H/O partial nephrectomy      S/P TURP          ICU Vital Signs Last 24 Hrs  T(C): 36.3 (04 Nov 2022 04:00), Max: 37.1 (03 Nov 2022 23:08)  T(F): 97.3 (04 Nov 2022 04:00), Max: 98.7 (03 Nov 2022 23:08)  HR: 70 (04 Nov 2022 06:45) (70 - 76)  BP: 128/55 (04 Nov 2022 06:45) (89/43 - 128/55)  BP(mean): 75 (04 Nov 2022 06:45) (54 - 81)  ABP: --  ABP(mean): --  RR: 21 (04 Nov 2022 06:45) (13 - 33)  SpO2: 95% (04 Nov 2022 06:45) (81% - 100%)    O2 Parameters below as of 04 Nov 2022 04:00  Patient On (Oxygen Delivery Method): nasal cannula  O2 Flow (L/min): 2          ABG - ( 03 Nov 2022 03:29 )  pH, Arterial: 7.53  pH, Blood: x     /  pCO2: 38    /  pO2: 76    / HCO3: 32    / Base Excess: 8.5   /  SaO2: 97                    11-03 @ 07:01  -  11-04 @ 07:00  --------------------------------------------------------  IN: 276.6 mL / OUT: 1490 mL / NET: -1213.4 mL        Mode: CPAP with PS  FiO2: 30  PEEP: 5  PS: 10      PHYSICAL EXAM:  GENERAL: NAD  EYES: EOMI, PERRLA  NECK: Supple, No JVD; Normal thyroid; Trachea midline: No LAD   NERVOUS SYSTEM:  Alert & Oriented X2-3,  Motor Strength 5/5 B/L upper and lower extremities  CHEST/LUNG: rhonchi bilaterally  HEART: Regular rate and rhythm; No murmurs, no gallops  ABDOMEN: Soft, Nontender, Nondistended; Bowel sounds present, no pain or masses on palpation  : voiding well, Bee in place  EXTREMITIES:  2+ Peripheral Pulses, No clubbing, cyanosis, or edema  SKIN: warm, intact, no lesions       LABS:  CBC Full  -  ( 04 Nov 2022 03:40 )  WBC Count : 8.50 K/uL  RBC Count : 3.69 M/uL  Hemoglobin : 10.3 g/dL  Hematocrit : 34.3 %  Platelet Count - Automated : 161 K/uL  Mean Cell Volume : 93.0 fl  Mean Cell Hemoglobin : 27.9 pg  Mean Cell Hemoglobin Concentration : 30.0 gm/dL  Auto Neutrophil # : x  Auto Lymphocyte # : x  Auto Monocyte # : x  Auto Eosinophil # : x  Auto Basophil # : x  Auto Neutrophil % : x  Auto Lymphocyte % : x  Auto Monocyte % : x  Auto Eosinophil % : x  Auto Basophil % : x    11-04    144  |  109<H>  |  37<H>  ----------------------------<  155<H>  3.8   |  29  |  1.74<H>    Ca    9.6      04 Nov 2022 03:40  Phos  3.3     11-04  Mg     2.0     11-04    TPro  5.8<L>  /  Alb  2.4<L>  /  TBili  0.8  /  DBili  x   /  AST  43<H>  /  ALT  70<H>  /  AlkPhos  271<H>  11-04    PT/INR - ( 02 Nov 2022 13:29 )   PT: 13.6 sec;   INR: 1.14 ratio         PTT - ( 03 Nov 2022 04:00 )  PTT:65.1 sec    Culture Results:   No growth (11-02 @ 18:00)  Culture Results:   Testing in progress (11-02 @ 18:00)      RADIOLOGY & ADDITIONAL STUDIES REVIEWED:  ***    [ ]GOALS OF CARE DISCUSSION WITH PATIENT/FAMILY/PROXY:    CRITICAL CARE TIME SPENT: 35 minutes

## 2022-11-04 NOTE — PROGRESS NOTE ADULT - ASSESSMENT
89 yo M with pmhx of hyperlipidemia, diabetes, ?atrial fibrillation on Eliquis, PPM presents with generalized weakness x 2 days. Found to be in cardiogenic shock, atrial tachyarrhythmia , and PPM-driven tachycardia resolved s/p pacemaker interrogation and re-programming. DG to Medicine 11/1. CODE BLUE 11/2 after suspected aspiration event.    #Cardiogenic shock  #Atrial tachyarrhythmia   #PPM mode adjustment   #AHRF 2/2 to new onset CHF vs fluid overload   #HLD  #Diabetes   #LIGIA   #Hypothyroid  #Urinary retention     PLAN:    -----------------CNS:------------------  Decerebrate posturing c/f intracerebral hemorrhage  Heparin drip held PRIOR to CODE BLUE compressions  - CT head non-contrast NEG, posturing likely to hypercapnia/anoxia during arrest    -----------------CVS:------------------  # s/o Cardiac arrest  # Cardiogenic shock  #NSTEMI  down time ~8 min, s/p 2x Epi  - re-check tele pre - code to ensure PPM not malfunctioning  - peripheral levo titrated down, patient now on minimal dose 0.01mcg  - troponin peaked at 1644, now > 847 > 1485  - HOLD heparin drip 2/2 bleeding concerns status post compressions  Cardiology Dr. Dominguez  TTE 10/30 : EF=40-45%.    #Atrial tachyarrhythmia   #PPM-driven tachycardia.  On Eliquis and Lopressor at home, however family says no history of AFib?  Presented with AFib/Flutter/Tach with RVR to 140s.   Received Amio and Digoxin without improvement.   PPM interrogated, was sensing atrial rate, mode switched by EP to DDIR with resolution.   Discontinued rate/rhythm control agents.   EP Dr. Ly following.   - HOLDING Coreg, Lasix in setting of hypotension  - Heparin infusion for AC HELD      -----------------RESPIRATORY:--------  #Right lung infiltrate, c/f Aspiration  - started zosyn  - sputum culture sent, result pending  successfully extubated 11/3/2022      #Right lung effusion  appears w/ + plankton sign on bedside POCUS  -s/p 6th ICS Right chest  -non-con CT chest   -Lasix 40 QD HELD for hypotension    -----------------GI:----------------------  #Diarrhea  - resolved    #Nutrition  -due to concern for prior aspiration event NPO pending Speech and Swallow eval        -----------------ID:-----------------------  #suspect aspiration w/ risk to develop PNA  - started zosyn 11/2, q 12 hrs  -approval by Dr Leblanc    -----------------RENAL: ---------------  Oliguric Pre-Renal Acute Renal Failure  Cr was around 1.5 when out of ICU, now 1.85 post resuscitation   - avoid nephrotoxic agents  -levophed for BP support  -diuretics and BP meds held  -trend BUN/Cr    #Hematuria:  urology following  hb is stable  CBC q 12  HOLDING heparin drip    -----------------HEME/ONC: ---------------  Anemia  - hgb 9.6 > 10.4  - transfuse if hgb<7  - monitor cbc     -----------------ENDO: ---------------  Hypothyroidism  - TSH noted to be 6.6  - continue with levothyroxine     Diabetes  - SSI     -----------------EXTREMITIES:-------  No acute issues     -----------------PROPHYLAXIS: -------  DVT: Heparin drip HOLDING;   SCDs  PPI: pantoprazole 40mg IVP BID    -----------------DISPOSITION--------  ICU     89 yo M with pmhx of hyperlipidemia, diabetes, ?atrial fibrillation on Eliquis, PPM presents with generalized weakness x 2 days. Found to be in cardiogenic shock, atrial tachyarrhythmia , and PPM-driven tachycardia resolved s/p pacemaker interrogation and re-programming. DG to Medicine 11/1. CODE BLUE 11/2 after suspected aspiration event.    #Cardiogenic shock  #Atrial tachyarrhythmia   #PPM mode adjustment   #AHRF 2/2 to new onset CHF vs fluid overload   #HLD  #Diabetes   #LIGIA   #Hypothyroid  #Urinary retention     PLAN:    -----------------CNS:------------------  CTH was negative for any acute abnormalities   patient is now A/O x3    -----------------CVS:------------------  # s/o Cardiac arrest  # Cardiogenic shock  #NSTEMI  down time ~8 min, s/p 2x Epi  - re-check tele pre - code to ensure PPM not malfunctioning  - peripheral levo titrated down, patient now on minimal dose 0.01mcg  - troponin peaked at 1644, now > 847 > 1485  Cardiology Dr. Dominguez  TTE 10/30 : EF=40-45%.  -Will start Lovenox 1mg/kg qd due to LIGIA    #Atrial tachyarrhythmia   #PPM-driven tachycardia.  On Eliquis and Lopressor at home, however family says no history of AFib?  Presented with AFib/Flutter/Tach with RVR to 140s.   Received Amio and Digoxin without improvement.   PPM interrogated, was sensing atrial rate, mode switched by EP to DDIR with resolution.   C/W digoxin   restarted Lasix, and Lovenox 1mg/kg QD      -----------------RESPIRATORY:--------  #Right lung infiltrate, c/f Aspiration  - started zosyn  - sputum culture sent, result pending  successfully extubated 11/3/2022      #Right lung effusion  -chest tube right drained 1800ml  - chest tube removed  - pleural fluid transudative     -----------------GI:----------------------  #Diarrhea  - resolved    #Nutrition  -S/S eval pureed diet       -----------------ID:-----------------------  #suspect aspiration w/ risk to develop PNA  - started zosyn 11/2, q 12 hrs  -approval by Dr Leblanc  -c/w abx    -----------------RENAL: ---------------  Oliguric Pre-Renal Acute Renal Failure  Cr was around 1.5 when out of ICU, now 1.85 post resuscitation   - avoid nephrotoxic agents  -levophed for BP support  -trend BUN/Cr    #Hematuria:  urology following  hb is stable  Resolved     -----------------HEME/ONC: ---------------  Anemia  - hgb 9.6 > 10.4  - transfuse if hgb<7  - monitor cbc     -----------------ENDO: ---------------  Hypothyroidism  - TSH noted to be 6.6  - continue with levothyroxine     Diabetes  - SSI     -----------------EXTREMITIES:-------  No acute issues     -----------------PROPHYLAXIS: -------  DVT: on Lovenox 1mg/kg QD  PPI: pantoprazole 40mg IVP BID    -----------------DISPOSITION--------  ICU     89 yo M with pmhx of hyperlipidemia, diabetes, ?atrial fibrillation on Eliquis, PPM presents with generalized weakness x 2 days. Found to be in cardiogenic shock, atrial tachyarrhythmia , and PPM-driven tachycardia resolved s/p pacemaker interrogation and re-programming. DG to Medicine 11/1. CODE BLUE 11/2 after suspected aspiration event.    #Cardiogenic shock  #Atrial tachyarrhythmia   #PPM mode adjustment   #AHRF 2/2 to new onset CHF vs fluid overload   #HLD  #Diabetes   #LIGIA   #Hypothyroid  #Urinary retention     PLAN:    -----------------CNS:------------------  CTH was negative for any acute abnormalities   patient is now A/O x3    -----------------CVS:------------------  # s/o Cardiac arrest  # Cardiogenic shock  #NSTEMI  down time ~8 min, s/p 2x Epi  - re-check tele pre - code to ensure PPM not malfunctioning  - peripheral levo titrated down, patient now on minimal dose 0.01mcg  - troponin peaked at 1644, now > 847 > 1485  Cardiology Dr. Dominguez  TTE 10/30 : EF=40-45%.  -Will start Lovenox 1mg/kg qd due to LIGIA    #Atrial tachyarrhythmia   #PPM-driven tachycardia.  On Eliquis and Lopressor at home, however family says no history of AFib?  Presented with AFib/Flutter/Tach with RVR to 140s.   Received Amio and Digoxin without improvement.   PPM interrogated, was sensing atrial rate, mode switched by EP to DDIR with resolution.   C/W digoxin   restarted Lasix, and Lovenox 1mg/kg QD      -----------------RESPIRATORY:--------  #Right lung infiltrate, c/f Aspiration  - started zosyn  - sputum culture sent, result pending  successfully extubated 11/3/2022      #Right lung effusion  -chest tube right drained 1800ml  - chest tube removed  - pleural fluid transudative     -----------------GI:----------------------  #Diarrhea  - resolved    #Nutrition  -S/S eval pureed diet       -----------------ID:-----------------------  #suspect aspiration w/ risk to develop PNA  - started zosyn 11/2, q 12 hrs  -approval by Dr Leblanc  -c/w abx    -----------------RENAL: ---------------  Oliguric Pre-Renal Acute Renal Failure  Cr was around 1.5 when out of ICU, now 1.85 post resuscitation   - avoid nephrotoxic agents  -levophed for BP support  -trend BUN/Cr    #Hematuria:  urology following  hb is stable  Resolved     -----------------HEME/ONC: ---------------  Anemia  - hgb 9.6 > 10.4  - transfuse if hgb<7  - monitor cbc     -----------------ENDO: ---------------  Hypothyroidism  - TSH noted to be 6.6  - continue with levothyroxine     Diabetes  - SSI     -----------------EXTREMITIES:-------  No acute issues     -----------------PROPHYLAXIS: -------  DVT: on Lovenox 1mg/kg QD  PPI: pantoprazole 40mg IVP BID    -----------------DISPOSITION--------  ICU    89

## 2022-11-04 NOTE — PROGRESS NOTE ADULT - SUBJECTIVE AND OBJECTIVE BOX
CHIEF COMPLAINT:Patient is a 89y old  Male who presents with a chief complaint of Fall.Pt s/p extubation.    	  REVIEW OF SYSTEMS:  CONSTITUTIONAL: No fever, weight loss, or fatigue  EYES: No eye pain, visual disturbances, or discharge  ENT:  No difficulty hearing, tinnitus, vertigo; No sinus or throat pain  NECK: No pain or stiffness  RESPIRATORY: No cough, wheezing, chills or hemoptysis; No Shortness of Breath  CARDIOVASCULAR: No chest pain, palpitations, passing out, dizziness, or leg swelling  GASTROINTESTINAL: No abdominal or epigastric pain. No nausea, vomiting, or hematemesis; No diarrhea or constipation. No melena or hematochezia.  GENITOURINARY: No dysuria, frequency, hematuria, or incontinence  NEUROLOGICAL: No headaches, memory loss, loss of strength, numbness, or tremors  SKIN: No itching, burning, rashes, or lesions   LYMPH Nodes: No enlarged glands  ENDOCRINE: No heat or cold intolerance; No hair loss  MUSCULOSKELETAL: No joint pain or swelling; No muscle, back, or extremity pain  PSYCHIATRIC: No depression, anxiety, mood swings, or difficulty sleeping  HEME/LYMPH: No easy bruising, or bleeding gums  ALLERGY AND IMMUNOLOGIC: No hives or eczema	      PHYSICAL EXAM:  T(C): 36.1 (11-04-22 @ 12:00), Max: 37.1 (11-03-22 @ 23:08)  HR: 70 (11-04-22 @ 12:00) (70 - 76)  BP: 117/53 (11-04-22 @ 12:00) (89/43 - 132/60)  RR: 22 (11-04-22 @ 12:00) (13 - 33)  SpO2: 87% (11-04-22 @ 12:00) (81% - 100%)  Wt(kg): --  I&O's Summary    03 Nov 2022 07:01  -  04 Nov 2022 07:00  --------------------------------------------------------  IN: 276.6 mL / OUT: 1490 mL / NET: -1213.4 mL        Appearance: Normal	  HEENT:   Normal oral mucosa, PERRL, EOMI	  Lymphatic: No lymphadenopathy  Cardiovascular: Normal S1 S2, No JVD, No murmurs, No edema  Respiratory: B/L ronchi	  Psychiatry: A & O x 3, Mood & affect appropriate  Gastrointestinal:  Soft, Non-tender, + BS	  Skin: No rashes, No ecchymoses, No cyanosis	  Neurologic: Non-focal  Extremities: Normal range of motion, No clubbing, cyanosis or edema  Vascular: Peripheral pulses palpable 2+ bilaterally    MEDICATIONS  (STANDING):  atorvastatin 40 milliGRAM(s) Oral at bedtime  chlorhexidine 2% Cloths 1 Application(s) Topical <User Schedule>  dexMEDEtomidine Infusion 0.5 MICROgram(s)/kG/Hr (8.23 mL/Hr) IV Continuous <Continuous>  digoxin  Injectable 125 MICROGram(s) IV Push every other day  furosemide   Injectable 20 milliGRAM(s) IV Push daily  heparin   Injectable 5000 Unit(s) SubCutaneous every 8 hours  insulin lispro (ADMELOG) corrective regimen sliding scale   SubCutaneous every 6 hours  levothyroxine Injectable 56 MICROGram(s) IV Push at bedtime  norepinephrine Infusion 0.15 MICROgram(s)/kG/Min (18.5 mL/Hr) IV Continuous <Continuous>  pantoprazole  Injectable 40 milliGRAM(s) IV Push daily  piperacillin/tazobactam IVPB.. 3.375 Gram(s) IV Intermittent every 12 hours      TELEMETRY: paced	    	  	  LABS:	 	    CARDIAC MARKERS:  CARDIAC MARKERS ( 03 Nov 2022 05:30 )  x     / x     / 69 U/L / x     / 1.5 ng/mL  CARDIAC MARKERS ( 03 Nov 2022 04:00 )  x     / x     / 65 U/L / x     / 1.9 ng/mL  CARDIAC MARKERS ( 02 Nov 2022 21:11 )  x     / x     / 94 U/L / x     / 3.2 ng/mL  CARDIAC MARKERS ( 02 Nov 2022 13:29 )  x     / x     / 75 U/L / x     / 1.4 ng/mL      Troponin I, High Sensitivity Result: 1485.6 ng/L (11-03 @ 05:30)  Troponin I, High Sensitivity Result: 847.6 ng/L (11-03 @ 04:00)  Troponin I, High Sensitivity Result: 1644.4 ng/L (11-02 @ 21:11)  Troponin I, High Sensitivity Result: 1406.8 ng/L (11-02 @ 13:29)                            10.3   8.50  )-----------( 161      ( 04 Nov 2022 03:40 )             34.3     11-04    144  |  109<H>  |  37<H>  ----------------------------<  155<H>  3.8   |  29  |  1.74<H>    Ca    9.6      04 Nov 2022 03:40  Phos  3.3     11-04  Mg     2.0     11-04    TPro  5.8<L>  /  Alb  2.4<L>  /  TBili  0.8  /  DBili  x   /  AST  43<H>  /  ALT  70<H>  /  AlkPhos  271<H>  11-04    proBNP: Serum Pro-Brain Natriuretic Peptide: 96261 pg/mL (10-29 @ 12:55)  Serum Pro-Brain Natriuretic Peptide: 20624 pg/mL (10-28 @ 19:36)    TSH: Thyroid Stimulating Hormone, Serum: 6.68 uU/mL (10-29 @ 10:41)

## 2022-11-04 NOTE — PROGRESS NOTE ADULT - ASSESSMENT
This is a 89 yo M with pmhx of hyperlipidemia, diabetes, atrial fibrillation on Eliquis presents with generalized weakness x 2 days. Patient states that he was sitting on the couch yesterday when he sat up then fell on the ground. The episode was witnessed by his wife. He doesn't believe he lost consciousness or if he hit his head. He does mentions having 3 episodes of watery, nonbloody diarrhea for the past few days. 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 and Cardio-renal disease with volume  overload  2) NAGMA with improvement  4) Atrial fibrillation/A/flutter s/p cardiogenic shock with systolic/diastolic CHF exacerbation with volume overload with improvement   5) Electrolytes disorders with improvement  6) Hypoalbuminemia/malnutrition  7) Anemia of chronic renal disease  8) History of RCC with Partial Nephrectomy  9) Hypothyroidism  10) Gross hematuria likely traumatic Gonzalez vs AC  11) Acute hypoxic respiratory failure on ventilatory support with bilateral pneumonia/pleural effusion s/p Right chest tube drain  12) S/p Cardiac arrest s/p CPR with asystole    Recommend:  Patient critically ill in ICU now off ventilatory support  Strict I/o  Avoid Nephrotoxic agents  S cr 1.7 stable with non oliguria  ON minimal IV pressors today morning  Continue IV pressors to maintain MAP>65-70 mm hg  Transfuse PRBC with goal Hgb>7  Adjust antibiotics as per renal dose adjustment with IV Zosyn for aspiration pneumonia  S/p Right chest tube drain  Intermittent Lasix for clinical volume overload  Continue Gonzalez's catheter  Replete electrolytes with goal K>4 and <5, Mag>2 and Phos 2.5 to 3.5   AC/Antiplatelet agents per primary/cardiology team  Monitor BMP/electrolytes daily  No urgent need for RRT/HD  Further work up per primary ICU/Cardiology team  Will follow

## 2022-11-04 NOTE — PROGRESS NOTE ADULT - ATTENDING COMMENTS
89 yr old man with  hyperlipidemia, diabetes, atrial fibrillation on Eliquis presents with generalized weakness x 2 days. Upon evaluation in the ED v/s: /92, , RR 20, SPO2: 97% on RA  (29 Oct 2022 04:15). Patient admitted initially to medicine for generalized weakness and diarrhea. Further evaluation including ct head and ct cervical spine was negative for acute pathologies.   Patient had RRT called around 10AM for tachycardia and hypotension. Patient noted to be lethargic and vitals at the time of the rapid was afebrile, BP of 130/61 and repeat BP showing reads of 50/40s. Patient noted to have heart rates in 140's. Patient placed on 15L NRBM for severe hypoxic resp failure ,peripheral phenylephrine was started and metoprolol 2.5mg IV was given for heart rate. STAT EKG and labs including cardiac enzymes, coags and chemistries were sent. ICU was consulted and bedside POCUS done showing decreased left ventricular function, dilated IVC and Right atrium. Patient admitted to ICU for  cardiogenic shock     11/2: Cardiac arrest called on the floor. As per notes patient was noted to have vomited and aspirated prior to arrest. ROSC achieve in about 8 minutes. Paced rhythm on initial event. Patient intubated and transferred to ICU.    Assessment:  1. Cardiac arrest  2. Shock   3. Lactic acidosis  4. Atrial fibrillation with RVR  5. Acute respiratory failure  6. Aspiration pneumonia   7. Right pleural effusion   8. HLD   9. Diabetes Mellitus   10. LIGIA   11. Hypothyroidism  12. Hematuria     Plan   - Extubated 11/3   - Monitor respiratory status  - Awake and alert, no focal deficits reported   - CT head with out acute bleeding   - D/c chest tube today   - Pain control  - Hold all antihypertensives given shock   - Titrate down levophed as tolerated   - Cardiology followup   - broad spectrum antibiotics  - EP cards eval noted , PPM mode reset and tachycardia resolved . BP improved . anticoagulation and my need ADRIAN as per EP   - Hemodynamic support   - Dysphagia diet per Swallow evaluation  - Hematuria resolved  - Restart anticoagulation  - Trend cbc   - Urology follow up   - Cont Gonzalez   - Monitor I/O , keep fluid neg   - DVT and stress ulcer prophylaxis  - Prognosis is guarded at this time  - Cont. ICU care

## 2022-11-04 NOTE — SWALLOW BEDSIDE ASSESSMENT ADULT - COMMENTS
Pt AA+Ox3. Granddaughter present. HOB elevated to 60°. Baseline congestion & weak wet cough; oral suction given, yielded minimal sputum/phlegm. immediate cough after all thin liquids trials. Pt required oral care & suctioning to clear oropharyngeal  residue. increased wet congestion and delayed cough after cup sip only.

## 2022-11-04 NOTE — PROGRESS NOTE ADULT - SUBJECTIVE AND OBJECTIVE BOX
Ernesto Bennett MD (Nephrology progress note)  [address], [facility],  [address],  [facility]  TEl: [phone]  Cell: [phone]    Patient is a 89y Male seen and evaluated at bedside. Vital signs, laboratory data, imaging studies, consult notes reviewed done within past 24 hours. Overnight events noted. Patient lying in bed in no distress with interval extubation on nasal cannula oxygen. Interval chest tube drain of right pleural effusion. Interval stable renal function with non oliguria with S cr 1.7.    Allergies    No Known Allergies    Intolerances        ROS:  Limited  Alert and awake in no distress on nasal cannula oxygen  Denies any chest pain, SOB  Right chest tube drain present    VITALS:    T(C): 36.3 (11-04-22 @ 04:00), Max: 37.1 (11-03-22 @ 23:08)  HR: 70 (11-04-22 @ 06:45) (70 - 76)  BP: 128/55 (11-04-22 @ 06:45) (89/43 - 128/55)  RR: 21 (11-04-22 @ 06:45) (13 - 33)  SpO2: 95% (11-04-22 @ 06:45) (81% - 100%)  CAPILLARY BLOOD GLUCOSE      POCT Blood Glucose.: 149 mg/dL (04 Nov 2022 11:32)  POCT Blood Glucose.: 130 mg/dL (04 Nov 2022 05:44)  POCT Blood Glucose.: 121 mg/dL (03 Nov 2022 23:29)  POCT Blood Glucose.: 124 mg/dL (03 Nov 2022 18:33)  POCT Blood Glucose.: 131 mg/dL (03 Nov 2022 13:17)      11-03-22 @ 07:01  -  11-04-22 @ 07:00  --------------------------------------------------------  IN: 276.6 mL / OUT: 1490 mL / NET: -1213.4 mL      MEDICATIONS  (STANDING):  atorvastatin 40 milliGRAM(s) Oral at bedtime  chlorhexidine 2% Cloths 1 Application(s) Topical <User Schedule>  dexMEDEtomidine Infusion 0.5 MICROgram(s)/kG/Hr (8.23 mL/Hr) IV Continuous <Continuous>  digoxin  Injectable 125 MICROGram(s) IV Push every other day  furosemide   Injectable 20 milliGRAM(s) IV Push daily  heparin   Injectable 5000 Unit(s) SubCutaneous every 8 hours  insulin lispro (ADMELOG) corrective regimen sliding scale   SubCutaneous every 6 hours  levothyroxine Injectable 56 MICROGram(s) IV Push at bedtime  norepinephrine Infusion 0.15 MICROgram(s)/kG/Min (18.5 mL/Hr) IV Continuous <Continuous>  pantoprazole  Injectable 40 milliGRAM(s) IV Push daily  piperacillin/tazobactam IVPB.. 3.375 Gram(s) IV Intermittent every 12 hours    MEDICATIONS  (PRN):      PHYSICAL EXAM:  GENERAL: Alert, awake and oriented x2-3 in no distress  HEENT: JOSE ARMANDO, EOMI, neck supple, no JVP, no carotid bruit, oral mucosa moist and pale  CHEST/LUNG: Bilateral decreased breath sounds, Right>left, Right chest tube drain noted  HEART: Regular rate and rhythm, FREIDA II/VI at LPSB, no gallops, no rub   ABDOMEN: Soft, nontender, non distended, bowel sounds present  : No flank or supra pubic tenderness.  EXTREMITIES: Peripheral pulses are palpable, no pedal edema  Neurology: AAOx2, no focal neurological deficit  SKIN: No rash or skin lesion  Musculoskeletal: No joint deformity or spinal tenderness.      Vascular ACCESS: None    LABS:                        10.3   8.50  )-----------( 161      ( 04 Nov 2022 03:40 )             34.3     11-04    144  |  109<H>  |  37<H>  ----------------------------<  155<H>  3.8   |  29  |  1.74<H>    Ca    9.6      04 Nov 2022 03:40  Phos  3.3     11-04  Mg     2.0     11-04    TPro  5.8<L>  /  Alb  2.4<L>  /  TBili  0.8  /  DBili  x   /  AST  43<H>  /  ALT  70<H>  /  AlkPhos  271<H>  11-04      PT/INR - ( 02 Nov 2022 13:29 )   PT: 13.6 sec;   INR: 1.14 ratio         PTT - ( 03 Nov 2022 04:00 )  PTT:65.1 sec          RADIOLOGY & ADDITIONAL STUDIES:  rad< from: Xray Chest 1 View-PORTABLE IMMEDIATE (Xray Chest 1 View-PORTABLE IMMEDIATE .) (11.03.22 @ 04:44) >    ACC: [id number] EXAM:  XR CHEST PORTABLE IMMED 1V                          PROCEDURE DATE:  11/03/2022          INTERPRETATION:  AP chest on November 3, 2022 at 4:30 AM. Patient is   short of breath.    Heart magnified by technique. Sternotomy and left-sided pacemaker again   noted. Catheter right chest tube remains. Endotracheal tube remains.    There is rather mild right base effusion showing improvement from   November 2.    No pneumothorax.    IMPRESSION: Mild right base effusion is somewhat improved.    --- End of Report ---            BHAVANI CONLEY MD; Attending Radiologist  This document has been electronically signed. Nov  3 2022 10:15AM    < end of copied text >    Imaging Personally Reviewed:  [x] YES  [ ] NO    Consultant(s) Notes Reviewed:  [x] YES  [ ] NO    Care Discussed with Primary team/ Other Providers [x] YES  [ ] NO

## 2022-11-04 NOTE — PROGRESS NOTE ADULT - ASSESSMENT
89 yo M with pmhx of CAD-s/p CABG,s/p PPM, hyperlipidemia, diabetes,Parox atrial fibrillation on Eliquis,Renal cell Ca-s/p partial nephrectomy ,Hypothyroid who  presents with generalized weakness x 2 day, now in ICU with afib with RVR and hypotension with acute systolic HF, s/p PEA arrest due to aspiration pneumonia,Type II MI due to demand ischemia,acute anemia..  1.ICU monitoring.  2.Aspiration pneumonia abx.  3.PAF, off AC due to acute anemia.Dig qod.  4.DM-Insulin.  5.PPI.  6.AKII-f/u lytes.Renal f/u.  7.Hypothyroid-synthroid.  8.Type II MI due to demand ischemia.  9.GI and DVT prophylaxis.

## 2022-11-04 NOTE — SWALLOW BEDSIDE ASSESSMENT ADULT - PHARYNGEAL PHASE
Delayed pharyngeal swallow/Decreased laryngeal elevation/Wet vocal quality post oral intake/Multiple swallows Delayed pharyngeal swallow/Decreased laryngeal elevation/Wet vocal quality post oral intake/Cough post oral intake/Throat clear post oral intake/Multiple swallows Delayed pharyngeal swallow/Decreased laryngeal elevation/Wet vocal quality post oral intake/Delayed cough post oral intake/Delayed throat clear post oral intake/Multiple swallows

## 2022-11-05 LAB
ALBUMIN SERPL ELPH-MCNC: 2.4 G/DL — LOW (ref 3.5–5)
ALP SERPL-CCNC: 216 U/L — HIGH (ref 40–120)
ALT FLD-CCNC: 54 U/L DA — SIGNIFICANT CHANGE UP (ref 10–60)
ANION GAP SERPL CALC-SCNC: 5 MMOL/L — SIGNIFICANT CHANGE UP (ref 5–17)
AST SERPL-CCNC: 32 U/L — SIGNIFICANT CHANGE UP (ref 10–40)
BILIRUB SERPL-MCNC: 0.8 MG/DL — SIGNIFICANT CHANGE UP (ref 0.2–1.2)
BUN SERPL-MCNC: 43 MG/DL — HIGH (ref 7–18)
CALCIUM SERPL-MCNC: 9.3 MG/DL — SIGNIFICANT CHANGE UP (ref 8.4–10.5)
CHLORIDE SERPL-SCNC: 107 MMOL/L — SIGNIFICANT CHANGE UP (ref 96–108)
CO2 SERPL-SCNC: 31 MMOL/L — SIGNIFICANT CHANGE UP (ref 22–31)
CREAT SERPL-MCNC: 1.78 MG/DL — HIGH (ref 0.5–1.3)
CULTURE RESULTS: SIGNIFICANT CHANGE UP
EGFR: 35 ML/MIN/1.73M2 — LOW
GLUCOSE SERPL-MCNC: 176 MG/DL — HIGH (ref 70–99)
HCT VFR BLD CALC: 31.2 % — LOW (ref 39–50)
HGB BLD-MCNC: 9.4 G/DL — LOW (ref 13–17)
MAGNESIUM SERPL-MCNC: 2 MG/DL — SIGNIFICANT CHANGE UP (ref 1.6–2.6)
MCHC RBC-ENTMCNC: 28 PG — SIGNIFICANT CHANGE UP (ref 27–34)
MCHC RBC-ENTMCNC: 30.1 GM/DL — LOW (ref 32–36)
MCV RBC AUTO: 92.9 FL — SIGNIFICANT CHANGE UP (ref 80–100)
NRBC # BLD: 0 /100 WBCS — SIGNIFICANT CHANGE UP (ref 0–0)
PHOSPHATE SERPL-MCNC: 2.3 MG/DL — LOW (ref 2.5–4.5)
PLATELET # BLD AUTO: 183 K/UL — SIGNIFICANT CHANGE UP (ref 150–400)
POTASSIUM SERPL-MCNC: 3.2 MMOL/L — LOW (ref 3.5–5.3)
POTASSIUM SERPL-SCNC: 3.2 MMOL/L — LOW (ref 3.5–5.3)
PROT SERPL-MCNC: 5.7 G/DL — LOW (ref 6–8.3)
RBC # BLD: 3.36 M/UL — LOW (ref 4.2–5.8)
RBC # FLD: 17.2 % — HIGH (ref 10.3–14.5)
SODIUM SERPL-SCNC: 143 MMOL/L — SIGNIFICANT CHANGE UP (ref 135–145)
SPECIMEN SOURCE: SIGNIFICANT CHANGE UP
WBC # BLD: 7.25 K/UL — SIGNIFICANT CHANGE UP (ref 3.8–10.5)
WBC # FLD AUTO: 7.25 K/UL — SIGNIFICANT CHANGE UP (ref 3.8–10.5)

## 2022-11-05 RX ORDER — TAMSULOSIN HYDROCHLORIDE 0.4 MG/1
0.4 CAPSULE ORAL AT BEDTIME
Refills: 0 | Status: DISCONTINUED | OUTPATIENT
Start: 2022-11-05 | End: 2022-11-10

## 2022-11-05 RX ORDER — LEVOTHYROXINE SODIUM 125 MCG
75 TABLET ORAL DAILY
Refills: 0 | Status: DISCONTINUED | OUTPATIENT
Start: 2022-11-05 | End: 2022-11-10

## 2022-11-05 RX ORDER — POTASSIUM PHOSPHATE, MONOBASIC POTASSIUM PHOSPHATE, DIBASIC 236; 224 MG/ML; MG/ML
15 INJECTION, SOLUTION INTRAVENOUS ONCE
Refills: 0 | Status: COMPLETED | OUTPATIENT
Start: 2022-11-05 | End: 2022-11-05

## 2022-11-05 RX ORDER — POTASSIUM CHLORIDE 20 MEQ
10 PACKET (EA) ORAL
Refills: 0 | Status: COMPLETED | OUTPATIENT
Start: 2022-11-05 | End: 2022-11-05

## 2022-11-05 RX ORDER — APIXABAN 2.5 MG/1
2.5 TABLET, FILM COATED ORAL
Refills: 0 | Status: DISCONTINUED | OUTPATIENT
Start: 2022-11-05 | End: 2022-11-10

## 2022-11-05 RX ORDER — PANTOPRAZOLE SODIUM 20 MG/1
40 TABLET, DELAYED RELEASE ORAL
Refills: 0 | Status: DISCONTINUED | OUTPATIENT
Start: 2022-11-05 | End: 2022-11-10

## 2022-11-05 RX ADMIN — CHLORHEXIDINE GLUCONATE 1 APPLICATION(S): 213 SOLUTION TOPICAL at 06:20

## 2022-11-05 RX ADMIN — PANTOPRAZOLE SODIUM 40 MILLIGRAM(S): 20 TABLET, DELAYED RELEASE ORAL at 12:04

## 2022-11-05 RX ADMIN — PIPERACILLIN AND TAZOBACTAM 25 GRAM(S): 4; .5 INJECTION, POWDER, LYOPHILIZED, FOR SOLUTION INTRAVENOUS at 06:19

## 2022-11-05 RX ADMIN — TAMSULOSIN HYDROCHLORIDE 0.4 MILLIGRAM(S): 0.4 CAPSULE ORAL at 21:21

## 2022-11-05 RX ADMIN — Medication 1: at 17:10

## 2022-11-05 RX ADMIN — PANTOPRAZOLE SODIUM 40 MILLIGRAM(S): 20 TABLET, DELAYED RELEASE ORAL at 16:37

## 2022-11-05 RX ADMIN — Medication 20 MILLIGRAM(S): at 06:19

## 2022-11-05 RX ADMIN — APIXABAN 2.5 MILLIGRAM(S): 2.5 TABLET, FILM COATED ORAL at 17:11

## 2022-11-05 RX ADMIN — POTASSIUM PHOSPHATE, MONOBASIC POTASSIUM PHOSPHATE, DIBASIC 62.5 MILLIMOLE(S): 236; 224 INJECTION, SOLUTION INTRAVENOUS at 06:19

## 2022-11-05 RX ADMIN — Medication 100 MILLIEQUIVALENT(S): at 06:20

## 2022-11-05 RX ADMIN — Medication 100 MILLIEQUIVALENT(S): at 08:38

## 2022-11-05 RX ADMIN — Medication 100 MILLIEQUIVALENT(S): at 09:00

## 2022-11-05 RX ADMIN — Medication 75 MICROGRAM(S): at 16:36

## 2022-11-05 RX ADMIN — ATORVASTATIN CALCIUM 40 MILLIGRAM(S): 80 TABLET, FILM COATED ORAL at 21:21

## 2022-11-05 RX ADMIN — PIPERACILLIN AND TAZOBACTAM 25 GRAM(S): 4; .5 INJECTION, POWDER, LYOPHILIZED, FOR SOLUTION INTRAVENOUS at 17:12

## 2022-11-05 NOTE — PROGRESS NOTE ADULT - ATTENDING COMMENTS
IMP: This is a 89 yr old man with  hyperlipidemia, diabetes, atrial fibrillation on Eliquis presents with generalized weakness x 2 days. Upon evaluation in the ED v/s: /92, , RR 20, SPO2: 97% on RA  (29 Oct 2022 04:15). Patient admitted initially to medicine for generalized weakness and diarrhea. Further evaluation including ct head and ct cervical spine was negative for acute pathologies.   Patient had RRT called around 10AM for tachycardia and hypotension. Patient noted to be lethargic and vitals at the time of the rapid was afebrile, BP of 130/61 and repeat BP showing reads of 50/40s. Patient noted to have heart rates in 140's. Patient placed on 15L NRBM for severe hypoxic resp failure ,peripheral phenylephrine was started and metoprolol 2.5mg IV was given for heart rate. STAT EKG and labs including cardiac enzymes, coags and chemistries were sent. ICU was consulted and bedside POCUS done showing decreased left ventricular function, dilated IVC and Right atrium. Patient admitted to ICU for  cardiogenic shock     11/2: Cardiac arrest called on the floor. As per notes patient was noted to have vomited and aspirated prior to arrest. ROSC achieve in about 8 minutes. Paced rhythm on initial event. Patient intubated and transferred to ICU.    Assessment:  1. Cardiac arrest  2. Shock   3. Lactic acidosis  4. Atrial fibrillation with RVR  5. Acute respiratory failure  6. Aspiration pneumonia   7. Right pleural effusion   8. HLD   9. Diabetes Mellitus   10. LIGIA   11. Hypothyroidism  12. Hematuria     Plan   - Extubated 11/3   - Monitor respiratory status  - Awake and alert, no focal deficits reported   - CT head with out acute bleeding   - S/P chest tube   - Pain control  - Hold all antihypertensives given shock   - Titrate down levophed as tolerated   - Cardiology followup   - Broad spectrum antibiotics  - EP cards eval noted , PPM mode reset and tachycardia resolved . BP improved . anticoagulation and my need ADRIAN as per EP   - Hemodynamic support   - Dysphagia diet per Swallow evaluation  - Hematuria resolved  - Restart anticoagulation  - Trend cbc   - Urology follow up   - Cont Gonzalez   - Monitor I/O , keep fluid neg   - DVT and stress ulcer prophylaxis  - Prognosis is guarded at this time  - Cont. ICU care. IMP: This is a 89 yr old man with  hyperlipidemia, diabetes, atrial fibrillation on Eliquis presents with generalized weakness x 2 days. Upon evaluation in the ED v/s: /92, , RR 20, SPO2: 97% on RA  (29 Oct 2022 04:15). Patient admitted initially to medicine for generalized weakness and diarrhea. Further evaluation including ct head and ct cervical spine was negative for acute pathologies.   Patient had RRT called around 10AM for tachycardia and hypotension. Patient noted to be lethargic and vitals at the time of the rapid was afebrile, BP of 130/61 and repeat BP showing reads of 50/40s. Patient noted to have heart rates in 140's. Patient placed on 15L NRBM for severe hypoxic resp failure ,peripheral phenylephrine was started and metoprolol 2.5mg IV was given for heart rate. STAT EKG and labs including cardiac enzymes, coags and chemistries were sent. ICU was consulted and bedside POCUS done showing decreased left ventricular function, dilated IVC and Right atrium. Patient admitted to ICU for  cardiogenic shock     11/2: Cardiac arrest called on the floor. As per notes patient was noted to have vomited and aspirated prior to arrest. ROSC achieve in about 8 minutes. Paced rhythm on initial event. Patient intubated and transferred to ICU.    Assessment:  1. Cardiac arrest  2. Shock   3. Lactic acidosis  4. Atrial fibrillation with RVR  5. Acute respiratory failure  6. Aspiration pneumonia   7. Right pleural effusion   8. HLD   9. Diabetes Mellitus   10. LIGIA   11. Hypothyroidism  12. Hematuria     Plan   - Extubated 11/3   - Monitor respiratory status  - Awake and alert, no focal deficits reported   - CT head with out acute bleeding   - S/P chest tube   - Pain control  - Hold all antihypertensives given shock   - Titrate down levophed as tolerated   - Cardiology followup   - Broad spectrum antibiotics day for total 7 days   - EP cards eval noted , PPM mode reset and tachycardia resolved . BP improved . anticoagulation and my need ADRIAN as per EP   - Hemodynamic support   - Dysphagia diet per Swallow evaluation  - Hematuria resolved  - TOV , start flomax   - Restart anticoagulation  - Trend cbc   - Urology follow up   - Cont Gonzalez   - Monitor I/O , keep fluid neg   - DVT and stress ulcer prophylaxis  - Prognosis is guarded at this time  - Cont. ICU care.

## 2022-11-05 NOTE — PROGRESS NOTE ADULT - SUBJECTIVE AND OBJECTIVE BOX
Ernesto Bennett MD (Nephrology progress note)  [address], [facility],  [address],  [facility]  TEl: [phone]  Cell: [phone]    Patient is a 89y Male seen and evaluated at bedside. Vital signs, laboratory data, imaging studies, consult notes reviewed done within past 24 hours. Overnight events noted. Patient lying in bed alert and awake in no distress in ICU now off IV pressors/ventilatory support. Interval stable renal function with S cr 1,7 with non oliguria.     Allergies    No Known Allergies    Intolerances        ROS:  Limited  Alert and awake but fatigue appearing in no distress    VITALS:    T(C): 36 (11-05-22 @ 15:55), Max: 37.2 (11-05-22 @ 00:00)  HR: 70 (11-05-22 @ 12:00) (70 - 81)  BP: 119/55 (11-05-22 @ 12:00) (97/56 - 137/69)  RR: 18 (11-05-22 @ 12:00) (16 - 26)  SpO2: 100% (11-05-22 @ 12:00) (94% - 100%)  CAPILLARY BLOOD GLUCOSE      POCT Blood Glucose.: 200 mg/dL (05 Nov 2022 16:33)  POCT Blood Glucose.: 185 mg/dL (05 Nov 2022 12:02)  POCT Blood Glucose.: 149 mg/dL (05 Nov 2022 05:28)  POCT Blood Glucose.: 134 mg/dL (04 Nov 2022 22:37)  POCT Blood Glucose.: 251 mg/dL (04 Nov 2022 17:19)      11-04-22 @ 07:01  -  11-05-22 @ 07:00  --------------------------------------------------------  IN: 0 mL / OUT: 1950 mL / NET: -1950 mL    11-05-22 @ 08:01  -  11-05-22 @ 17:03  --------------------------------------------------------  IN: 0 mL / OUT: 375 mL / NET: -375 mL      MEDICATIONS  (STANDING):  apixaban 2.5 milliGRAM(s) Oral two times a day  atorvastatin 40 milliGRAM(s) Oral at bedtime  chlorhexidine 2% Cloths 1 Application(s) Topical <User Schedule>  digoxin  Injectable 125 MICROGram(s) IV Push every other day  furosemide   Injectable 20 milliGRAM(s) IV Push daily  insulin lispro (ADMELOG) corrective regimen sliding scale   SubCutaneous every 6 hours  levothyroxine 75 MICROGram(s) Oral daily  pantoprazole    Tablet 40 milliGRAM(s) Oral before breakfast  piperacillin/tazobactam IVPB.. 3.375 Gram(s) IV Intermittent every 12 hours  tamsulosin 0.4 milliGRAM(s) Oral at bedtime    MEDICATIONS  (PRN):      PHYSICAL EXAM:  GENERAL: Alert, awake and oriented x2-3 in no distress  HEENT: JOSE ARMANDO, EOMI, neck supple, no JVP, no carotid bruit, oral mucosa moist and pink.  CHEST/LUNG: Bilateral decreased breath sounds, bibasilar rales and rhonchi  HEART: Regular rate and rhythm, FREIDA II/VI at LPSB, no gallops, no rub   ABDOMEN: Soft, nontender, non distended, bowel sounds present, no palpable organomegaly  : No flank or supra pubic tenderness.  EXTREMITIES: Peripheral pulses are palpable, no pedal edema  Neurology: AAOx2-3, no focal neurological deficit  SKIN: No rash or skin lesion  Musculoskeletal: No joint deformity or spinal tenderness.      Vascular ACCESS:     LABS:                        9.4    7.25  )-----------( 183      ( 05 Nov 2022 03:46 )             31.2     11-05    143  |  107  |  43<H>  ----------------------------<  176<H>  3.2<L>   |  31  |  1.78<H>    Ca    9.3      05 Nov 2022 03:46  Phos  2.3     11-05  Mg     2.0     11-05    TPro  5.7<L>  /  Alb  2.4<L>  /  TBili  0.8  /  DBili  x   /  AST  32  /  ALT  54  /  AlkPhos  216<H>  11-05                RADIOLOGY & ADDITIONAL STUDIES:  r< from: Xray Chest 1 View- PORTABLE-Routine (Xray Chest 1 View- PORTABLE-Routine .) (11.04.22 @ 13:08) >    ACC: [id number] EXAM:  XR CHEST PORTABLE ROUTINE 1V                          PROCEDURE DATE:  11/04/2022          INTERPRETATION:  Portable chest radiograph    CLINICAL INFORMATION: Dyspnea, shortness of breath.    TECHNIQUE:  Portable  AP chest radiograph.    COMPARISON: 11/3/2022 chest x-ray .    FINDINGS:  CATHETERS AND TUBES: None    PULMONARY: Suspect LEFT retrocardiac airspace consolidation and/or   effusion obscuring LEFT diaphragm contour. LEFT upper zone and visualized   RIGHT lung parenchyma grossly clear.  No pneumothorax.    HEART/VASCULAR: The  heart is enlarged in transverse diameter. Status   post coronary artery bypass graft procedure.  Cardiac device wire leads are within right atrium and right ventricle.  .    BONES: Visualized osseous structures are intact.    IMPRESSION:   Residual LEFT of retrocardiac airspace consolidation and/or   effusion..    --- End of Report ---            BHAVANI OCONNOR MD; Attending Radiologist  This document has been electronically signed. Nov 4 2022  3:13PM    < end of copied text >    Imaging Personally Reviewed:  [x] YES  [ ] NO    Consultant(s) Notes Reviewed:  [x] YES  [ ] NO    Care Discussed with Primary team/ Other Providers [x] YES  [ ] NO

## 2022-11-05 NOTE — PROGRESS NOTE ADULT - SUBJECTIVE AND OBJECTIVE BOX
INTERVAL HPI/OVERNIGHT EVENTS: No events overnight.     PRESSORS: [ ] YES [X] NO    Antimicrobial:  piperacillin/tazobactam IVPB.. 3.375 Gram(s) IV Intermittent every 12 hours    Cardiovascular:  digoxin  Injectable 125 MICROGram(s) IV Push every other day  furosemide   Injectable 20 milliGRAM(s) IV Push daily  norepinephrine Infusion 0.15 MICROgram(s)/kG/Min IV Continuous <Continuous>    Pulmonary:    Hematalogic:  enoxaparin Injectable 65 milliGRAM(s) SubCutaneous every 24 hours    Other:  atorvastatin 40 milliGRAM(s) Oral at bedtime  chlorhexidine 2% Cloths 1 Application(s) Topical <User Schedule>  dexMEDEtomidine Infusion 0.5 MICROgram(s)/kG/Hr IV Continuous <Continuous>  insulin lispro (ADMELOG) corrective regimen sliding scale   SubCutaneous every 6 hours  levothyroxine Injectable 56 MICROGram(s) IV Push at bedtime  pantoprazole  Injectable 40 milliGRAM(s) IV Push daily    atorvastatin 40 milliGRAM(s) Oral at bedtime  chlorhexidine 2% Cloths 1 Application(s) Topical <User Schedule>  dexMEDEtomidine Infusion 0.5 MICROgram(s)/kG/Hr IV Continuous <Continuous>  digoxin  Injectable 125 MICROGram(s) IV Push every other day  enoxaparin Injectable 65 milliGRAM(s) SubCutaneous every 24 hours  furosemide   Injectable 20 milliGRAM(s) IV Push daily  insulin lispro (ADMELOG) corrective regimen sliding scale   SubCutaneous every 6 hours  levothyroxine Injectable 56 MICROGram(s) IV Push at bedtime  norepinephrine Infusion 0.15 MICROgram(s)/kG/Min IV Continuous <Continuous>  pantoprazole  Injectable 40 milliGRAM(s) IV Push daily  piperacillin/tazobactam IVPB.. 3.375 Gram(s) IV Intermittent every 12 hours    Drug Dosing Weight  Height (cm): 160 (02 Nov 2022 16:34)  Weight (kg): 65.8 (28 Oct 2022 20:11)  BMI (kg/m2): 25.7 (02 Nov 2022 16:34)  BSA (m2): 1.69 (02 Nov 2022 16:34)    CENTRAL LINE: [ ] YES [X] NO  LOCATION:   DATE INSERTED:  REMOVE: [ ] YES [ ] NO  EXPLAIN:    BEE: [X] YES [ ] NO    DATE INSERTED:  REMOVE:  [ ] YES [ ] NO  EXPLAIN:    A-LINE:  [ ] YES [X] NO  LOCATION:   DATE INSERTED:  REMOVE:  [ ] YES [ ] NO  EXPLAIN:    PMH -reviewed admission note, no change since admission      ABG - ( 03 Nov 2022 03:29 )  pH, Arterial: 7.53  pH, Blood: x     /  pCO2: 38    /  pO2: 76    / HCO3: 32    / Base Excess: 8.5   /  SaO2: 97                    11-03 @ 07:01  -  11-04 @ 07:00  --------------------------------------------------------  IN: 276.6 mL / OUT: 1490 mL / NET: -1213.4 mL            PHYSICAL EXAM:    GENERAL:   HEAD:  Atraumatic, Normocephalic  ENMT: No tonsillar erythema, exudates, or enlargement; Moist mucous membranes, Good dentition, [ ]No lesions  NECK: Supple, normal appearance, No JVD; Normal thyroid; Trachea midline  NERVOUS SYSTEM:  Alert & Oriented X3, Good concentration; Motor Strength 5/5 B/L upper and lower extremities; DTRs 2+ intact and symmetric  CHEST/LUNG: No chest deformity; Normal percussion bilaterally; No rales, rhonchi, wheezing   HEART: Regular rate and rhythm; No murmurs, rubs, or gallops  ABDOMEN: Soft, Nontender, Nondistended;Bowel sounds present  EXTREMITIES:  2+ Peripheral Pulses, No clubbing, cyanosis, or edema  SKIN: No rashes or lesions; Good capillary refill      LABS:  CBC Full  -  ( 04 Nov 2022 03:40 )  WBC Count : 8.50 K/uL  RBC Count : 3.69 M/uL  Hemoglobin : 10.3 g/dL  Hematocrit : 34.3 %  Platelet Count - Automated : 161 K/uL  Mean Cell Volume : 93.0 fl  Mean Cell Hemoglobin : 27.9 pg  Mean Cell Hemoglobin Concentration : 30.0 gm/dL  Auto Neutrophil # : x  Auto Lymphocyte # : x  Auto Monocyte # : x  Auto Eosinophil # : x  Auto Basophil # : x  Auto Neutrophil % : x  Auto Lymphocyte % : x  Auto Monocyte % : x  Auto Eosinophil % : x  Auto Basophil % : x    11-04    144  |  109<H>  |  37<H>  ----------------------------<  155<H>  3.8   |  29  |  1.74<H>    Ca    9.6      04 Nov 2022 03:40  Phos  3.3     11-04  Mg     2.0     11-04    TPro  5.8<L>  /  Alb  2.4<L>  /  TBili  0.8  /  DBili  x   /  AST  43<H>  /  ALT  70<H>  /  AlkPhos  271<H>  11-04    PTT - ( 03 Nov 2022 04:00 )  PTT:65.1 sec    Culture Results:   Normal Respiratory Juanita present (11-03 @ 14:30)  Culture Results:   No growth (11-02 @ 18:00)  Culture Results:   Testing in progress (11-02 @ 18:00)      RADIOLOGY & ADDITIONAL STUDIES REVIEWED:  ***    [ ]GOALS OF CARE DISCUSSION WITH PATIENT/FAMILY/PROXY:    CRITICAL CARE TIME SPENT: 35 minutes INTERVAL HPI/OVERNIGHT EVENTS: No events overnight.     PRESSORS: [ ] YES [X] NO    Antimicrobial:  piperacillin/tazobactam IVPB.. 3.375 Gram(s) IV Intermittent every 12 hours    Cardiovascular:  digoxin  Injectable 125 MICROGram(s) IV Push every other day  furosemide   Injectable 20 milliGRAM(s) IV Push daily  norepinephrine Infusion 0.15 MICROgram(s)/kG/Min IV Continuous <Continuous>    Pulmonary:    Hematalogic:  enoxaparin Injectable 65 milliGRAM(s) SubCutaneous every 24 hours    Other:  atorvastatin 40 milliGRAM(s) Oral at bedtime  chlorhexidine 2% Cloths 1 Application(s) Topical <User Schedule>  dexMEDEtomidine Infusion 0.5 MICROgram(s)/kG/Hr IV Continuous <Continuous>  insulin lispro (ADMELOG) corrective regimen sliding scale   SubCutaneous every 6 hours  levothyroxine Injectable 56 MICROGram(s) IV Push at bedtime  pantoprazole  Injectable 40 milliGRAM(s) IV Push daily    atorvastatin 40 milliGRAM(s) Oral at bedtime  chlorhexidine 2% Cloths 1 Application(s) Topical <User Schedule>  dexMEDEtomidine Infusion 0.5 MICROgram(s)/kG/Hr IV Continuous <Continuous>  digoxin  Injectable 125 MICROGram(s) IV Push every other day  enoxaparin Injectable 65 milliGRAM(s) SubCutaneous every 24 hours  furosemide   Injectable 20 milliGRAM(s) IV Push daily  insulin lispro (ADMELOG) corrective regimen sliding scale   SubCutaneous every 6 hours  levothyroxine Injectable 56 MICROGram(s) IV Push at bedtime  norepinephrine Infusion 0.15 MICROgram(s)/kG/Min IV Continuous <Continuous>  pantoprazole  Injectable 40 milliGRAM(s) IV Push daily  piperacillin/tazobactam IVPB.. 3.375 Gram(s) IV Intermittent every 12 hours    Drug Dosing Weight  Height (cm): 160 (02 Nov 2022 16:34)  Weight (kg): 65.8 (28 Oct 2022 20:11)  BMI (kg/m2): 25.7 (02 Nov 2022 16:34)  BSA (m2): 1.69 (02 Nov 2022 16:34)    CENTRAL LINE: [ ] YES [X] NO  LOCATION:   DATE INSERTED:  REMOVE: [ ] YES [ ] NO  EXPLAIN:    BEE: [X] YES [ ] NO    DATE INSERTED:  REMOVE:  [ ] YES [ ] NO  EXPLAIN:    A-LINE:  [ ] YES [X] NO  LOCATION:   DATE INSERTED:  REMOVE:  [ ] YES [ ] NO  EXPLAIN:    PMH -reviewed admission note, no change since admission      ABG - ( 03 Nov 2022 03:29 )  pH, Arterial: 7.53  pH, Blood: x     /  pCO2: 38    /  pO2: 76    / HCO3: 32    / Base Excess: 8.5   /  SaO2: 97                    11-03 @ 07:01  -  11-04 @ 07:00  --------------------------------------------------------  IN: 276.6 mL / OUT: 1490 mL / NET: -1213.4 mL            PHYSICAL EXAM:  GENERAL: NAD  EYES: EOMI, PERRLA  NECK: Supple, No JVD; Normal thyroid; Trachea midline: No LAD   NERVOUS SYSTEM:  Alert & Oriented X2-3,  Motor Strength 5/5 B/L upper and lower extremities  CHEST/LUNG: rhonchi bilaterally  HEART: Regular rate and rhythm; No murmurs, no gallops  ABDOMEN: Soft, Nontender, Nondistended; Bowel sounds present, no pain or masses on palpation  : voiding well, Bee in place  EXTREMITIES:  2+ Peripheral Pulses, No clubbing, cyanosis, or edema  SKIN: warm, intact, no lesions         LABS:  CBC Full  -  ( 04 Nov 2022 03:40 )  WBC Count : 8.50 K/uL  RBC Count : 3.69 M/uL  Hemoglobin : 10.3 g/dL  Hematocrit : 34.3 %  Platelet Count - Automated : 161 K/uL  Mean Cell Volume : 93.0 fl  Mean Cell Hemoglobin : 27.9 pg  Mean Cell Hemoglobin Concentration : 30.0 gm/dL  Auto Neutrophil # : x  Auto Lymphocyte # : x  Auto Monocyte # : x  Auto Eosinophil # : x  Auto Basophil # : x  Auto Neutrophil % : x  Auto Lymphocyte % : x  Auto Monocyte % : x  Auto Eosinophil % : x  Auto Basophil % : x    11-04    144  |  109<H>  |  37<H>  ----------------------------<  155<H>  3.8   |  29  |  1.74<H>    Ca    9.6      04 Nov 2022 03:40  Phos  3.3     11-04  Mg     2.0     11-04    TPro  5.8<L>  /  Alb  2.4<L>  /  TBili  0.8  /  DBili  x   /  AST  43<H>  /  ALT  70<H>  /  AlkPhos  271<H>  11-04    PTT - ( 03 Nov 2022 04:00 )  PTT:65.1 sec    Culture Results:   Normal Respiratory Juanita present (11-03 @ 14:30)  Culture Results:   No growth (11-02 @ 18:00)  Culture Results:   Testing in progress (11-02 @ 18:00)      RADIOLOGY & ADDITIONAL STUDIES REVIEWED:  ***    [ ]GOALS OF CARE DISCUSSION WITH PATIENT/FAMILY/PROXY:    CRITICAL CARE TIME SPENT: 35 minutes INTERVAL HPI/OVERNIGHT EVENTS: No events overnight.     PRESSORS: [ ] YES [X] NO    Antimicrobial:  piperacillin/tazobactam IVPB.. 3.375 Gram(s) IV Intermittent every 12 hours    Cardiovascular:  digoxin  Injectable 125 MICROGram(s) IV Push every other day  furosemide   Injectable 20 milliGRAM(s) IV Push daily  norepinephrine Infusion 0.15 MICROgram(s)/kG/Min IV Continuous <Continuous>    Pulmonary:    Hematalogic:  enoxaparin Injectable 65 milliGRAM(s) SubCutaneous every 24 hours    Other:  atorvastatin 40 milliGRAM(s) Oral at bedtime  chlorhexidine 2% Cloths 1 Application(s) Topical <User Schedule>  dexMEDEtomidine Infusion 0.5 MICROgram(s)/kG/Hr IV Continuous <Continuous>  insulin lispro (ADMELOG) corrective regimen sliding scale   SubCutaneous every 6 hours  levothyroxine Injectable 56 MICROGram(s) IV Push at bedtime  pantoprazole  Injectable 40 milliGRAM(s) IV Push daily    atorvastatin 40 milliGRAM(s) Oral at bedtime  chlorhexidine 2% Cloths 1 Application(s) Topical <User Schedule>  dexMEDEtomidine Infusion 0.5 MICROgram(s)/kG/Hr IV Continuous <Continuous>  digoxin  Injectable 125 MICROGram(s) IV Push every other day  enoxaparin Injectable 65 milliGRAM(s) SubCutaneous every 24 hours  furosemide   Injectable 20 milliGRAM(s) IV Push daily  insulin lispro (ADMELOG) corrective regimen sliding scale   SubCutaneous every 6 hours  levothyroxine Injectable 56 MICROGram(s) IV Push at bedtime  norepinephrine Infusion 0.15 MICROgram(s)/kG/Min IV Continuous <Continuous>  pantoprazole  Injectable 40 milliGRAM(s) IV Push daily  piperacillin/tazobactam IVPB.. 3.375 Gram(s) IV Intermittent every 12 hours    Drug Dosing Weight  Height (cm): 160 (02 Nov 2022 16:34)  Weight (kg): 65.8 (28 Oct 2022 20:11)  BMI (kg/m2): 25.7 (02 Nov 2022 16:34)  BSA (m2): 1.69 (02 Nov 2022 16:34)    CENTRAL LINE: [ ] YES [X] NO  LOCATION:   DATE INSERTED:  REMOVE: [ ] YES [ ] NO  EXPLAIN:    BEE: [X] YES [ ] NO    DATE INSERTED:  REMOVE:  [ ] YES [ ] NO  EXPLAIN:    A-LINE:  [ ] YES [X] NO  LOCATION:   DATE INSERTED:  REMOVE:  [ ] YES [ ] NO  EXPLAIN:    PMH -reviewed admission note, no change since admission      ABG - ( 03 Nov 2022 03:29 )  pH, Arterial: 7.53  pH, Blood: x     /  pCO2: 38    /  pO2: 76    / HCO3: 32    / Base Excess: 8.5   /  SaO2: 97                    11-03 @ 07:01  -  11-04 @ 07:00  --------------------------------------------------------  IN: 276.6 mL / OUT: 1490 mL / NET: -1213.4 mL            PHYSICAL EXAM:  GENERAL: NAD  EYES: EOMI, PERRLA  NECK: Supple, No JVD; Normal thyroid; Trachea midline: No LAD   NERVOUS SYSTEM:  Alert & Oriented X1  Motor Strength 5/5 B/L upper and lower extremities  CHEST/LUNG: rhonchi bilaterally  HEART: Regular rate and rhythm; No murmurs, no gallops  ABDOMEN: Soft, Nontender, Nondistended; Bowel sounds present, no pain or masses on palpation  : voiding well, Bee in place  EXTREMITIES:  2+ Peripheral Pulses, No clubbing, cyanosis, or edema  SKIN: warm, intact, no lesions         LABS:  CBC Full  -  ( 04 Nov 2022 03:40 )  WBC Count : 8.50 K/uL  RBC Count : 3.69 M/uL  Hemoglobin : 10.3 g/dL  Hematocrit : 34.3 %  Platelet Count - Automated : 161 K/uL  Mean Cell Volume : 93.0 fl  Mean Cell Hemoglobin : 27.9 pg  Mean Cell Hemoglobin Concentration : 30.0 gm/dL  Auto Neutrophil # : x  Auto Lymphocyte # : x  Auto Monocyte # : x  Auto Eosinophil # : x  Auto Basophil # : x  Auto Neutrophil % : x  Auto Lymphocyte % : x  Auto Monocyte % : x  Auto Eosinophil % : x  Auto Basophil % : x    11-04    144  |  109<H>  |  37<H>  ----------------------------<  155<H>  3.8   |  29  |  1.74<H>    Ca    9.6      04 Nov 2022 03:40  Phos  3.3     11-04  Mg     2.0     11-04    TPro  5.8<L>  /  Alb  2.4<L>  /  TBili  0.8  /  DBili  x   /  AST  43<H>  /  ALT  70<H>  /  AlkPhos  271<H>  11-04    PTT - ( 03 Nov 2022 04:00 )  PTT:65.1 sec    Culture Results:   Normal Respiratory Juanita present (11-03 @ 14:30)  Culture Results:   No growth (11-02 @ 18:00)  Culture Results:   Testing in progress (11-02 @ 18:00)      RADIOLOGY & ADDITIONAL STUDIES REVIEWED:  ***    [ ]GOALS OF CARE DISCUSSION WITH PATIENT/FAMILY/PROXY:    CRITICAL CARE TIME SPENT: 35 minutes INTERVAL HPI/OVERNIGHT EVENTS: No events overnight.     PRESSORS: [ ] YES [X] NO    Antimicrobial:  piperacillin/tazobactam IVPB.. 3.375 Gram(s) IV Intermittent every 12 hours    Cardiovascular:  digoxin  Injectable 125 MICROGram(s) IV Push every other day  furosemide   Injectable 20 milliGRAM(s) IV Push daily  norepinephrine Infusion 0.15 MICROgram(s)/kG/Min IV Continuous <Continuous>    Pulmonary:    Hematalogic:  enoxaparin Injectable 65 milliGRAM(s) SubCutaneous every 24 hours    Other:  atorvastatin 40 milliGRAM(s) Oral at bedtime  chlorhexidine 2% Cloths 1 Application(s) Topical <User Schedule>  dexMEDEtomidine Infusion 0.5 MICROgram(s)/kG/Hr IV Continuous <Continuous>  insulin lispro (ADMELOG) corrective regimen sliding scale   SubCutaneous every 6 hours  levothyroxine Injectable 56 MICROGram(s) IV Push at bedtime  pantoprazole  Injectable 40 milliGRAM(s) IV Push daily    atorvastatin 40 milliGRAM(s) Oral at bedtime  chlorhexidine 2% Cloths 1 Application(s) Topical <User Schedule>  dexMEDEtomidine Infusion 0.5 MICROgram(s)/kG/Hr IV Continuous <Continuous>  digoxin  Injectable 125 MICROGram(s) IV Push every other day  enoxaparin Injectable 65 milliGRAM(s) SubCutaneous every 24 hours  furosemide   Injectable 20 milliGRAM(s) IV Push daily  insulin lispro (ADMELOG) corrective regimen sliding scale   SubCutaneous every 6 hours  levothyroxine Injectable 56 MICROGram(s) IV Push at bedtime  norepinephrine Infusion 0.15 MICROgram(s)/kG/Min IV Continuous <Continuous>  pantoprazole  Injectable 40 milliGRAM(s) IV Push daily  piperacillin/tazobactam IVPB.. 3.375 Gram(s) IV Intermittent every 12 hours    Drug Dosing Weight  Height (cm): 160 (02 Nov 2022 16:34)  Weight (kg): 65.8 (28 Oct 2022 20:11)  BMI (kg/m2): 25.7 (02 Nov 2022 16:34)  BSA (m2): 1.69 (02 Nov 2022 16:34)    CENTRAL LINE: [ ] YES [X] NO  LOCATION:   DATE INSERTED:  REMOVE: [ ] YES [ ] NO  EXPLAIN:    BEE: [X] YES [ ] NO    DATE INSERTED:  REMOVE:  [ ] YES [ ] NO  EXPLAIN:    A-LINE:  [ ] YES [X] NO  LOCATION:   DATE INSERTED:  REMOVE:  [ ] YES [ ] NO  EXPLAIN:    PMH -reviewed admission note, no change since admission      ABG - ( 03 Nov 2022 03:29 )  pH, Arterial: 7.53  pH, Blood: x     /  pCO2: 38    /  pO2: 76    / HCO3: 32    / Base Excess: 8.5   /  SaO2: 97                11-03 @ 07:01  -  11-04 @ 07:00  --------------------------------------------------------  IN: 276.6 mL / OUT: 1490 mL / NET: -1213.4 mL    PHYSICAL EXAM:  GENERAL: NAD  EYES: EOMI, PERRLA  NECK: Supple, No JVD; Normal thyroid; Trachea midline: No LAD   NERVOUS SYSTEM:  Alert & Oriented X1  Motor Strength 5/5 B/L upper and lower extremities  CHEST/LUNG: rhonchi bilaterally  HEART: Regular rate and rhythm; No murmurs, no gallops  ABDOMEN: Soft, Nontender, Nondistended; Bowel sounds present, no pain or masses on palpation  : voiding well, Bee in place  EXTREMITIES:  2+ Peripheral Pulses, No clubbing, cyanosis, or edema  SKIN: warm, intact, no lesions     LABS:  CBC Full  -  ( 04 Nov 2022 03:40 )  WBC Count : 8.50 K/uL  RBC Count : 3.69 M/uL  Hemoglobin : 10.3 g/dL  Hematocrit : 34.3 %  Platelet Count - Automated : 161 K/uL  Mean Cell Volume : 93.0 fl  Mean Cell Hemoglobin : 27.9 pg  Mean Cell Hemoglobin Concentration : 30.0 gm/dL  Auto Neutrophil # : x  Auto Lymphocyte # : x  Auto Monocyte # : x  Auto Eosinophil # : x  Auto Basophil # : x  Auto Neutrophil % : x  Auto Lymphocyte % : x  Auto Monocyte % : x  Auto Eosinophil % : x  Auto Basophil % : x    11-04    144  |  109<H>  |  37<H>  ----------------------------<  155<H>  3.8   |  29  |  1.74<H>    Ca    9.6      04 Nov 2022 03:40  Phos  3.3     11-04  Mg     2.0     11-04    TPro  5.8<L>  /  Alb  2.4<L>  /  TBili  0.8  /  DBili  x   /  AST  43<H>  /  ALT  70<H>  /  AlkPhos  271<H>  11-04    PTT - ( 03 Nov 2022 04:00 )  PTT:65.1 sec    Culture Results:   Normal Respiratory Juanita present (11-03 @ 14:30)  Culture Results:   No growth (11-02 @ 18:00)  Culture Results:   Testing in progress (11-02 @ 18:00)      RADIOLOGY & ADDITIONAL STUDIES REVIEWED:      [ ]GOALS OF CARE DISCUSSION WITH PATIENT/FAMILY/PROXY:    CRITICAL CARE TIME SPENT: 35 minutes

## 2022-11-05 NOTE — PROGRESS NOTE ADULT - SUBJECTIVE AND OBJECTIVE BOX
CHIEF COMPLAINT:Patient is a 89y old  Male who presents with a chief complaint of Fall.Pt appears comfortable.    	  REVIEW OF SYSTEMS:  CONSTITUTIONAL: No fever, weight loss, or fatigue  EYES: No eye pain, visual disturbances, or discharge  ENT:  No difficulty hearing, tinnitus, vertigo; No sinus or throat pain  NECK: No pain or stiffness  RESPIRATORY: No cough, wheezing, chills or hemoptysis; No Shortness of Breath  CARDIOVASCULAR: No chest pain, palpitations, passing out, dizziness, or leg swelling  GASTROINTESTINAL: No abdominal or epigastric pain. No nausea, vomiting, or hematemesis; No diarrhea or constipation. No melena or hematochezia.  GENITOURINARY: No dysuria, frequency, hematuria, or incontinence  NEUROLOGICAL: No headaches, memory loss, loss of strength, numbness, or tremors  SKIN: No itching, burning, rashes, or lesions   LYMPH Nodes: No enlarged glands  ENDOCRINE: No heat or cold intolerance; No hair loss  MUSCULOSKELETAL: No joint pain or swelling; No muscle, back, or extremity pain  PSYCHIATRIC: No depression, anxiety, mood swings, or difficulty sleeping  HEME/LYMPH: No easy bruising, or bleeding gums  ALLERGY AND IMMUNOLOGIC: No hives or eczema	        PHYSICAL EXAM:  T(C): 36.9 (11-05-22 @ 12:00), Max: 37.2 (11-05-22 @ 00:00)  HR: 70 (11-05-22 @ 12:00) (70 - 81)  BP: 119/55 (11-05-22 @ 12:00) (86/44 - 137/69)  RR: 18 (11-05-22 @ 12:00) (14 - 26)  SpO2: 100% (11-05-22 @ 12:00) (94% - 100%)  Wt(kg): --  I&O's Summary    04 Nov 2022 07:01  -  05 Nov 2022 07:00  --------------------------------------------------------  IN: 0 mL / OUT: 1950 mL / NET: -1950 mL    05 Nov 2022 08:01  -  05 Nov 2022 13:22  --------------------------------------------------------  IN: 0 mL / OUT: 375 mL / NET: -375 mL        Appearance: Normal	  HEENT:   Normal oral mucosa, PERRL, EOMI	  Lymphatic: No lymphadenopathy  Cardiovascular: Normal S1 S2, No JVD, No murmurs, No edema  Respiratory: B/L [facility]	  Psychiatry: A & O x 3, Mood & affect appropriate  Gastrointestinal:  Soft, Non-tender, + BS	  Skin: No rashes, No ecchymoses, No cyanosis	  Neurologic: Non-focal  Extremities: Normal range of motion, No clubbing, cyanosis or edema  Vascular: Peripheral pulses palpable 2+ bilaterally    MEDICATIONS  (STANDING):  apixaban 2.5 milliGRAM(s) Oral two times a day  atorvastatin 40 milliGRAM(s) Oral at bedtime  chlorhexidine 2% Cloths 1 Application(s) Topical <User Schedule>  digoxin  Injectable 125 MICROGram(s) IV Push every other day  furosemide   Injectable 20 milliGRAM(s) IV Push daily  insulin lispro (ADMELOG) corrective regimen sliding scale   SubCutaneous every 6 hours  levothyroxine 75 MICROGram(s) Oral daily  pantoprazole    Tablet 40 milliGRAM(s) Oral before breakfast  piperacillin/tazobactam IVPB.. 3.375 Gram(s) IV Intermittent every 12 hours  tamsulosin 0.4 milliGRAM(s) Oral at bedtime      TELEMETRY: 	  paced   	  	  LABS:	 	    Troponin I, High Sensitivity Result: 1485.6 ng/L (11-03 @ 05:30)  Troponin I, High Sensitivity Result: 847.6 ng/L (11-03 @ 04:00)  Troponin I, High Sensitivity Result: 1644.4 ng/L (11-02 @ 21:11)  Troponin I, High Sensitivity Result: 1406.8 ng/L (11-02 @ 13:29)                            9.4    7.25  )-----------( 183      ( 05 Nov 2022 03:46 )             31.2     11-05    143  |  107  |  43<H>  ----------------------------<  176<H>  3.2<L>   |  31  |  1.78<H>    Ca    9.3      05 Nov 2022 03:46  Phos  2.3     11-05  Mg     2.0     11-05    TPro  5.7<L>  /  Alb  2.4<L>  /  TBili  0.8  /  DBili  x   /  AST  32  /  ALT  54  /  AlkPhos  216<H>  11-05    proBNP: Serum Pro-Brain Natriuretic Peptide: 49055 pg/mL (10-29 @ 12:55)  Serum Pro-Brain Natriuretic Peptide: 58034 pg/mL (10-28 @ 19:36)      TSH: Thyroid Stimulating Hormone, Serum: 6.68 uU/mL (10-29 @ 10:41)

## 2022-11-05 NOTE — PROGRESS NOTE ADULT - ASSESSMENT
89 yo M with pmhx of hyperlipidemia, diabetes, ?atrial fibrillation on Eliquis, PPM presents with generalized weakness x 2 days. Found to be in cardiogenic shock, atrial tachyarrhythmia , and PPM-driven tachycardia resolved s/p pacemaker interrogation and re-programming. DG to Medicine 11/1. CODE BLUE 11/2 after suspected aspiration event.    #Cardiogenic shock  #Atrial tachyarrhythmia   #PPM mode adjustment   #AHRF 2/2 to new onset CHF vs fluid overload   #HLD  #Diabetes   #LIGIA   #Hypothyroid  #Urinary retention     PLAN:    -----------------CNS:------------------  CTH was negative for any acute abnormalities   patient is now A/O x3    -----------------CVS:------------------  # s/o Cardiac arrest  # Cardiogenic shock  #NSTEMI  down time ~8 min, s/p 2x Epi  - re-check tele pre - code to ensure PPM not malfunctioning  - peripheral levo titrated down, patient now on minimal dose 0.01mcg  - troponin peaked at 1644, now > 847 > 1485  Cardiology Dr. Dominguez  TTE 10/30 : EF=40-45%.  -Will start Lovenox 1mg/kg qd due to LIGIA    #Atrial tachyarrhythmia   #PPM-driven tachycardia.  On Eliquis and Lopressor at home, however family says no history of AFib?  Presented with AFib/Flutter/Tach with RVR to 140s.   Received Amio and Digoxin without improvement.   PPM interrogated, was sensing atrial rate, mode switched by EP to DDIR with resolution.   C/W digoxin   restarted Lasix, and Lovenox 1mg/kg QD      -----------------RESPIRATORY:--------  #Right lung infiltrate, c/f Aspiration  - started zosyn  - sputum culture sent, result pending  successfully extubated 11/3/2022      #Right lung effusion  -chest tube right drained 1800ml  - chest tube removed  - pleural fluid transudative     -----------------GI:----------------------  #Diarrhea  - resolved    #Nutrition  -S/S eval pureed diet       -----------------ID:-----------------------  #suspect aspiration w/ risk to develop PNA  - started zosyn 11/2, q 12 hrs  -approval by Dr Leblanc  -c/w abx    -----------------RENAL: ---------------  Oliguric Pre-Renal Acute Renal Failure  Cr was around 1.5 when out of ICU, now 1.85 post resuscitation   - avoid nephrotoxic agents  -levophed for BP support  -trend BUN/Cr    #Hematuria:  urology following  hb is stable  Resolved     -----------------HEME/ONC: ---------------  Anemia  - hgb 9.6 > 10.4  - transfuse if hgb<7  - monitor cbc     -----------------ENDO: ---------------  Hypothyroidism  - TSH noted to be 6.6  - continue with levothyroxine     Diabetes  - SSI     -----------------EXTREMITIES:-------  No acute issues     -----------------PROPHYLAXIS: -------  DVT: on Lovenox 1mg/kg QD  PPI: pantoprazole 40mg IVP BID    -----------------DISPOSITION--------  ICU    89 yo M with pmhx of hyperlipidemia, diabetes, ?atrial fibrillation on Eliquis, PPM presents with generalized weakness x 2 days. Found to be in cardiogenic shock, atrial tachyarrhythmia , and PPM-driven tachycardia resolved s/p pacemaker interrogation and re-programming. DG to Medicine 11/1. CODE BLUE 11/2 after suspected aspiration event.    #Cardiogenic shock  #Atrial tachyarrhythmia   #PPM mode adjustment   #AHRF 2/2 to new onset CHF vs fluid overload   #HLD  #Diabetes   #LIGIA   #Hypothyroid  #Urinary retention     PLAN:    -----------------CNS:------------------  CTH was negative for any acute abnormalities   patient is now A/O x3    -----------------CVS:------------------  # s/o Cardiac arrest  # Cardiogenic shock  #NSTEMI  down time ~8 min, s/p 2x Epi  - re-check tele pre - code to ensure PPM not malfunctioning  - peripheral levo titrated down, patient now on minimal dose 0.01mcg  - troponin peaked at 1644, now > 847 > 1485  Cardiology Dr. Dominguez  TTE 10/30 : EF=40-45%.  -Will start Lovenox 1mg/kg qd due to LIGIA    #Atrial tachyarrhythmia   #PPM-driven tachycardia.  On Eliquis and Lopressor at home, however family says no history of AFib?  Presented with AFib/Flutter/Tach with RVR to 140s.   Received Amio and Digoxin without improvement.   PPM interrogated, was sensing atrial rate, mode switched by EP to DDIR with resolution.   C/W digoxin   restarted Lasix, and Lovenox 1mg/kg QD      -----------------RESPIRATORY:--------  #Right lung infiltrate, c/f Aspiration  - started zosyn  - sputum culture sent, result pending  successfully extubated 11/3/2022      #Right lung effusion  -chest tube right drained 1800ml  - chest tube removed  - pleural fluid transudative     -----------------GI:----------------------  #Diarrhea  - resolved    #Nutrition  -S/S eval pureed diet       -----------------ID:-----------------------  #suspect aspiration w/ risk to develop PNA  - started zosyn 11/2, q 12 hrs  -approval by Dr Leblanc  -c/w abx    -----------------RENAL: ---------------  Oliguric Pre-Renal Acute Renal Failure  Cr was around 1.5 when out of ICU, now 1.85 post resuscitation   - avoid nephrotoxic agents  -levophed for BP support  -trend BUN/Cr    #Hematuria:  urology following  hb is stable  Resolved     -----------------HEME/ONC: ---------------  Anemia  - hgb 9.6 > 10.4  - transfuse if hgb<7  - monitor cbc     -----------------ENDO: ---------------  Hypothyroidism  - TSH noted to be 6.6  - continue with levothyroxine     Diabetes  - SSI     -----------------EXTREMITIES:-------  No acute issues     -----------------PROPHYLAXIS: -------  DVT: on Lovenox 1mg/kg QD  PPI: pantoprazole 40mg IVP BID    -----------------DISPOSITION--------  ICU    89 yo M with pmhx of hyperlipidemia, diabetes, ?atrial fibrillation on Eliquis, PPM presents with generalized weakness x 2 days. Found to be in cardiogenic shock, atrial tachyarrhythmia , and PPM-driven tachycardia resolved s/p pacemaker interrogation and re-programming. DG to Medicine 11/1. CODE BLUE 11/2 after suspected aspiration event with ROSC, had large right sided pleural effusion status post chest tube placement, now extubated     Active Issues  #Cardiac Arrest with ROSC  Pleural Effusion status post chest tube placement and removal  #Cardiogenic shock  #Atrial tachyarrhythmia   #PPM mode adjustment   #AHRF 2/2 to new onset CHF vs fluid overload   #HLD  #Diabetes   #LIGIA   #Hypothyroid  #Urinary retention     PLAN:    -----------------CNS:------------------  CTH was negative for any acute abnormalities   patient is now at baseline mentation per family  Monitor for ICU delirum    -----------------CVS:------------------  # S/p Cardiac arrest  # Cardiogenic shock  # NSTEMI  - Cardiac arrest 11/3. Down time ~8 min, s/p 2x Epi  - Initially on pressors, now weaned off.   - troponin peaked at 1644, now > 847 > 1485  - Cardiology Dr. Dominguez  - TTE 10/30 : EF=40-45%.     #Atrial tachyarrhythmia   #PPM-driven tachycardia.  - On Eliquis and Lopressor at home, however family says no history of AFib?  - Presented with AFib/Flutter/Tach with RVR to 140s.   - Received Amio and Digoxin without improvement.   - PPM interrogated, was sensing atrial rate, mode switched by EP to DDIR with resolution.   - Continue with Digoxin and Eliquis.     -----------------RESPIRATORY:--------  #Aspiration Event  #AHRF  - Consolidation on x-ray, likely x-ray.   - On Zosyn 11/3 - 10.   - sputum culture sent, result pending  - Successfully extubated to room air 11/3    #Right lung effusion  - Chest tube right drained 1800ml  - Chest tube removed 11/4.   - Pleural fluid transudative     -----------------GI:----------------------  #Diarrhea  - resolved    #Nutrition  -S/S eval pureed diet   - Aspiration precautions.     -----------------ID:-----------------------  #Aspiration Event  - Zosyn 11/3 - 10.   - Dr Leblanc    -----------------RENAL: ---------------  #Oliguric Pre-Renal Acute Renal Failure  - Cr was around 1.5 when out of ICU, now 1.85 post resuscitation   - Avoid nephrotoxic agent  - Trend BUN/Cr    #Hematuria: (resolved)  - Gonzalez placed upon admission, noted to have mild hematuria.  - Gonzalez removed 11/5 and passed TOV.       -----------------HEME/ONC: ---------------  Anemia  - hgb 9.6 > 10.4  - transfuse if hgb<7  - monitor cbc     -----------------ENDO: ---------------  Hypothyroidism  - TSH noted to be 6.6  - continue with levothyroxine     Diabetes  - SSI     -----------------EXTREMITIES:-------  No acute issues     -----------------PROPHYLAXIS: -------  DVT: on Lovenox 1mg/kg QD  PPI: pantoprazole 40mg IVP BID    -----------------DISPOSITION--------  ICU   Ongoing GOC

## 2022-11-05 NOTE — PROGRESS NOTE ADULT - ASSESSMENT
89 yo M with pmhx of CAD-s/p CABG,s/p PPM, hyperlipidemia, diabetes,Parox atrial fibrillation on Eliquis,Renal cell Ca-s/p partial nephrectomy ,Hypothyroid who  presents with generalized weakness x 2 day, now in ICU with afib with RVR and hypotension with acute systolic HF, s/p PEA arrest due to aspiration pneumonia,Type II MI due to demand ischemia,acute anemia..  1.ICU monitoring.  2.Aspiration pneumonia abx.  3.PAF-change lovenox to eliquis 2.5mg bid.Dig qod.  4.DM-Insulin.  5.PPI.  6.AKII-f/u lytes.Renal f/u.Gentle diuresis.  7.Hypothyroid-synthroid.  8.Type II MI due to demand ischemia.D/W son at bedside will treat medically,no further cardiac testing.  9.GI prophylaxis.

## 2022-11-05 NOTE — PROGRESS NOTE ADULT - ASSESSMENT
This is a 89 yo M with pmhx of hyperlipidemia, diabetes, atrial fibrillation on Eliquis presents with generalized weakness x 2 days. Patient states that he was sitting on the couch yesterday when he sat up then fell on the ground. The episode was witnessed by his wife. He doesn't believe he lost consciousness or if he hit his head. He does mentions having 3 episodes of watery, nonbloody diarrhea for the past few days. 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 and Cardio-renal disease with volume  overload  2) Atrial fibrillation/A/flutter s/p cardiogenic shock with systolic/diastolic CHF exacerbation with volume overload with improvement   3) Electrolytes disorders with hypokalemia  4) Hypoalbuminemia/malnutrition  5) Anemia of chronic renal disease  6) History of RCC with Partial Nephrectomy  7) Hypothyroidism  8) Acute hypoxic respiratory failure off ventilatory support  12) S/p Cardiac arrest s/p CPR with asystole    Recommend:  Patient critically ill in ICU now off ventilatory support and IV pressors  Strict I/o  Avoid Nephrotoxic agents  S cr 1.7 stable with non oliguria  Maintain MAP>65-70 mm hg  Transfuse PRBC with goal Hgb>7  Adjust antibiotics as per renal dose adjustment with IV Zosyn for aspiration pneumonia  Optimal CHF treatment per primary/cardiology team  Replete electrolytes with goal K>4 and <5, Mag>2 and Phos 2.5 to 3.5   AC/Antiplatelet agents per primary/cardiology team  Monitor BMP/electrolytes daily  No urgent need for RRT/HD  Will follow

## 2022-11-06 LAB
ALBUMIN SERPL ELPH-MCNC: 2.3 G/DL — LOW (ref 3.5–5)
ALP SERPL-CCNC: 182 U/L — HIGH (ref 40–120)
ALT FLD-CCNC: 46 U/L DA — SIGNIFICANT CHANGE UP (ref 10–60)
ANION GAP SERPL CALC-SCNC: 7 MMOL/L — SIGNIFICANT CHANGE UP (ref 5–17)
ANION GAP SERPL CALC-SCNC: 9 MMOL/L — SIGNIFICANT CHANGE UP (ref 5–17)
AST SERPL-CCNC: 33 U/L — SIGNIFICANT CHANGE UP (ref 10–40)
BILIRUB SERPL-MCNC: 0.8 MG/DL — SIGNIFICANT CHANGE UP (ref 0.2–1.2)
BUN SERPL-MCNC: 35 MG/DL — HIGH (ref 7–18)
BUN SERPL-MCNC: 38 MG/DL — HIGH (ref 7–18)
CALCIUM SERPL-MCNC: 8.8 MG/DL — SIGNIFICANT CHANGE UP (ref 8.4–10.5)
CALCIUM SERPL-MCNC: 9.2 MG/DL — SIGNIFICANT CHANGE UP (ref 8.4–10.5)
CHLORIDE SERPL-SCNC: 105 MMOL/L — SIGNIFICANT CHANGE UP (ref 96–108)
CHLORIDE SERPL-SCNC: 110 MMOL/L — HIGH (ref 96–108)
CO2 SERPL-SCNC: 27 MMOL/L — SIGNIFICANT CHANGE UP (ref 22–31)
CO2 SERPL-SCNC: 28 MMOL/L — SIGNIFICANT CHANGE UP (ref 22–31)
CREAT SERPL-MCNC: 1.66 MG/DL — HIGH (ref 0.5–1.3)
CREAT SERPL-MCNC: 1.86 MG/DL — HIGH (ref 0.5–1.3)
DIGOXIN SERPL-MCNC: 1 NG/ML — SIGNIFICANT CHANGE UP (ref 0.8–2)
EGFR: 33 ML/MIN/1.73M2 — LOW
EGFR: 38 ML/MIN/1.73M2 — LOW
GLUCOSE SERPL-MCNC: 123 MG/DL — HIGH (ref 70–99)
GLUCOSE SERPL-MCNC: 252 MG/DL — HIGH (ref 70–99)
HCT VFR BLD CALC: 30 % — LOW (ref 39–50)
HGB BLD-MCNC: 8.9 G/DL — LOW (ref 13–17)
MAGNESIUM SERPL-MCNC: 1.9 MG/DL — SIGNIFICANT CHANGE UP (ref 1.6–2.6)
MAGNESIUM SERPL-MCNC: 2 MG/DL — SIGNIFICANT CHANGE UP (ref 1.6–2.6)
MCHC RBC-ENTMCNC: 27.8 PG — SIGNIFICANT CHANGE UP (ref 27–34)
MCHC RBC-ENTMCNC: 29.7 GM/DL — LOW (ref 32–36)
MCV RBC AUTO: 93.8 FL — SIGNIFICANT CHANGE UP (ref 80–100)
NRBC # BLD: 0 /100 WBCS — SIGNIFICANT CHANGE UP (ref 0–0)
PHOSPHATE SERPL-MCNC: 2 MG/DL — LOW (ref 2.5–4.5)
PHOSPHATE SERPL-MCNC: 2.8 MG/DL — SIGNIFICANT CHANGE UP (ref 2.5–4.5)
PLATELET # BLD AUTO: 187 K/UL — SIGNIFICANT CHANGE UP (ref 150–400)
POTASSIUM SERPL-MCNC: 3.8 MMOL/L — SIGNIFICANT CHANGE UP (ref 3.5–5.3)
POTASSIUM SERPL-MCNC: 4 MMOL/L — SIGNIFICANT CHANGE UP (ref 3.5–5.3)
POTASSIUM SERPL-SCNC: 3.8 MMOL/L — SIGNIFICANT CHANGE UP (ref 3.5–5.3)
POTASSIUM SERPL-SCNC: 4 MMOL/L — SIGNIFICANT CHANGE UP (ref 3.5–5.3)
PROT SERPL-MCNC: 5.7 G/DL — LOW (ref 6–8.3)
RBC # BLD: 3.2 M/UL — LOW (ref 4.2–5.8)
RBC # FLD: 16.9 % — HIGH (ref 10.3–14.5)
SODIUM SERPL-SCNC: 142 MMOL/L — SIGNIFICANT CHANGE UP (ref 135–145)
SODIUM SERPL-SCNC: 144 MMOL/L — SIGNIFICANT CHANGE UP (ref 135–145)
WBC # BLD: 7.33 K/UL — SIGNIFICANT CHANGE UP (ref 3.8–10.5)
WBC # FLD AUTO: 7.33 K/UL — SIGNIFICANT CHANGE UP (ref 3.8–10.5)

## 2022-11-06 RX ORDER — FUROSEMIDE 40 MG
20 TABLET ORAL DAILY
Refills: 0 | Status: DISCONTINUED | OUTPATIENT
Start: 2022-11-06 | End: 2022-11-09

## 2022-11-06 RX ORDER — DIGOXIN 250 MCG
125 TABLET ORAL DAILY
Refills: 0 | Status: ACTIVE | OUTPATIENT
Start: 2022-11-06 | End: 2023-10-05

## 2022-11-06 RX ORDER — POTASSIUM PHOSPHATE, MONOBASIC POTASSIUM PHOSPHATE, DIBASIC 236; 224 MG/ML; MG/ML
15 INJECTION, SOLUTION INTRAVENOUS ONCE
Refills: 0 | Status: COMPLETED | OUTPATIENT
Start: 2022-11-06 | End: 2022-11-06

## 2022-11-06 RX ADMIN — APIXABAN 2.5 MILLIGRAM(S): 2.5 TABLET, FILM COATED ORAL at 05:13

## 2022-11-06 RX ADMIN — TAMSULOSIN HYDROCHLORIDE 0.4 MILLIGRAM(S): 0.4 CAPSULE ORAL at 21:54

## 2022-11-06 RX ADMIN — PIPERACILLIN AND TAZOBACTAM 25 GRAM(S): 4; .5 INJECTION, POWDER, LYOPHILIZED, FOR SOLUTION INTRAVENOUS at 18:21

## 2022-11-06 RX ADMIN — CHLORHEXIDINE GLUCONATE 1 APPLICATION(S): 213 SOLUTION TOPICAL at 05:14

## 2022-11-06 RX ADMIN — Medication 75 MICROGRAM(S): at 05:13

## 2022-11-06 RX ADMIN — PIPERACILLIN AND TAZOBACTAM 25 GRAM(S): 4; .5 INJECTION, POWDER, LYOPHILIZED, FOR SOLUTION INTRAVENOUS at 05:14

## 2022-11-06 RX ADMIN — ATORVASTATIN CALCIUM 40 MILLIGRAM(S): 80 TABLET, FILM COATED ORAL at 21:54

## 2022-11-06 RX ADMIN — Medication 1: at 23:14

## 2022-11-06 RX ADMIN — PANTOPRAZOLE SODIUM 40 MILLIGRAM(S): 20 TABLET, DELAYED RELEASE ORAL at 06:04

## 2022-11-06 RX ADMIN — Medication 1: at 06:04

## 2022-11-06 RX ADMIN — APIXABAN 2.5 MILLIGRAM(S): 2.5 TABLET, FILM COATED ORAL at 18:22

## 2022-11-06 RX ADMIN — POTASSIUM PHOSPHATE, MONOBASIC POTASSIUM PHOSPHATE, DIBASIC 62.5 MILLIMOLE(S): 236; 224 INJECTION, SOLUTION INTRAVENOUS at 05:17

## 2022-11-06 RX ADMIN — Medication 20 MILLIGRAM(S): at 11:41

## 2022-11-06 RX ADMIN — Medication 2: at 11:40

## 2022-11-06 RX ADMIN — Medication 125 MICROGRAM(S): at 18:22

## 2022-11-06 RX ADMIN — Medication 20 MILLIGRAM(S): at 05:18

## 2022-11-06 NOTE — PROGRESS NOTE ADULT - ATTENDING COMMENTS
IMP: This is a 89 yr old man with  hyperlipidemia, diabetes, atrial fibrillation on Eliquis presents with generalized weakness x 2 days. Upon evaluation in the ED v/s: /92, , RR 20, SPO2: 97% on RA  (29 Oct 2022 04:15). Patient admitted initially to medicine for generalized weakness and diarrhea. Further evaluation including ct head and ct cervical spine was negative for acute pathologies.   Patient had RRT called around 10AM for tachycardia and hypotension. Patient noted to be lethargic and vitals at the time of the rapid was afebrile, BP of 130/61 and repeat BP showing reads of 50/40s. Patient noted to have heart rates in 140's. Patient placed on 15L NRBM for severe hypoxic resp failure ,peripheral phenylephrine was started and metoprolol 2.5mg IV was given for heart rate. STAT EKG and labs including cardiac enzymes, coags and chemistries were sent. ICU was consulted and bedside POCUS done showing decreased left ventricular function, dilated IVC and Right atrium. Patient admitted to ICU for  cardiogenic shock     11/2: Cardiac arrest called on the floor. As per notes patient was noted to have vomited and aspirated prior to arrest. ROSC achieve in about 8 minutes. Paced rhythm on initial event. Patient intubated and transferred to ICU.    Assessment:  1. Cardiac arrest  2. Shock   3. Lactic acidosis  4. Atrial fibrillation with RVR  5. Acute respiratory failure  6. Aspiration pneumonia   7. Right pleural effusion   8. HLD   9. Diabetes Mellitus   10. LIGIA   11. Hypothyroidism  12. Hematuria     Plan   - Extubated 11/3   - Monitor respiratory status  - Awake and alert, no focal deficits reported , speaking in [place]   - CT head with out acute bleeding   - S/P chest tube   - Pain control  - Hold all antihypertensives given shock   - Titrate down levophed as tolerated   - Cardiology followup   - Broad spectrum antibiotics day for total 7 days   - EP cards eval noted , PPM mode reset and tachycardia resolved . BP improved . anticoagulation and my need ADRIAN as per EP   - Hemodynamic support   - Dysphagia diet per Swallow evaluation  - Hematuria resolved  - Passed TOV , start flomax , garza cath d/keyana   - Restart anticoagulation  - Trend cbc   - DVT and stress ulcer prophylaxis  - Prognosis is guarded at this time IMP: This is a 89 yr old man with  hyperlipidemia, diabetes, atrial fibrillation on Eliquis presents with generalized weakness x 2 days. Upon evaluation in the ED v/s: /92, , RR 20, SPO2: 97% on RA  (29 Oct 2022 04:15). Patient admitted initially to medicine for generalized weakness and diarrhea. Further evaluation including ct head and ct cervical spine was negative for acute pathologies.   Patient had RRT called around 10AM for tachycardia and hypotension. Patient noted to be lethargic and vitals at the time of the rapid was afebrile, BP of 130/61 and repeat BP showing reads of 50/40s. Patient noted to have heart rates in 140's. Patient placed on 15L NRBM for severe hypoxic resp failure ,peripheral phenylephrine was started and metoprolol 2.5mg IV was given for heart rate. STAT EKG and labs including cardiac enzymes, coags and chemistries were sent. ICU was consulted and bedside POCUS done showing decreased left ventricular function, dilated IVC and Right atrium. Patient admitted to ICU for  cardiogenic shock     11/2: Cardiac arrest called on the floor. As per notes patient was noted to have vomited and aspirated prior to arrest. ROSC achieve in about 8 minutes. Paced rhythm on initial event. Patient intubated and transferred to ICU.    Assessment:  1. Cardiac arrest  2. Shock   3. Lactic acidosis  4. Atrial fibrillation with RVR  5. Acute respiratory failure  6. Aspiration pneumonia   7. Right pleural effusion   8. HLD   9. Diabetes Mellitus   10. LIGIA   11. Hypothyroidism  12. Hematuria     Plan   - Extubated 11/3   - Monitor respiratory status  - Awake and alert, no focal deficits reported , speaking in [place]   - CT head with out acute bleeding   - S/P chest tube   - Pain control  - Hold all antihypertensives given shock   - Titrate down levophed as tolerated   - Cardiology followup   - Broad spectrum antibiotics day for total 7 days   - EP cards eval noted , PPM mode reset and tachycardia resolved . BP improved . anticoagulation and my need ADRIAN as per EP   - Hemodynamic support   - Dysphagia diet per Swallow evaluation  - Hematuria resolved  - Passed TOV , start flomax , garza cath d/keyana   - Restart anticoagulation  - Trend cbc   - DVT and stress ulcer prophylaxis  - Prognosis is guarded at this time  - OOB to chair

## 2022-11-06 NOTE — PROGRESS NOTE ADULT - SUBJECTIVE AND OBJECTIVE BOX
CHIEF COMPLAINT:Patient is a 89y old  Male who presents with a chief complaint of Fall.Pt appears comfortable.    	  REVIEW OF SYSTEMS:  CONSTITUTIONAL: No fever, weight loss, or fatigue  EYES: No eye pain, visual disturbances, or discharge  ENT:  No difficulty hearing, tinnitus, vertigo; No sinus or throat pain  NECK: No pain or stiffness  RESPIRATORY: No cough, wheezing, chills or hemoptysis; No Shortness of Breath  CARDIOVASCULAR: No chest pain, palpitations, passing out, dizziness, or leg swelling  GASTROINTESTINAL: No abdominal or epigastric pain. No nausea, vomiting, or hematemesis; No diarrhea or constipation. No melena or hematochezia.  GENITOURINARY: No dysuria, frequency, hematuria, or incontinence  NEUROLOGICAL: No headaches, memory loss, loss of strength, numbness, or tremors  SKIN: No itching, burning, rashes, or lesions   LYMPH Nodes: No enlarged glands  ENDOCRINE: No heat or cold intolerance; No hair loss  MUSCULOSKELETAL: No joint pain or swelling; No muscle, back, or extremity pain  PSYCHIATRIC: No depression, anxiety, mood swings, or difficulty sleeping  HEME/LYMPH: No easy bruising, or bleeding gums  ALLERGY AND IMMUNOLOGIC: No hives or eczema	      PHYSICAL EXAM:  T(C): 36.3 (11-06-22 @ 12:00), Max: 36.6 (11-06-22 @ 05:00)  HR: 70 (11-06-22 @ 12:00) (70 - 81)  BP: 119/70 (11-06-22 @ 12:00) (79/56 - 146/71)  RR: 16 (11-06-22 @ 12:00) (16 - 22)  SpO2: 97% (11-06-22 @ 12:00) (89% - 100%)  Wt(kg): --  I&O's Summary    05 Nov 2022 08:01  -  06 Nov 2022 07:00  --------------------------------------------------------  IN: 400 mL / OUT: 1125 mL / NET: -725 mL        Appearance: Normal	  HEENT:   Normal oral mucosa, PERRL, EOMI	  Lymphatic: No lymphadenopathy  Cardiovascular: Normal S1 S2, No JVD, No murmurs, No edema  Respiratory: Lungs clear to auscultation	  Psychiatry: A & O x 3, Mood & affect appropriate  Gastrointestinal:  Soft, Non-tender, + BS	  Skin: No rashes, No ecchymoses, No cyanosis	  Neurologic: Non-focal  Extremities: Normal range of motion, No clubbing, cyanosis or edema  Vascular: Peripheral pulses palpable 2+ bilaterally    MEDICATIONS  (STANDING):  apixaban 2.5 milliGRAM(s) Oral two times a day  atorvastatin 40 milliGRAM(s) Oral at bedtime  chlorhexidine 2% Cloths 1 Application(s) Topical <User Schedule>  digoxin     Tablet 125 MICROGram(s) Oral daily  furosemide    Tablet 20 milliGRAM(s) Oral daily  insulin lispro (ADMELOG) corrective regimen sliding scale   SubCutaneous every 6 hours  levothyroxine 75 MICROGram(s) Oral daily  pantoprazole    Tablet 40 milliGRAM(s) Oral before breakfast  piperacillin/tazobactam IVPB.. 3.375 Gram(s) IV Intermittent every 12 hours  tamsulosin 0.4 milliGRAM(s) Oral at bedtime        	  LABS:	 	                       8.9    7.33  )-----------( 187      ( 06 Nov 2022 03:57 )             30.0     11-06    144  |  110<H>  |  38<H>  ----------------------------<  123<H>  4.0   |  27  |  1.66<H>    Ca    8.8      06 Nov 2022 03:57  Phos  2.0     11-06  Mg     2.0     11-06    TPro  5.7<L>  /  Alb  2.3<L>  /  TBili  0.8  /  DBili  x   /  AST  33  /  ALT  46  /  AlkPhos  182<H>  11-06    proBNP: Serum Pro-Brain Natriuretic Peptide: 97234 pg/mL (10-29 @ 12:55)  Serum Pro-Brain Natriuretic Peptide: 17931 pg/mL (10-28 @ 19:36)      TSH: Thyroid Stimulating Hormone, Serum: 6.68 uU/mL (10-29 @ 10:41)

## 2022-11-06 NOTE — PROGRESS NOTE ADULT - SUBJECTIVE AND OBJECTIVE BOX
Ernesto Bennett MD (Nephrology progress note)  [address], [facility],  [address],  [id number]  TEl: [phone]  Cell: [phone]    Patient is a 89y Male seen and evaluated at bedside. Vital signs, laboratory data, imaging studies, consult notes reviewed done within past 24 hours. Overnight events noted. Patient lying in bed alert and awake in no distress sitting in chair in no distress. Interval stable renal function with S cr 1.8 with non oliguria.    Allergies    No Known Allergies    Intolerances        ROS:  Limited  Alert and awake in no respiratory distress  Denies any chest pain SOB    VITALS:    T(C): 36.1 (11-06-22 @ 19:26), Max: 36.6 (11-06-22 @ 05:00)  HR: 72 (11-06-22 @ 18:00) (70 - 81)  BP: 122/54 (11-06-22 @ 19:00) (79/56 - 146/71)  RR: 20 (11-06-22 @ 18:00) (13 - 22)  SpO2: 96% (11-06-22 @ 19:00) (91% - 100%)  CAPILLARY BLOOD GLUCOSE      POCT Blood Glucose.: 129 mg/dL (06 Nov 2022 16:48)  POCT Blood Glucose.: 206 mg/dL (06 Nov 2022 11:38)  POCT Blood Glucose.: 156 mg/dL (06 Nov 2022 05:44)  POCT Blood Glucose.: 92 mg/dL (05 Nov 2022 23:02)      11-05-22 @ 08:01  -  11-06-22 @ 07:00  --------------------------------------------------------  IN: 400 mL / OUT: 1125 mL / NET: -725 mL    11-06-22 @ 07:01  -  11-06-22 @ 20:05  --------------------------------------------------------  IN: 0 mL / OUT: 450 mL / NET: -450 mL      MEDICATIONS  (STANDING):  apixaban 2.5 milliGRAM(s) Oral two times a day  atorvastatin 40 milliGRAM(s) Oral at bedtime  chlorhexidine 2% Cloths 1 Application(s) Topical <User Schedule>  digoxin     Tablet 125 MICROGram(s) Oral daily  furosemide    Tablet 20 milliGRAM(s) Oral daily  insulin lispro (ADMELOG) corrective regimen sliding scale   SubCutaneous every 6 hours  levothyroxine 75 MICROGram(s) Oral daily  pantoprazole    Tablet 40 milliGRAM(s) Oral before breakfast  piperacillin/tazobactam IVPB.. 3.375 Gram(s) IV Intermittent every 12 hours  tamsulosin 0.4 milliGRAM(s) Oral at bedtime    MEDICATIONS  (PRN):      PHYSICAL EXAM:  GENERAL: Alert, awake and oriented x2 in no distress  HEENT: JOSE ARMANDO, EOMI, neck supple, no JVP, no carotid bruit, oral mucosa moist and pink.  CHEST/LUNG: Bilateral decreased breath sounds, no rales, no rhonchi, no wheezing  HEART: Regular rate and rhythm, FREIDA II/VI at LPSB, no gallops, no rub   ABDOMEN: Soft, nontender, non distended, bowel sounds present  : No flank or supra pubic tenderness.  EXTREMITIES: Peripheral pulses are palpable, no pedal edema  Neurology: AAOx2, no focal neurological deficit  SKIN: No rash or skin lesion  Musculoskeletal: No joint deformity    Vascular ACCESS: None    LABS:                        8.9    7.33  )-----------( 187      ( 06 Nov 2022 03:57 )             30.0     11-06    142  |  105  |  35<H>  ----------------------------<  252<H>  3.8   |  28  |  1.86<H>    Ca    9.2      06 Nov 2022 13:10  Phos  2.8     11-06  Mg     1.9     11-06    TPro  5.7<L>  /  Alb  2.3<L>  /  TBili  0.8  /  DBili  x   /  AST  33  /  ALT  46  /  AlkPhos  182<H>  11-06                RADIOLOGY & ADDITIONAL STUDIES:    Imaging Personally Reviewed:  [x] YES  [ ] NO    Consultant(s) Notes Reviewed:  [x] YES  [ ] NO    Care Discussed with Primary team/ Other Providers [x] YES  [ ] NO

## 2022-11-06 NOTE — PROGRESS NOTE ADULT - ASSESSMENT
89 yo M with pmhx of CAD-s/p CABG,s/p PPM, hyperlipidemia, diabetes,Parox atrial fibrillation on Eliquis,Renal cell Ca-s/p partial nephrectomy ,Hypothyroid who  presents with generalized weakness x 2 day, now in ICU with afib with RVR and hypotension with acute systolic HF, s/p PEA arrest due to aspiration pneumonia,Type II MI due to demand ischemia,acute anemia..  1.ICU monitoring.  2.Aspiration pneumonia abx.  3.PAF-eliquis 2.5mg bid.Dig qod.  4.DM-Insulin.  5.PPI.  6.AKII-f/u lytes.Renal f/u.Gentle diuresis.  7.Hypothyroid-synthroid.  8.Type II MI due to demand ischemia.D/W son  will treat medically,no further cardiac testing.  9.GI prophylaxis.

## 2022-11-06 NOTE — PROGRESS NOTE ADULT - SUBJECTIVE AND OBJECTIVE BOX
INTERVAL HPI/OVERNIGHT EVENTS: no acute overnight events, patient was seen and examined at beside, has no complaints.     PRESSORS: [ ] YES [x ] NO  WHICH:    Antimicrobial:  piperacillin/tazobactam IVPB.. 3.375 Gram(s) IV Intermittent every 12 hours    Cardiovascular:  digoxin  Injectable 125 MICROGram(s) IV Push every other day  furosemide   Injectable 20 milliGRAM(s) IV Push daily    Pulmonary:    Hematalogic:  apixaban 2.5 milliGRAM(s) Oral two times a day    Other:  atorvastatin 40 milliGRAM(s) Oral at bedtime  chlorhexidine 2% Cloths 1 Application(s) Topical <User Schedule>  insulin lispro (ADMELOG) corrective regimen sliding scale   SubCutaneous every 6 hours  levothyroxine 75 MICROGram(s) Oral daily  pantoprazole    Tablet 40 milliGRAM(s) Oral before breakfast  tamsulosin 0.4 milliGRAM(s) Oral at bedtime    apixaban 2.5 milliGRAM(s) Oral two times a day  atorvastatin 40 milliGRAM(s) Oral at bedtime  chlorhexidine 2% Cloths 1 Application(s) Topical <User Schedule>  digoxin  Injectable 125 MICROGram(s) IV Push every other day  furosemide   Injectable 20 milliGRAM(s) IV Push daily  insulin lispro (ADMELOG) corrective regimen sliding scale   SubCutaneous every 6 hours  levothyroxine 75 MICROGram(s) Oral daily  pantoprazole    Tablet 40 milliGRAM(s) Oral before breakfast  piperacillin/tazobactam IVPB.. 3.375 Gram(s) IV Intermittent every 12 hours  tamsulosin 0.4 milliGRAM(s) Oral at bedtime    Drug Dosing Weight  Height (cm): 160 (02 Nov 2022 16:34)  Weight (kg): 65.8 (28 Oct 2022 20:11)  BMI (kg/m2): 25.7 (02 Nov 2022 16:34)  BSA (m2): 1.69 (02 Nov 2022 16:34)    CENTRAL LINE: [ ] YES [x ] NO  LOCATION:   DATE INSERTED:  REMOVE: [ ] YES [ ] NO  EXPLAIN:    BEE: [x ] YES [ ] NO    DATE INSERTED:  REMOVE:  [ ] YES [ ] NO  EXPLAIN:    A-LINE:  [ ] YES [x ] NO  LOCATION:   DATE INSERTED:  REMOVE:  [ ] YES [ ] NO  EXPLAIN:    PMH -reviewed admission note, no change since admission  PAST MEDICAL & SURGICAL HISTORY:  Diabetes      HTN (hypertension)      Hypothyroid      GERD (gastroesophageal reflux disease)      HLD (hyperlipidemia)      Hypothyroidism      H/O heart bypass surgery      H/O partial nephrectomy      S/P TURP          ICU Vital Signs Last 24 Hrs  T(C): 36.2 (06 Nov 2022 00:00), Max: 37 (05 Nov 2022 04:00)  T(F): 97.1 (06 Nov 2022 00:00), Max: 98.6 (05 Nov 2022 04:00)  HR: 76 (06 Nov 2022 00:00) (70 - 81)  BP: 98/46 (06 Nov 2022 00:00) (79/56 - 140/60)  BP(mean): 58 (06 Nov 2022 00:00) (58 - 113)  ABP: --  ABP(mean): --  RR: 16 (06 Nov 2022 00:00) (16 - 25)  SpO2: 93% (06 Nov 2022 00:00) (89% - 100%)    O2 Parameters below as of 05 Nov 2022 16:00  Patient On (Oxygen Delivery Method): nasal cannula  O2 Flow (L/min): 4                11-04 @ 07:01  -  11-05 @ 07:00  --------------------------------------------------------  IN: 0 mL / OUT: 1950 mL / NET: -1950 mL            PHYSICAL EXAM:  GENERAL: NAD  EYES: EOMI, PERRLA  NECK: Supple, No JVD; Normal thyroid; Trachea midline: No LAD   NERVOUS SYSTEM:  Alert & Oriented X2-3,  Motor Strength 5/5 B/L upper and lower extremities  CHEST/LUNG: rhonchi bilaterally  HEART: Regular rate and rhythm; No murmurs, no gallops  ABDOMEN: Soft, Nontender, Nondistended; Bowel sounds present, no pain or masses on palpation  EXTREMITIES:  2+ Peripheral Pulses, No clubbing, cyanosis, or edema  SKIN: warm, intact, no lesions         LABS:  CBC Full  -  ( 05 Nov 2022 03:46 )  WBC Count : 7.25 K/uL  RBC Count : 3.36 M/uL  Hemoglobin : 9.4 g/dL  Hematocrit : 31.2 %  Platelet Count - Automated : 183 K/uL  Mean Cell Volume : 92.9 fl  Mean Cell Hemoglobin : 28.0 pg  Mean Cell Hemoglobin Concentration : 30.1 gm/dL  Auto Neutrophil # : x  Auto Lymphocyte # : x  Auto Monocyte # : x  Auto Eosinophil # : x  Auto Basophil # : x  Auto Neutrophil % : x  Auto Lymphocyte % : x  Auto Monocyte % : x  Auto Eosinophil % : x  Auto Basophil % : x    11-05    143  |  107  |  43<H>  ----------------------------<  176<H>  3.2<L>   |  31  |  1.78<H>    Ca    9.3      05 Nov 2022 03:46  Phos  2.3     11-05  Mg     2.0     11-05    TPro  5.7<L>  /  Alb  2.4<L>  /  TBili  0.8  /  DBili  x   /  AST  32  /  ALT  54  /  AlkPhos  216<H>  11-05        Culture Results:   Normal Respiratory Juanita present (11-03 @ 14:30)      RADIOLOGY & ADDITIONAL STUDIES REVIEWED:  ***    [ ]GOALS OF CARE DISCUSSION WITH PATIENT/FAMILY/PROXY:    CRITICAL CARE TIME SPENT: 35 minutes INTERVAL HPI/OVERNIGHT EVENTS: no acute overnight events, patient was seen and examined at beside, has no complaints.     PRESSORS: [ ] YES [x ] NO  WHICH:    Antimicrobial:  piperacillin/tazobactam IVPB.. 3.375 Gram(s) IV Intermittent every 12 hours    Cardiovascular:  digoxin  Injectable 125 MICROGram(s) IV Push every other day  furosemide   Injectable 20 milliGRAM(s) IV Push daily    Pulmonary:    Hematalogic:  apixaban 2.5 milliGRAM(s) Oral two times a day    Other:  atorvastatin 40 milliGRAM(s) Oral at bedtime  chlorhexidine 2% Cloths 1 Application(s) Topical <User Schedule>  insulin lispro (ADMELOG) corrective regimen sliding scale   SubCutaneous every 6 hours  levothyroxine 75 MICROGram(s) Oral daily  pantoprazole    Tablet 40 milliGRAM(s) Oral before breakfast  tamsulosin 0.4 milliGRAM(s) Oral at bedtime    apixaban 2.5 milliGRAM(s) Oral two times a day  atorvastatin 40 milliGRAM(s) Oral at bedtime  chlorhexidine 2% Cloths 1 Application(s) Topical <User Schedule>  digoxin  Injectable 125 MICROGram(s) IV Push every other day  furosemide   Injectable 20 milliGRAM(s) IV Push daily  insulin lispro (ADMELOG) corrective regimen sliding scale   SubCutaneous every 6 hours  levothyroxine 75 MICROGram(s) Oral daily  pantoprazole    Tablet 40 milliGRAM(s) Oral before breakfast  piperacillin/tazobactam IVPB.. 3.375 Gram(s) IV Intermittent every 12 hours  tamsulosin 0.4 milliGRAM(s) Oral at bedtime    Drug Dosing Weight  Height (cm): 160 (02 Nov 2022 16:34)  Weight (kg): 65.8 (28 Oct 2022 20:11)  BMI (kg/m2): 25.7 (02 Nov 2022 16:34)  BSA (m2): 1.69 (02 Nov 2022 16:34)    CENTRAL LINE: [ ] YES [x ] NO  LOCATION:   DATE INSERTED:  REMOVE: [ ] YES [ ] NO  EXPLAIN:    BEE: [ ] YES [x ] NO    DATE INSERTED:  REMOVE:  [ ] YES [ ] NO  EXPLAIN:    A-LINE:  [ ] YES [x ] NO  LOCATION:   DATE INSERTED:  REMOVE:  [ ] YES [ ] NO  EXPLAIN:    PMH -reviewed admission note, no change since admission  PAST MEDICAL & SURGICAL HISTORY:  Diabetes      HTN (hypertension)      Hypothyroid      GERD (gastroesophageal reflux disease)      HLD (hyperlipidemia)      Hypothyroidism      H/O heart bypass surgery      H/O partial nephrectomy      S/P TURP          ICU Vital Signs Last 24 Hrs  T(C): 36.2 (06 Nov 2022 00:00), Max: 37 (05 Nov 2022 04:00)  T(F): 97.1 (06 Nov 2022 00:00), Max: 98.6 (05 Nov 2022 04:00)  HR: 76 (06 Nov 2022 00:00) (70 - 81)  BP: 98/46 (06 Nov 2022 00:00) (79/56 - 140/60)  BP(mean): 58 (06 Nov 2022 00:00) (58 - 113)  ABP: --  ABP(mean): --  RR: 16 (06 Nov 2022 00:00) (16 - 25)  SpO2: 93% (06 Nov 2022 00:00) (89% - 100%)    O2 Parameters below as of 05 Nov 2022 16:00  Patient On (Oxygen Delivery Method): nasal cannula  O2 Flow (L/min): 4 11-04 @ 07:01  -  11-05 @ 07:00  --------------------------------------------------------  IN: 0 mL / OUT: 1950 mL / NET: -1950 mL            PHYSICAL EXAM:  GENERAL: NAD  EYES: EOMI, PERRLA  NECK: Supple, No JVD; Normal thyroid; Trachea midline: No LAD   NERVOUS SYSTEM:  Alert & Oriented X2-3,  Motor Strength 5/5 B/L upper and lower extremities  CHEST/LUNG: diminished bases with rhonchi bilaterally  HEART: AV paced; regular rate and rhythm, No murmurs, no gallops  ABDOMEN: Soft, Nontender, Nondistended; Bowel sounds present, no pain or masses on palpation  EXTREMITIES:  2+ Peripheral Pulses, No clubbing, cyanosis, or edema  SKIN: warm, intact, no lesions         LABS:  CBC Full  -  ( 05 Nov 2022 03:46 )  WBC Count : 7.25 K/uL  RBC Count : 3.36 M/uL  Hemoglobin : 9.4 g/dL  Hematocrit : 31.2 %  Platelet Count - Automated : 183 K/uL  Mean Cell Volume : 92.9 fl  Mean Cell Hemoglobin : 28.0 pg  Mean Cell Hemoglobin Concentration : 30.1 gm/dL  Auto Neutrophil # : x  Auto Lymphocyte # : x  Auto Monocyte # : x  Auto Eosinophil # : x  Auto Basophil # : x  Auto Neutrophil % : x  Auto Lymphocyte % : x  Auto Monocyte % : x  Auto Eosinophil % : x  Auto Basophil % : x    11-05    143  |  107  |  43<H>  ----------------------------<  176<H>  3.2<L>   |  31  |  1.78<H>    Ca    9.3      05 Nov 2022 03:46  Phos  2.3     11-05  Mg     2.0     11-05    TPro  5.7<L>  /  Alb  2.4<L>  /  TBili  0.8  /  DBili  x   /  AST  32  /  ALT  54  /  AlkPhos  216<H>  11-05        Culture Results:   Normal Respiratory Juanita present (11-03 @ 14:30)      RADIOLOGY & ADDITIONAL STUDIES REVIEWED:  ***    [ ]GOALS OF CARE DISCUSSION WITH PATIENT/FAMILY/PROXY:    CRITICAL CARE TIME SPENT: 35 minutes

## 2022-11-06 NOTE — PROGRESS NOTE ADULT - ASSESSMENT
89 yo M with pmhx of hyperlipidemia, diabetes, ?atrial fibrillation on Eliquis, PPM presents with generalized weakness x 2 days. Found to be in cardiogenic shock, atrial tachyarrhythmia , and PPM-driven tachycardia resolved s/p pacemaker interrogation and re-programming. DG to Medicine 11/1. CODE BLUE 11/2 after suspected aspiration event with ROSC, had large right sided pleural effusion status post chest tube placement, now extubated     Active Issues  #Cardiac Arrest with ROSC  Pleural Effusion status post chest tube placement and removal  #Cardiogenic shock  #Atrial tachyarrhythmia   #PPM mode adjustment   #AHRF 2/2 to new onset CHF vs fluid overload   #HLD  #Diabetes   #LIGIA   #Hypothyroid  #Urinary retention     PLAN:    -----------------CNS:------------------  CTH was negative for any acute abnormalities   patient is now at baseline mentation per family  Monitor for ICU delirum    -----------------CVS:------------------  # S/p Cardiac arrest  # Cardiogenic shock  # NSTEMI  - Cardiac arrest 11/3. Down time ~8 min, s/p 2x Epi  - Initially on pressors, now weaned off.   - troponin peaked at 1644, now > 847 > 1485  - Cardiology Dr. Dominguez  - TTE 10/30 : EF=40-45%.     #Atrial tachyarrhythmia   #PPM-driven tachycardia.  - On Eliquis and Lopressor at home, however family says no history of AFib?  - Presented with AFib/Flutter/Tach with RVR to 140s.   - Received Amio and Digoxin without improvement.   - PPM interrogated, was sensing atrial rate, mode switched by EP to DDIR with resolution.   - Continue with Digoxin and Eliquis.     -----------------RESPIRATORY:--------  #Aspiration Event  #AHRF  - Consolidation on x-ray, likely x-ray.   - On Zosyn 11/3 - 10.   - sputum culture sent, result pending  - Successfully extubated to room air 11/3    #Right lung effusion  - Chest tube right drained 1800ml  - Chest tube removed 11/4.   - Pleural fluid transudative     -----------------GI:----------------------  #Diarrhea  - resolved    #Nutrition  -S/S eval pureed diet   - Aspiration precautions.     -----------------ID:-----------------------  #Aspiration Event  - Zosyn 11/3 - 10.   - Dr Leblanc    -----------------RENAL: ---------------  #Oliguric Pre-Renal Acute Renal Failure  - Cr was around 1.5 when out of ICU, now 1.85 post resuscitation   - Avoid nephrotoxic agent  - Trend BUN/Cr    #Hematuria: (resolved)  - Gonzalez placed upon admission, noted to have mild hematuria.  - Gonzalez removed 11/5 and passed TOV.       -----------------HEME/ONC: ---------------  Anemia  - hgb 9.6 > 10.4  - transfuse if hgb<7  - monitor cbc     -----------------ENDO: ---------------  Hypothyroidism  - TSH noted to be 6.6  - continue with levothyroxine     Diabetes  - SSI     -----------------EXTREMITIES:-------  No acute issues     -----------------PROPHYLAXIS: -------  DVT: on Lovenox 1mg/kg QD  PPI: pantoprazole 40mg IVP BID    -----------------DISPOSITION--------  ICU   Ongoing GOC     89 yo M with pmhx of hyperlipidemia, diabetes, ?atrial fibrillation on Eliquis, PPM presents with generalized weakness x 2 days. Found to be in cardiogenic shock, atrial tachyarrhythmia , and PPM-driven tachycardia resolved s/p pacemaker interrogation and re-programming. DG to Medicine 11/1. CODE BLUE 11/2 after suspected aspiration event with ROSC, had large right sided pleural effusion status post chest tube placement, now extubated     Active Issues  #Cardiac Arrest with ROSC  Pleural Effusion status post chest tube placement and removal  #Cardiogenic shock  #Atrial tachyarrhythmia   #PPM mode adjustment   #AHRF 2/2 to new onset CHF vs fluid overload   #HLD  #Diabetes   #LIGIA   #Hypothyroid  #Urinary retention     PLAN:    -----------------CNS:------------------  CTH was negative for any acute abnormalities   patient is now at baseline mentation per family  Monitor neuro exam and for ICU delirum    -----------------CVS:------------------  # S/p Cardiac arrest  # Cardiogenic shock  # NSTEMI  - Cardiac arrest 11/3. Down time ~8 min, s/p 2x Epi  - troponin peaked at 1644, now > 847 > 1485  - Cardiology Dr. Dominguez  - TTE 10/30 : EF=40-45%  - off pressors, changed furosemide to 20mg PO and continue       #Atrial tachyarrhythmia   #PPM-driven tachycardia.  - On Eliquis and Lopressor at home, however family says no history of AFib?  - Presented with AFib/Flutter/Tach with RVR to 140s.   - Received Amio and Digoxin without improvement.   - PPM interrogated, was sensing atrial rate, mode switched by EP to DDIR with total resolution.  -now rate controlled, continue with Eliquis, change digoxin to PO and continue  - digoxin level 1.0    -----------------RESPIRATORY:--------  #Aspiration Event  #AHRF  - Consolidation on x-ray, likely x-ray.   - Successfully extubated to room air 11/3  - On Zosyn 11/3 - 10.   - sputum culture sent: few gram positive ccoci in clusters    #Right lung effusion  - Chest tube right drained 1800ml  - Chest tube removed 11/4.   - Pleural fluid transudative     -----------------GI:----------------------  #Diarrhea  - resolved    #Nutrition  -S/S eval pureed diet   - Aspiration precautions.     -----------------ID:-----------------------  #Aspiration Event  - Zosyn 11/3 - 10.   - Dr Leblanc    -----------------RENAL: ---------------  #Oliguric Pre-Renal Acute Renal Failure  - Cr was around 1.5 when out of ICU, now 1.85 post resuscitation   - Avoid nephrotoxic agent  - Trend BUN/Cr  -electrolytes repleted; f/u BMP, Mg, phos    #Hematuria: (resolved)  - Gonzalez placed upon admission, noted to have mild hematuria.  - Gonzalez removed 11/5 and passed TOV.   -condom catheter draining to gravity      -----------------HEME/ONC: ---------------  Anemia  - hgb 9.6 > 10.4  - transfuse if hgb<7  - monitor cbc     -----------------ENDO: ---------------  Hypothyroidism  - TSH noted to be 6.6  - continue with levothyroxine     Diabetes  - SSI     -----------------EXTREMITIES:-------  No acute issues     -----------------PROPHYLAXIS: -------  DVT: on Lovenox 1mg/kg QD  PPI: pantoprazole 40mg IVP BID    -----------------DISPOSITION--------  Downgrade to medicine  [facility] discussion ongoing

## 2022-11-07 LAB
ALBUMIN SERPL ELPH-MCNC: 2.2 G/DL — LOW (ref 3.5–5)
ALP SERPL-CCNC: 181 U/L — HIGH (ref 40–120)
ALT FLD-CCNC: 44 U/L DA — SIGNIFICANT CHANGE UP (ref 10–60)
ANION GAP SERPL CALC-SCNC: 11 MMOL/L — SIGNIFICANT CHANGE UP (ref 5–17)
AST SERPL-CCNC: 37 U/L — SIGNIFICANT CHANGE UP (ref 10–40)
BILIRUB SERPL-MCNC: 0.7 MG/DL — SIGNIFICANT CHANGE UP (ref 0.2–1.2)
BUN SERPL-MCNC: 33 MG/DL — HIGH (ref 7–18)
CALCIUM SERPL-MCNC: 8.9 MG/DL — SIGNIFICANT CHANGE UP (ref 8.4–10.5)
CHLORIDE SERPL-SCNC: 105 MMOL/L — SIGNIFICANT CHANGE UP (ref 96–108)
CO2 SERPL-SCNC: 27 MMOL/L — SIGNIFICANT CHANGE UP (ref 22–31)
CREAT SERPL-MCNC: 1.69 MG/DL — HIGH (ref 0.5–1.3)
CULTURE RESULTS: SIGNIFICANT CHANGE UP
EGFR: 37 ML/MIN/1.73M2 — LOW
GLUCOSE SERPL-MCNC: 118 MG/DL — HIGH (ref 70–99)
HCT VFR BLD CALC: 30.7 % — LOW (ref 39–50)
HGB BLD-MCNC: 9.4 G/DL — LOW (ref 13–17)
MAGNESIUM SERPL-MCNC: 2 MG/DL — SIGNIFICANT CHANGE UP (ref 1.6–2.6)
MCHC RBC-ENTMCNC: 28.2 PG — SIGNIFICANT CHANGE UP (ref 27–34)
MCHC RBC-ENTMCNC: 30.6 GM/DL — LOW (ref 32–36)
MCV RBC AUTO: 92.2 FL — SIGNIFICANT CHANGE UP (ref 80–100)
NRBC # BLD: 0 /100 WBCS — SIGNIFICANT CHANGE UP (ref 0–0)
PHOSPHATE SERPL-MCNC: 2.6 MG/DL — SIGNIFICANT CHANGE UP (ref 2.5–4.5)
PLATELET # BLD AUTO: 181 K/UL — SIGNIFICANT CHANGE UP (ref 150–400)
POTASSIUM SERPL-MCNC: 3.7 MMOL/L — SIGNIFICANT CHANGE UP (ref 3.5–5.3)
POTASSIUM SERPL-SCNC: 3.7 MMOL/L — SIGNIFICANT CHANGE UP (ref 3.5–5.3)
PROT SERPL-MCNC: 6 G/DL — SIGNIFICANT CHANGE UP (ref 6–8.3)
RBC # BLD: 3.33 M/UL — LOW (ref 4.2–5.8)
RBC # FLD: 16.8 % — HIGH (ref 10.3–14.5)
SODIUM SERPL-SCNC: 143 MMOL/L — SIGNIFICANT CHANGE UP (ref 135–145)
SPECIMEN SOURCE: SIGNIFICANT CHANGE UP
WBC # BLD: 8.06 K/UL — SIGNIFICANT CHANGE UP (ref 3.8–10.5)
WBC # FLD AUTO: 8.06 K/UL — SIGNIFICANT CHANGE UP (ref 3.8–10.5)

## 2022-11-07 PROCEDURE — 71045 X-RAY EXAM CHEST 1 VIEW: CPT | Mod: 26

## 2022-11-07 RX ORDER — LANOLIN ALCOHOL/MO/W.PET/CERES
5 CREAM (GRAM) TOPICAL AT BEDTIME
Refills: 0 | Status: DISCONTINUED | OUTPATIENT
Start: 2022-11-07 | End: 2022-11-10

## 2022-11-07 RX ADMIN — ATORVASTATIN CALCIUM 40 MILLIGRAM(S): 80 TABLET, FILM COATED ORAL at 22:30

## 2022-11-07 RX ADMIN — Medication 75 MICROGRAM(S): at 06:01

## 2022-11-07 RX ADMIN — APIXABAN 2.5 MILLIGRAM(S): 2.5 TABLET, FILM COATED ORAL at 18:02

## 2022-11-07 RX ADMIN — TAMSULOSIN HYDROCHLORIDE 0.4 MILLIGRAM(S): 0.4 CAPSULE ORAL at 22:30

## 2022-11-07 RX ADMIN — PANTOPRAZOLE SODIUM 40 MILLIGRAM(S): 20 TABLET, DELAYED RELEASE ORAL at 06:02

## 2022-11-07 RX ADMIN — Medication 1: at 12:31

## 2022-11-07 RX ADMIN — PIPERACILLIN AND TAZOBACTAM 25 GRAM(S): 4; .5 INJECTION, POWDER, LYOPHILIZED, FOR SOLUTION INTRAVENOUS at 18:02

## 2022-11-07 RX ADMIN — PIPERACILLIN AND TAZOBACTAM 25 GRAM(S): 4; .5 INJECTION, POWDER, LYOPHILIZED, FOR SOLUTION INTRAVENOUS at 06:03

## 2022-11-07 RX ADMIN — CHLORHEXIDINE GLUCONATE 1 APPLICATION(S): 213 SOLUTION TOPICAL at 06:02

## 2022-11-07 RX ADMIN — APIXABAN 2.5 MILLIGRAM(S): 2.5 TABLET, FILM COATED ORAL at 06:01

## 2022-11-07 RX ADMIN — Medication 2: at 17:59

## 2022-11-07 RX ADMIN — Medication 125 MICROGRAM(S): at 06:24

## 2022-11-07 RX ADMIN — Medication 20 MILLIGRAM(S): at 06:01

## 2022-11-07 NOTE — PHYSICAL THERAPY INITIAL EVALUATION ADULT - PERTINENT HX OF CURRENT PROBLEM, REHAB EVAL
Pt admitted s/p fall
Pmhx of hyperlipidemia, diabetes, atrial fibrillation on Eliquis presents with generalized weakness x 2 days. S/P FALL

## 2022-11-07 NOTE — CHART NOTE - NSCHARTNOTEFT_GEN_A_CORE
89 yo M hx hyperlipidemia, diabetes, ?atrial fibrillation on Eliquis, PPM who presented with generalized weakness x 2 days. In ED had hypotension and worsening mental status, found to have cardiogenic shock, atrial tachyarrhythmia , and PPM-driven tachycardia.     ICU Course: Patient was started on Dobutamine for cardiogenic shock and was given digoxin and amiodarone for Afib with RVR. PPM was interrogated & found to be inappropriately pacing at 140 BPM. Changes made. Formal Echo showed EF 40-45%.  Patient developed hematuria, but hemoglobin has remained stable, so heparin was continued. Patient was able to be titrated off dobutamine drip and coreg was started for Afib. DG to medicine 11/1.     Rapid called on 11/2 at 12:27 for AMS after episode of emesis w/ suspected aspiration. On arrival of call team patient was in PEA. CPR started with ROSC achieved in ~ 8 minutes. 2 epi given. Patient intubated and started on peripheral levophed w/ 1L IVF bolus LR. Transferred to ICU.    ICU Course 2: Bedside POCUS showed complex right pleural effusion, appears transudative. Pt had a chest tube placed in the ICU on the right side, had 150 cc taken out immediately. 89 yo M, [place] speaking with hx hyperlipidemia, diabetes, ?atrial fibrillation on Eliquis, PPM who presented with generalized weakness x 2 days. In ED had hypotension and worsening mental status, found to have cardiogenic shock, atrial tachyarrhythmia , and PPM-driven tachycardia.     ICU Course: Patient was started on Dobutamine for cardiogenic shock and was given digoxin and amiodarone for Afib with RVR. PPM was interrogated & found to be inappropriately pacing at 140 BPM. Changes made. Formal Echo showed EF 40-45%.  Patient developed hematuria, but hemoglobin has remained stable, so heparin was continued. Patient was able to be titrated off dobutamine drip and coreg was started for Afib. DG to medicine 11/1.     Rapid called on 11/2 at 12:27 for AMS after episode of emesis w/ suspected aspiration. On arrival of call team patient was in PEA. CPR started with ROSC achieved in ~ 8 minutes. 2 epi given. Patient intubated and started on peripheral levophed w/ 1L IVF bolus LR. Transferred to ICU.    ICU Course 2: Patient was intubated and started on Levophed. cxr showed large right sided effusion. Bedside POCUS showed complex right pleural effusion, appears transudative. Pt had a chest tube placed in the ICU on the right side, had 150 cc taken out immediately with 1800cc total drainage of serous fluid. Patient was able to be titrated off pressors and was extubated on 11/3. Pts mental status continued to improve. He is back at baseline. Dysphagia diet per Speech and Swallow evaluation. Passed TOV.     Pt was ready for downgrade and singed out to Dr. Rodríguez and resident Dr. Le.       Things to Follow:     - PT recommendations.   - Cardio Dr. Dominguez following.   - Antibiotics as per renal dose adjustment with IV Zosyn for aspiration pneumonia.

## 2022-11-07 NOTE — PHYSICAL THERAPY INITIAL EVALUATION ADULT - GENERAL OBSERVATIONS, REHAB EVAL
Patient was received in bed in ICU, cardiac monitor, I/V, [place] catheter in place.
Pt observed supine w/ HOB elevated, IV in place, garza, telemetry, A line, Nasal Canula, NAD.

## 2022-11-07 NOTE — PROGRESS NOTE ADULT - ASSESSMENT
89 yo M with pmhx of CAD-s/p CABG,s/p PPM, hyperlipidemia, diabetes,Parox atrial fibrillation on Eliquis,Renal cell Ca-s/p partial nephrectomy ,Hypothyroid who  presents with generalized weakness x 2 day, now in ICU with afib with RVR and hypotension with acute systolic HF, s/p PEA arrest due to aspiration pneumonia,Type II MI due to demand ischemia,acute anemia..  1.ICU monitoring.  2.Aspiration pneumonia abx.  3.PAF-eliquis 2.5mg bid.Dig qod.  4.DM-Insulin.  5.PPI.  6.AKII-f/u lytes.Renal f/u.Gentle diuresis.  7.Hypothyroid-synthroid.  8.Type II MI due to demand ischemia.D/W son  will treat medically,no further cardiac testing.  9.GI prophylaxis.

## 2022-11-07 NOTE — PHYSICAL THERAPY INITIAL EVALUATION ADULT - PATIENT/FAMILY/SIGNIFICANT OTHER GOALS STATEMENT, PT EVAL
To get better. To be able to walk
Pt wants to regain his strength in order to be able to regain independence at home

## 2022-11-07 NOTE — PHYSICAL THERAPY INITIAL EVALUATION ADULT - IMPAIRMENTS FOUND, PT EVAL
balance/gait, locomotion, and balance/muscle strength
aerobic capacity/endurance/gait, locomotion, and balance/muscle strength

## 2022-11-07 NOTE — PHYSICAL THERAPY INITIAL EVALUATION ADULT - DIAGNOSIS, PT EVAL
Declined functional status
Pt presents w/ weakness after a fall which has reduced his ability to perform bed mobility, transfers, and ambulate.

## 2022-11-07 NOTE — PROGRESS NOTE ADULT - ASSESSMENT
89 yo M with pmhx of hyperlipidemia, diabetes, ?atrial fibrillation on Eliquis, PPM presents with generalized weakness x 2 days. Found to be in cardiogenic shock, atrial tachyarrhythmia , and PPM-driven tachycardia resolved s/p pacemaker interrogation and re-programming. DG to Medicine 11/1. CODE BLUE 11/2 after suspected aspiration event with ROSC, had large right sided pleural effusion status post chest tube placement, now extubated     Active Issues  #Cardiac Arrest with ROSC  Pleural Effusion status post chest tube placement and removal  #Cardiogenic shock  #Atrial tachyarrhythmia   #PPM mode adjustment   #AHRF 2/2 to new onset CHF vs fluid overload   #HLD  #Diabetes   #LIGIA   #Hypothyroid  #Urinary retention     PLAN:    -----------------CNS:------------------  CTH was negative for any acute abnormalities   patient is now at baseline mentation per family  Monitor neuro exam and for ICU delirum    -----------------CVS:------------------  # S/p Cardiac arrest  # Cardiogenic shock  # NSTEMI  - Cardiac arrest 11/3. Down time ~8 min, s/p 2x Epi  - troponin peaked at 1644, now > 847 > 1485  - Cardiology Dr. Dominguez  - TTE 10/30 : EF=40-45%  - off pressors, changed furosemide to 20mg PO and continue       #Atrial tachyarrhythmia   #PPM-driven tachycardia.  - On Eliquis and Lopressor at home, however family says no history of AFib?  - Presented with AFib/Flutter/Tach with RVR to 140s.   - Received Amio and Digoxin without improvement.   - PPM interrogated, was sensing atrial rate, mode switched by EP to DDIR with total resolution.  -now rate controlled, continue with Eliquis, change digoxin to PO and continue  - digoxin level 1.0    -----------------RESPIRATORY:--------  #Aspiration Event  #AHRF  - Consolidation on x-ray, likely x-ray.   - Successfully extubated to room air 11/3  - On Zosyn 11/3 - 10.   - sputum culture sent: few gram positive ccoci in clusters    #Right lung effusion  - Chest tube right drained 1800ml  - Chest tube removed 11/4.   - Pleural fluid transudative     -----------------GI:----------------------  #Diarrhea  - resolved    #Nutrition  -S/S eval pureed diet   - Aspiration precautions.     -----------------ID:-----------------------  #Aspiration Event  - Zosyn 11/3 - 10.   - Dr Leblanc    -----------------RENAL: ---------------  #Oliguric Pre-Renal Acute Renal Failure  - Cr was around 1.5 when out of ICU, now 1.85 post resuscitation   - Avoid nephrotoxic agent  - Trend BUN/Cr  -electrolytes repleted; f/u BMP, Mg, phos    #Hematuria: (resolved)  - Gonzalez placed upon admission, noted to have mild hematuria.  - Gonzalez removed 11/5 and passed TOV.   -condom catheter draining to gravity      -----------------HEME/ONC: ---------------  Anemia  - hgb 9.6 > 10.4  - transfuse if hgb<7  - monitor cbc     -----------------ENDO: ---------------  Hypothyroidism  - TSH noted to be 6.6  - continue with levothyroxine     Diabetes  - SSI     -----------------EXTREMITIES:-------  No acute issues     -----------------PROPHYLAXIS: -------  DVT: on Lovenox 1mg/kg QD  PPI: pantoprazole 40mg IVP BID    -----------------DISPOSITION--------  Downgrade to medicine  [facility] discussion ongoing

## 2022-11-07 NOTE — PROGRESS NOTE ADULT - SUBJECTIVE AND OBJECTIVE BOX
CHIEF COMPLAINT:Patient is a 89y old  Male who presents with a chief complaint of Fall.Pt appears comfortable.    	  REVIEW OF SYSTEMS:  CONSTITUTIONAL: No fever, weight loss, or fatigue  EYES: No eye pain, visual disturbances, or discharge  ENT:  No difficulty hearing, tinnitus, vertigo; No sinus or throat pain  NECK: No pain or stiffness  RESPIRATORY: No cough, wheezing, chills or hemoptysis; No Shortness of Breath  CARDIOVASCULAR: No chest pain, palpitations, passing out, dizziness, or leg swelling  GASTROINTESTINAL: No abdominal or epigastric pain. No nausea, vomiting, or hematemesis; No diarrhea or constipation. No melena or hematochezia.  GENITOURINARY: No dysuria, frequency, hematuria, or incontinence  NEUROLOGICAL: No headaches, memory loss, loss of strength, numbness, or tremors  SKIN: No itching, burning, rashes, or lesions   LYMPH Nodes: No enlarged glands  ENDOCRINE: No heat or cold intolerance; No hair loss  MUSCULOSKELETAL: No joint pain or swelling; No muscle, back, or extremity pain  PSYCHIATRIC: No depression, anxiety, mood swings, or difficulty sleeping  HEME/LYMPH: No easy bruising, or bleeding gums  ALLERGY AND IMMUNOLOGIC: No hives or eczema	    PHYSICAL EXAM:  T(C): 36 (11-07-22 @ 08:00), Max: 36.1 (11-06-22 @ 19:26)  HR: 73 (11-07-22 @ 11:00) (70 - 81)  BP: 132/52 (11-07-22 @ 11:00) (82/63 - 136/70)  RR: 22 (11-07-22 @ 11:00) (13 - 26)  SpO2: 97% (11-07-22 @ 11:00) (91% - 100%)  Wt(kg): --  I&O's Summary    06 Nov 2022 07:01  -  07 Nov 2022 07:00  --------------------------------------------------------  IN: 25 mL / OUT: 750 mL / NET: -725 mL    07 Nov 2022 07:01  -  07 Nov 2022 12:31  --------------------------------------------------------  IN: 25 mL / OUT: 225 mL / NET: -200 mL        Appearance: Normal	  HEENT:   Normal oral mucosa, PERRL, EOMI	  Lymphatic: No lymphadenopathy  Cardiovascular: Normal S1 S2, No JVD, No murmurs, No edema  Respiratory: B/L [facility]  Psychiatry: A & O x 3, Mood & affect appropriate  Gastrointestinal:  Soft, Non-tender, + BS	  Skin: No rashes, No ecchymoses, No cyanosis	  Neurologic: Non-focal  Extremities: Normal range of motion, No clubbing, cyanosis or edema  Vascular: Peripheral pulses palpable 2+ bilaterally    MEDICATIONS  (STANDING):  apixaban 2.5 milliGRAM(s) Oral two times a day  atorvastatin 40 milliGRAM(s) Oral at bedtime  chlorhexidine 2% Cloths 1 Application(s) Topical <User Schedule>  digoxin     Tablet 125 MICROGram(s) Oral daily  furosemide    Tablet 20 milliGRAM(s) Oral daily  insulin lispro (ADMELOG) corrective regimen sliding scale   SubCutaneous every 6 hours  levothyroxine 75 MICROGram(s) Oral daily  melatonin 5 milliGRAM(s) Oral at bedtime  pantoprazole    Tablet 40 milliGRAM(s) Oral before breakfast  piperacillin/tazobactam IVPB.. 3.375 Gram(s) IV Intermittent every 12 hours  tamsulosin 0.4 milliGRAM(s) Oral at bedtime        LABS:	 	                        9.4    8.06  )-----------( 181      ( 07 Nov 2022 04:26 )             30.7     11-07    143  |  105  |  33<H>  ----------------------------<  118<H>  3.7   |  27  |  1.69<H>    Ca    8.9      07 Nov 2022 04:26  Phos  2.6     11-07  Mg     2.0     11-07    TPro  6.0  /  Alb  2.2<L>  /  TBili  0.7  /  DBili  x   /  AST  37  /  ALT  44  /  AlkPhos  181<H>  11-07    proBNP: Serum Pro-Brain Natriuretic Peptide: 06279 pg/mL (10-29 @ 12:55)  Serum Pro-Brain Natriuretic Peptide: 69896 pg/mL (10-28 @ 19:36)    TSH: Thyroid Stimulating Hormone, Serum: 6.68 uU/mL (10-29 @ 10:41)

## 2022-11-07 NOTE — PHYSICAL THERAPY INITIAL EVALUATION ADULT - ADDITIONAL COMMENTS
patient used GABRIELLA
Pt reports being independent at his baseline using a RW to ambulate and perform ADL/IADL's. Pt reports living in an apartment building that has elevator access.

## 2022-11-07 NOTE — PHYSICAL THERAPY INITIAL EVALUATION ADULT - RANGE OF MOTION EXAMINATION, REHAB EVAL
A-AAROM/bilateral upper extremity ROM was WFL (within functional limits)/bilateral lower extremity ROM was WFL (within functional limits)

## 2022-11-07 NOTE — PROGRESS NOTE ADULT - SUBJECTIVE AND OBJECTIVE BOX
INTERVAL HPI/OVERNIGHT EVENTS:       PRESSORS: [ ] YES [ ] NO  WHICH:    ANTIBIOTICS:                  DATE STARTED:  ANTIBIOTICS:                  DATE STARTED:    Antimicrobial:  piperacillin/tazobactam IVPB.. 3.375 Gram(s) IV Intermittent every 12 hours    Cardiovascular:  digoxin     Tablet 125 MICROGram(s) Oral daily  furosemide    Tablet 20 milliGRAM(s) Oral daily    Pulmonary:    Hematalogic:  apixaban 2.5 milliGRAM(s) Oral two times a day    Other:  atorvastatin 40 milliGRAM(s) Oral at bedtime  chlorhexidine 2% Cloths 1 Application(s) Topical <User Schedule>  insulin lispro (ADMELOG) corrective regimen sliding scale   SubCutaneous every 6 hours  levothyroxine 75 MICROGram(s) Oral daily  pantoprazole    Tablet 40 milliGRAM(s) Oral before breakfast  tamsulosin 0.4 milliGRAM(s) Oral at bedtime    apixaban 2.5 milliGRAM(s) Oral two times a day  atorvastatin 40 milliGRAM(s) Oral at bedtime  chlorhexidine 2% Cloths 1 Application(s) Topical <User Schedule>  digoxin     Tablet 125 MICROGram(s) Oral daily  furosemide    Tablet 20 milliGRAM(s) Oral daily  insulin lispro (ADMELOG) corrective regimen sliding scale   SubCutaneous every 6 hours  levothyroxine 75 MICROGram(s) Oral daily  pantoprazole    Tablet 40 milliGRAM(s) Oral before breakfast  piperacillin/tazobactam IVPB.. 3.375 Gram(s) IV Intermittent every 12 hours  tamsulosin 0.4 milliGRAM(s) Oral at bedtime    Drug Dosing Weight  Height (cm): 160 (02 Nov 2022 16:34)  Weight (kg): 65.8 (28 Oct 2022 20:11)  BMI (kg/m2): 25.7 (02 Nov 2022 16:34)  BSA (m2): 1.69 (02 Nov 2022 16:34)    PHYSICAL EXAM:  GENERAL: NAD  EYES: EOMI, PERRLA  NECK: Supple, No JVD; Normal thyroid; Trachea midline: No LAD   NERVOUS SYSTEM:  Alert & Oriented X3,  Motor Strength 5/5 B/L upper and lower extremities; DTRs 2+ intact and symmetric  CHEST/LUNG: No rales, rhonchi, wheezing, breath sounds present bilaterally  HEART: Regular rate and rhythm; No murmurs, no gallops  ABDOMEN: Soft, Nontender, Nondistended; Bowel sounds present, no pain or masses on palpation  : voiding well, Bee in place  EXTREMITIES:  2+ Peripheral Pulses, No clubbing, cyanosis, or edema  SKIN: warm, intact, no lesions     LINES/DRAINS/DEVICES  CENTRAL LINE: [ ] YES [ ] NO  LOCATION:     BEE: [ ] YES [ ] NO     A-LINE:  [ ] YES [ ] NO  LOCATION:       ICU Vital Signs Last 24 Hrs  T(C): 35.9 (07 Nov 2022 04:00), Max: 36.4 (06 Nov 2022 08:00)  T(F): 96.7 (07 Nov 2022 04:00), Max: 97.5 (06 Nov 2022 08:00)  HR: 70 (07 Nov 2022 06:00) (70 - 81)  BP: 125/55 (07 Nov 2022 06:00) (82/63 - 146/71)  BP(mean): 73 (07 Nov 2022 06:00) (61 - 97)  ABP: --  ABP(mean): --  RR: 21 (07 Nov 2022 06:00) (13 - 26)  SpO2: 96% (07 Nov 2022 06:00) (92% - 100%)    O2 Parameters below as of 06 Nov 2022 18:00  Patient On (Oxygen Delivery Method): room air                  11-06 @ 07:01  -  11-07 @ 07:00  --------------------------------------------------------  IN: 0 mL / OUT: 750 mL / NET: -750 mL              LABS:  CBC Full  -  ( 07 Nov 2022 04:26 )  WBC Count : 8.06 K/uL  RBC Count : 3.33 M/uL  Hemoglobin : 9.4 g/dL  Hematocrit : 30.7 %  Platelet Count - Automated : 181 K/uL  Mean Cell Volume : 92.2 fl  Mean Cell Hemoglobin : 28.2 pg  Mean Cell Hemoglobin Concentration : 30.6 gm/dL  Auto Neutrophil # : x  Auto Lymphocyte # : x  Auto Monocyte # : x  Auto Eosinophil # : x  Auto Basophil # : x  Auto Neutrophil % : x  Auto Lymphocyte % : x  Auto Monocyte % : x  Auto Eosinophil % : x  Auto Basophil % : x    11-07    143  |  105  |  33<H>  ----------------------------<  118<H>  3.7   |  27  |  1.69<H>    Ca    8.9      07 Nov 2022 04:26  Phos  2.6     11-07  Mg     2.0     11-07    TPro  6.0  /  Alb  2.2<L>  /  TBili  0.7  /  DBili  x   /  AST  37  /  ALT  44  /  AlkPhos  181<H>  11-07            RADIOLOGY & ADDITIONAL STUDIES REVIEWED DURING TEAM ROUNDS    [ ]GOALS OF CARE DISCUSSION WITH PATIENT/FAMILY/PROXY:    CRITICAL CARE TIME SPENT: 35 minutes   INTERVAL HPI/OVERNIGHT EVENTS: No acute overnight events.       PRESSORS: [ ] YES [ ] NO  WHICH:    ANTIBIOTICS:                  DATE STARTED:  ANTIBIOTICS:                  DATE STARTED:    Antimicrobial:  piperacillin/tazobactam IVPB.. 3.375 Gram(s) IV Intermittent every 12 hours    Cardiovascular:  digoxin     Tablet 125 MICROGram(s) Oral daily  furosemide    Tablet 20 milliGRAM(s) Oral daily    Pulmonary:    Hematalogic:  apixaban 2.5 milliGRAM(s) Oral two times a day    Other:  atorvastatin 40 milliGRAM(s) Oral at bedtime  chlorhexidine 2% Cloths 1 Application(s) Topical <User Schedule>  insulin lispro (ADMELOG) corrective regimen sliding scale   SubCutaneous every 6 hours  levothyroxine 75 MICROGram(s) Oral daily  pantoprazole    Tablet 40 milliGRAM(s) Oral before breakfast  tamsulosin 0.4 milliGRAM(s) Oral at bedtime    apixaban 2.5 milliGRAM(s) Oral two times a day  atorvastatin 40 milliGRAM(s) Oral at bedtime  chlorhexidine 2% Cloths 1 Application(s) Topical <User Schedule>  digoxin     Tablet 125 MICROGram(s) Oral daily  furosemide    Tablet 20 milliGRAM(s) Oral daily  insulin lispro (ADMELOG) corrective regimen sliding scale   SubCutaneous every 6 hours  levothyroxine 75 MICROGram(s) Oral daily  pantoprazole    Tablet 40 milliGRAM(s) Oral before breakfast  piperacillin/tazobactam IVPB.. 3.375 Gram(s) IV Intermittent every 12 hours  tamsulosin 0.4 milliGRAM(s) Oral at bedtime    Drug Dosing Weight  Height (cm): 160 (02 Nov 2022 16:34)  Weight (kg): 65.8 (28 Oct 2022 20:11)  BMI (kg/m2): 25.7 (02 Nov 2022 16:34)  BSA (m2): 1.69 (02 Nov 2022 16:34)    PHYSICAL EXAM:  GENERAL: NAD  EYES: EOMI, PERRLA  NECK: Supple, No JVD; Normal thyroid; Trachea midline: No LAD   NERVOUS SYSTEM:  Alert & Oriented X2-3,  Motor Strength 5/5 B/L upper and lower extremities  CHEST/LUNG: diminished bases with rhonchi bilaterally  HEART: AV paced; regular rate and rhythm, No murmurs, no gallops  ABDOMEN: Soft, Nontender, Nondistended; Bowel sounds present, no pain or masses on palpation  EXTREMITIES:  2+ Peripheral Pulses, No clubbing, cyanosis, or edema  SKIN: warm, intact, no lesion    LINES/DRAINS/DEVICES  CENTRAL LINE: [ ] YES [ ] NO  LOCATION:     BEE: [ ] YES [ ] NO     A-LINE:  [ ] YES [ ] NO  LOCATION:       ICU Vital Signs Last 24 Hrs  T(C): 35.9 (07 Nov 2022 04:00), Max: 36.4 (06 Nov 2022 08:00)  T(F): 96.7 (07 Nov 2022 04:00), Max: 97.5 (06 Nov 2022 08:00)  HR: 70 (07 Nov 2022 06:00) (70 - 81)  BP: 125/55 (07 Nov 2022 06:00) (82/63 - 146/71)  BP(mean): 73 (07 Nov 2022 06:00) (61 - 97)  ABP: --  ABP(mean): --  RR: 21 (07 Nov 2022 06:00) (13 - 26)  SpO2: 96% (07 Nov 2022 06:00) (92% - 100%)    O2 Parameters below as of 06 Nov 2022 18:00  Patient On (Oxygen Delivery Method): room air                  11-06 @ 07:01  -  11-07 @ 07:00  --------------------------------------------------------  IN: 0 mL / OUT: 750 mL / NET: -750 mL              LABS:  CBC Full  -  ( 07 Nov 2022 04:26 )  WBC Count : 8.06 K/uL  RBC Count : 3.33 M/uL  Hemoglobin : 9.4 g/dL  Hematocrit : 30.7 %  Platelet Count - Automated : 181 K/uL  Mean Cell Volume : 92.2 fl  Mean Cell Hemoglobin : 28.2 pg  Mean Cell Hemoglobin Concentration : 30.6 gm/dL  Auto Neutrophil # : x  Auto Lymphocyte # : x  Auto Monocyte # : x  Auto Eosinophil # : x  Auto Basophil # : x  Auto Neutrophil % : x  Auto Lymphocyte % : x  Auto Monocyte % : x  Auto Eosinophil % : x  Auto Basophil % : x    11-07    143  |  105  |  33<H>  ----------------------------<  118<H>  3.7   |  27  |  1.69<H>    Ca    8.9      07 Nov 2022 04:26  Phos  2.6     11-07  Mg     2.0     11-07    TPro  6.0  /  Alb  2.2<L>  /  TBili  0.7  /  DBili  x   /  AST  37  /  ALT  44  /  AlkPhos  181<H>  11-07            RADIOLOGY & ADDITIONAL STUDIES REVIEWED DURING TEAM ROUNDS    [ ]GOALS OF CARE DISCUSSION WITH PATIENT/FAMILY/PROXY:    CRITICAL CARE TIME SPENT: 35 minutes

## 2022-11-07 NOTE — PROGRESS NOTE ADULT - ATTENDING COMMENTS
IMP: This is a 89 yr old man with  hyperlipidemia, diabetes, atrial fibrillation on Eliquis presents with generalized weakness x 2 days. Upon evaluation in the ED v/s: /92, , RR 20, SPO2: 97% on RA  (29 Oct 2022 04:15). Patient admitted initially to medicine for generalized weakness and diarrhea. Further evaluation including ct head and ct cervical spine was negative for acute pathologies.   Patient had RRT called around 10AM for tachycardia and hypotension. Patient noted to be lethargic and vitals at the time of the rapid was afebrile, BP of 130/61 and repeat BP showing reads of 50/40s. Patient noted to have heart rates in 140's. Patient placed on 15L NRBM for severe hypoxic resp failure ,peripheral phenylephrine was started and metoprolol 2.5mg IV was given for heart rate. STAT EKG and labs including cardiac enzymes, coags and chemistries were sent. ICU was consulted and bedside POCUS done showing decreased left ventricular function, dilated IVC and Right atrium. Patient admitted to ICU for  cardiogenic shock     11/2: Cardiac arrest called on the floor. As per notes patient was noted to have vomited and aspirated prior to arrest. ROSC achieve in about 8 minutes. Paced rhythm on initial event. Patient intubated and transferred to ICU.    Assessment:  1. Cardiac arrest  2. Shock   3. Lactic acidosis  4. Atrial fibrillation with RVR  5. Acute respiratory failure  6. Aspiration pneumonia   7. Right pleural effusion   8. HLD   9. Diabetes Mellitus   10. LIGIA   11. Hypothyroidism  12. Hematuria     Plan   - Extubated 11/3   - Monitor respiratory status  - Awake and alert, no focal deficits reported , speaking in [place]ian   - CT head with out acute bleeding   - S/P chest tube   - Pain control  - Hold all antihypertensives given shock   - Titrate down levophed as tolerated   - Cardiology followup   - Broad spectrum antibiotics day for total 7 days   - EP cards eval noted , PPM mode reset and tachycardia resolved . BP improved . anticoagulation and my need ADRIAN as per EP   - Hemodynamic support   - Dysphagia diet per Swallow evaluation  - Hematuria resolved  - Passed TOV , start flomax , garza cath d/keyana   - Restart anticoagulation  - Trend cbc   - DVT and stress ulcer prophylaxis  - Prognosis is guarded at this time  - OOB to chair.

## 2022-11-08 DIAGNOSIS — R57.0 CARDIOGENIC SHOCK: ICD-10-CM

## 2022-11-08 DIAGNOSIS — R53.81 OTHER MALAISE: ICD-10-CM

## 2022-11-08 DIAGNOSIS — J96.01 ACUTE RESPIRATORY FAILURE WITH HYPOXIA: ICD-10-CM

## 2022-11-08 DIAGNOSIS — R13.10 DYSPHAGIA, UNSPECIFIED: ICD-10-CM

## 2022-11-08 DIAGNOSIS — I46.9 CARDIAC ARREST, CAUSE UNSPECIFIED: ICD-10-CM

## 2022-11-08 DIAGNOSIS — Z86.74 PERSONAL HISTORY OF SUDDEN CARDIAC ARREST: ICD-10-CM

## 2022-11-08 DIAGNOSIS — Z51.5 ENCOUNTER FOR PALLIATIVE CARE: ICD-10-CM

## 2022-11-08 LAB
ALBUMIN SERPL ELPH-MCNC: 2.2 G/DL — LOW (ref 3.5–5)
ALP SERPL-CCNC: 160 U/L — HIGH (ref 40–120)
ALT FLD-CCNC: 41 U/L DA — SIGNIFICANT CHANGE UP (ref 10–60)
ANION GAP SERPL CALC-SCNC: 7 MMOL/L — SIGNIFICANT CHANGE UP (ref 5–17)
AST SERPL-CCNC: 34 U/L — SIGNIFICANT CHANGE UP (ref 10–40)
BILIRUB SERPL-MCNC: 0.6 MG/DL — SIGNIFICANT CHANGE UP (ref 0.2–1.2)
BUN SERPL-MCNC: 32 MG/DL — HIGH (ref 7–18)
CALCIUM SERPL-MCNC: 8.8 MG/DL — SIGNIFICANT CHANGE UP (ref 8.4–10.5)
CHLORIDE SERPL-SCNC: 105 MMOL/L — SIGNIFICANT CHANGE UP (ref 96–108)
CO2 SERPL-SCNC: 32 MMOL/L — HIGH (ref 22–31)
CREAT SERPL-MCNC: 1.7 MG/DL — HIGH (ref 0.5–1.3)
EGFR: 37 ML/MIN/1.73M2 — LOW
GLUCOSE BLDC GLUCOMTR-MCNC: 127 MG/DL — HIGH (ref 70–99)
GLUCOSE BLDC GLUCOMTR-MCNC: 162 MG/DL — HIGH (ref 70–99)
GLUCOSE BLDC GLUCOMTR-MCNC: 189 MG/DL — HIGH (ref 70–99)
GLUCOSE BLDC GLUCOMTR-MCNC: 220 MG/DL — HIGH (ref 70–99)
GLUCOSE SERPL-MCNC: 140 MG/DL — HIGH (ref 70–99)
HCT VFR BLD CALC: 29.3 % — LOW (ref 39–50)
HGB BLD-MCNC: 8.9 G/DL — LOW (ref 13–17)
MAGNESIUM SERPL-MCNC: 2 MG/DL — SIGNIFICANT CHANGE UP (ref 1.6–2.6)
MCHC RBC-ENTMCNC: 28.1 PG — SIGNIFICANT CHANGE UP (ref 27–34)
MCHC RBC-ENTMCNC: 30.4 GM/DL — LOW (ref 32–36)
MCV RBC AUTO: 92.4 FL — SIGNIFICANT CHANGE UP (ref 80–100)
NRBC # BLD: 0 /100 WBCS — SIGNIFICANT CHANGE UP (ref 0–0)
PHOSPHATE SERPL-MCNC: 2.3 MG/DL — LOW (ref 2.5–4.5)
PLATELET # BLD AUTO: 189 K/UL — SIGNIFICANT CHANGE UP (ref 150–400)
POTASSIUM SERPL-MCNC: 3.5 MMOL/L — SIGNIFICANT CHANGE UP (ref 3.5–5.3)
POTASSIUM SERPL-SCNC: 3.5 MMOL/L — SIGNIFICANT CHANGE UP (ref 3.5–5.3)
PROT SERPL-MCNC: 5.8 G/DL — LOW (ref 6–8.3)
RBC # BLD: 3.17 M/UL — LOW (ref 4.2–5.8)
RBC # FLD: 16.7 % — HIGH (ref 10.3–14.5)
SODIUM SERPL-SCNC: 144 MMOL/L — SIGNIFICANT CHANGE UP (ref 135–145)
WBC # BLD: 7.41 K/UL — SIGNIFICANT CHANGE UP (ref 3.8–10.5)
WBC # FLD AUTO: 7.41 K/UL — SIGNIFICANT CHANGE UP (ref 3.8–10.5)

## 2022-11-08 PROCEDURE — 99233 SBSQ HOSP IP/OBS HIGH 50: CPT | Mod: GC

## 2022-11-08 PROCEDURE — 99497 ADVNCD CARE PLAN 30 MIN: CPT | Mod: 25,GC

## 2022-11-08 PROCEDURE — 99223 1ST HOSP IP/OBS HIGH 75: CPT | Mod: GC

## 2022-11-08 RX ORDER — SODIUM,POTASSIUM PHOSPHATES 278-250MG
1 POWDER IN PACKET (EA) ORAL ONCE
Refills: 0 | Status: COMPLETED | OUTPATIENT
Start: 2022-11-08 | End: 2022-11-08

## 2022-11-08 RX ORDER — CARVEDILOL PHOSPHATE 80 MG/1
3.12 CAPSULE, EXTENDED RELEASE ORAL EVERY 12 HOURS
Refills: 0 | Status: DISCONTINUED | OUTPATIENT
Start: 2022-11-08 | End: 2022-11-10

## 2022-11-08 RX ORDER — DIGOXIN 250 MCG
125 TABLET ORAL EVERY OTHER DAY
Refills: 0 | Status: DISCONTINUED | OUTPATIENT
Start: 2022-11-08 | End: 2022-11-10

## 2022-11-08 RX ORDER — INSULIN LISPRO 100/ML
VIAL (ML) SUBCUTANEOUS
Refills: 0 | Status: DISCONTINUED | OUTPATIENT
Start: 2022-11-08 | End: 2022-11-10

## 2022-11-08 RX ORDER — INSULIN LISPRO 100/ML
VIAL (ML) SUBCUTANEOUS AT BEDTIME
Refills: 0 | Status: DISCONTINUED | OUTPATIENT
Start: 2022-11-08 | End: 2022-11-10

## 2022-11-08 RX ADMIN — Medication 5 MILLIGRAM(S): at 21:49

## 2022-11-08 RX ADMIN — APIXABAN 2.5 MILLIGRAM(S): 2.5 TABLET, FILM COATED ORAL at 05:45

## 2022-11-08 RX ADMIN — PIPERACILLIN AND TAZOBACTAM 25 GRAM(S): 4; .5 INJECTION, POWDER, LYOPHILIZED, FOR SOLUTION INTRAVENOUS at 17:15

## 2022-11-08 RX ADMIN — TAMSULOSIN HYDROCHLORIDE 0.4 MILLIGRAM(S): 0.4 CAPSULE ORAL at 21:49

## 2022-11-08 RX ADMIN — APIXABAN 2.5 MILLIGRAM(S): 2.5 TABLET, FILM COATED ORAL at 17:16

## 2022-11-08 RX ADMIN — Medication 1: at 17:17

## 2022-11-08 RX ADMIN — ATORVASTATIN CALCIUM 40 MILLIGRAM(S): 80 TABLET, FILM COATED ORAL at 21:49

## 2022-11-08 RX ADMIN — Medication 75 MICROGRAM(S): at 05:44

## 2022-11-08 RX ADMIN — PIPERACILLIN AND TAZOBACTAM 25 GRAM(S): 4; .5 INJECTION, POWDER, LYOPHILIZED, FOR SOLUTION INTRAVENOUS at 07:03

## 2022-11-08 RX ADMIN — CARVEDILOL PHOSPHATE 3.12 MILLIGRAM(S): 80 CAPSULE, EXTENDED RELEASE ORAL at 17:16

## 2022-11-08 RX ADMIN — Medication 20 MILLIGRAM(S): at 05:44

## 2022-11-08 RX ADMIN — Medication 1 PACKET(S): at 09:49

## 2022-11-08 RX ADMIN — Medication 125 MICROGRAM(S): at 05:44

## 2022-11-08 RX ADMIN — PANTOPRAZOLE SODIUM 40 MILLIGRAM(S): 20 TABLET, DELAYED RELEASE ORAL at 05:45

## 2022-11-08 RX ADMIN — Medication 1: at 00:25

## 2022-11-08 RX ADMIN — CARVEDILOL PHOSPHATE 3.12 MILLIGRAM(S): 80 CAPSULE, EXTENDED RELEASE ORAL at 10:22

## 2022-11-08 NOTE — CONSULT NOTE ADULT - CONVERSATION DETAILS
This is a 89 yo M with pmhx of hyperlipidemia, diabetes, PPM, atrial fibrillation on Eliquis presents with generalized weakness x 2 days. Found stable in the ED aside from suggestion of dehydration.  Admitted for generalized weakness 2/2 dehydration- initial diagnosis    Admitted to ICu after RRT for AMS, hypotension, tachy- with RV failure seen on POCUS  Treated for cardiogenic Shock, initially presumed to be PE, however found to be PPM induced tachycardia via interrogation- requiring device reset  Treated with pressors, inotrope  DG to the floors    Readmitted to ICU for Cardiac arrest after Emesis-  Received CPR and Intubation, cause of CODE BLUE likely aspiration PNA  ROSC 8min  Post Rosc course in ICU was complicated by pleural effusions requiring a chest tube now s/p 1.8L of drainage.    Palliative was consulted due for further consultations of [facility] especially in the event that the patient cannot swallow.  Patient has otherwise displayed steady improvement of mental status, respiratory status, and hemodynamics. LIGIA is stable since admission.  speech and ruma calvillo, PT rec home PT    Palliative spoke to the patient today, with granddaughter at the bedside; the patient is completely alert and was surprised that he had arrested, because he knew he came in for "weakness". The hospital course was explained to him and he expressed understanding, and admitted that he did not think any of it was "that serious" because he feels fine now.    Patient Questioned if perhaps his pace maker is faulty and needs to get changed, in light of the arrest. Was informed that the pacemaker was reset and is working, however it's also possible that it misfired as a response to a disease state of the heart. Looking ahead, the heart is now weaker s/p hx of arrest, and that it is important to think about whether he would like aggressive resuscitation efforts in the future with the understanding that they may not bring significant benefit.    Planning for home PT, the patient would like to go back to a functional baseline. He was therefore asked how he would like to receive medical management if his weakness recurred or persisted, where he would now be dependent on more assistance.     Patient expressed a deep understanding, and asked for some time to think about the answers to the above!! Palliative was consulted due for further consultations of [facility] especially in the event that the patient cannot swallow in the setting of current diagnosis and prognosis as detailed in the assessment and plan below.     Palliative spoke to the patient today, with granddaughter at the bedside; the patient is completely alert and was surprised that he had arrested, because he knew he came in for "weakness". The hospital course was explained to him and he expressed understanding, and admitted that he did not think any of it was "that serious" because he feels fine now.    Patient Questioned if perhaps his pace maker is faulty and needs to get changed, in light of the arrest. Was informed that the pacemaker was reset and is working, however it's also possible that it misfired as a response to a disease state of the heart. Looking ahead, the heart is now weaker s/p hx of arrest, and that it is important to think about whether he would like aggressive resuscitation efforts in the future with the understanding that they may not bring significant benefit.    Planning for home PT, the patient would like to go back to a functional baseline. He was therefore asked how he would like to receive medical management if his weakness recurred or persisted, where he would now be dependent on more assistance.     Patient expressed a deep understanding, and asked for some time to think about the answers to the above!!    Patient has otherwise displayed steady improvement of mental status, respiratory status, and hemodynamics. LIGIA is stable since admission. mojgan and ruma calvillo, PT rec home PT

## 2022-11-08 NOTE — PROGRESS NOTE ADULT - SUBJECTIVE AND OBJECTIVE BOX
PGY-1 Progress Note discussed with attending    TEAMS or PAGER #: [[phone]]  TILL 5:00 PM  PLEASE CONTACT ON CALL TEAM:  - On Call Team (Please refer to [place]) FROM 5:00 PM - 8:30PM  - Nightfloat Team FROM 8:30 -7:30 AM      INTERVAL HPI/OVERNIGHT EVENTS: No events overnight. Pt assessed at the bedside in [place]se, in NAD, denies any chest pain, sob, fever, chills, n/v/d. Pt with family at the bedside. Will continue to monitor.         MEDICATIONS  (STANDING):  apixaban 2.5 milliGRAM(s) Oral two times a day  atorvastatin 40 milliGRAM(s) Oral at bedtime  carvedilol 3.125 milliGRAM(s) Oral every 12 hours  digoxin     Tablet 125 MICROGram(s) Oral every other day  furosemide    Tablet 20 milliGRAM(s) Oral daily  insulin lispro (ADMELOG) corrective regimen sliding scale   SubCutaneous three times a day before meals  insulin lispro (ADMELOG) corrective regimen sliding scale   SubCutaneous at bedtime  levothyroxine 75 MICROGram(s) Oral daily  melatonin 5 milliGRAM(s) Oral at bedtime  pantoprazole    Tablet 40 milliGRAM(s) Oral before breakfast  piperacillin/tazobactam IVPB.. 3.375 Gram(s) IV Intermittent every 12 hours  tamsulosin 0.4 milliGRAM(s) Oral at bedtime    MEDICATIONS  (PRN):      REVIEW OF SYSTEMS:  CONSTITUTIONAL: No fever, weight loss, or fatigue  RESPIRATORY: No cough, wheezing, chills or hemoptysis; No shortness of breath  CARDIOVASCULAR: No chest pain, palpitations, dizziness, or leg swelling  GASTROINTESTINAL: No abdominal pain. No nausea, vomiting, or hematemesis; No diarrhea or constipation. No melena or hematochezia.  GENITOURINARY: No dysuria or hematuria, urinary frequency  NEUROLOGICAL: No headaches, memory loss, loss of strength, numbness, or tremors  SKIN: No itching, burning, rashes, or lesions     Vital Signs Last 24 Hrs  T(C): 36.6 (08 Nov 2022 11:08), Max: 37.1 (08 Nov 2022 05:00)  T(F): 97.9 (08 Nov 2022 11:08), Max: 98.7 (08 Nov 2022 05:00)  HR: 74 (08 Nov 2022 11:08) (70 - 88)  BP: 123/65 (08 Nov 2022 11:08) (95/57 - 148/65)  BP(mean): 103 (07 Nov 2022 21:00) (65 - 103)  RR: 19 (08 Nov 2022 11:08) (15 - 25)  SpO2: 95% (08 Nov 2022 11:08) (94% - 100%)    Parameters below as of 08 Nov 2022 11:08  Patient On (Oxygen Delivery Method): room air        PHYSICAL EXAMINATION:  GENERAL: NAD, well-developed, well-groomed  HEAD:  Atraumatic, Normocephalic  EYES:  conjunctiva and sclera clear  NECK: Supple, No JVD, Normal thyroid  CHEST/LUNG: Crackles b/l .   HEART: Regular rate and rhythm; No murmurs, rubs, or gallops  ABDOMEN: Soft, Nontender, Nondistended; Bowel sounds present  NERVOUS SYSTEM:  Alert & Oriented X2-3,    EXTREMITIES:  2+ Peripheral Pulses, No clubbing, cyanosis, or edema  SKIN: R foot hallux redness, warm dry                          8.9    7.41  )-----------( 189      ( 08 Nov 2022 05:14 )             29.3     11-08    144  |  105  |  32<H>  ----------------------------<  140<H>  3.5   |  32<H>  |  1.70<H>    Ca    8.8      08 Nov 2022 05:14  Phos  2.3     11-08  Mg     2.0     11-08    TPro  5.8<L>  /  Alb  2.2<L>  /  TBili  0.6  /  DBili  x   /  AST  34  /  ALT  41  /  AlkPhos  160<H>  11-08    LIVER FUNCTIONS - ( 08 Nov 2022 05:14 )  Alb: 2.2 g/dL / Pro: 5.8 g/dL / ALK PHOS: 160 U/L / ALT: 41 U/L DA / AST: 34 U/L / GGT: x                   CAPILLARY BLOOD GLUCOSE      RADIOLOGY & ADDITIONAL TESTS:

## 2022-11-08 NOTE — CONSULT NOTE ADULT - PROBLEM SELECTOR RECOMMENDATION 3
Patient came in with weakness  now s/p 2 ICU admissions due to Cardiac dysfunction  Cardiac arrest due to emesis and aspiration  now extubated and palliative was consulted for concerns about failing dysphagia screening  Passed_ now on puree diet  Family requests a reconsult so patient is back to his baseline diet, if possible  Pt still has some secretions, but is able to swallow, seen at bedside  - risks of aspiration have been explained to patient and patient is compliant with the recommended consistency.  -f/u s/s rec,  -pulm consult as needed Class II  In the setting of A fib, with PPM  Came I for weakness and had PPM dysfunction that has been reset   however the patient suffered Cardiac arrest a few days later, ROSC 8min later  Pt EF in 10/30 showing EF of 40% prior to cardiac arrest    EF may be more reduced from baseline,   will follow cardiology, Per Dr. Dominguez "D/W son  will treat medically, no further cardiac testing."    -on coreg, statin and dig  -not on ACE, or aspirin for optimal medical therapy , yet  -however, given his overall function, estimated life expectancy suggests not hospice candidate at this time  -Palliative reconsult as needed Class II  In the setting of A fib, with PPM  Came I for weakness and had PPM dysfunction that has been reset   however the patient suffered Cardiac arrest a few days later, ROSC 8min later  Pt EF in 10/30 showing EF of 40% prior to cardiac arrest  EF may be more reduced from baseline,   will follow cardiology, Per Dr. Dominguez "D/W son  will treat medically, no further cardiac testing."  -on coreg, statin and dig  -not on ACE, or aspirin for optimal medical therapy , yet  -however, given his overall function, estimated life expectancy suggests not hospice candidate at this time  but high risk of readmission and progressive morbidity  -Palliative reconsult as needed

## 2022-11-08 NOTE — PROGRESS NOTE ADULT - ASSESSMENT
89 yo M with pmhx of CAD-s/p CABG,s/p PPM, hyperlipidemia, diabetes,Parox atrial fibrillation on Eliquis,Renal cell Ca-s/p partial nephrectomy ,Hypothyroid who  presents with generalized weakness x 2 day, s/p in ICU with afib with RVR and hypotension with acute systolic HF, s/p PEA arrest due to aspiration pneumonia,Type II MI due to demand ischemia.  1.Tele monitoring.  2.Aspiration pneumonia abx.  3.PAF-eliquis 2.5mg bid.Dig qod.  4.DM-Insulin.  5.PPI.  6.AKII-f/u lytes.Renal f/u.Gentle diuresis.  7.Hypothyroid-synthroid.  8.Type II MI due to demand ischemia.D/W son  will treat medically,no further cardiac testing.  9.CAD-statin,resume coreg 3.125mg bid  10.GI prophylaxis.

## 2022-11-08 NOTE — CONSULT NOTE ADULT - PROBLEM SELECTOR RECOMMENDATION 2
In the setting of A fib, with PPM  Came I for weakness and had PPM dysfunction that has been reset   however the patient suffered Cardiac arrest a few days later, ROSC 8min later  Pt EF in 10/30 showing EF of 40% prior to cardiac arrest    EF may be more reduced from baseline,   will follow cardiology, Per Dr. Dominguez "D/W son  will treat medically, no further cardiac testing."    -on coreg, statin and dig  -however, given his overall function, estimated life expectancy suggests not hospice candidate at this time  -Palliative reconsult as needed Cardiac arrest a few days over this admission, ROSC 8min later  EF in 10/30 showing EF of 40% prior to cardiac arrest  EF may be more reduced from baseline,   will follow cardiology, Per Dr. Dominguez "D/W son  will treat medically, no further cardiac testing."

## 2022-11-08 NOTE — CONSULT NOTE ADULT - PROBLEM SELECTOR RECOMMENDATION 5
See GOC  where palliative was consulted to address GOC in the setting of weakness, s/p 2 ICU admissions surrounding cardiac dysfunction,   dg with concerns for continued weakness and dysphagia  Pt has shown Improvement   now pending dc home with PT,  code status- patient is thinking about his wishes and would like more time

## 2022-11-08 NOTE — CONSULT NOTE ADULT - NS ATTEND AMEND GEN_ALL_CORE FT
s/p cardiac arrest s/p successful extubation with improving dysphagia and cognition, reviewed GOC/advance directives, considering all options  high risk of readmission, could be hospice candidate in near future

## 2022-11-08 NOTE — PROGRESS NOTE ADULT - ATTENDING COMMENTS
89 yr old man with  hyperlipidemia, diabetes, atrial fibrillation on Eliquis presented with generalized weakness x 2 days. Patient noted to be lethargic and vitals at the time of the rapid was afebrile, BP of 130/61 and repeat BP showing reads of 50/40s. Patient noted to have heart rates in 140's. Patient placed on 15L NRBM for severe hypoxic resp failure ,peripheral phenylephrine was started and metoprolol 2.5mg IV was given for heart rate.  ICU was consulted and bedside POCUS done showing decreased left ventricular function, dilated IVC and Right atrium. Patient admitted to ICU for  cardiogenic shock     11/2: Cardiac arrest called on the floor. As per notes patient was noted to have vomited and aspirated prior to arrest. ROSC achieve in about 8 minutes. Paced rhythm on initial event. Patient intubated and transferred to ICU. Extubated, 11/3    Alert, verbally responsive man, cachectic  Vital Signs Last 24 Hrs  T(C): 36.5 (08 Nov 2022 15:53), Max: 37.1 (08 Nov 2022 05:00)  T(F): 97.7 (08 Nov 2022 15:53), Max: 98.7 (08 Nov 2022 05:00)  HR: 76 (08 Nov 2022 15:53) (70 - 88)  BP: 130/54 (08 Nov 2022 15:53) (95/57 - 148/65)  BP(mean): 103 (07 Nov 2022 21:00) (67 - 103)  RR: 18 (08 Nov 2022 15:53) (18 - 25)  SpO2: 98% (08 Nov 2022 15:53) (94% - 98%)    Parameters below as of 08 Nov 2022 15:53  Patient On (Oxygen Delivery Method): room air    Temporal wasting  Lungs, bilateral air entry  Cor, irreg  Abdomen, soft  Neurological, non-focal    Assessment:  1. Cardiac arrest  2. Shock   3. Lactic acidosis  4. Atrial fibrillation with RVR  5, Acute hypoxic respiratroy failiulre  6. Aspiration pneumonia   7. Right pleural effusion; - S/P chest tube    8. HLD   9. Diabetes Mellitus   10. LIGIA   11. Hypothyroidism  12. Hematuria, resolved.   13. Severe protein-calorie malnutrition    Plan   - CT head with out acute bleeding   - S/P chest tube   - Pain control  - Cardiology followup   - Broad spectrum antibiotics day for total 7 days   - EP cards eval noted , PPM mode reset and tachycardia resolved . BP improved . anticoagulation and my need ADRIAN as per EP   - Hemodynamic support   - Dysphagia diet per Swallow evaluation  - Passed TOV , start flomax , garza cath d/keyana   - Restart anticoagulation  - Trend cbc   - DVT and stress ulcer prophylaxis  - OOB to chair.

## 2022-11-08 NOTE — PROGRESS NOTE ADULT - ASSESSMENT
This is a 89 yo M with pmhx of hyperlipidemia, diabetes, atrial fibrillation on Eliquis presents with generalized weakness x 2 days. Patient states that he was sitting on the couch yesterday when he sat up then fell on the ground. The episode was witnessed by his wife. He doesn't believe he lost consciousness or if he hit his head. He does mentions having 3 episodes of watery, nonbloody diarrhea for the past few days. 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 and Cardio-renal disease with volume  overload  2) Atrial fibrillation/A/flutter s/p cardiogenic shock with volume overload  3) Electrolytes disorders with Mild hypokalemia  4) Hypoalbuminemia/malnutrition  5) Anemia of chronic renal disease  6) History of RCC with Partial Nephrectomy  7) Hypothyroidism  8) Acute hypoxic respiratory failure now off ventilatory support  12) S/p Cardiac arrest s/p CPR with asystole    Recommend:  Patient  off ICU to floor for further treatment  Monitor Strict I/o  Avoid Nephrotoxic agents  S cr 1.7 stable with non oliguria  Maintain MAP>65-70 mm hg  Transfuse PRBC with goal Hgb>7  Adjust antibiotics as per renal dose adjustment with IV Zosyn for aspiration pneumonia  Optimal CHF treatment per primary/cardiology team   Replete electrolytes with goal K>4 and <5, Mag>2 and Phos 2.5 to 3.5   AC/Antiplatelet agents per primary/cardiology team  Monitor BMP/electrolytes daily  No urgent need for RRT/HD  GOC discussion/advanced directives per family  Physical therapy for ZAINA vs home PT  Will follow

## 2022-11-08 NOTE — PROGRESS NOTE ADULT - SUBJECTIVE AND OBJECTIVE BOX
Ernesto Bennett MD (Nephrology progress note)  [address], [facility],  [address],  [id number]  TEl: [phone]  Cell: [phone]    Patient is a 89y Male seen and evaluated at bedside. Vital signs, laboratory data, imaging studies, consult notes reviewed done within past 24 hours. Overnight events noted. Patient with interval transfer from ICU to floor for further treatment. Interval stable renal function with S cr 1.7 with non oliguria.     Allergies    No Known Allergies    Intolerances        ROS:  Limited  Alert and awake in no distress  Denies any chest pain, SOB    VITALS:    T(C): 36.6 (11-08-22 @ 11:08), Max: 37.1 (11-08-22 @ 05:00)  HR: 74 (11-08-22 @ 11:08) (70 - 88)  BP: 123/65 (11-08-22 @ 11:08) (95/57 - 148/65)  RR: 19 (11-08-22 @ 11:08) (15 - 25)  SpO2: 95% (11-08-22 @ 11:08) (94% - 100%)  CAPILLARY BLOOD GLUCOSE      POCT Blood Glucose.: 136 mg/dL (08 Nov 2022 11:29)  POCT Blood Glucose.: 127 mg/dL (08 Nov 2022 06:02)  POCT Blood Glucose.: 189 mg/dL (07 Nov 2022 23:59)  POCT Blood Glucose.: 217 mg/dL (07 Nov 2022 16:34)      11-07-22 @ 07:01  -  11-08-22 @ 07:00  --------------------------------------------------------  IN: 620 mL / OUT: 695 mL / NET: -75 mL      MEDICATIONS  (STANDING):  apixaban 2.5 milliGRAM(s) Oral two times a day  atorvastatin 40 milliGRAM(s) Oral at bedtime  carvedilol 3.125 milliGRAM(s) Oral every 12 hours  digoxin     Tablet 125 MICROGram(s) Oral every other day  furosemide    Tablet 20 milliGRAM(s) Oral daily  insulin lispro (ADMELOG) corrective regimen sliding scale   SubCutaneous three times a day before meals  insulin lispro (ADMELOG) corrective regimen sliding scale   SubCutaneous at bedtime  levothyroxine 75 MICROGram(s) Oral daily  melatonin 5 milliGRAM(s) Oral at bedtime  pantoprazole    Tablet 40 milliGRAM(s) Oral before breakfast  piperacillin/tazobactam IVPB.. 3.375 Gram(s) IV Intermittent every 12 hours  tamsulosin 0.4 milliGRAM(s) Oral at bedtime    MEDICATIONS  (PRN):      PHYSICAL EXAM:  GENERAL: Frail appearing alert and awake lying in bed in no distress  HEENT: JOSE ARMANDO, EOMI, neck supple, no JVP, no carotid bruit, oral mucosa moist and pale  CHEST/LUNG: Bilateral decreased breath sounds, bibasilar rales and rhonchi, no wheezing  HEART: Irregular rate and rhythm, FREIDA II/VI at LPSB, no gallops, no rub   ABDOMEN: Soft, nontender, non distended, bowel sounds present  : No flank or supra pubic tenderness.  EXTREMITIES: Peripheral pulses are palpable, no pedal edema  Neurology: AAOx1-2, no focal neurological deficit  SKIN: No rash or skin lesion  Musculoskeletal: No joint deformity or spinal tenderness.      Vascular ACCESS:     LABS:                        8.9    7.41  )-----------( 189      ( 08 Nov 2022 05:14 )             29.3     11-08    144  |  105  |  32<H>  ----------------------------<  140<H>  3.5   |  32<H>  |  1.70<H>    Ca    8.8      08 Nov 2022 05:14  Phos  2.3     11-08  Mg     2.0     11-08    TPro  5.8<L>  /  Alb  2.2<L>  /  TBili  0.6  /  DBili  x   /  AST  34  /  ALT  41  /  AlkPhos  160<H>  11-08                RADIOLOGY & ADDITIONAL STUDIES:  rad< from: Xray Chest 1 View- PORTABLE-Urgent (Xray Chest 1 View- PORTABLE-Urgent .) (11.07.22 @ 12:44) >    ACC: [id number] EXAM:  XR CHEST PORTABLE URGENT 1V                          PROCEDURE DATE:  11/07/2022          INTERPRETATION:  AP chest on November 17, 2022 at 11:11 AM.    Heart magnified by technique but possibly enlarged.    Sternotomy and left-sided pacemaker again noted.    Retrocardiac density may represent a mix of atelectasis and effusion.    There is a persistent right base infiltrate.    Chest is similar to November 4.    IMPRESSION: Stable findings as above.    --- End of Report ---            BHAVANI CONLEY MD; Attending Radiologist  This document has been electronically signed. Nov 8 2022 11:48AM    < end of copied text >    Imaging Personally Reviewed:  [x] YES  [ ] NO    Consultant(s) Notes Reviewed:  [x] YES  [ ] NO    Care Discussed with Primary team/ Other Providers [x] YES  [ ] NO

## 2022-11-08 NOTE — CONSULT NOTE ADULT - CONSULT REQUESTED DATE/TIME
02-Nov-2022 14:58
29-Oct-2022 14:48
29-Oct-2022 10:46
29-Oct-2022 12:23
30-Oct-2022 11:51
01-Nov-2022 09:04
07-Nov-2022 11:00

## 2022-11-08 NOTE — CONSULT NOTE ADULT - PROBLEM SELECTOR RECOMMENDATION 4
See GOC  where palliative was consulted to address GOC in the setting of weakness, s/p 2 ICU admissions surrounding cardiac dysfunction,   dg with concerns for continued weakness and dysphagia  Pt has shown Improvement   now pending dc home with PT,  code status- patient is thinking about his wishes and would like more time Patient came in with generalized weakness 2/2 dehydration   Patient would like to go back to a functional baseline.   so far PT rec home PT Patient is thinking about GOC if he would not tolerated home PT, and became more dependent     -for now hope PT per recs  -palliative will follow Patient came in with generalized weakness 2/2 dehydration   Patient would like to go back to a functional baseline.   so far PT rec home PT Patient is thinking about GOC if he would not tolerated home PT, and became more dependent     -for now PT per recs  -palliative will follow

## 2022-11-08 NOTE — CONSULT NOTE ADULT - SUBJECTIVE AND OBJECTIVE BOX
Consult to: Discuss complex medical decision making related to goals of care    [facility] Geriatric and Palliative Consult Service:  Marlyty Strong DO: cell ([phone])  Meredith Lovett MD: cell ([phone])   Johnnie Carvalho NP: cell ([phone])   Hailey Caputo DNP: cell ([phone])  Lorne Ashley LMSW: cell ([phone])   Savi Salazar NP: via [employer] Teams    Can contact any Palliative Team member via [employer] Teams for consults and questions      HPI:  This is a 89 yo M with pmhx of hyperlipidemia, diabetes, PPM, atrial fibrillation on Eliquis presents with generalized weakness x 2 days. Patient states that he was sitting on the couch yesterday when he sat up then fell on the ground. The episode was witnessed by his wife. He doesn't believe he lost consciousness or if he hit his head. He does mentions having 3 episodes of watery, nonbloody diarrhea for the past few days.    Admitted for generalized weakness 2/2 dehydration- initial diagnosis    Admitted to ICu after RRT for AMS, hypotension, tachy- with RV failure seen on POCUS  Treated for cardiogenic Shock, initially presumed to be PE, however found to be PPM induced tachycardia via interrogation- requiring device reset  Treated with pressors, inotrope  DG to the floors    Readmitted to ICU for Cardiac arrest after Emesis-  Received CPR and Intubation, cause of CODE BLUE likely aspiration PNA  ROSC 8min  Post Rosc course in ICU was complicated by pleural effusions requiring a chest tube now s/p 1.8L of drainage.    Steady improvement of mental status, respiratory status, and hemodynamics. LIGIA is stable since admission.  speech and sallow eval -puree, PT rec home PT        PAST MEDICAL & SURGICAL HISTORY:  Diabetes      HTN (hypertension)      Hypothyroid      GERD (gastroesophageal reflux disease)      HLD (hyperlipidemia)      Hypothyroidism      H/O heart bypass surgery      H/O partial nephrectomy      S/P TURP          SOCIAL HISTORY:    Admitted from:  home  (with HHA)           assisted living          [facility]   [ none ] Substance abuse, [ none ] Tobacco hx, [ none ] Alcohol hx, [ none ] Home Opioid Hx    FAMILY HISTORY:  Family history of hypertension (Father, Mother)     unable to obtain from patient due to poor mentation, family unable to give information, see H&P for history  Baseline ADLs (prior to admission):    Allergies    No Known Allergies    Intolerances      Present Symptoms:   Pain:  Fatigue:  Nausea:  Lack of Appetite:   SOB:  Depression:  Anxiety:  Review of Systems: [All others negative or Unable to obtain due to poor mentation]    MEDICATIONS  (STANDING):  apixaban 2.5 milliGRAM(s) Oral two times a day  atorvastatin 40 milliGRAM(s) Oral at bedtime  carvedilol 3.125 milliGRAM(s) Oral every 12 hours  digoxin     Tablet 125 MICROGram(s) Oral every other day  furosemide    Tablet 20 milliGRAM(s) Oral daily  insulin lispro (ADMELOG) corrective regimen sliding scale   SubCutaneous three times a day before meals  insulin lispro (ADMELOG) corrective regimen sliding scale   SubCutaneous at bedtime  levothyroxine 75 MICROGram(s) Oral daily  melatonin 5 milliGRAM(s) Oral at bedtime  pantoprazole    Tablet 40 milliGRAM(s) Oral before breakfast  piperacillin/tazobactam IVPB.. 3.375 Gram(s) IV Intermittent every 12 hours  tamsulosin 0.4 milliGRAM(s) Oral at bedtime    MEDICATIONS  (PRN):      PHYSICAL EXAM:  Vital Signs Last 24 Hrs  T(C): 36.6 (08 Nov 2022 11:08), Max: 37.1 (08 Nov 2022 05:00)  T(F): 97.9 (08 Nov 2022 11:08), Max: 98.7 (08 Nov 2022 05:00)  HR: 74 (08 Nov 2022 11:08) (70 - 88)  BP: 123/65 (08 Nov 2022 11:08) (95/57 - 148/65)  BP(mean): 103 (07 Nov 2022 21:00) (64 - 103)  RR: 19 (08 Nov 2022 11:08) (15 - 25)  SpO2: 95% (08 Nov 2022 11:08) (94% - 100%)    Parameters below as of 08 Nov 2022 11:08  Patient On (Oxygen Delivery Method): room air        General: alert  oriented x ____    lethargic distressed cachexia  nonverbal  unarousable verbal    Palliative Performance Scale/Karnofsky Score:  ECOG Performance:    HEENT: no abnormal lesion, dry mouth  ET tube/trach oral lesions:  Lungs: tachypnea/labored breathing, audible excessive secretions  CV: RRR, S1S2, tachycardia  GI: soft non distended non tender  incontinent               PEG/NG/OG tube  constipation  last BM:   : incontinent  oliguria/anuria  garza  Musculoskeletal: weakness x4 edema x4    ambulatory with assistance   mostly/fully bedbound/wheelchair bound  Skin: no abnormal skin lesions, poor skin turgor, pressure ulcer stage:   Neuro: no deficits, mild cognitive impairment dsyphagia/dysarthria paresis  Oral intake ability: unable/only mouth care, minimal moderate full capability    LABS:                        8.9    7.41  )-----------( 189      ( 08 Nov 2022 05:14 )             29.3     11-08    144  |  105  |  32<H>  ----------------------------<  140<H>  3.5   |  32<H>  |  1.70<H>    Ca    8.8      08 Nov 2022 05:14  Phos  2.3     11-08  Mg     2.0     11-08    TPro  5.8<L>  /  Alb  2.2<L>  /  TBili  0.6  /  DBili  x   /  AST  34  /  ALT  41  /  AlkPhos  160<H>  11-08        RADIOLOGY & ADDITIONAL STUDIES:     Consult to: Discuss complex medical decision making related to goals of care    [facility] Geriatric and Palliative Consult Service:  Marlyty Strong DO: cell ([phone])  Meredith Lovett MD: cell ([phone])   Johnnie Carvalho NP: cell ([phone])   Hailey Caputo DNP: cell ([phone])  Lorne Ashley LMSW: cell ([phone])   Savi Salazar NP: via [employer] Teams    Can contact any Palliative Team member via [employer] Teams for consults and questions      HPI:  This is a 89 yo M with pmhx of hyperlipidemia, diabetes, PPM, atrial fibrillation on Eliquis presents with generalized weakness x 2 days. Patient states that he was sitting on the couch yesterday when he sat up then fell on the ground. The episode was witnessed by his wife. He doesn't believe he lost consciousness or if he hit his head. He does mentions having 3 episodes of watery, nonbloody diarrhea for the past few days.    Admitted for generalized weakness 2/2 dehydration- initial diagnosis    Admitted to ICu after RRT for AMS, hypotension, tachy- with RV failure seen on POCUS  Treated for cardiogenic Shock, initially presumed to be PE, however found to be PPM induced tachycardia via interrogation- requiring device reset  Treated with pressors, inotrope  DG to the floors    Readmitted to ICU for Cardiac arrest after Emesis-  Received CPR and Intubation, cause of CODE BLUE likely aspiration PNA  ROSC 8min  Post Rosc course in ICU was complicated by pleural effusions requiring a chest tube now s/p 1.8L of drainage.    Steady improvement of mental status, respiratory status, and hemodynamics. LIGIA is stable since admission.  speech and sallow eval -puree, PT rec home PT        PAST MEDICAL & SURGICAL HISTORY:  Diabetes      HTN (hypertension)      Hypothyroid      GERD (gastroesophageal reflux disease)      HLD (hyperlipidemia)      Hypothyroidism      H/O heart bypass surgery      H/O partial nephrectomy      S/P TURP          SOCIAL HISTORY:    Admitted from:  home  (with HHA)           assisted living          [facility]   [ none ] Substance abuse, [ none ] Tobacco hx, [ none ] Alcohol hx, [ none ] Home Opioid Hx    FAMILY HISTORY:  Family history of hypertension (Father, Mother)     unable to obtain from patient due to poor mentation, family unable to give information, see H&P for history  Baseline ADLs (prior to admission):    Allergies    No Known Allergies    Intolerances      Present Symptoms:   Pain:  Fatigue:  Nausea:  Lack of Appetite:   SOB:  Depression:  Anxiety:  Review of Systems: [All others negative or Unable to obtain due to poor mentation]    MEDICATIONS  (STANDING):  apixaban 2.5 milliGRAM(s) Oral two times a day  atorvastatin 40 milliGRAM(s) Oral at bedtime  carvedilol 3.125 milliGRAM(s) Oral every 12 hours  digoxin     Tablet 125 MICROGram(s) Oral every other day  furosemide    Tablet 20 milliGRAM(s) Oral daily  insulin lispro (ADMELOG) corrective regimen sliding scale   SubCutaneous three times a day before meals  insulin lispro (ADMELOG) corrective regimen sliding scale   SubCutaneous at bedtime  levothyroxine 75 MICROGram(s) Oral daily  melatonin 5 milliGRAM(s) Oral at bedtime  pantoprazole    Tablet 40 milliGRAM(s) Oral before breakfast  piperacillin/tazobactam IVPB.. 3.375 Gram(s) IV Intermittent every 12 hours  tamsulosin 0.4 milliGRAM(s) Oral at bedtime    MEDICATIONS  (PRN):      PHYSICAL EXAM:  Vital Signs Last 24 Hrs  T(C): 36.6 (08 Nov 2022 11:08), Max: 37.1 (08 Nov 2022 05:00)  T(F): 97.9 (08 Nov 2022 11:08), Max: 98.7 (08 Nov 2022 05:00)  HR: 74 (08 Nov 2022 11:08) (70 - 88)  BP: 123/65 (08 Nov 2022 11:08) (95/57 - 148/65)  BP(mean): 103 (07 Nov 2022 21:00) (64 - 103)  RR: 19 (08 Nov 2022 11:08) (15 - 25)  SpO2: 95% (08 Nov 2022 11:08) (94% - 100%)    Parameters below as of 08 Nov 2022 11:08  Patient On (Oxygen Delivery Method): room air        General: alert  oriented x ____    lethargic distressed cachexia  nonverbal  unarousable verbal    Palliative Performance Scale/Karnofsky Score: 60%      GENERAL: NAD, lying in bed comfortably  HEAD:  Atraumatic, Normocephalic  EYES: EOMI, PERRLA, conjunctiva and sclera clear  ENT: Moist mucous membranes  NECK: Supple, No JVD  CHEST/LUNG: Clear to auscultation bilaterally; No rales, rhonchi, wheezing, or rubs. Unlabored respirations  HEART: Regular rate and rhythm; No murmurs, rubs, or gallops  ABDOMEN: Bowel sounds present; Soft, Nontender, Nondistended. No hepatomegaly  EXTREMITIES:  2+ Peripheral Pulses, brisk capillary refill. No clubbing, cyanosis, or edema  NERVOUS SYSTEM:  Alert & Oriented X3, speech clear. No deficits   MSK: FROM all 4 extremities, full and equal strength  SKIN: No rashes or lesions    LABS:                        8.9    7.41  )-----------( 189      ( 08 Nov 2022 05:14 )             29.3     11-08    144  |  105  |  32<H>  ----------------------------<  140<H>  3.5   |  32<H>  |  1.70<H>    Ca    8.8      08 Nov 2022 05:14  Phos  2.3     11-08  Mg     2.0     11-08    TPro  5.8<L>  /  Alb  2.2<L>  /  TBili  0.6  /  DBili  x   /  AST  34  /  ALT  41  /  AlkPhos  160<H>  11-08        RADIOLOGY & ADDITIONAL STUDIES:     Consult to: Discuss complex medical decision making related to goals of care    [facility] Geriatric and Palliative Consult Service:  Marlyty Strong DO: cell ([phone])  Meredith Lovett MD: cell ([phone])   Johnnie Carvalho NP: cell ([phone])   Hailey Caputo DNP: cell ([phone])  Lorne Ashley LMSW: cell ([phone])   Savi Salazar NP: via [employer] Teams    Can contact any Palliative Team member via [employer] Teams for consults and questions      HPI:  This is a 89 yo M with pmhx of hyperlipidemia, diabetes, PPM, atrial fibrillation on Eliquis presents with generalized weakness x 2 days. Patient states that he was sitting on the couch yesterday when he sat up then fell on the ground. The episode was witnessed by his wife. He doesn't believe he lost consciousness or if he hit his head. He does mentions having 3 episodes of watery, nonbloody diarrhea for the past few days.    Admitted for generalized weakness 2/2 dehydration- initial diagnosis    Admitted to ICu after RRT for AMS, hypotension, tachy- with RV failure seen on POCUS  Treated for cardiogenic Shock, initially presumed to be PE, however found to be PPM induced tachycardia via interrogation- requiring device reset  Treated with pressors, inotrope  DG to the floors    Readmitted to ICU for Cardiac arrest after Emesis-  Received CPR and Intubation, cause of CODE BLUE likely aspiration PNA  ROSC 8min  Post Rosc course in ICU was complicated by pleural effusions requiring a chest tube now s/p 1.8L of drainage.    Steady improvement of mental status, respiratory status, and hemodynamics. LIGIA is stable since admission.  speech and sallow eval -puree, PT rec home PT        PAST MEDICAL & SURGICAL HISTORY:  Diabetes      HTN (hypertension)      Hypothyroid      GERD (gastroesophageal reflux disease)      HLD (hyperlipidemia)      Hypothyroidism      H/O heart bypass surgery      H/O partial nephrectomy      S/P TURP          SOCIAL HISTORY:    Admitted from:  home  (with A)            [ none ] Substance abuse, [ none ] Tobacco hx, [ none ] Alcohol hx, [ none ] Home Opioid Hx    FAMILY HISTORY:  Family history of hypertension (Father, Mother)  Baseline ADLs (prior to admission): ambulatory with assistance    Allergies    No Known Allergies    Intolerances      Present Symptoms:   Review of Systems: [All others negative   MEDICATIONS  (STANDING):  apixaban 2.5 milliGRAM(s) Oral two times a day  atorvastatin 40 milliGRAM(s) Oral at bedtime  carvedilol 3.125 milliGRAM(s) Oral every 12 hours  digoxin     Tablet 125 MICROGram(s) Oral every other day  furosemide    Tablet 20 milliGRAM(s) Oral daily  insulin lispro (ADMELOG) corrective regimen sliding scale   SubCutaneous three times a day before meals  insulin lispro (ADMELOG) corrective regimen sliding scale   SubCutaneous at bedtime  levothyroxine 75 MICROGram(s) Oral daily  melatonin 5 milliGRAM(s) Oral at bedtime  pantoprazole    Tablet 40 milliGRAM(s) Oral before breakfast  piperacillin/tazobactam IVPB.. 3.375 Gram(s) IV Intermittent every 12 hours  tamsulosin 0.4 milliGRAM(s) Oral at bedtime    MEDICATIONS  (PRN):      PHYSICAL EXAM:  Vital Signs Last 24 Hrs  T(C): 36.6 (08 Nov 2022 11:08), Max: 37.1 (08 Nov 2022 05:00)  T(F): 97.9 (08 Nov 2022 11:08), Max: 98.7 (08 Nov 2022 05:00)  HR: 74 (08 Nov 2022 11:08) (70 - 88)  BP: 123/65 (08 Nov 2022 11:08) (95/57 - 148/65)  BP(mean): 103 (07 Nov 2022 21:00) (64 - 103)  RR: 19 (08 Nov 2022 11:08) (15 - 25)  SpO2: 95% (08 Nov 2022 11:08) (94% - 100%)    Parameters below as of 08 Nov 2022 11:08  Patient On (Oxygen Delivery Method): room air    Palliative Performance Scale/Karnofsky Score: 40%  GENERAL: NAD, lying in bed comfortably  HEAD:  Atraumatic, Normocephalic  EYES: EOMI, PERRLA, conjunctiva and sclera clear  ENT: Moist mucous membranes  NECK: Supple, No JVD  CHEST/LUNG: Clear to auscultation bilaterally; No rales, rhonchi, wheezing, or rubs. Unlabored respirations  HEART: Regular rate and rhythm; No murmurs, rubs, or gallops  ABDOMEN: Bowel sounds present; Soft, Nontender, Nondistended. No hepatomegaly  EXTREMITIES:  2+ Peripheral Pulses, brisk capillary refill. No clubbing, cyanosis, or edema  NERVOUS SYSTEM:  Alert & Oriented X3, speech clear. No deficits   MSK: FROM all 4 extremities, full and equal strength  SKIN: No rashes or lesions    LABS:                        8.9    7.41  )-----------( 189      ( 08 Nov 2022 05:14 )             29.3     11-08    144  |  105  |  32<H>  ----------------------------<  140<H>  3.5   |  32<H>  |  1.70<H>    Ca    8.8      08 Nov 2022 05:14  Phos  2.3     11-08  Mg     2.0     11-08    TPro  5.8<L>  /  Alb  2.2<L>  /  TBili  0.6  /  DBili  x   /  AST  34  /  ALT  41  /  AlkPhos  160<H>  11-08        RADIOLOGY & ADDITIONAL STUDIES: reviewed

## 2022-11-08 NOTE — CONSULT NOTE ADULT - PROBLEM SELECTOR RECOMMENDATION 9
Patient came in with generalized weakness 2/2 dehydration , now euvolemic  Patient would like to go back to a functional baseline. He was therefore asked how he would like to receive medical management if his weakness recurred or persisted, where he would now be dependent on more assistance.     Patient is thinking about his wishes, as he is pre-occupied with his wife with cancer.    PT recommended that patient is stable home PT, to which patient will get discharged  Palliative can be reconsulted as needed in the future Patient came in with weakness  now s/p 2 ICU admissions due to Cardiac dysfunction  Cardiac arrest due to emesis and aspiration  now extubated and palliative was consulted for concerns about failing dysphagia screening  Passed_ now on puree diet  Family requests a reconsult so patient is back to his baseline diet, if possible  Pt still has some secretions, but is able to swallow, seen at bedside  - risks of aspiration have been explained to patient and patient is compliant with the recommended consistency.  -f/u s/s rec

## 2022-11-08 NOTE — PROGRESS NOTE ADULT - ASSESSMENT
This is a 89 yo M with pmhx of hyperlipidemia, diabetes, atrial fibrillation on Eliquis presents with generalized weakness x 2 days likely in the setting of dehydration 2/2 diarrhea. Pt had code blue 2/2 possible aspiration pneumonia downgraded from the ICU.

## 2022-11-08 NOTE — PROGRESS NOTE ADULT - SUBJECTIVE AND OBJECTIVE BOX
CHIEF COMPLAINT:Patient is a 89y old  Male who presents with a chief complaint of Fall.Pt appears comfortable.    	  REVIEW OF SYSTEMS:  CONSTITUTIONAL: No fever, weight loss, or fatigue  EYES: No eye pain, visual disturbances, or discharge  ENT:  No difficulty hearing, tinnitus, vertigo; No sinus or throat pain  NECK: No pain or stiffness  RESPIRATORY: No cough, wheezing, chills or hemoptysis; No Shortness of Breath  CARDIOVASCULAR: No chest pain, palpitations, passing out, dizziness, or leg swelling  GASTROINTESTINAL: No abdominal or epigastric pain. No nausea, vomiting, or hematemesis; No diarrhea or constipation. No melena or hematochezia.  GENITOURINARY: No dysuria, frequency, hematuria, or incontinence  NEUROLOGICAL: No headaches, memory loss, loss of strength, numbness, or tremors  SKIN: No itching, burning, rashes, or lesions   LYMPH Nodes: No enlarged glands  ENDOCRINE: No heat or cold intolerance; No hair loss  MUSCULOSKELETAL: No joint pain or swelling; No muscle, back, or extremity pain  PSYCHIATRIC: No depression, anxiety, mood swings, or difficulty sleeping  HEME/LYMPH: No easy bruising, or bleeding gums  ALLERGY AND IMMUNOLOGIC: No hives or eczema	      PHYSICAL EXAM:  T(C): 36.6 (11-08-22 @ 08:00), Max: 37.1 (11-08-22 @ 05:00)  HR: 73 (11-08-22 @ 08:00) (70 - 88)  BP: 121/73 (11-08-22 @ 08:00) (95/57 - 161/76)  RR: 19 (11-08-22 @ 08:00) (15 - 25)  SpO2: 95% (11-08-22 @ 08:00) (89% - 100%)  Wt(kg): --  I&O's Summary    07 Nov 2022 07:01  -  08 Nov 2022 07:00  --------------------------------------------------------  IN: 620 mL / OUT: 695 mL / NET: -75 mL        Appearance: Normal	  HEENT:   Normal oral mucosa, PERRL, EOMI	  Lymphatic: No lymphadenopathy  Cardiovascular: Normal S1 S2, No JVD, No murmurs, No edema  Respiratory: Lungs clear to auscultation	  Psychiatry: A & O x 3, Mood & affect appropriate  Gastrointestinal:  Soft, Non-tender, + BS	  Skin: No rashes, No ecchymoses, No cyanosis	  Neurologic: Non-focal  Extremities: Normal range of motion, No clubbing, cyanosis or edema  Vascular: Peripheral pulses palpable 2+ bilaterally    MEDICATIONS  (STANDING):  apixaban 2.5 milliGRAM(s) Oral two times a day  atorvastatin 40 milliGRAM(s) Oral at bedtime  carvedilol 3.125 milliGRAM(s) Oral every 12 hours  digoxin     Tablet 125 MICROGram(s) Oral every other day  furosemide    Tablet 20 milliGRAM(s) Oral daily  insulin lispro (ADMELOG) corrective regimen sliding scale   SubCutaneous three times a day before meals  insulin lispro (ADMELOG) corrective regimen sliding scale   SubCutaneous at bedtime  levothyroxine 75 MICROGram(s) Oral daily  melatonin 5 milliGRAM(s) Oral at bedtime  pantoprazole    Tablet 40 milliGRAM(s) Oral before breakfast  piperacillin/tazobactam IVPB.. 3.375 Gram(s) IV Intermittent every 12 hours  potassium phosphate / sodium phosphate Powder (PHOS-NaK) 1 Packet(s) Oral once  tamsulosin 0.4 milliGRAM(s) Oral at bedtime      TELEMETRY: av paced	    	  	  LABS:	 	                    8.9    7.41  )-----------( 189      ( 08 Nov 2022 05:14 )             29.3     11-08    144  |  105  |  32<H>  ----------------------------<  140<H>  3.5   |  32<H>  |  1.70<H>    Ca    8.8      08 Nov 2022 05:14  Phos  2.3     11-08  Mg     2.0     11-08    TPro  5.8<L>  /  Alb  2.2<L>  /  TBili  0.6  /  DBili  x   /  AST  34  /  ALT  41  /  AlkPhos  160<H>  11-08    proBNP: Serum Pro-Brain Natriuretic Peptide: 08292 pg/mL (10-29 @ 12:55)  Serum Pro-Brain Natriuretic Peptide: 32002 pg/mL (10-28 @ 19:36)      TSH: Thyroid Stimulating Hormone, Serum: 6.68 uU/mL (10-29 @ 10:41)

## 2022-11-08 NOTE — CONSULT NOTE ADULT - ASSESSMENT
This is a 89 yo M with PMH of hyperlipidemia, diabetes, PPM, atrial fibrillation on Eliquis presents with generalized weakness x 2 days, admitted to ICU for cardiogenic shock, dg complicated by cardiac arrest, now dg for further management. Palliative consulted for dysphagia.

## 2022-11-09 DIAGNOSIS — I50.9 HEART FAILURE, UNSPECIFIED: ICD-10-CM

## 2022-11-09 LAB
ANION GAP SERPL CALC-SCNC: 5 MMOL/L — SIGNIFICANT CHANGE UP (ref 5–17)
BUN SERPL-MCNC: 26 MG/DL — HIGH (ref 7–18)
CALCIUM SERPL-MCNC: 9.3 MG/DL — SIGNIFICANT CHANGE UP (ref 8.4–10.5)
CHLORIDE SERPL-SCNC: 104 MMOL/L — SIGNIFICANT CHANGE UP (ref 96–108)
CO2 SERPL-SCNC: 34 MMOL/L — HIGH (ref 22–31)
CREAT SERPL-MCNC: 1.55 MG/DL — HIGH (ref 0.5–1.3)
EGFR: 41 ML/MIN/1.73M2 — LOW
GLUCOSE BLDC GLUCOMTR-MCNC: 131 MG/DL — HIGH (ref 70–99)
GLUCOSE BLDC GLUCOMTR-MCNC: 155 MG/DL — HIGH (ref 70–99)
GLUCOSE BLDC GLUCOMTR-MCNC: 186 MG/DL — HIGH (ref 70–99)
GLUCOSE BLDC GLUCOMTR-MCNC: 197 MG/DL — HIGH (ref 70–99)
GLUCOSE SERPL-MCNC: 138 MG/DL — HIGH (ref 70–99)
HCT VFR BLD CALC: 30.3 % — LOW (ref 39–50)
HGB BLD-MCNC: 9.1 G/DL — LOW (ref 13–17)
MAGNESIUM SERPL-MCNC: 2.3 MG/DL — SIGNIFICANT CHANGE UP (ref 1.6–2.6)
MCHC RBC-ENTMCNC: 28.1 PG — SIGNIFICANT CHANGE UP (ref 27–34)
MCHC RBC-ENTMCNC: 30 GM/DL — LOW (ref 32–36)
MCV RBC AUTO: 93.5 FL — SIGNIFICANT CHANGE UP (ref 80–100)
NRBC # BLD: 0 /100 WBCS — SIGNIFICANT CHANGE UP (ref 0–0)
PHOSPHATE SERPL-MCNC: 2.2 MG/DL — LOW (ref 2.5–4.5)
PLATELET # BLD AUTO: 207 K/UL — SIGNIFICANT CHANGE UP (ref 150–400)
POTASSIUM SERPL-MCNC: 4 MMOL/L — SIGNIFICANT CHANGE UP (ref 3.5–5.3)
POTASSIUM SERPL-SCNC: 4 MMOL/L — SIGNIFICANT CHANGE UP (ref 3.5–5.3)
PROCALCITONIN SERPL-MCNC: 0.18 NG/ML — HIGH (ref 0.02–0.1)
RBC # BLD: 3.24 M/UL — LOW (ref 4.2–5.8)
RBC # FLD: 16.6 % — HIGH (ref 10.3–14.5)
SODIUM SERPL-SCNC: 143 MMOL/L — SIGNIFICANT CHANGE UP (ref 135–145)
WBC # BLD: 6.5 K/UL — SIGNIFICANT CHANGE UP (ref 3.8–10.5)
WBC # FLD AUTO: 6.5 K/UL — SIGNIFICANT CHANGE UP (ref 3.8–10.5)

## 2022-11-09 PROCEDURE — 99233 SBSQ HOSP IP/OBS HIGH 50: CPT | Mod: GC

## 2022-11-09 RX ORDER — SACUBITRIL AND VALSARTAN 24; 26 MG/1; MG/1
1 TABLET, FILM COATED ORAL
Refills: 0 | Status: DISCONTINUED | OUTPATIENT
Start: 2022-11-09 | End: 2022-11-10

## 2022-11-09 RX ADMIN — CARVEDILOL PHOSPHATE 3.12 MILLIGRAM(S): 80 CAPSULE, EXTENDED RELEASE ORAL at 17:07

## 2022-11-09 RX ADMIN — PIPERACILLIN AND TAZOBACTAM 25 GRAM(S): 4; .5 INJECTION, POWDER, LYOPHILIZED, FOR SOLUTION INTRAVENOUS at 17:06

## 2022-11-09 RX ADMIN — ATORVASTATIN CALCIUM 40 MILLIGRAM(S): 80 TABLET, FILM COATED ORAL at 21:33

## 2022-11-09 RX ADMIN — Medication 20 MILLIGRAM(S): at 05:33

## 2022-11-09 RX ADMIN — APIXABAN 2.5 MILLIGRAM(S): 2.5 TABLET, FILM COATED ORAL at 17:07

## 2022-11-09 RX ADMIN — SACUBITRIL AND VALSARTAN 1 TABLET(S): 24; 26 TABLET, FILM COATED ORAL at 12:02

## 2022-11-09 RX ADMIN — APIXABAN 2.5 MILLIGRAM(S): 2.5 TABLET, FILM COATED ORAL at 05:33

## 2022-11-09 RX ADMIN — CARVEDILOL PHOSPHATE 3.12 MILLIGRAM(S): 80 CAPSULE, EXTENDED RELEASE ORAL at 05:33

## 2022-11-09 RX ADMIN — TAMSULOSIN HYDROCHLORIDE 0.4 MILLIGRAM(S): 0.4 CAPSULE ORAL at 21:32

## 2022-11-09 RX ADMIN — Medication 5 MILLIGRAM(S): at 21:33

## 2022-11-09 RX ADMIN — PIPERACILLIN AND TAZOBACTAM 25 GRAM(S): 4; .5 INJECTION, POWDER, LYOPHILIZED, FOR SOLUTION INTRAVENOUS at 05:33

## 2022-11-09 RX ADMIN — SACUBITRIL AND VALSARTAN 1 TABLET(S): 24; 26 TABLET, FILM COATED ORAL at 17:07

## 2022-11-09 RX ADMIN — Medication 1: at 12:03

## 2022-11-09 RX ADMIN — PANTOPRAZOLE SODIUM 40 MILLIGRAM(S): 20 TABLET, DELAYED RELEASE ORAL at 08:22

## 2022-11-09 RX ADMIN — Medication 75 MICROGRAM(S): at 05:33

## 2022-11-09 RX ADMIN — Medication 1: at 17:08

## 2022-11-09 NOTE — PROGRESS NOTE ADULT - ASSESSMENT
This is a 89 yo M with pmhx of hyperlipidemia, diabetes, atrial fibrillation on Eliquis presents with generalized weakness x 2 days. Patient states that he was sitting on the couch yesterday when he sat up then fell on the ground. The episode was witnessed by his wife. He doesn't believe he lost consciousness or if he hit his head. He does mentions having 3 episodes of watery, nonbloody diarrhea for the past few days. 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 and Cardio-renal disease with volume  overload  2) Atrial fibrillation/A/flutter s/p cardiogenic shock with volume overload  3) Electrolytes disorders with Mild hypokalemia  4) Hypoalbuminemia/malnutrition  5) Anemia of chronic renal disease  6) History of RCC with Partial Nephrectomy  7) Hypothyroidism  8) Acute hypoxic respiratory failure now off ventilatory support  12) S/p Cardiac arrest s/p CPR with asystole    Recommend:  Monitor Strict I/o  Avoid Nephrotoxic agents  S cr 1.5 stable with non oliguria  Maintain MAP>65-70 mm hg  Adjust antibiotics as per renal dose adjustment with IV Zosyn for aspiration pneumonia  Optimal CHF treatment per primary/cardiology team   No renal contraindication for ACEi/ARB  Replete electrolytes with goal K>4 and <5, Mag>2 and Phos 2.5 to 3.5   AC/Antiplatelet agents per primary/cardiology team  Monitor BMP/electrolytes daily  No urgent need for RRT/HD  GOC discussion/advanced directives per family  Physical therapy for ZAINA vs home PT  Will follow

## 2022-11-09 NOTE — PROGRESS NOTE ADULT - NS ATTEND AMEND GEN_ALL_CORE FT
INSURANCE COVERAGE REGARDING PAYMENT  FOR YOUR COLONOSCOPY      Colon Cancer is the second leading cause of death among cancers, per the [employer]. It is preventable. Early detection is the key. Your doctor will determine which tests need to be done for prevention and/or treatment.    If during the course of a screening colonoscopy, our physician finds an abnormality, performs a biopsy or polypectomy (removal of polyp), your insurance company most likely will consider the procedure to be a diagnostic exam and no longer a screening procedure.    Every insurance company is different. We encourage you to call your insurance company and ask them \"if during the course of a screening colonoscopy, an abnormality is discovered and the physician performs a biopsy or polypectomy, will the procedure fall under your screening benefits or under diagnostic benefits\". Generally, screening benefits and diagnostic benefits are paid at different levels. This varies with each insurance company, so we want you to be aware of this prior to your procedure. You do not have to call your insurance company if you have Medicare.    The authorization staff at [facility] will precertify your colonoscopy. However, precertification, which serves as notification is never a guarantee of payment. If you have questions regarding precertification for your procedure please contact your insurance company. If you need further assistance call our authorization department at [phone].   s/p cardiac arrest s/p successful extubation with improving dysphagia and cognition, reviewed GOC/advance directives again  high risk of readmission, could be hospice candidate in near future    patient at this time wants to not commit to any decisions, would like more thought and time before finalizing

## 2022-11-09 NOTE — PROGRESS NOTE ADULT - TIME BILLING
Patient/primary team
independent

## 2022-11-09 NOTE — PROGRESS NOTE ADULT - SUBJECTIVE AND OBJECTIVE BOX
PGY-1 Progress Note discussed with attending    TEAMS or PAGER #: [[phone]]  TILL 5:00 PM  PLEASE CONTACT ON CALL TEAM:  - On Call Team (Please refer to [place]) FROM 5:00 PM - 8:30PM  - Nightfloat Team FROM 8:30 -7:30 AM      INTERVAL HPI/OVERNIGHT EVENTS: No events overnight. Pt assessed at the bedside, in NAD, denies any chest pain, sob, fever, chills, n/v/d. Pt is oob to chair doing well. Will continue to monitor.         MEDICATIONS  (STANDING):  apixaban 2.5 milliGRAM(s) Oral two times a day  atorvastatin 40 milliGRAM(s) Oral at bedtime  carvedilol 3.125 milliGRAM(s) Oral every 12 hours  digoxin     Tablet 125 MICROGram(s) Oral every other day  insulin lispro (ADMELOG) corrective regimen sliding scale   SubCutaneous three times a day before meals  insulin lispro (ADMELOG) corrective regimen sliding scale   SubCutaneous at bedtime  levothyroxine 75 MICROGram(s) Oral daily  melatonin 5 milliGRAM(s) Oral at bedtime  pantoprazole    Tablet 40 milliGRAM(s) Oral before breakfast  piperacillin/tazobactam IVPB.. 3.375 Gram(s) IV Intermittent every 12 hours  sacubitril 24 mG/valsartan 26 mG 1 Tablet(s) Oral two times a day  tamsulosin 0.4 milliGRAM(s) Oral at bedtime    MEDICATIONS  (PRN):      REVIEW OF SYSTEMS:  CONSTITUTIONAL: No fever, weight loss, or fatigue  RESPIRATORY: No cough, wheezing, chills or hemoptysis; No shortness of breath  CARDIOVASCULAR: No chest pain, palpitations, dizziness, or leg swelling  GASTROINTESTINAL: No abdominal pain. No nausea, vomiting, or hematemesis; No diarrhea or constipation. No melena or hematochezia.  GENITOURINARY: No dysuria or hematuria, urinary frequency  NEUROLOGICAL: No headaches, memory loss, loss of strength, numbness, or tremors  SKIN: No itching, burning, rashes, or lesions     Vital Signs Last 24 Hrs  T(C): 36.4 (09 Nov 2022 11:08), Max: 36.7 (08 Nov 2022 19:39)  T(F): 97.5 (09 Nov 2022 11:08), Max: 98 (08 Nov 2022 19:39)  HR: 74 (09 Nov 2022 11:20) (67 - 76)  BP: 114/51 (09 Nov 2022 11:20) (105/46 - 132/70)  BP(mean): --  RR: 17 (09 Nov 2022 11:08) (17 - 19)  SpO2: 96% (09 Nov 2022 11:20) (94% - 99%)    Parameters below as of 09 Nov 2022 11:08  Patient On (Oxygen Delivery Method): room air        PHYSICAL EXAMINATION:  GENERAL: NAD, elderly, cachectic, temporal sparing   HEAD:  Atraumatic, Normocephalic  EYES:  conjunctiva and sclera clear  NECK: Supple, No JVD, Normal thyroid  CHEST/LUNG: Clear to auscultation. Clear to percussion bilaterally; No rales, rhonchi, wheezing, or rubs  HEART: Regular rate and rhythm; No murmurs, rubs, or gallops  ABDOMEN: Soft, Nontender, Nondistended; Bowel sounds present  NERVOUS SYSTEM:  Alert & Oriented X3,    EXTREMITIES:  2+ Peripheral Pulses, No clubbing, cyanosis, or edema  SKIN: warm dry                          9.1    6.50  )-----------( 207      ( 09 Nov 2022 05:46 )             30.3     11-09    143  |  104  |  26<H>  ----------------------------<  138<H>  4.0   |  34<H>  |  1.55<H>    Ca    9.3      09 Nov 2022 05:46  Phos  2.2     11-09  Mg     2.3     11-09    TPro  5.8<L>  /  Alb  2.2<L>  /  TBili  0.6  /  DBili  x   /  AST  34  /  ALT  41  /  AlkPhos  160<H>  11-08    LIVER FUNCTIONS - ( 08 Nov 2022 05:14 )  Alb: 2.2 g/dL / Pro: 5.8 g/dL / ALK PHOS: 160 U/L / ALT: 41 U/L DA / AST: 34 U/L / GGT: x                   CAPILLARY BLOOD GLUCOSE      RADIOLOGY & ADDITIONAL TESTS:

## 2022-11-09 NOTE — SWALLOW BEDSIDE ASSESSMENT ADULT - DIET PRIOR TO ADMI
As per granddaughter, regular diet with thin liquids
As per granddaughter, regular diet with thin liquids

## 2022-11-09 NOTE — SWALLOW BEDSIDE ASSESSMENT ADULT - NS ASR SWALLOW FINDINGS DISCUS
RN Kaycee, Pt, & Granddaughter/Nursing/Patient/Family Paged Dr. Stanford & discussed w/ RN Kaycee, Pt, & Granddaughter/Nursing/Patient/Family

## 2022-11-09 NOTE — SWALLOW BEDSIDE ASSESSMENT ADULT - MODE OF PRESENTATION
x1 ice chip/self fed x5 TSPN/spoon/self fed x3 TSPN & x3 cup sip/cup/spoon/self fed x2 TSPN & x1 cup sip/cup/spoon/self fed

## 2022-11-09 NOTE — SWALLOW BEDSIDE ASSESSMENT ADULT - PHARYNGEAL PHASE
Delayed pharyngeal swallow/Decreased laryngeal elevation/Wet vocal quality post oral intake/Multiple swallows Delayed pharyngeal swallow/Decreased laryngeal elevation/Wet vocal quality post oral intake/Cough post oral intake/Multiple swallows Delayed pharyngeal swallow/Decreased laryngeal elevation/Multiple swallows

## 2022-11-09 NOTE — SWALLOW BEDSIDE ASSESSMENT ADULT - COMMENTS
Pt seen for bedside swallow re-evaluation, as per family's request given improvement in medical status. Granddaughter present at bedside & translated exam in [place]. AA+Ox3; pt OOB, upright in chair at 90 degrees. Pt expressed concerns about LTC placement & diet restrictions. PO trials included: ice, puree, mildly thick liquids via spoon & cup sip, & thin liquids via spoon & cup sip. Additional puree trials w/ chin tuck strategy appeared more successful; no overt s&s of penetration/aspiration w/ PO trials of puree w/ chin tuck.

## 2022-11-09 NOTE — SWALLOW BEDSIDE ASSESSMENT ADULT - SWALLOW EVAL: RECOMMENDED FEEDING/EATING TECHNIQUES
allow for swallow between intakes/alternate food with liquid/crush medication (when feasible)/maintain upright posture during/after eating for 30 mins/oral hygiene/position upright (90 degrees)/small sips/bites/tuck chin
allow for swallow between intakes/alternate food with liquid/crush medication (when feasible)/maintain upright posture during/after eating for 30 mins/no straws/oral hygiene/position upright (90 degrees)/small sips/bites

## 2022-11-09 NOTE — PROGRESS NOTE ADULT - SUBJECTIVE AND OBJECTIVE BOX
CHIEF COMPLAINT:Patient is a 89y old  Male who presents with a chief complaint of Fall.Pt appears comfortable.    	  REVIEW OF SYSTEMS:  CONSTITUTIONAL: No fever, weight loss, or fatigue  EYES: No eye pain, visual disturbances, or discharge  ENT:  No difficulty hearing, tinnitus, vertigo; No sinus or throat pain  NECK: No pain or stiffness  RESPIRATORY: No cough, wheezing, chills or hemoptysis; No Shortness of Breath  CARDIOVASCULAR: No chest pain, palpitations, passing out, dizziness, or leg swelling  GASTROINTESTINAL: No abdominal or epigastric pain. No nausea, vomiting, or hematemesis; No diarrhea or constipation. No melena or hematochezia.  GENITOURINARY: No dysuria, frequency, hematuria, or incontinence  NEUROLOGICAL: No headaches, memory loss, loss of strength, numbness, or tremors  SKIN: No itching, burning, rashes, or lesions   LYMPH Nodes: No enlarged glands  ENDOCRINE: No heat or cold intolerance; No hair loss  MUSCULOSKELETAL: No joint pain or swelling; No muscle, back, or extremity pain  PSYCHIATRIC: No depression, anxiety, mood swings, or difficulty sleeping  HEME/LYMPH: No easy bruising, or bleeding gums  ALLERGY AND IMMUNOLOGIC: No hives or eczema	    PHYSICAL EXAM:  T(C): 36.4 (11-09-22 @ 07:10), Max: 36.7 (11-08-22 @ 19:39)  HR: 70 (11-09-22 @ 07:10) (67 - 76)  BP: 129/47 (11-09-22 @ 07:10) (114/45 - 132/70)  RR: 17 (11-09-22 @ 07:10) (17 - 19)  SpO2: 95% (11-09-22 @ 07:10) (94% - 99%)  Wt(kg): --  I&O's Summary    08 Nov 2022 07:01  -  09 Nov 2022 07:00  --------------------------------------------------------  IN: 200 mL / OUT: 0 mL / NET: 200 mL        Appearance: Normal	  HEENT:   Normal oral mucosa, PERRL, EOMI	  Lymphatic: No lymphadenopathy  Cardiovascular: Normal S1 S2, No JVD, No murmurs, No edema  Respiratory: Lungs clear to auscultation	  Psychiatry: A & O x 3, Mood & affect appropriate  Gastrointestinal:  Soft, Non-tender, + BS	  Skin: No rashes, No ecchymoses, No cyanosis	  Neurologic: Non-focal  Extremities: Normal range of motion, No clubbing, cyanosis or edema  Vascular: Peripheral pulses palpable 2+ bilaterally    MEDICATIONS  (STANDING):  apixaban 2.5 milliGRAM(s) Oral two times a day  atorvastatin 40 milliGRAM(s) Oral at bedtime  carvedilol 3.125 milliGRAM(s) Oral every 12 hours  digoxin     Tablet 125 MICROGram(s) Oral every other day  furosemide    Tablet 20 milliGRAM(s) Oral daily  insulin lispro (ADMELOG) corrective regimen sliding scale   SubCutaneous three times a day before meals  insulin lispro (ADMELOG) corrective regimen sliding scale   SubCutaneous at bedtime  levothyroxine 75 MICROGram(s) Oral daily  melatonin 5 milliGRAM(s) Oral at bedtime  pantoprazole    Tablet 40 milliGRAM(s) Oral before breakfast  piperacillin/tazobactam IVPB.. 3.375 Gram(s) IV Intermittent every 12 hours  tamsulosin 0.4 milliGRAM(s) Oral at bedtime    	  	  LABS:	 	                            9.1    6.50  )-----------( 207      ( 09 Nov 2022 05:46 )             30.3     11-09    143  |  104  |  26<H>  ----------------------------<  138<H>  4.0   |  34<H>  |  1.55<H>    Ca    9.3      09 Nov 2022 05:46  Phos  2.2     11-09  Mg     2.3     11-09    TPro  5.8<L>  /  Alb  2.2<L>  /  TBili  0.6  /  DBili  x   /  AST  34  /  ALT  41  /  AlkPhos  160<H>  11-08    proBNP: Serum Pro-Brain Natriuretic Peptide: 57387 pg/mL (10-29 @ 12:55)  Serum Pro-Brain Natriuretic Peptide: 10647 pg/mL (10-28 @ 19:36)      TSH: Thyroid Stimulating Hormone, Serum: 6.68 uU/mL (10-29 @ 10:41)

## 2022-11-09 NOTE — SWALLOW BEDSIDE ASSESSMENT ADULT - SLP PERTINENT HISTORY OF CURRENT PROBLEM
89 yo M with pmhx of hyperlipidemia, diabetes, ?atrial fibrillation on Eliquis, PPM presents with generalized weakness x 2 days. Found to be in cardiogenic shock, atrial tachyarrhythmia , and PPM-driven tachycardia resolved s/p pacemaker interrogation and re-programming. DG to Medicine 11/1. CODE BLUE 11/2 after suspected aspiration event. As per notes patient was noted to have vomited and aspirated prior to arrest. ROSC achieve in about 8 minutes. Paced rhythm on initial event. Patient intubated and admitted to ICU for cardiogenic shock.
89 yo M with pmhx of hyperlipidemia, diabetes, ?atrial fibrillation on Eliquis, PPM presents with generalized weakness x 2 days. Found to be in cardiogenic shock, atrial tachyarrhythmia , and PPM-driven tachycardia resolved s/p pacemaker interrogation and re-programming. DG to Medicine 11/1. CODE BLUE 11/2 after suspected aspiration event. As per notes patient was noted to have vomited and aspirated prior to arrest. ROSC achieve in about 8 minutes. Paced rhythm on initial event. Patient intubated and admitted to ICU for cardiogenic shock

## 2022-11-09 NOTE — PROGRESS NOTE ADULT - ATTENDING COMMENTS
89 yr old man with  hyperlipidemia, diabetes, atrial fibrillation on Eliquis presented with generalized weakness x 2 days. Patient noted to be lethargic and vitals at the time of the rapid was afebrile, BP of 130/61 and repeat BP showing reads of 50/40s. Patient placed on 15L NRBM for severe hypoxic resp failure ,peripheral phenylephrine was started and metoprolol 2.5mg IV was given for heart rate.  ICU was consulted and bedside POCUS done showing decreased left ventricular function, dilated IVC and Right atrium. Patient admitted to ICU for  cardiogenic shock.    11/2: Cardiac arrest called on the floor. As per notes patient was noted to have vomited and aspirated prior to arrest. ROSC achieve in about 8 minutes. Paced rhythm on initial event. Patient intubated and transferred to ICU. Extubated, 11/3    Today patient is alert, seated in chair, eating  Vital Signs Last 24 Hrs  T(C): 36.4 (09 Nov 2022 15:48), Max: 36.7 (08 Nov 2022 19:39)  T(F): 97.6 (09 Nov 2022 15:48), Max: 98 (08 Nov 2022 19:39)  HR: 70 (09 Nov 2022 15:48) (67 - 76)  BP: 111/53 (09 Nov 2022 15:48) (105/46 - 132/70)  BP(mean): --  RR: 18 (09 Nov 2022 15:48) (17 - 19)  SpO2: 98% (09 Nov 2022 15:48) (94% - 99%)    Parameters below as of 09 Nov 2022 15:48  Patient On (Oxygen Delivery Method): room air    Temporal wasting  Lungs, bilateral air entry  Cor, irreg  Abdomen, soft  Neurological, non-focal    Assessment:  1. Cardiac arrest  2. Shock   3. Lactic acidosis  4. Atrial fibrillation with RVR  5, Acute hypoxic respiratroy failiulre  6. Aspiration pneumonia   7. Right pleural effusion; - S/P chest tube    8. HLD   9. Diabetes Mellitus   10. LIGIA   11. Hypothyroidism  12. Hematuria, resolved.   13. Severe protein-calorie malnutrition    Plan   - CT head with out acute bleeding   - S/P chest tube   - Pain control  - Cardiology followup   - Broad spectrum antibiotics day for total 7 days   - EP cards eval noted , PPM mode reset and tachycardia resolved . BP improved . anticoagulation and my need ADRIAN as per EP   - Hemodynamic support   - Dysphagia diet per Swallow evaluation  - Passed TOV , continue flomax   - continue  anticoagulation  - Trend cbc   - DVT and stress ulcer prophylaxis  -Continue nutritional support  - transfer to [facility], when arrangements are made.

## 2022-11-09 NOTE — PROGRESS NOTE ADULT - ASSESSMENT
89 yo M with pmhx of CAD-s/p CABG,s/p PPM, hyperlipidemia, diabetes,Parox atrial fibrillation on Eliquis,Renal cell Ca-s/p partial nephrectomy ,Hypothyroid who  presents with generalized weakness x 2 day, s/p in ICU with afib with RVR and hypotension with acute systolic HF, s/p PEA arrest due to aspiration pneumonia,Type II MI due to demand ischemia.  1.Tele monitoring.  2.Aspiration pneumonia abx.  3.PAF-eliquis 2.5mg bid.Dig qod.  4.DM-Insulin.  5.PPI.  6.AKII-f/u lytes.Renal f/u.  7.Hypothyroid-synthroid.  8.Type II MI due to demand ischemia.D/W son  will treat medically,no further cardiac testing.  9.CAD-statin,coreg 3.125mg bid,d/c lasix and resume entresto 24/26 mg bid.  10.GI prophylaxis.

## 2022-11-09 NOTE — PROGRESS NOTE ADULT - ASSESSMENT
This is a 89 yo M with pmhx of hyperlipidemia, diabetes, atrial fibrillation on Eliquis presents with generalized weakness x 2 days likely in the setting of dehydration 2/2 diarrhea. Pt had code blue 2/2 possible aspiration pneumonia downgraded from the ICU.

## 2022-11-09 NOTE — SWALLOW BEDSIDE ASSESSMENT ADULT - SWALLOW EVAL: DIAGNOSIS
Pt p/w s&s oropharyngeal dysphagia c/b impaired bolus formation, slow A-P transport, oral stasis, multiple swallows, delayed swallow reflex, reduced hyolaryngeal elevation, and overt s&s of penetration/aspiration seen on thin liquids & large presentation sizes. Suspected premature spillage of bolus to the pharynx. Due to weak cough, patient may have difficulty expectorating thicker viscosities.  Benefitted from chin tuck posture during swallows to aide in pharyngeal clearance.
Pt p/w s&s oropharyngeal dysphagia c/b impaired bolus formation, slow A-P transport, oral stasis, suspected premature spillage of bolus to the pharynx, multiple swallows, delayed swallow reflex, & reduced hyolaryngeal elevation. Overt s&s of penetration/aspiration observed on PO trials of ice, puree, & thin liquids AEB wet, gurgly vocal quality w/ PO intake of puree & immediate coughing & wet, gurgly vocal quality w/ PO intake of ice & thin liquids. Additional puree trials w/ chin tuck strategy appeared more successful; no overt s&s of penetration/aspiration w/ PO trials of puree w/ chin tuck.  Pt stated he consistently used chin tuck posture; however, granddaughter stated he sometimes recues verbal cues to complete chin tuck. Patient/caregiver education provided about chin tuck posture during swallows.

## 2022-11-09 NOTE — PROGRESS NOTE ADULT - SUBJECTIVE AND OBJECTIVE BOX
Ernesto Bennett MD (Nephrology progress note)  [address], [facility],  [address],  [id number]  TEl: [phone]  Cell: [phone]    Patient is a 89y Male seen and evaluated at bedside. Vital signs, laboratory data, imaging studies, consult notes reviewed done within past 24 hours. Overnight events noted. Patient lying in bed in no distress offers no new complains. Interval stable renal function with non oliguria. Await discharge planning to [facility]    Allergies    No Known Allergies    Intolerances        ROS:  CONSTITUTIONAL: No fever or chills.  HEENT: No headache or visual disturbances.  RESPIRATORY: No shortness of breath, cough, hemoptysis or wheezing  CARDIOVASCULAR: No Chest pain or SOB  GASTROINTESTINAL: No abdominal pain, nausea, vomiting, diarrhea, hematemesis or blood per rectum.  GENITOURINARY: No flank or supra pubic pain  NEUROLOGICAL: No headache, focal limb weakness, tingling or numbness  SKIN: No skin rash or lesion  MUSCULOSKELETAL: No leg pain, calf pain or swelling    VITALS:    T(C): 36.4 (11-09-22 @ 07:10), Max: 36.7 (11-08-22 @ 19:39)  HR: 70 (11-09-22 @ 07:10) (67 - 76)  BP: 129/47 (11-09-22 @ 07:10) (114/45 - 132/70)  RR: 17 (11-09-22 @ 07:10) (17 - 19)  SpO2: 95% (11-09-22 @ 07:10) (94% - 99%)  CAPILLARY BLOOD GLUCOSE      POCT Blood Glucose.: 131 mg/dL (09 Nov 2022 07:45)  POCT Blood Glucose.: 220 mg/dL (08 Nov 2022 21:05)  POCT Blood Glucose.: 162 mg/dL (08 Nov 2022 16:49)  POCT Blood Glucose.: 136 mg/dL (08 Nov 2022 11:29)      11-08-22 @ 07:01  -  11-09-22 @ 07:00  --------------------------------------------------------  IN: 200 mL / OUT: 0 mL / NET: 200 mL      MEDICATIONS  (STANDING):  apixaban 2.5 milliGRAM(s) Oral two times a day  atorvastatin 40 milliGRAM(s) Oral at bedtime  carvedilol 3.125 milliGRAM(s) Oral every 12 hours  digoxin     Tablet 125 MICROGram(s) Oral every other day  furosemide    Tablet 20 milliGRAM(s) Oral daily  insulin lispro (ADMELOG) corrective regimen sliding scale   SubCutaneous three times a day before meals  insulin lispro (ADMELOG) corrective regimen sliding scale   SubCutaneous at bedtime  levothyroxine 75 MICROGram(s) Oral daily  melatonin 5 milliGRAM(s) Oral at bedtime  pantoprazole    Tablet 40 milliGRAM(s) Oral before breakfast  piperacillin/tazobactam IVPB.. 3.375 Gram(s) IV Intermittent every 12 hours  tamsulosin 0.4 milliGRAM(s) Oral at bedtime    MEDICATIONS  (PRN):      PHYSICAL EXAM:  GENERAL: Alert, awake and oriented x2-3  HEENT: JOSE ARMANDO, EOMI, neck supple, no JVP, no carotid bruit, oral mucosa moist and pink.  CHEST/LUNG: Bilateral decreased breath sounds, bibasilar rhonchi, no wheezing  HEART: Regular rate and rhythm, FREIDA II/VI at LPSB, no gallops, no rub   ABDOMEN: Soft, nontender, non distended, bowel sounds present  : No flank or supra pubic tenderness.  EXTREMITIES: Peripheral pulses are palpable, no pedal edema  Neurology: AAOx2-3, no focal neurological deficit  SKIN: No rash or skin lesion  Musculoskeletal: No joint deformity or spinal tenderness.      Vascular ACCESS: None    LABS:                        9.1    6.50  )-----------( 207      ( 09 Nov 2022 05:46 )             30.3     11-09    143  |  104  |  26<H>  ----------------------------<  138<H>  4.0   |  34<H>  |  1.55<H>    Ca    9.3      09 Nov 2022 05:46  Phos  2.2     11-09  Mg     2.3     11-09    TPro  5.8<L>  /  Alb  2.2<L>  /  TBili  0.6  /  DBili  x   /  AST  34  /  ALT  41  /  AlkPhos  160<H>  11-08                RADIOLOGY & ADDITIONAL STUDIES:  rad< from: Xray Chest 1 View- PORTABLE-Urgent (Xray Chest 1 View- PORTABLE-Urgent .) (11.07.22 @ 12:44) >    ACC: [id number] EXAM:  XR CHEST PORTABLE URGENT 1V                          PROCEDURE DATE:  11/07/2022          INTERPRETATION:  AP chest on November 17, 2022 at 11:11 AM.    Heart magnified by technique but possibly enlarged.    Sternotomy and left-sided pacemaker again noted.    Retrocardiac density may represent a mix of atelectasis and effusion.    There is a persistent right base infiltrate.    Chest is similar to November 4.    IMPRESSION: Stable findings as above.    --- End of Report ---            BHAVANI CONLEY MD; Attending Radiologist  This document has been electronically signed. Nov 8 2022 11:48AM    < end of copied text >    Imaging Personally Reviewed:  [x] YES  [ ] NO    Consultant(s) Notes Reviewed:  [x] YES  [ ] NO    Care Discussed with Primary team/ Other Providers [x] YES  [ ] NO

## 2022-11-09 NOTE — PROGRESS NOTE ADULT - CONVERSATION DETAILS
Patient is awake and has insight, his granddaughter was at bedside:  Declined , and chose to speak with help of granddaughter.   He understands English and was seeking the same words be repeated in [place]e, asking his grand daughter to paraphrase.    When asked how he was doing patient stated that he was feeling somewhat short of breath trying to get in and out of bed. Palliative care and family at bedside helped the bedside nurse to get the patient back in bed. The patient seemed very debilitated.    Conversation about rehab plans was initiated. The possibility of suboptimal functional status and the need for assistance was addressed, to which the patient stated he is willing to try. As far as hypoxia being the cause of his recent cardiac arrest, the possibility of developing shortness of breath leading to respiratory failure at rehab was explored, in order to suzanne the patient's wishes on what care he would want in the event of another cardiac arrest. Patient was then informed on the details of CPR at length.     Patient was very appreciative of the useful information, and expressed that he is a  who is now slowing down and at his age, he is not particularly very eager to hold on to life so aggressively. He is more interested in a good quality of life, however, whether that means DNR/DNI he cannot quite say, because he is now stable after resuscitative attempts, where amidst of those efforts, despite the trauma of needing a thora after,  he did not feel pain. Therefore, he is still thinking that unless the trauma of CPR is so severe (such as bleeding) at which point stopping CPR can be considered, and unless there is truly there is no benefit to CPR, he would like to give it another chance, because he right now does not have any fear or doubts surrounding it. However stated that he would like 1-2 more conversations to process the benefits.

## 2022-11-10 ENCOUNTER — TRANSCRIPTION ENCOUNTER (OUTPATIENT)
Age: 87
End: 2022-11-10

## 2022-11-10 VITALS
SYSTOLIC BLOOD PRESSURE: 100 MMHG | OXYGEN SATURATION: 96 % | RESPIRATION RATE: 18 BRPM | TEMPERATURE: 98 F | HEART RATE: 72 BPM | DIASTOLIC BLOOD PRESSURE: 52 MMHG

## 2022-11-10 DIAGNOSIS — Z29.9 ENCOUNTER FOR PROPHYLACTIC MEASURES, UNSPECIFIED: ICD-10-CM

## 2022-11-10 LAB
ANION GAP SERPL CALC-SCNC: 7 MMOL/L — SIGNIFICANT CHANGE UP (ref 5–17)
BUN SERPL-MCNC: 26 MG/DL — HIGH (ref 7–18)
CALCIUM SERPL-MCNC: 9.1 MG/DL — SIGNIFICANT CHANGE UP (ref 8.4–10.5)
CHLORIDE SERPL-SCNC: 103 MMOL/L — SIGNIFICANT CHANGE UP (ref 96–108)
CO2 SERPL-SCNC: 31 MMOL/L — SIGNIFICANT CHANGE UP (ref 22–31)
CREAT SERPL-MCNC: 1.51 MG/DL — HIGH (ref 0.5–1.3)
EGFR: 43 ML/MIN/1.73M2 — LOW
GLUCOSE BLDC GLUCOMTR-MCNC: 153 MG/DL — HIGH (ref 70–99)
GLUCOSE BLDC GLUCOMTR-MCNC: 197 MG/DL — HIGH (ref 70–99)
GLUCOSE SERPL-MCNC: 149 MG/DL — HIGH (ref 70–99)
HCT VFR BLD CALC: 30.9 % — LOW (ref 39–50)
HGB BLD-MCNC: 9.1 G/DL — LOW (ref 13–17)
MAGNESIUM SERPL-MCNC: 2.1 MG/DL — SIGNIFICANT CHANGE UP (ref 1.6–2.6)
MCHC RBC-ENTMCNC: 27.3 PG — SIGNIFICANT CHANGE UP (ref 27–34)
MCHC RBC-ENTMCNC: 29.4 GM/DL — LOW (ref 32–36)
MCV RBC AUTO: 92.8 FL — SIGNIFICANT CHANGE UP (ref 80–100)
NRBC # BLD: 0 /100 WBCS — SIGNIFICANT CHANGE UP (ref 0–0)
PHOSPHATE SERPL-MCNC: 2 MG/DL — LOW (ref 2.5–4.5)
PLATELET # BLD AUTO: 230 K/UL — SIGNIFICANT CHANGE UP (ref 150–400)
POTASSIUM SERPL-MCNC: 3.7 MMOL/L — SIGNIFICANT CHANGE UP (ref 3.5–5.3)
POTASSIUM SERPL-SCNC: 3.7 MMOL/L — SIGNIFICANT CHANGE UP (ref 3.5–5.3)
RBC # BLD: 3.33 M/UL — LOW (ref 4.2–5.8)
RBC # FLD: 16.6 % — HIGH (ref 10.3–14.5)
SARS-COV-2 RNA SPEC QL NAA+PROBE: SIGNIFICANT CHANGE UP
SODIUM SERPL-SCNC: 141 MMOL/L — SIGNIFICANT CHANGE UP (ref 135–145)
WBC # BLD: 6.19 K/UL — SIGNIFICANT CHANGE UP (ref 3.8–10.5)
WBC # FLD AUTO: 6.19 K/UL — SIGNIFICANT CHANGE UP (ref 3.8–10.5)

## 2022-11-10 PROCEDURE — 84157 ASSAY OF PROTEIN OTHER: CPT

## 2022-11-10 PROCEDURE — 85025 COMPLETE CBC W/AUTO DIFF WBC: CPT

## 2022-11-10 PROCEDURE — 84295 ASSAY OF SERUM SODIUM: CPT

## 2022-11-10 PROCEDURE — 83615 LACTATE (LD) (LDH) ENZYME: CPT

## 2022-11-10 PROCEDURE — 84311 SPECTROPHOTOMETRY: CPT

## 2022-11-10 PROCEDURE — 92610 EVALUATE SWALLOWING FUNCTION: CPT

## 2022-11-10 PROCEDURE — 87015 SPECIMEN INFECT AGNT CONCNTJ: CPT

## 2022-11-10 PROCEDURE — 93306 TTE W/DOPPLER COMPLETE: CPT

## 2022-11-10 PROCEDURE — 83605 ASSAY OF LACTIC ACID: CPT

## 2022-11-10 PROCEDURE — 97116 GAIT TRAINING THERAPY: CPT

## 2022-11-10 PROCEDURE — 96374 THER/PROPH/DIAG INJ IV PUSH: CPT

## 2022-11-10 PROCEDURE — 71250 CT THORAX DX C-: CPT

## 2022-11-10 PROCEDURE — 99285 EMERGENCY DEPT VISIT HI MDM: CPT | Mod: 25

## 2022-11-10 PROCEDURE — 86901 BLOOD TYPING SEROLOGIC RH(D): CPT

## 2022-11-10 PROCEDURE — 82945 GLUCOSE OTHER FLUID: CPT

## 2022-11-10 PROCEDURE — 81001 URINALYSIS AUTO W/SCOPE: CPT

## 2022-11-10 PROCEDURE — 86900 BLOOD TYPING SEROLOGIC ABO: CPT

## 2022-11-10 PROCEDURE — 83735 ASSAY OF MAGNESIUM: CPT

## 2022-11-10 PROCEDURE — 97161 PT EVAL LOW COMPLEX 20 MIN: CPT

## 2022-11-10 PROCEDURE — 87206 SMEAR FLUORESCENT/ACID STAI: CPT

## 2022-11-10 PROCEDURE — 87640 STAPH A DNA AMP PROBE: CPT

## 2022-11-10 PROCEDURE — 87040 BLOOD CULTURE FOR BACTERIA: CPT

## 2022-11-10 PROCEDURE — 80162 ASSAY OF DIGOXIN TOTAL: CPT

## 2022-11-10 PROCEDURE — 82553 CREATINE MB FRACTION: CPT

## 2022-11-10 PROCEDURE — 97530 THERAPEUTIC ACTIVITIES: CPT

## 2022-11-10 PROCEDURE — 86850 RBC ANTIBODY SCREEN: CPT

## 2022-11-10 PROCEDURE — 99239 HOSP IP/OBS DSCHRG MGMT >30: CPT

## 2022-11-10 PROCEDURE — 70450 CT HEAD/BRAIN W/O DYE: CPT

## 2022-11-10 PROCEDURE — 36415 COLL VENOUS BLD VENIPUNCTURE: CPT

## 2022-11-10 PROCEDURE — 82803 BLOOD GASES ANY COMBINATION: CPT

## 2022-11-10 PROCEDURE — 82962 GLUCOSE BLOOD TEST: CPT

## 2022-11-10 PROCEDURE — 84145 PROCALCITONIN (PCT): CPT

## 2022-11-10 PROCEDURE — 83550 IRON BINDING TEST: CPT

## 2022-11-10 PROCEDURE — 84100 ASSAY OF PHOSPHORUS: CPT

## 2022-11-10 PROCEDURE — 87116 MYCOBACTERIA CULTURE: CPT

## 2022-11-10 PROCEDURE — 87086 URINE CULTURE/COLONY COUNT: CPT

## 2022-11-10 PROCEDURE — 97110 THERAPEUTIC EXERCISES: CPT

## 2022-11-10 PROCEDURE — 80053 COMPREHEN METABOLIC PANEL: CPT

## 2022-11-10 PROCEDURE — 88112 CYTOPATH CELL ENHANCE TECH: CPT

## 2022-11-10 PROCEDURE — 85610 PROTHROMBIN TIME: CPT

## 2022-11-10 PROCEDURE — 71045 X-RAY EXAM CHEST 1 VIEW: CPT

## 2022-11-10 PROCEDURE — 94003 VENT MGMT INPAT SUBQ DAY: CPT

## 2022-11-10 PROCEDURE — 88305 TISSUE EXAM BY PATHOLOGIST: CPT

## 2022-11-10 PROCEDURE — 80048 BASIC METABOLIC PNL TOTAL CA: CPT

## 2022-11-10 PROCEDURE — 87641 MR-STAPH DNA AMP PROBE: CPT

## 2022-11-10 PROCEDURE — 87102 FUNGUS ISOLATION CULTURE: CPT

## 2022-11-10 PROCEDURE — 87205 SMEAR GRAM STAIN: CPT

## 2022-11-10 PROCEDURE — 83880 ASSAY OF NATRIURETIC PEPTIDE: CPT

## 2022-11-10 PROCEDURE — 0225U NFCT DS DNA&RNA 21 SARSCOV2: CPT

## 2022-11-10 PROCEDURE — 87070 CULTURE OTHR SPECIMN AEROBIC: CPT

## 2022-11-10 PROCEDURE — 94002 VENT MGMT INPAT INIT DAY: CPT

## 2022-11-10 PROCEDURE — 83540 ASSAY OF IRON: CPT

## 2022-11-10 PROCEDURE — 85730 THROMBOPLASTIN TIME PARTIAL: CPT

## 2022-11-10 PROCEDURE — 72125 CT NECK SPINE W/O DYE: CPT | Mod: MA

## 2022-11-10 PROCEDURE — 82042 OTHER SOURCE ALBUMIN QUAN EA: CPT

## 2022-11-10 PROCEDURE — 84443 ASSAY THYROID STIM HORMONE: CPT

## 2022-11-10 PROCEDURE — 83986 ASSAY PH BODY FLUID NOS: CPT

## 2022-11-10 PROCEDURE — 82330 ASSAY OF CALCIUM: CPT

## 2022-11-10 PROCEDURE — 72170 X-RAY EXAM OF PELVIS: CPT

## 2022-11-10 PROCEDURE — 94660 CPAP INITIATION&MGMT: CPT

## 2022-11-10 PROCEDURE — 82550 ASSAY OF CK (CPK): CPT

## 2022-11-10 PROCEDURE — 84484 ASSAY OF TROPONIN QUANT: CPT

## 2022-11-10 PROCEDURE — 87075 CULTR BACTERIA EXCEPT BLOOD: CPT

## 2022-11-10 PROCEDURE — 93005 ELECTROCARDIOGRAM TRACING: CPT

## 2022-11-10 PROCEDURE — 89051 BODY FLUID CELL COUNT: CPT

## 2022-11-10 PROCEDURE — 83036 HEMOGLOBIN GLYCOSYLATED A1C: CPT

## 2022-11-10 PROCEDURE — 87635 SARS-COV-2 COVID-19 AMP PRB: CPT

## 2022-11-10 PROCEDURE — 85027 COMPLETE CBC AUTOMATED: CPT

## 2022-11-10 PROCEDURE — 84132 ASSAY OF SERUM POTASSIUM: CPT

## 2022-11-10 RX ORDER — DIGOXIN 250 MCG
1 TABLET ORAL
Qty: 15 | Refills: 0
Start: 2022-11-10 | End: 2022-12-09

## 2022-11-10 RX ORDER — SODIUM,POTASSIUM PHOSPHATES 278-250MG
1 POWDER IN PACKET (EA) ORAL ONCE
Refills: 0 | Status: COMPLETED | OUTPATIENT
Start: 2022-11-10 | End: 2022-11-10

## 2022-11-10 RX ORDER — DIGOXIN 250 UG/1
1 TABLET ORAL
Qty: 15 | Refills: 0 | DISCHARGE
Start: 2022-11-10 | End: 2022-12-09

## 2022-11-10 RX ORDER — SODIUM CHLORIDE 9 MG/ML
500 INJECTION INTRAMUSCULAR; INTRAVENOUS; SUBCUTANEOUS ONCE
Refills: 0 | Status: COMPLETED | OUTPATIENT
Start: 2022-11-10 | End: 2022-11-10

## 2022-11-10 RX ORDER — TAMSULOSIN HYDROCHLORIDE 0.4 MG/1
1 CAPSULE ORAL
Qty: 30 | Refills: 0
Start: 2022-11-10 | End: 2022-12-09

## 2022-11-10 RX ORDER — APIXABAN 2.5 MG/1
1 TABLET, FILM COATED ORAL
Qty: 60 | Refills: 0
Start: 2022-11-10 | End: 2022-12-09

## 2022-11-10 RX ORDER — SACUBITRIL AND VALSARTAN 24; 26 MG/1; MG/1
1 TABLET, FILM COATED ORAL
Qty: 0 | Refills: 0 | DISCHARGE

## 2022-11-10 RX ADMIN — Medication 1: at 11:58

## 2022-11-10 RX ADMIN — Medication 1 PACKET(S): at 11:34

## 2022-11-10 RX ADMIN — SACUBITRIL AND VALSARTAN 1 TABLET(S): 24; 26 TABLET, FILM COATED ORAL at 05:47

## 2022-11-10 RX ADMIN — Medication 1 PACKET(S): at 09:22

## 2022-11-10 RX ADMIN — CARVEDILOL PHOSPHATE 3.12 MILLIGRAM(S): 80 CAPSULE, EXTENDED RELEASE ORAL at 05:48

## 2022-11-10 RX ADMIN — Medication 1: at 08:21

## 2022-11-10 RX ADMIN — APIXABAN 2.5 MILLIGRAM(S): 2.5 TABLET, FILM COATED ORAL at 05:48

## 2022-11-10 RX ADMIN — PANTOPRAZOLE SODIUM 40 MILLIGRAM(S): 20 TABLET, DELAYED RELEASE ORAL at 05:48

## 2022-11-10 RX ADMIN — Medication 125 MICROGRAM(S): at 11:34

## 2022-11-10 RX ADMIN — SODIUM CHLORIDE 500 MILLILITER(S): 9 INJECTION INTRAMUSCULAR; INTRAVENOUS; SUBCUTANEOUS at 10:41

## 2022-11-10 RX ADMIN — Medication 75 MICROGRAM(S): at 05:48

## 2022-11-10 NOTE — PROGRESS NOTE ADULT - ATTENDING COMMENTS
Patient had a brief episode of hypotension today, which responded to 500 ml normal sailing.    We had started an new antihypertensive yesterday -- Entresto.   He was subsequently confused about his location, but improved after his son was present at the bedside.     Cachectic, alert man in NAD  Vital Signs Last 24 Hrs  T(C): 36.9 (10 Nov 2022 15:44), Max: 36.9 (10 Nov 2022 07:40)  T(F): 98.4 (10 Nov 2022 15:44), Max: 98.4 (10 Nov 2022 07:40)  HR: 72 (10 Nov 2022 15:44) (68 - 72)  BP: 100/52 (10 Nov 2022 15:44) (86/50 - 135/63)  BP(mean): --  RR: 18 (10 Nov 2022 15:44) (18 - 20)  SpO2: 96% (10 Nov 2022 15:44) (95% - 98%)    Parameters below as of 10 Nov 2022 15:44  Patient On (Oxygen Delivery Method): room air    Lungs, bilateral air entry, decreased bs, right base  Cor, RRR  ABdomen, soft  Neurological, alert, confused, non-focal  Assessment:  1. Cardiac arrest  2. Shock   3. Lactic acidosis  4. Atrial fibrillation with RVR  5, Acute hypoxic respiratroy failiulre  6. Aspiration pneumonia   7. Right pleural effusion; - S/P chest tube    8. HLD   9. Diabetes Mellitus   10. LIGIA   11. Hypothyroidism  12. Hematuria, resolved.   13. Severe protein-calorie malnutrition    Plan   -hold entresto.  Continue other medications.   - Dysphagia diet per Swallow evaluation  - Passed TO , continue flomax   - continue  anticoagulation  -Continue nutritional support  - transfer to [facility] today.  Patient's son is in agreement.

## 2022-11-10 NOTE — PROGRESS NOTE ADULT - PROBLEM SELECTOR PROBLEM 2
Cardiogenic shock
Cardiogenic shock
LIGIA (acute kidney injury)
Acute respiratory failure with hypoxia

## 2022-11-10 NOTE — PROGRESS NOTE ADULT - REASON FOR ADMISSION
Fall

## 2022-11-10 NOTE — PROGRESS NOTE ADULT - PROBLEM SELECTOR PLAN 3
EF 40-45%  pt on Entresto 24-26 at home  restarted home med  dc lasix
EF 40-45%  pt on Entresto 24-26 at home  restarted home med  dc lasix
- urology saw patient - hematuria secondary to anticoagulation vs. traumatic garza insertion vs. other cause  - urology recommending outpatient hematuria work up (cystoscopy, CT)  - continue to monitor Hgb/Hct  - c/w heparin drip
-S/p Cardiac arrest  -Cardiogenic shock  - NSTEMI  - Cardiac arrest 11/3. Down time ~8 min, s/p 2x Epi  - troponin peaked at 1644, now > 847 > 1485  - Cardiology Dr. Dominguez  - TTE 10/30 : EF=40-45%  - off pressors, changed furosemide to 20mg PO and continue

## 2022-11-10 NOTE — PROGRESS NOTE ADULT - PROBLEM SELECTOR PLAN 2
-S/p Cardiac arrest  -Cardiogenic shock  - NSTEMI  - Cardiac arrest 11/3. Down time ~8 min, s/p 2x Epi  - troponin peaked at 1644, now > 847 > 1485  - Cardiology Dr. Dominguez  - TTE 10/30 : EF=40-45%  - off pressors, changed furosemide to 20mg PO and continue
-S/p Cardiac arrest  -Cardiogenic shock  - NSTEMI  - Cardiac arrest 11/3. Down time ~8 min, s/p 2x Epi  - troponin peaked at 1644, now > 847 > 1485  - Cardiology Dr. Dominguez  - TTE 10/30 : EF=40-45%  - off pressors, changed furosemide to 20mg PO and continue
- On admission, BUN/Cr 42/1.85  - Resolving, today BUN/Cr 37/1.72  - avoid nephrotoxic agents
Consolidation on x-ray, likely x-ray.   - Successfully extubated to room air 11/3  - On Zosyn 11/3 - 10.   - sputum culture sent: few gram positive ccoci in clusters    #Right lung effusion  - Chest tube right drained 1800ml  - Chest tube removed 11/4.   - Pleural fluid transudative

## 2022-11-10 NOTE — DISCHARGE NOTE PROVIDER - NSDCCPCAREPLAN_GEN_ALL_CORE_FT
PRINCIPAL DISCHARGE DIAGNOSIS  Diagnosis: Cardiogenic shock  Assessment and Plan of Treatment: You presented to the hospital with generalized weakness, based on your vitals signs you were in cardiogenic shock. You were sent to the ICU and put on dobutamine to help your heart with its work load. It was also found that you pacemaker was pacing at a very fast right which might have caused you symptoms. The pacemaker was interogated and adjusted. You were then titrated off the dobutamine. Please follow up with your primary care physician.         SECONDARY DISCHARGE DIAGNOSES  Diagnosis: Chronic CHF  Assessment and Plan of Treatment: You have history of chronic CHF (congestive heart failure).  On this admission, your CXR showed some fluid in the lungs. You were treated with LASIX IV to help with diuresis which has improved your respiratory symptoms and leg swelling. Please weigh yourself daily and If you gain 3lbs in 3 days, or 5lbs in a week and /or have any swelling or increased swelling in your feet, ankles, and/or stomach call your Health Care Provider. Do not eat or drink foods containing more than 2000 mg of salt (sodium) in your diet every day and limit fluids to 800cc to 1000 cc per day. Please take your medication as prescribed and follow up with your PCP/Cardiologist in 1 week from discharge to adjust medications as needed. You were put back on Entresto dose that you take at home, however your blood pressure was on the lower side, therefore we will hold your Entresto until you tolerate the dose and follow up with your primary care doctor.       Diagnosis: Atrial fibrillation  Assessment and Plan of Treatment: On this admission You were diagnosed with/have history of atrial fibrillation. You were started on arrhythmia medication regimen with a blood thinner Eliquis and Digoxin for control. Continue with your medications. Follow up with PCP within 2 weeks of discharge.      Diagnosis: Pneumonia, aspiration  Assessment and Plan of Treatment: During your admission you had episode of emesis w/ suspected aspiration that caused pneumonia. You were started on antibitics and you completed the course. Chest xrays showed right sided effusion and you had a chest tube put in to drain the dluid and was successfully removed. Please follow up with your primary care doctor.    Diagnosis: Diabetes mellitus  Assessment and Plan of Treatment: Maintaining blood glucose level within normal range.  - You have a history of diabetes  - Your HbA1c is 6.8  - You should continue to take your medications Farxiga and Metformin regularly as prescribed  - Please follow up with your primary care provider/endocrinologist within a week of discharge.  - You need to continue monitoring your blood sugar levels closely.  - Please maintain healthy lifestyle by eating healthy diabetic regimen, weight loss and exercise regularly as tolerated.  Make sure you get your HgA1c checked every three months.  If you take oral diabetes medications, check your blood glucose two times a day.

## 2022-11-10 NOTE — PROGRESS NOTE ADULT - PROBLEM SELECTOR PLAN 1
Consolidation on x-ray, likely x-ray.   - Successfully extubated to room air 11/3  - On Zosyn 11/3 - 10.   - sputum culture sent: few gram positive ccoci in clusters    #Right lung effusion  - Chest tube right drained 1800ml  - Chest tube removed 11/4.   - Pleural fluid transudative
Consolidation on x-ray, likely x-ray.   - Successfully extubated to room air 11/3  - On Zosyn 11/3 - 10.   - sputum culture sent: few gram positive ccoci in clusters    #Right lung effusion  - Chest tube right drained 1800ml  - Chest tube removed 11/4.   - Pleural fluid transudative
Pt presented with Afib w/ RVR, likely in setting of dehydration  EKG: ventricularly paced rhythm  Trop x1 negative f/u trop x2  s/p Lopressor 2.5  Will place on LR 100cc/hr x10hrs  F/u TTE  Cardio consulted Dr. Dominguez  PPM interrogated, was sensing atrial rate, mode switched by EP to DDIR with total resolution.  now rate controlled, continue with Eliquis, change digoxin to PO and continue  digoxin level 1.0  Pt on Dig 125 mcg QD
- c/w heparin drip  - Cardiology saw patient, and is discussing case with pt's outpt cardiologist   - currently on digoxin

## 2022-11-10 NOTE — DISCHARGE NOTE PROVIDER - CARE PROVIDER_API CALL
Kasia Dominguez)  Internal Medicine  [address] [place] [address]  Phone: [phone]  Fax: [phone]  Follow Up Time: 1 week

## 2022-11-10 NOTE — DISCHARGE NOTE NURSING/CASE MANAGEMENT/SOCIAL WORK - NSDCPEFALRISK_GEN_ALL_CORE
For information on Fall & Injury Prevention, visit: [email or web address] OR  [email or web address] OR  https://www.cdc.gov/steadi/patient.html

## 2022-11-10 NOTE — DISCHARGE NOTE PROVIDER - NSDCMRMEDTOKEN_GEN_ALL_CORE_FT
apixaban 2.5 mg oral tablet: 1 tab(s) orally 2 times a day  aspirin 81 mg oral tablet, chewable: 1 tab(s) orally once a day  atorvastatin 40 mg oral tablet: 1 tab(s) orally once a day  carvedilol 25 mg oral tablet: 1 tab(s) orally 2 times a day  digoxin 125 mcg (0.125 mg) oral tablet: 1 tab(s) orally every other day  Farxiga 5 mg oral tablet: 1 tab(s) orally once a day  levothyroxine 75 mcg (0.075 mg) oral capsule: 1 cap(s) orally once a day  metFORMIN 500 mg oral tablet: 1 tab(s) orally 2 times a day  pantoprazole 40 mg oral delayed release tablet: 1 tab(s) orally once a day  tamsulosin 0.4 mg oral capsule: 1 cap(s) orally once a day (at bedtime)

## 2022-11-10 NOTE — DISCHARGE NOTE PROVIDER - HOSPITAL COURSE
3 yo M, Vincentian speaking with hx hyperlipidemia, diabetes, ?atrial fibrillation on Eliquis, PPM who presented with generalized weakness x 2 days. In ED had hypotension and worsening mental status, found to have cardiogenic shock, atrial tachyarrhythmia , and PPM-driven tachycardia.     ICU Course: Patient was started on Dobutamine for cardiogenic shock and was given digoxin and amiodarone for Afib with RVR. PPM was interrogated & found to be inappropriately pacing at 140 BPM. Changes made. Formal Echo showed EF 40-45%.  Patient developed hematuria, but hemoglobin has remained stable, so heparin was continued. Patient was able to be titrated off dobutamine drip and coreg was started for Afib. DG to medicine 11/1.     Rapid called on 11/2 at 12:27 for AMS after episode of emesis w/ suspected aspiration. On arrival of call team patient was in PEA. CPR started with ROSC achieved in ~ 8 minutes. 2 epi given. Patient intubated and started on peripheral levophed w/ 1L IVF bolus LR. Transferred to ICU.    ICU Course 2: Patient was intubated and started on Levophed. cxr showed large right sided effusion. Bedside POCUS showed complex right pleural effusion, appears transudative. Pt had a chest tube placed in the ICU on the right side, had 150 cc taken out immediately with 1800cc total drainage of serous fluid. Patient was able to be titrated off pressors and was extubated on 11/3. Pts mental status continued to improve. He is back at baseline. Dysphagia diet per Speech and Swallow evaluation. Passed TOV.     5N- Pt was stable on unit. Seen by Dr. Dominguez and placed back on his entresto. Pt started having episodes of hypotension, decision   was made with attending to hold Entresto until pt is more robust to restart med.   Patient is able to tolerate diet prior to discharge. Patient is stable for discharge per attending and is advised to follow up with PCP as outpatient. Please refer to patient's complete medical chart with documents for a full hospital course, for this is only a brief summary.

## 2022-11-10 NOTE — PROGRESS NOTE ADULT - PROBLEM SELECTOR PLAN 6
Pt with NAGMA 2/2 diarrhea  pH 7.25, HCO3 20, AG 8  supportive treatment for diarrhea
Pt on Farxiga and Metformin at home   HA1C 6.8  on sliding scale insulin

## 2022-11-10 NOTE — PROGRESS NOTE ADULT - PROVIDER SPECIALTY LIST ADULT
Critical Care
Critical Care
Internal Medicine
Urology
Cardiology
Critical Care
Internal Medicine
Internal Medicine
Nephrology
Nephrology
Cardiology
Internal Medicine
Nephrology
Nephrology
Cardiology
Critical Care
Critical Care
Nephrology
Nephrology
Urology
Palliative Care
Nephrology
Internal Medicine

## 2022-11-10 NOTE — PROGRESS NOTE ADULT - SUBJECTIVE AND OBJECTIVE BOX
PGY-1 Progress Note discussed with attending    TEAMS or PAGER #: [[phone]]  TILL 5:00 PM  PLEASE CONTACT ON CALL TEAM:  - On Call Team (Please refer to [place]) FROM 5:00 PM - 8:30PM  - Nightfloat Team FROM 8:30 -7:30 AM      INTERVAL HPI/OVERNIGHT EVENTS: No events overnight. Pt assessed at the bedside, in NAD, denies any chest pain, sob, fever, chills, n/v/d. Pt was seen with son at the bedside, family updated that pt will be transferred to [facility]. Will continue to monitor.         MEDICATIONS  (STANDING):  apixaban 2.5 milliGRAM(s) Oral two times a day  atorvastatin 40 milliGRAM(s) Oral at bedtime  carvedilol 3.125 milliGRAM(s) Oral every 12 hours  digoxin     Tablet 125 MICROGram(s) Oral every other day  insulin lispro (ADMELOG) corrective regimen sliding scale   SubCutaneous three times a day before meals  insulin lispro (ADMELOG) corrective regimen sliding scale   SubCutaneous at bedtime  levothyroxine 75 MICROGram(s) Oral daily  melatonin 5 milliGRAM(s) Oral at bedtime  pantoprazole    Tablet 40 milliGRAM(s) Oral before breakfast  sacubitril 24 mG/valsartan 26 mG 1 Tablet(s) Oral two times a day  tamsulosin 0.4 milliGRAM(s) Oral at bedtime    MEDICATIONS  (PRN):      REVIEW OF SYSTEMS:  CONSTITUTIONAL: No fever, weight loss, or fatigue  RESPIRATORY: No cough, wheezing, chills or hemoptysis; No shortness of breath  CARDIOVASCULAR: No chest pain, palpitations, dizziness, or leg swelling  GASTROINTESTINAL: No abdominal pain. No nausea, vomiting, or hematemesis; No diarrhea or constipation. No melena or hematochezia.  GENITOURINARY: No dysuria or hematuria, urinary frequency  NEUROLOGICAL: No headaches, memory loss, loss of strength, numbness, or tremors  SKIN: No itching, burning, rashes, or lesions     Vital Signs Last 24 Hrs  T(C): 35.7 (10 Nov 2022 12:00), Max: 36.9 (10 Nov 2022 07:40)  T(F): 96.2 (10 Nov 2022 12:00), Max: 98.4 (10 Nov 2022 07:40)  HR: 69 (10 Nov 2022 12:00) (68 - 72)  BP: 94/53 (10 Nov 2022 12:00) (86/50 - 135/63)  BP(mean): --  RR: 18 (10 Nov 2022 12:00) (18 - 20)  SpO2: 97% (10 Nov 2022 12:00) (95% - 98%)    Parameters below as of 10 Nov 2022 12:00  Patient On (Oxygen Delivery Method): room air        PHYSICAL EXAMINATION:  GENERAL: NAD, well-developed, well-groomed  HEAD:  Atraumatic, Normocephalic  EYES:  conjunctiva and sclera clear  NECK: Supple, No JVD, Normal thyroid  CHEST/LUNG: + crackles on the right upper and lower lobes   HEART: Regular rate and rhythm; No murmurs, rubs, or gallops  ABDOMEN: Soft, Nontender, Nondistended; Bowel sounds present  NERVOUS SYSTEM:  Alert & Oriented X3,    EXTREMITIES:  2+ Peripheral Pulses, No clubbing, cyanosis, or edema  SKIN: warm dry                          9.1    6.19  )-----------( 230      ( 10 Nov 2022 05:47 )             30.9     11-10    141  |  103  |  26<H>  ----------------------------<  149<H>  3.7   |  31  |  1.51<H>    Ca    9.1      10 Nov 2022 05:47  Phos  2.0     11-10  Mg     2.1     11-10                CAPILLARY BLOOD GLUCOSE      RADIOLOGY & ADDITIONAL TESTS:                   PGY-1 Progress Note discussed with attending    TEAMS or PAGER #: [[phone]]  TILL 5:00 PM  PLEASE CONTACT ON CALL TEAM:  - On Call Team (Please refer to [place]) FROM 5:00 PM - 8:30PM  - Nightfloat Team FROM 8:30 -7:30 AM      INTERVAL HPI/OVERNIGHT EVENTS: No events overnight. Pt assessed at the bedside, in NAD, denies any chest pain, sob, fever, chills, n/v/d. Pt was seen with son at the bedside, family updated that pt will be transferred to [facility]. Will continue to monitor.         MEDICATIONS  (STANDING):  apixaban 2.5 milliGRAM(s) Oral two times a day  atorvastatin 40 milliGRAM(s) Oral at bedtime  carvedilol 3.125 milliGRAM(s) Oral every 12 hours  digoxin     Tablet 125 MICROGram(s) Oral every other day  insulin lispro (ADMELOG) corrective regimen sliding scale   SubCutaneous three times a day before meals  insulin lispro (ADMELOG) corrective regimen sliding scale   SubCutaneous at bedtime  levothyroxine 75 MICROGram(s) Oral daily  melatonin 5 milliGRAM(s) Oral at bedtime  pantoprazole    Tablet 40 milliGRAM(s) Oral before breakfast  sacubitril 24 mG/valsartan 26 mG 1 Tablet(s) Oral two times a day  tamsulosin 0.4 milliGRAM(s) Oral at bedtime    MEDICATIONS  (PRN):      REVIEW OF SYSTEMS:  CONSTITUTIONAL: No fever, weight loss, or fatigue  RESPIRATORY: No cough, wheezing, chills or hemoptysis; No shortness of breath  CARDIOVASCULAR: No chest pain, palpitations, dizziness, or leg swelling  GASTROINTESTINAL: No abdominal pain. No nausea, vomiting, or hematemesis; No diarrhea or constipation. No melena or hematochezia.  GENITOURINARY: No dysuria or hematuria, urinary frequency  NEUROLOGICAL: No headaches, memory loss, loss of strength, numbness, or tremors  SKIN: No itching, burning, rashes, or lesions     Vital Signs Last 24 Hrs  T(C): 35.7 (10 Nov 2022 12:00), Max: 36.9 (10 Nov 2022 07:40)  T(F): 96.2 (10 Nov 2022 12:00), Max: 98.4 (10 Nov 2022 07:40)  HR: 69 (10 Nov 2022 12:00) (68 - 72)  BP: 94/53 (10 Nov 2022 12:00) (86/50 - 135/63)  BP(mean): --  RR: 18 (10 Nov 2022 12:00) (18 - 20)  SpO2: 97% (10 Nov 2022 12:00) (95% - 98%)    Parameters below as of 10 Nov 2022 12:00  Patient On (Oxygen Delivery Method): room air        PHYSICAL EXAMINATION:  GENERAL: NAD, elderly cachectic   HEAD:  Atraumatic, Normocephalic  EYES:  conjunctiva and sclera clear  NECK: Supple, No JVD, Normal thyroid  CHEST/LUNG: + crackles on the right upper and lower lobes   HEART: Regular rate and rhythm; No murmurs, rubs, or gallops  ABDOMEN: Soft, Nontender, Nondistended; Bowel sounds present  NERVOUS SYSTEM:  Alert & Oriented X2-3 became more oriented with son at bedside,    EXTREMITIES:  2+ Peripheral Pulses, No clubbing, cyanosis, or edema  SKIN: warm dry                          9.1    6.19  )-----------( 230      ( 10 Nov 2022 05:47 )             30.9     11-10    141  |  103  |  26<H>  ----------------------------<  149<H>  3.7   |  31  |  1.51<H>    Ca    9.1      10 Nov 2022 05:47  Phos  2.0     11-10  Mg     2.1     11-10                CAPILLARY BLOOD GLUCOSE      RADIOLOGY & ADDITIONAL TESTS:

## 2022-11-10 NOTE — CHART NOTE - NSCHARTNOTESELECT_GEN_ALL_CORE
Family Update/Event Note
Nutrition Services
Downgrade/Event Note
Downgrade/Transfer Note
Family Update/Event Note
Nutrition Services

## 2022-11-10 NOTE — PROGRESS NOTE ADULT - PROBLEM SELECTOR PLAN 4
Pt w/ watery diarrhea  f/u GI PCR, stool cx and stool O&P  Resolved
- Echo 40-45%, decreased left ventricular function, dilated IVC and R atrium   - C/w coreg and lasix
Pt w/ watery diarrhea  f/u GI PCR, stool cx and stool O&P  Resolved
Pt presenting with weakness likely in setting of dehydration  f/u orthostatic v/s  supportive care   Monitor for signs of volume overload

## 2022-11-10 NOTE — PROGRESS NOTE ADULT - PROBLEM SELECTOR PLAN 5
- C/w Insulin lispro
Pt with NAGMA 2/2 diarrhea  pH 7.25, HCO3 20, AG 8  supportive treatment for diarrhea
Pt with NAGMA 2/2 diarrhea  pH 7.25, HCO3 20, AG 8  supportive treatment for diarrhea
Pt w/ watery diarrhea  f/u GI PCR, stool cx and stool O&P  Resolved

## 2022-11-10 NOTE — DISCHARGE NOTE NURSING/CASE MANAGEMENT/SOCIAL WORK - PATIENT PORTAL LINK FT
You can access the FollowMyHealth Patient Portal offered by [facility] by registering at the following website: [email or web address]. By joining [facility]’s FollowMyHealth portal, you will also be able to view your health information using other applications (apps) compatible with our system.

## 2022-11-10 NOTE — DISCHARGE NOTE PROVIDER - ATTENDING DISCHARGE PHYSICAL EXAMINATION:
Alert, confused man, in NAD, cachectic  Vital Signs Last 24 Hrs  T(C): 36.9 (10 Nov 2022 15:44), Max: 36.9 (10 Nov 2022 07:40)  T(F): 98.4 (10 Nov 2022 15:44), Max: 98.4 (10 Nov 2022 07:40)  HR: 72 (10 Nov 2022 15:44) (68 - 72)  BP: 100/52 (10 Nov 2022 15:44) (86/50 - 135/63)  BP(mean): --  RR: 18 (10 Nov 2022 15:44) (18 - 20)  SpO2: 96% (10 Nov 2022 15:44) (95% - 98%)    Parameters below as of 10 Nov 2022 15:44  Patient On (Oxygen Delivery Method): room air  Lungs, decreased bs, right base  Cor, RRR  Abdomen, soft, non-tender  Neurological, alert, confused, non-focal

## 2022-11-10 NOTE — PROGRESS NOTE ADULT - ASSESSMENT
89 yo M with pmhx of CAD-s/p CABG,s/p PPM, hyperlipidemia, diabetes,Parox atrial fibrillation on Eliquis,Renal cell Ca-s/p partial nephrectomy ,Hypothyroid who  presents with generalized weakness x 2 day, s/p in ICU with afib with RVR and hypotension with acute systolic HF, s/p PEA arrest due to aspiration pneumonia,Type II MI due to demand ischemia.  1.D/C Tele monitoring.  2.Aspiration pneumonia abx.  3.PAF-eliquis 2.5mg bid.Dig qod.  4.DM-Insulin.  5.PPI.  6.AKII-f/u lytes.Renal f/u.  7.Hypothyroid-synthroid.  8.Type II MI due to demand ischemia.D/W son  will treat medically,no further cardiac testing.  9.CAD-statin,coreg 3.125mg bid,entresto 24/26 mg bid.  10.GI prophylaxis.

## 2022-11-10 NOTE — PROGRESS NOTE ADULT - SUBJECTIVE AND OBJECTIVE BOX
CHIEF COMPLAINT:Patient is a 89y old  Male who presents with a chief complaint of Fall .Pt appears comfortable.    	  REVIEW OF SYSTEMS:  CONSTITUTIONAL: No fever, weight loss, or fatigue  EYES: No eye pain, visual disturbances, or discharge  ENT:  No difficulty hearing, tinnitus, vertigo; No sinus or throat pain  NECK: No pain or stiffness  RESPIRATORY: No cough, wheezing, chills or hemoptysis; No Shortness of Breath  CARDIOVASCULAR: No chest pain, palpitations, passing out, dizziness, or leg swelling  GASTROINTESTINAL: No abdominal or epigastric pain. No nausea, vomiting, or hematemesis; No diarrhea or constipation. No melena or hematochezia.  GENITOURINARY: No dysuria, frequency, hematuria, or incontinence  NEUROLOGICAL: No headaches, memory loss, loss of strength, numbness, or tremors  SKIN: No itching, burning, rashes, or lesions   LYMPH Nodes: No enlarged glands  ENDOCRINE: No heat or cold intolerance; No hair loss  MUSCULOSKELETAL: No joint pain or swelling; No muscle, back, or extremity pain  PSYCHIATRIC: No depression, anxiety, mood swings, or difficulty sleeping  HEME/LYMPH: No easy bruising, or bleeding gums  ALLERGY AND IMMUNOLOGIC: No hives or eczema	    PHYSICAL EXAM:  T(C): 36.8 (11-10-22 @ 05:00), Max: 36.8 (11-10-22 @ 05:00)  HR: 68 (11-10-22 @ 05:00) (68 - 74)  BP: 123/52 (11-10-22 @ 05:00) (105/46 - 135/63)  RR: 18 (11-10-22 @ 05:00) (17 - 18)  SpO2: 98% (11-10-22 @ 05:00) (95% - 98%)  Wt(kg): --  I&O's Summary    09 Nov 2022 07:01  -  10 Nov 2022 07:00  --------------------------------------------------------  IN: 200 mL / OUT: 0 mL / NET: 200 mL        Appearance: Normal	  HEENT:   Normal oral mucosa, PERRL, EOMI	  Lymphatic: No lymphadenopathy  Cardiovascular: Normal S1 S2, No JVD, No murmurs, No edema  Respiratory: Lungs clear to auscultation	  Psychiatry: A & O x 3, Mood & affect appropriate  Gastrointestinal:  Soft, Non-tender, + BS	  Skin: No rashes, No ecchymoses, No cyanosis	  Neurologic: Non-focal  Extremities: Normal range of motion, No clubbing, cyanosis or edema  Vascular: Peripheral pulses palpable 2+ bilaterally    MEDICATIONS  (STANDING):  apixaban 2.5 milliGRAM(s) Oral two times a day  atorvastatin 40 milliGRAM(s) Oral at bedtime  carvedilol 3.125 milliGRAM(s) Oral every 12 hours  digoxin     Tablet 125 MICROGram(s) Oral every other day  insulin lispro (ADMELOG) corrective regimen sliding scale   SubCutaneous three times a day before meals  insulin lispro (ADMELOG) corrective regimen sliding scale   SubCutaneous at bedtime  levothyroxine 75 MICROGram(s) Oral daily  melatonin 5 milliGRAM(s) Oral at bedtime  pantoprazole    Tablet 40 milliGRAM(s) Oral before breakfast  potassium phosphate / sodium phosphate Powder (PHOS-NaK) 1 Packet(s) Oral once  potassium phosphate / sodium phosphate Powder (PHOS-NaK) 1 Packet(s) Oral once  sacubitril 24 mG/valsartan 26 mG 1 Tablet(s) Oral two times a day  tamsulosin 0.4 milliGRAM(s) Oral at bedtime      TELEMETRY: 	av paced    	  	  LABS:	 	                        9.1    6.19  )-----------( 230      ( 10 Nov 2022 05:47 )             30.9     11-10    141  |  103  |  26<H>  ----------------------------<  149<H>  3.7   |  31  |  1.51<H>    Ca    9.1      10 Nov 2022 05:47  Phos  2.0     11-10  Mg     2.1     11-10      proBNP: Serum Pro-Brain Natriuretic Peptide: 82197 pg/mL (10-29 @ 12:55)  Serum Pro-Brain Natriuretic Peptide: 90092 pg/mL (10-28 @ 19:36)    Lipid Profile:   HgA1c:   TSH: Thyroid Stimulating Hormone, Serum: 6.68 uU/mL (10-29 @ 10:41)

## 2022-11-10 NOTE — PROGRESS NOTE ADULT - PROBLEM SELECTOR PROBLEM 1
Atrial fibrillation with RVR
Acute respiratory failure with hypoxia
Atrial fibrillation with RVR
Acute respiratory failure with hypoxia

## 2022-12-03 LAB
CULTURE RESULTS: SIGNIFICANT CHANGE UP
SPECIMEN SOURCE: SIGNIFICANT CHANGE UP

## 2023-06-29 NOTE — CHART NOTE - NSCHARTNOTEFT_GEN_A_CORE
Assessment:   89yMalePatient is a 89y old  Male who presents with a chief complaint of Fall (10 Nov 2022 08:59)      Factors impacting intake: [ ] none [ ] nausea  [ ] vomiting [ ] diarrhea [ ] constipation  [ ]chewing problems [ ] swallowing issues  [x ] other: consult requested by MD To see patient. seen by swallow On (11/4/22) where a puree diet with mildly thick liquids .spoon feed for all was recommended. also seen again by swallow On (11/9/22) where it was recommended To continue with puree diet and mildly thick liquids. visited patient in room but unable to fully interview as forgetful. Per RN, patient consuming 100% of meals and tolerating consistency. suggest TO continue with Pureed, carbohydrate consistent , mildly thick liquids , liquid via teaspoon as ordered     Diet Presciption: Diet, Pureed:   Consistent Carbohydrate {Evening Snacks}  DASH/TLC {Sodium & Cholesterol Restricted}  Mildly Thick Liquids (MILDTHICKLIQS)  Liquid via Teaspoon Only (11-04-22 @ 12:47)    Intake: adequate     Current Weight: 98.3kg (11/9), 59.3kg (11/3), ? accuracy of last weight.   % Weight Change    Pertinent Medications: MEDICATIONS  (STANDING):  apixaban 2.5 milliGRAM(s) Oral two times a day  atorvastatin 40 milliGRAM(s) Oral at bedtime  carvedilol 3.125 milliGRAM(s) Oral every 12 hours  digoxin     Tablet 125 MICROGram(s) Oral every other day  insulin lispro (ADMELOG) corrective regimen sliding scale   SubCutaneous three times a day before meals  insulin lispro (ADMELOG) corrective regimen sliding scale   SubCutaneous at bedtime  levothyroxine 75 MICROGram(s) Oral daily  melatonin 5 milliGRAM(s) Oral at bedtime  pantoprazole    Tablet 40 milliGRAM(s) Oral before breakfast  sacubitril 24 mG/valsartan 26 mG 1 Tablet(s) Oral two times a day  tamsulosin 0.4 milliGRAM(s) Oral at bedtime    MEDICATIONS  (PRN):    Pertinent Labs: 11-10 Na141 mmol/L Glu 149 mg/dL<H> K+ 3.7 mmol/L Cr  1.51 mg/dL<H> BUN 26 mg/dL<H> 11-10 Phos 2.0 mg/dL<L> 11-08 Alb 2.2 g/dL<L>     CAPILLARY BLOOD GLUCOSE      POCT Blood Glucose.: 197 mg/dL (10 Nov 2022 11:39)  POCT Blood Glucose.: 153 mg/dL (10 Nov 2022 07:52)  POCT Blood Glucose.: 186 mg/dL (09 Nov 2022 20:55)  POCT Blood Glucose.: 155 mg/dL (09 Nov 2022 16:29)    Skin: R buttocks, suspected DTI     Estimated Needs:   [ x] no change since previous assessment  [ ] recalculated:     Previous Nutrition Diagnosis:   [ ] Inadequate Energy Intake [ x]Inadequate Oral Intake [ ] Excessive Energy Intake   [ ] Underweight [ ] Increased Nutrient Needs [ ] Overweight/Obesity   [ ] Altered GI Function [ ] Unintended Weight Loss [ ] Food & Nutrition Related Knowledge Deficit [ ] Malnutrition     Nutrition Diagnosis is [x ] ongoing  [ ] resolved [ ] not applicable     New Nutrition Diagnosis: [ ] not applicable       Interventions:   Recommend  [ ] Change Diet To:  [ ] Nutrition Supplement  [ ] Nutrition Support  [ ] Other: suggest TO continue with Pureed, carbohydrate consistent , mildly thick liquids , liquid via teaspoon as ordered , add multivitamin/minerals 1 tab/d and vitamin C 500mg/d to assist with healing if consistent with goals of care     Monitoring and Evaluation:   [ ] PO intake [ x ] Tolerance to diet prescription [ x ] weights [ x ] labs[ x ] follow up per protocol  [ ] other: No

## 2024-06-08 NOTE — PROCEDURE NOTE - NSCHLORHEXIDINEBATH_GEN_A_CORE
2% wipes
GENERAL: mild acute distress  HEENT: 3 cm avulsion injury of R forehead w/ intact eyebrow movement, vision grossly intact, EOMI, no conjunctivitis or discharge, hearing grossly intact, no nasal discharge or epistaxis, clear pharynx  CV: regular rate, normal rhythm, normal S1/S2, no murmurs/rubs, no cyanosis  PULM: normal work of breathing, normal O2 saturation on -, clear breath sounds in b/l upper/lower lung fields, no crackles/rales/rhonchi/wheezing  GI: soft/non-tender/nondistended abdomen, no guarding or rebound tenderness, no palpable masses  : no CVA tenderness  NEURO: A&Ox4, follows commands, normal speech, no focal motor or sensory deficits  MSK: no joint tenderness/swelling/erythema, ranging all extremities with no appreciable loss of ROM  EXT: no peripheral edema, no calf tenderness, no redness or swelling  SKIN: 1 cm laceration overlying R elbow  PSYCH: appropriate mood and affect
2% wipes
2% wipes

## 2024-07-28 NOTE — ED PROVIDER NOTE - INPATIENT RESIDENT/ACP NOTIFIED DATE
Trauma Surgery - End of Shift Nursing Note:  Shift timeframe [id number]    Pain Management  Last pain level: 1/10  What pain medications were given tylenol, augie, lidocaine patches    Respiratory Assessment:  Supplemental oxygent Yes  Incentive Spirometry 500 mL    GI Assessment:   Nausea No  Emesis No  Flatus yes  Bowel movement yes     Assessment:  Voiding Yes    Bladder scan No  If yes Straight Cath No  Burks No    Activity:  Times out of bed 3  Delirium No  IF yes please describe to include if a CAM assessment was completed, or a change was noted in the mental status assessment.    Patient Education:   Patient provided trauma folder if applicable Yes  Incentive Spirometer Yes    Any Unforseen Events? None     28-Apr-2017 16:42

## 2025-04-03 ENCOUNTER — INPATIENT (INPATIENT)
Facility: HOSPITAL | Age: 89
LOS: 5 days | Discharge: NURSING, REHAB OR LONG-TERM CARE | DRG: 195 | End: 2025-04-09
Attending: INTERNAL MEDICINE | Admitting: INTERNAL MEDICINE
Payer: MEDICARE

## 2025-04-03 VITALS
HEART RATE: 86 BPM | DIASTOLIC BLOOD PRESSURE: 70 MMHG | TEMPERATURE: 99 F | OXYGEN SATURATION: 95 % | RESPIRATION RATE: 18 BRPM | SYSTOLIC BLOOD PRESSURE: 127 MMHG | WEIGHT: 123.9 LBS | HEIGHT: 63 IN

## 2025-04-03 DIAGNOSIS — Z95.1 PRESENCE OF AORTOCORONARY BYPASS GRAFT: Chronic | ICD-10-CM

## 2025-04-03 DIAGNOSIS — Z90.79 ACQUIRED ABSENCE OF OTHER GENITAL ORGAN(S): Chronic | ICD-10-CM

## 2025-04-03 DIAGNOSIS — Z90.5 ACQUIRED ABSENCE OF KIDNEY: Chronic | ICD-10-CM

## 2025-04-03 DIAGNOSIS — J18.9 PNEUMONIA, UNSPECIFIED ORGANISM: ICD-10-CM

## 2025-04-03 LAB
ALBUMIN SERPL ELPH-MCNC: 3 G/DL — LOW (ref 3.5–5)
ALP SERPL-CCNC: 77 U/L — SIGNIFICANT CHANGE UP (ref 40–120)
ALT FLD-CCNC: 14 U/L DA — SIGNIFICANT CHANGE UP (ref 10–60)
ANION GAP SERPL CALC-SCNC: 8 MMOL/L — SIGNIFICANT CHANGE UP (ref 5–17)
APPEARANCE UR: ABNORMAL
AST SERPL-CCNC: 13 U/L — SIGNIFICANT CHANGE UP (ref 10–40)
BASOPHILS # BLD AUTO: 0.04 K/UL — SIGNIFICANT CHANGE UP (ref 0–0.2)
BASOPHILS NFR BLD AUTO: 0.3 % — SIGNIFICANT CHANGE UP (ref 0–2)
BILIRUB SERPL-MCNC: 0.5 MG/DL — SIGNIFICANT CHANGE UP (ref 0.2–1.2)
BILIRUB UR-MCNC: NEGATIVE — SIGNIFICANT CHANGE UP
BUN SERPL-MCNC: 40 MG/DL — HIGH (ref 7–18)
CALCIUM SERPL-MCNC: 9.1 MG/DL — SIGNIFICANT CHANGE UP (ref 8.4–10.5)
CHLORIDE SERPL-SCNC: 106 MMOL/L — SIGNIFICANT CHANGE UP (ref 96–108)
CK SERPL-CCNC: 46 U/L — SIGNIFICANT CHANGE UP (ref 35–232)
CO2 SERPL-SCNC: 20 MMOL/L — LOW (ref 22–31)
COLOR SPEC: ABNORMAL
CREAT SERPL-MCNC: 1.93 MG/DL — HIGH (ref 0.5–1.3)
DIFF PNL FLD: ABNORMAL
EGFR: 31 ML/MIN/1.73M2 — LOW
EGFR: 31 ML/MIN/1.73M2 — LOW
EOSINOPHIL # BLD AUTO: 0.05 K/UL — SIGNIFICANT CHANGE UP (ref 0–0.5)
EOSINOPHIL NFR BLD AUTO: 0.3 % — SIGNIFICANT CHANGE UP (ref 0–6)
GLUCOSE SERPL-MCNC: 297 MG/DL — HIGH (ref 70–99)
GLUCOSE UR QL: >=1000 MG/DL
HCT VFR BLD CALC: 35.9 % — LOW (ref 39–50)
HGB BLD-MCNC: 11.5 G/DL — LOW (ref 13–17)
IMM GRANULOCYTES NFR BLD AUTO: 0.5 % — SIGNIFICANT CHANGE UP (ref 0–0.9)
KETONES UR-MCNC: ABNORMAL MG/DL
LACTATE SERPL-SCNC: 1.2 MMOL/L — SIGNIFICANT CHANGE UP (ref 0.7–2)
LEUKOCYTE ESTERASE UR-ACNC: ABNORMAL
LIDOCAIN IGE QN: 342 U/L — HIGH (ref 13–75)
LYMPHOCYTES # BLD AUTO: 0.71 K/UL — LOW (ref 1–3.3)
LYMPHOCYTES # BLD AUTO: 4.5 % — LOW (ref 13–44)
MAGNESIUM SERPL-MCNC: 1.9 MG/DL — SIGNIFICANT CHANGE UP (ref 1.6–2.6)
MCHC RBC-ENTMCNC: 31.4 PG — SIGNIFICANT CHANGE UP (ref 27–34)
MCHC RBC-ENTMCNC: 32 G/DL — SIGNIFICANT CHANGE UP (ref 32–36)
MCV RBC AUTO: 98.1 FL — SIGNIFICANT CHANGE UP (ref 80–100)
MONOCYTES # BLD AUTO: 0.99 K/UL — HIGH (ref 0–0.9)
MONOCYTES NFR BLD AUTO: 6.2 % — SIGNIFICANT CHANGE UP (ref 2–14)
NEUTROPHILS # BLD AUTO: 14.04 K/UL — HIGH (ref 1.8–7.4)
NEUTROPHILS NFR BLD AUTO: 88.2 % — HIGH (ref 43–77)
NITRITE UR-MCNC: NEGATIVE — SIGNIFICANT CHANGE UP
NRBC BLD AUTO-RTO: 0 /100 WBCS — SIGNIFICANT CHANGE UP (ref 0–0)
NT-PROBNP SERPL-SCNC: 1258 PG/ML — HIGH (ref 0–450)
PH UR: 5 — SIGNIFICANT CHANGE UP (ref 5–8)
PLATELET # BLD AUTO: 179 K/UL — SIGNIFICANT CHANGE UP (ref 150–400)
POTASSIUM SERPL-MCNC: 4.6 MMOL/L — SIGNIFICANT CHANGE UP (ref 3.5–5.3)
POTASSIUM SERPL-SCNC: 4.6 MMOL/L — SIGNIFICANT CHANGE UP (ref 3.5–5.3)
PROT SERPL-MCNC: 6.5 G/DL — SIGNIFICANT CHANGE UP (ref 6–8.3)
PROT UR-MCNC: 100 MG/DL
RBC # BLD: 3.66 M/UL — LOW (ref 4.2–5.8)
RBC # FLD: 13.7 % — SIGNIFICANT CHANGE UP (ref 10.3–14.5)
SODIUM SERPL-SCNC: 134 MMOL/L — LOW (ref 135–145)
SP GR SPEC: 1.03 — SIGNIFICANT CHANGE UP (ref 1–1.03)
TROPONIN I, HIGH SENSITIVITY RESULT: 24.9 NG/L — SIGNIFICANT CHANGE UP
UROBILINOGEN FLD QL: 0.2 MG/DL — SIGNIFICANT CHANGE UP (ref 0.2–1)
WBC # BLD: 15.91 K/UL — HIGH (ref 3.8–10.5)
WBC # FLD AUTO: 15.91 K/UL — HIGH (ref 3.8–10.5)

## 2025-04-03 PROCEDURE — 71045 X-RAY EXAM CHEST 1 VIEW: CPT | Mod: 26

## 2025-04-03 PROCEDURE — 93010 ELECTROCARDIOGRAM REPORT: CPT

## 2025-04-03 PROCEDURE — 99285 EMERGENCY DEPT VISIT HI MDM: CPT

## 2025-04-03 RX ORDER — CEFTRIAXONE 500 MG/1
1000 INJECTION, POWDER, FOR SOLUTION INTRAMUSCULAR; INTRAVENOUS ONCE
Refills: 0 | Status: COMPLETED | OUTPATIENT
Start: 2025-04-03 | End: 2025-04-03

## 2025-04-03 RX ORDER — AZITHROMYCIN 250 MG
500 CAPSULE ORAL ONCE
Refills: 0 | Status: COMPLETED | OUTPATIENT
Start: 2025-04-03 | End: 2025-04-03

## 2025-04-03 RX ADMIN — CEFTRIAXONE 100 MILLIGRAM(S): 500 INJECTION, POWDER, FOR SOLUTION INTRAMUSCULAR; INTRAVENOUS at 22:32

## 2025-04-03 RX ADMIN — Medication 20 MILLIGRAM(S): at 19:59

## 2025-04-03 RX ADMIN — Medication 250 MILLIGRAM(S): at 23:20

## 2025-04-03 NOTE — ED PROVIDER NOTE - CARE PLAN
1 Principal Discharge DX:	PNA (pneumonia)  Secondary Diagnosis:	Dehydration  Secondary Diagnosis:	Acute UTI

## 2025-04-03 NOTE — ED PROVIDER NOTE - OBJECTIVE STATEMENT
89-year-old male with history of DM, HTN, hypothyroidism, CABG, GERD, HLD, patient brought in by ambulance, as per family friend who is taking care of him, patient with rigors and urinary incontinence today, he also complained of left-sided chest pain earlier. Pt at times with confusions.  Family friend denies fever, admits to chronic cough and good appetite

## 2025-04-03 NOTE — ED PROVIDER NOTE - ENMT, MLM
Airway patent, Nasal mucosa clear. Mouth with  slightly dry mucosa. Throat has no vesicles, no oropharyngeal exudates and uvula is midline.

## 2025-04-03 NOTE — ED PROVIDER NOTE - CLINICAL SUMMARY MEDICAL DECISION MAKING FREE TEXT BOX
89-year-old male with sudden onset of rigors, urinary incontinence, confusion, concern for infectious process, dehydration.  Will get labs, cultures, will give antibiotics, and admission

## 2025-04-03 NOTE — ED PROVIDER NOTE - PROGRESS NOTE DETAILS
Patient's lab result explained to both family friend and patient's son who is a physician   attempt to straight cath patient 2 times with no success, placed condom cath to obtain urine   patient with possible pneumonia, UTI, will admit patient   patient's PCP is Dr. Zambrano, who is covered by Dr. Huitron

## 2025-04-04 DIAGNOSIS — E11.9 TYPE 2 DIABETES MELLITUS WITHOUT COMPLICATIONS: ICD-10-CM

## 2025-04-04 DIAGNOSIS — K40.20 BILATERAL INGUINAL HERNIA, WITHOUT OBSTRUCTION OR GANGRENE, NOT SPECIFIED AS RECURRENT: ICD-10-CM

## 2025-04-04 DIAGNOSIS — J44.89 OTHER SPECIFIED CHRONIC OBSTRUCTIVE PULMONARY DISEASE: ICD-10-CM

## 2025-04-04 DIAGNOSIS — Z29.9 ENCOUNTER FOR PROPHYLACTIC MEASURES, UNSPECIFIED: ICD-10-CM

## 2025-04-04 DIAGNOSIS — J18.9 PNEUMONIA, UNSPECIFIED ORGANISM: ICD-10-CM

## 2025-04-04 DIAGNOSIS — D72.829 ELEVATED WHITE BLOOD CELL COUNT, UNSPECIFIED: ICD-10-CM

## 2025-04-04 DIAGNOSIS — N17.9 ACUTE KIDNEY FAILURE, UNSPECIFIED: ICD-10-CM

## 2025-04-04 DIAGNOSIS — I48.20 CHRONIC ATRIAL FIBRILLATION, UNSPECIFIED: ICD-10-CM

## 2025-04-04 DIAGNOSIS — N40.0 BENIGN PROSTATIC HYPERPLASIA WITHOUT LOWER URINARY TRACT SYMPTOMS: ICD-10-CM

## 2025-04-04 DIAGNOSIS — E78.5 HYPERLIPIDEMIA, UNSPECIFIED: ICD-10-CM

## 2025-04-04 DIAGNOSIS — E03.9 HYPOTHYROIDISM, UNSPECIFIED: ICD-10-CM

## 2025-04-04 DIAGNOSIS — I10 ESSENTIAL (PRIMARY) HYPERTENSION: ICD-10-CM

## 2025-04-04 LAB
A1C WITH ESTIMATED AVERAGE GLUCOSE RESULT: 9.4 % — HIGH (ref 4–5.6)
ALBUMIN SERPL ELPH-MCNC: 2.7 G/DL — LOW (ref 3.5–5)
ALP SERPL-CCNC: 70 U/L — SIGNIFICANT CHANGE UP (ref 40–120)
ALT FLD-CCNC: 14 U/L DA — SIGNIFICANT CHANGE UP (ref 10–60)
ANION GAP SERPL CALC-SCNC: 9 MMOL/L — SIGNIFICANT CHANGE UP (ref 5–17)
APPEARANCE UR: CLEAR — SIGNIFICANT CHANGE UP
AST SERPL-CCNC: 11 U/L — SIGNIFICANT CHANGE UP (ref 10–40)
BACTERIA # UR AUTO: ABNORMAL /HPF
BACTERIA # UR AUTO: ABNORMAL /HPF
BASOPHILS # BLD AUTO: 0.03 K/UL — SIGNIFICANT CHANGE UP (ref 0–0.2)
BASOPHILS NFR BLD AUTO: 0.2 % — SIGNIFICANT CHANGE UP (ref 0–2)
BILIRUB SERPL-MCNC: 0.4 MG/DL — SIGNIFICANT CHANGE UP (ref 0.2–1.2)
BILIRUB UR-MCNC: NEGATIVE — SIGNIFICANT CHANGE UP
BUN SERPL-MCNC: 36 MG/DL — HIGH (ref 7–18)
CALCIUM SERPL-MCNC: 8.7 MG/DL — SIGNIFICANT CHANGE UP (ref 8.4–10.5)
CHLORIDE SERPL-SCNC: 106 MMOL/L — SIGNIFICANT CHANGE UP (ref 96–108)
CK SERPL-CCNC: 237 U/L — HIGH (ref 35–232)
CO2 SERPL-SCNC: 20 MMOL/L — LOW (ref 22–31)
COLOR SPEC: YELLOW — SIGNIFICANT CHANGE UP
CREAT ?TM UR-MCNC: 48 MG/DL — SIGNIFICANT CHANGE UP
CREAT SERPL-MCNC: 2.01 MG/DL — HIGH (ref 0.5–1.3)
DIFF PNL FLD: ABNORMAL
DIGOXIN SERPL-MCNC: 1.4 NG/ML — SIGNIFICANT CHANGE UP (ref 0.8–2)
EGFR: 30 ML/MIN/1.73M2 — LOW
EGFR: 30 ML/MIN/1.73M2 — LOW
EOSINOPHIL # BLD AUTO: 0.08 K/UL — SIGNIFICANT CHANGE UP (ref 0–0.5)
EOSINOPHIL NFR BLD AUTO: 0.6 % — SIGNIFICANT CHANGE UP (ref 0–6)
EPI CELLS # UR: PRESENT
EPI CELLS # UR: PRESENT
ESTIMATED AVERAGE GLUCOSE: 223 MG/DL — HIGH (ref 68–114)
GLUCOSE BLDC GLUCOMTR-MCNC: 133 MG/DL — HIGH (ref 70–99)
GLUCOSE BLDC GLUCOMTR-MCNC: 156 MG/DL — HIGH (ref 70–99)
GLUCOSE BLDC GLUCOMTR-MCNC: 162 MG/DL — HIGH (ref 70–99)
GLUCOSE BLDC GLUCOMTR-MCNC: 178 MG/DL — HIGH (ref 70–99)
GLUCOSE SERPL-MCNC: 226 MG/DL — HIGH (ref 70–99)
GLUCOSE UR QL: >=1000 MG/DL
HCT VFR BLD CALC: 33.1 % — LOW (ref 39–50)
HGB BLD-MCNC: 10.7 G/DL — LOW (ref 13–17)
IMM GRANULOCYTES NFR BLD AUTO: 0.4 % — SIGNIFICANT CHANGE UP (ref 0–0.9)
KETONES UR-MCNC: NEGATIVE MG/DL — SIGNIFICANT CHANGE UP
LEUKOCYTE ESTERASE UR-ACNC: ABNORMAL
LYMPHOCYTES # BLD AUTO: 1.7 K/UL — SIGNIFICANT CHANGE UP (ref 1–3.3)
LYMPHOCYTES # BLD AUTO: 12.3 % — LOW (ref 13–44)
MAGNESIUM SERPL-MCNC: 2 MG/DL — SIGNIFICANT CHANGE UP (ref 1.6–2.6)
MCHC RBC-ENTMCNC: 31.5 PG — SIGNIFICANT CHANGE UP (ref 27–34)
MCHC RBC-ENTMCNC: 32.3 G/DL — SIGNIFICANT CHANGE UP (ref 32–36)
MCV RBC AUTO: 97.4 FL — SIGNIFICANT CHANGE UP (ref 80–100)
MONOCYTES # BLD AUTO: 0.96 K/UL — HIGH (ref 0–0.9)
MONOCYTES NFR BLD AUTO: 6.9 % — SIGNIFICANT CHANGE UP (ref 2–14)
NEUTROPHILS # BLD AUTO: 10.99 K/UL — HIGH (ref 1.8–7.4)
NEUTROPHILS NFR BLD AUTO: 79.6 % — HIGH (ref 43–77)
NITRITE UR-MCNC: NEGATIVE — SIGNIFICANT CHANGE UP
NRBC BLD AUTO-RTO: 0 /100 WBCS — SIGNIFICANT CHANGE UP (ref 0–0)
OSMOLALITY UR: 478 MOS/KG — SIGNIFICANT CHANGE UP (ref 50–1200)
PH UR: 5.5 — SIGNIFICANT CHANGE UP (ref 5–8)
PHOSPHATE SERPL-MCNC: 2.2 MG/DL — LOW (ref 2.5–4.5)
PLATELET # BLD AUTO: 169 K/UL — SIGNIFICANT CHANGE UP (ref 150–400)
POTASSIUM SERPL-MCNC: 4.1 MMOL/L — SIGNIFICANT CHANGE UP (ref 3.5–5.3)
POTASSIUM SERPL-SCNC: 4.1 MMOL/L — SIGNIFICANT CHANGE UP (ref 3.5–5.3)
POTASSIUM UR-SCNC: 30 MMOL/L — SIGNIFICANT CHANGE UP
PROCALCITONIN SERPL-MCNC: 4.97 NG/ML — HIGH (ref 0.02–0.1)
PROT ?TM UR-MCNC: 44 MG/DL — HIGH (ref 0–12)
PROT SERPL-MCNC: 5.7 G/DL — LOW (ref 6–8.3)
PROT UR-MCNC: 30 MG/DL
PROT/CREAT UR-RTO: 0.9 RATIO — HIGH (ref 0–0.2)
RBC # BLD: 3.4 M/UL — LOW (ref 4.2–5.8)
RBC # FLD: 13.9 % — SIGNIFICANT CHANGE UP (ref 10.3–14.5)
RBC CASTS # UR COMP ASSIST: 20 /HPF — HIGH (ref 0–4)
RBC CASTS # UR COMP ASSIST: ABNORMAL /HPF
SODIUM SERPL-SCNC: 135 MMOL/L — SIGNIFICANT CHANGE UP (ref 135–145)
SODIUM UR-SCNC: 54 MMOL/L — SIGNIFICANT CHANGE UP
SP GR SPEC: 1.02 — SIGNIFICANT CHANGE UP (ref 1–1.03)
TROPONIN I, HIGH SENSITIVITY RESULT: 46.7 NG/L — SIGNIFICANT CHANGE UP
TSH SERPL-MCNC: 3.85 UU/ML — SIGNIFICANT CHANGE UP (ref 0.34–4.82)
UROBILINOGEN FLD QL: 0.2 MG/DL — SIGNIFICANT CHANGE UP (ref 0.2–1)
WBC # BLD: 13.82 K/UL — HIGH (ref 3.8–10.5)
WBC # FLD AUTO: 13.82 K/UL — HIGH (ref 3.8–10.5)
WBC UR QL: 4 /HPF — SIGNIFICANT CHANGE UP (ref 0–5)
WBC UR QL: 4 /HPF — SIGNIFICANT CHANGE UP (ref 0–5)

## 2025-04-04 PROCEDURE — 74176 CT ABD & PELVIS W/O CONTRAST: CPT | Mod: 26

## 2025-04-04 PROCEDURE — 71250 CT THORAX DX C-: CPT | Mod: 26

## 2025-04-04 RX ORDER — ATORVASTATIN CALCIUM 80 MG/1
40 TABLET, FILM COATED ORAL AT BEDTIME
Refills: 0 | Status: DISCONTINUED | OUTPATIENT
Start: 2025-04-04 | End: 2025-04-09

## 2025-04-04 RX ORDER — INSULIN LISPRO 100 U/ML
INJECTION, SOLUTION INTRAVENOUS; SUBCUTANEOUS
Refills: 0 | Status: DISCONTINUED | OUTPATIENT
Start: 2025-04-04 | End: 2025-04-09

## 2025-04-04 RX ORDER — IPRATROPIUM BROMIDE AND ALBUTEROL SULFATE .5; 2.5 MG/3ML; MG/3ML
3 SOLUTION RESPIRATORY (INHALATION) EVERY 12 HOURS
Refills: 0 | Status: DISCONTINUED | OUTPATIENT
Start: 2025-04-04 | End: 2025-04-09

## 2025-04-04 RX ORDER — DIGOXIN 250 UG/1
125 TABLET ORAL EVERY OTHER DAY
Refills: 0 | Status: DISCONTINUED | OUTPATIENT
Start: 2025-04-04 | End: 2025-04-09

## 2025-04-04 RX ORDER — SODIUM CHLORIDE 9 G/1000ML
1000 INJECTION, SOLUTION INTRAVENOUS
Refills: 0 | Status: DISCONTINUED | OUTPATIENT
Start: 2025-04-04 | End: 2025-04-05

## 2025-04-04 RX ORDER — ASPIRIN 325 MG
81 TABLET ORAL DAILY
Refills: 0 | Status: DISCONTINUED | OUTPATIENT
Start: 2025-04-04 | End: 2025-04-05

## 2025-04-04 RX ORDER — SODIUM CHLORIDE 9 G/1000ML
1000 INJECTION, SOLUTION INTRAVENOUS
Refills: 0 | Status: DISCONTINUED | OUTPATIENT
Start: 2025-04-04 | End: 2025-04-09

## 2025-04-04 RX ORDER — DEXTROSE 50 % IN WATER 50 %
25 SYRINGE (ML) INTRAVENOUS ONCE
Refills: 0 | Status: DISCONTINUED | OUTPATIENT
Start: 2025-04-04 | End: 2025-04-09

## 2025-04-04 RX ORDER — CEFTRIAXONE 500 MG/1
1000 INJECTION, POWDER, FOR SOLUTION INTRAMUSCULAR; INTRAVENOUS EVERY 24 HOURS
Refills: 0 | Status: DISCONTINUED | OUTPATIENT
Start: 2025-04-04 | End: 2025-04-07

## 2025-04-04 RX ORDER — ACETAMINOPHEN 500 MG/5ML
650 LIQUID (ML) ORAL EVERY 6 HOURS
Refills: 0 | Status: DISCONTINUED | OUTPATIENT
Start: 2025-04-04 | End: 2025-04-09

## 2025-04-04 RX ORDER — LEVOTHYROXINE SODIUM 300 MCG
75 TABLET ORAL DAILY
Refills: 0 | Status: DISCONTINUED | OUTPATIENT
Start: 2025-04-04 | End: 2025-04-09

## 2025-04-04 RX ORDER — INSULIN LISPRO 100 U/ML
INJECTION, SOLUTION INTRAVENOUS; SUBCUTANEOUS AT BEDTIME
Refills: 0 | Status: DISCONTINUED | OUTPATIENT
Start: 2025-04-04 | End: 2025-04-09

## 2025-04-04 RX ORDER — APIXABAN 2.5 MG/1
2.5 TABLET, FILM COATED ORAL EVERY 12 HOURS
Refills: 0 | Status: DISCONTINUED | OUTPATIENT
Start: 2025-04-04 | End: 2025-04-05

## 2025-04-04 RX ORDER — AZITHROMYCIN 250 MG
500 CAPSULE ORAL EVERY 24 HOURS
Refills: 0 | Status: COMPLETED | OUTPATIENT
Start: 2025-04-04 | End: 2025-04-07

## 2025-04-04 RX ORDER — DEXTROMETHORPHAN HBR, GUAIFENESIN 200 MG/10ML
600 LIQUID ORAL EVERY 12 HOURS
Refills: 0 | Status: DISCONTINUED | OUTPATIENT
Start: 2025-04-04 | End: 2025-04-09

## 2025-04-04 RX ORDER — TAMSULOSIN HYDROCHLORIDE 0.4 MG/1
0.4 CAPSULE ORAL AT BEDTIME
Refills: 0 | Status: DISCONTINUED | OUTPATIENT
Start: 2025-04-04 | End: 2025-04-09

## 2025-04-04 RX ORDER — ESCITALOPRAM OXALATE 20 MG/1
5 TABLET ORAL DAILY
Refills: 0 | Status: DISCONTINUED | OUTPATIENT
Start: 2025-04-04 | End: 2025-04-09

## 2025-04-04 RX ORDER — MELATONIN 5 MG
3 TABLET ORAL AT BEDTIME
Refills: 0 | Status: DISCONTINUED | OUTPATIENT
Start: 2025-04-04 | End: 2025-04-04

## 2025-04-04 RX ORDER — DEXTROSE 50 % IN WATER 50 %
15 SYRINGE (ML) INTRAVENOUS ONCE
Refills: 0 | Status: DISCONTINUED | OUTPATIENT
Start: 2025-04-04 | End: 2025-04-09

## 2025-04-04 RX ORDER — ONDANSETRON HCL/PF 4 MG/2 ML
4 VIAL (ML) INJECTION EVERY 8 HOURS
Refills: 0 | Status: DISCONTINUED | OUTPATIENT
Start: 2025-04-04 | End: 2025-04-04

## 2025-04-04 RX ORDER — DEXTROSE 50 % IN WATER 50 %
12.5 SYRINGE (ML) INTRAVENOUS ONCE
Refills: 0 | Status: DISCONTINUED | OUTPATIENT
Start: 2025-04-04 | End: 2025-04-09

## 2025-04-04 RX ORDER — SODIUM CHLORIDE 9 G/1000ML
1000 INJECTION, SOLUTION INTRAVENOUS
Refills: 0 | Status: DISCONTINUED | OUTPATIENT
Start: 2025-04-04 | End: 2025-04-04

## 2025-04-04 RX ORDER — GLUCAGON 3 MG/1
1 POWDER NASAL ONCE
Refills: 0 | Status: DISCONTINUED | OUTPATIENT
Start: 2025-04-04 | End: 2025-04-09

## 2025-04-04 RX ADMIN — SODIUM CHLORIDE 70 MILLILITER(S): 9 INJECTION, SOLUTION INTRAVENOUS at 08:40

## 2025-04-04 RX ADMIN — ATORVASTATIN CALCIUM 40 MILLIGRAM(S): 80 TABLET, FILM COATED ORAL at 21:09

## 2025-04-04 RX ADMIN — Medication 1000 MILLILITER(S): at 00:25

## 2025-04-04 RX ADMIN — Medication 250 MILLIGRAM(S): at 21:11

## 2025-04-04 RX ADMIN — DEXTROMETHORPHAN HBR, GUAIFENESIN 600 MILLIGRAM(S): 200 LIQUID ORAL at 05:10

## 2025-04-04 RX ADMIN — SODIUM CHLORIDE 75 MILLILITER(S): 9 INJECTION, SOLUTION INTRAVENOUS at 12:16

## 2025-04-04 RX ADMIN — Medication 75 MICROGRAM(S): at 05:03

## 2025-04-04 RX ADMIN — APIXABAN 2.5 MILLIGRAM(S): 2.5 TABLET, FILM COATED ORAL at 19:13

## 2025-04-04 RX ADMIN — IPRATROPIUM BROMIDE AND ALBUTEROL SULFATE 3 MILLILITER(S): .5; 2.5 SOLUTION RESPIRATORY (INHALATION) at 18:12

## 2025-04-04 RX ADMIN — TAMSULOSIN HYDROCHLORIDE 0.4 MILLIGRAM(S): 0.4 CAPSULE ORAL at 21:10

## 2025-04-04 RX ADMIN — CEFTRIAXONE 100 MILLIGRAM(S): 500 INJECTION, POWDER, FOR SOLUTION INTRAMUSCULAR; INTRAVENOUS at 21:11

## 2025-04-04 RX ADMIN — INSULIN LISPRO 1: 100 INJECTION, SOLUTION INTRAVENOUS; SUBCUTANEOUS at 13:18

## 2025-04-04 RX ADMIN — APIXABAN 2.5 MILLIGRAM(S): 2.5 TABLET, FILM COATED ORAL at 05:03

## 2025-04-04 NOTE — PROGRESS NOTE ADULT - PROBLEM SELECTOR PLAN 4
CTAP: Right inguinal hernia containing fat, small bowel, right colon and fluid. Left internal hernia containing fat, small bowel and fluid.   non tender  ?possible cause of elevated lipase  f/u official read CTAP: Right inguinal hernia containing fat, small bowel, right colon and fluid. Left internal hernia containing fat, small bowel and fluid.   non tender, no signs of incarcerated   ?possible cause of elevated lipase  No pancreatic pathology   no abdominal signs/symptoms

## 2025-04-04 NOTE — H&P ADULT - PROBLEM SELECTOR PLAN 7
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  Confirmed with scanning computerized tomographic optic nerve imaging.  Severity of the papilledema is trace in both eyes (significant.  Visual field testing is borderline for visual field defects in both eyes which is also significant.  Full test results under procedures.  We discussed this with patient's pediatrician Dr. Fleming and recommended to her an MRI and pediatric neurology consultation.  We also discussed the findings and recommendations with the patient and with her mother.  Plan: VISUAL FIELD EXAM EXTENDED, SCANNING         COMPUTERIZED OPHTHALMIC IMAGING OPTIC NERVE        Recommended recheck in about 3 months.      CHIEF COMPLAINT    Chief Complaint   Patient presents with   • Referral     Referral from Dr. Hawthorne. History of headaches but worsening.  Gets more above the eyes and at times a pressure, but headaches more are in the front of the head. Does have black outs at times.         History of present illness    She is here today for evaluation of headaches with abnormal optic nerves reviewed by Dr. Marlo Hawthorne that [facility].  The patient has had worsening headaches for many months.  These only partially respond medications.  She is brought in by her mother.  She denies any visual changes.      I have reviewed the past medical, family and social history sections including the medications and allergies listed in the above medical record as well as the nursing notes.     Review of systems    All other review of systems negative, except for those noted in HPI.    Physical Exam    Vital Signs:  There were no vitals filed for this visit.    Visual Acuity (Snellen - Linear)       Right Left    Dist sc 20/80 20/200    Dist cc 20/20 -1 20/20 -1    Near cc 20/20OU 20/20    Correction:  Glasses          Main Ophthalmology Exam     External Exam       Right Left     External Normal Normal          Slit Lamp Exam       Right Left    Lids/Lashes Normal Normal    Conjunctiva/Sclera Clear Clear    Cornea Clear Clear    Anterior Chamber Deep and quiet Deep and quiet    Iris Round and regular Round and regular    Lens Clear Clear    Vitreous Clear Clear          Fundus Exam       Right Left    Disc Flat, pink with a healthy rim Flat, pink with a healthy rim    C/D Ratio 0.05 0.05    Macula Healthy with sharp foveal reflex Healthy with sharp foveal reflex    Vessels Healthy with normal Artery-Vein ratio Healthy with normal Artery-Vein ratio    Periphery Flat, healthy, without tears or detachments Flat, healthy, without tears or detachments            Visual field test and optic nerve imaging results under procedures.    Please refer to full Ophthalmic Exam within the encounter for additional information.    The patient indicates understanding of these issues and agrees with the plan.            has h/o HLD on Atorvastatin at home  -c/w home meds  -f/u lipid profile

## 2025-04-04 NOTE — ED ADULT NURSE NOTE - OBJECTIVE STATEMENT
pt biba with complaints of left sided chest pain and urinary incontinence. pt confused at times. denies fever, chills, n/v/d.

## 2025-04-04 NOTE — H&P ADULT - ASSESSMENT
89y/M, from home, PMH of HTN, DM, Afib on eliquis, Hypothyroidism, Depression, BPH, HLD was brought in by family friend, who is taking care of him, for rigor and urinary incontinence. labs showing elevated white count. Admitted for further evaluation.  89y/M, from home, PMH of HTN, DM, Afib on eliquis, Hypothyroidism, Depression, BPH, HLD was brought in by family friend, who is taking care of him, for rigor and urinary incontinence. labs showing elevated white count. Admitted for further evaluation.     NEEDS MED REC

## 2025-04-04 NOTE — SWALLOW BEDSIDE ASSESSMENT ADULT - SWALLOW EVAL: DIAGNOSIS
Pt p/w s&s oropharyngeal dysphagia c/b impaired bolus formation, slow A-P transport, oral stasis, multiple swallows, delayed swallow reflex, reduced hyolaryngeal elevation, and overt s&s of penetration/aspiration seen on thin liquids & large presentation sizes. Suspected premature spillage of bolus to the pharynx.

## 2025-04-04 NOTE — ED ADULT NURSE NOTE - NSFALLHARMRISKINTERV_ED_ALL_ED
Assistance OOB with selected safe patient handling equipment if applicable/Assistance with ambulation/Communicate risk of Fall with Harm to all staff, patient, and family/Encourage patient to sit up slowly, dangle for a short time, stand at bedside before walking/Monitor gait and stability/Monitor for mental status changes and reorient to person, place, and time, as needed/Orthostatic vital signs/Provide patient with walking aids/Provide visual cue: red socks, yellow wristband, yellow gown, etc/Reinforce activity limits and safety measures with patient and family/Toileting schedule using arm’s reach rule for commode and bathroom/Use of alarms - bed, stretcher, chair and/or video monitoring/Bed in lowest position, wheels locked, appropriate side rails in place/Call bell, personal items and telephone in reach/Instruct patient to call for assistance before getting out of bed/chair/stretcher/Non-slip footwear applied when patient is off stretcher/Lawn to call system/Physically safe environment - no spills, clutter or unnecessary equipment/Purposeful Proactive Rounding/Room/bathroom lighting operational, light cord in reach

## 2025-04-04 NOTE — SWALLOW BEDSIDE ASSESSMENT ADULT - PHARYNGEAL PHASE
Delayed pharyngeal swallow/Decreased laryngeal elevation/Throat clear post oral intake/Delayed cough post oral intake/Multiple swallows Delayed pharyngeal swallow/Decreased laryngeal elevation/Wet vocal quality post oral intake/Multiple swallows Delayed pharyngeal swallow/Decreased laryngeal elevation/Wet vocal quality post oral intake/Cough post oral intake/Multiple swallows Delayed pharyngeal swallow/Decreased laryngeal elevation/Throat clear post oral intake/Multiple swallows Delayed pharyngeal swallow/Decreased laryngeal elevation/Multiple swallows

## 2025-04-04 NOTE — ED ADULT NURSE NOTE - TEMPLATE
“Patient's name, , procedure and correct site were confirmed during the [place] Timeout.” Cardiac

## 2025-04-04 NOTE — SWALLOW BEDSIDE ASSESSMENT ADULT - ORAL PHASE
Decreased anterior-posterior movement of the bolus/Delayed oral transit time Decreased anterior-posterior movement of the bolus Decreased anterior-posterior movement of the bolus/Delayed oral transit time/Stasis in lateral sulci/Lingual stasis

## 2025-04-04 NOTE — PROGRESS NOTE ADULT - PROBLEM SELECTOR PLAN 6
on sitagliptin at home  started ISS  needs medrec  f/u A1c on sitagliptin at home  started ISS  needs medrec  A1C 9.4%   Endo consulted Dr. Palencia     - f/u endo reccs

## 2025-04-04 NOTE — H&P ADULT - NSHPPHYSICALEXAM_GEN_ALL_CORE
T(C): 36.2 (04-04-25 @ 00:30), Max: 37.3 (04-03-25 @ 18:54)  HR: 71 (04-04-25 @ 00:30) (71 - 86)  BP: 93/55 (04-04-25 @ 00:30) (93/55 - 127/70)  RR: 16 (04-04-25 @ 00:30) (16 - 18)  SpO2: 97% (04-04-25 @ 00:30) (95% - 97%)      GENERAL: NAD, speaks in full sentences, no signs of respiratory distress  HEAD:  Atraumatic, Normocephalic  EYES: EOMI, PERRLA, conjunctiva and sclera clear  NECK: Supple, No JVD  CHEST/LUNG: Clear to auscultation bilaterally; No wheeze; No crackles; No accessory muscles used  HEART: Regular rate and rhythm; No murmurs;   ABDOMEN: Soft, Nontender, Nondistended; Bowel sounds present; No guarding  EXTREMITIES:  2+ Peripheral Pulses, No cyanosis or edema  PSYCH: AAOx3  NEUROLOGY: non-focal  SKIN: No rashes or lesions T(C): 36.2 (04-04-25 @ 00:30), Max: 37.3 (04-03-25 @ 18:54)  HR: 71 (04-04-25 @ 00:30) (71 - 86)  BP: 93/55 (04-04-25 @ 00:30) (93/55 - 127/70)  RR: 16 (04-04-25 @ 00:30) (16 - 18)  SpO2: 97% (04-04-25 @ 00:30) (95% - 97%)      GENERAL: NAD, speaks in full sentences, no signs of respiratory distress  HEAD:  Atraumatic, Normocephalic  EYES: EOMI, PERRLA, conjunctiva and sclera clear  NECK: Supple, No JVD  CHEST/LUNG: Clear to auscultation bilaterally; No wheeze; No crackles  HEART: Regular rate and rhythm; No murmurs;   ABDOMEN: Soft, Nontender, Nondistended; Bowel sounds present; No guarding  EXTREMITIES:  2+ Peripheral Pulses, No cyanosis or edema  PSYCH: AAOx1  NEUROLOGY: non-focal  SKIN: No rashes or lesions

## 2025-04-04 NOTE — SWALLOW BEDSIDE ASSESSMENT ADULT - MODE OF PRESENTATION
x3 cup sip/spoon/self fed x5 TSPN/spoon/self fed x2 ice chip/self fed x2 TSPN & x1 cup sip/cup/spoon/self fed self fed

## 2025-04-04 NOTE — H&P ADULT - PROBLEM SELECTOR PLAN 5
as per surescipt, on coreg, digoxin and eliquis  c/w meds with holding parameters  f/u digoxin level  obtain med rec

## 2025-04-04 NOTE — PROGRESS NOTE ADULT - PROBLEM SELECTOR PLAN 5
as per surescipt, on coreg, digoxin and eliquis  c/w meds with holding parameters  f/u digoxin level  obtain med rec as per surescipt, on coreg, digoxin and eliquis  c/w meds with holding parameters  Digoxin levels normal     - follow up digoxin levels every two days

## 2025-04-04 NOTE — H&P ADULT - PROBLEM SELECTOR PLAN 3
Scr 1.9 (baseline 1.5 in 2022)  pre renal vs post renal  -monitor Scr  -f/u PVR scan Scr 1.9 (baseline 1.5 in 2022)  pre renal vs post renal  s/p 1L IVF  -c/w IVF  -monitor Scr  -f/u PVR scan

## 2025-04-04 NOTE — PATIENT PROFILE ADULT - FALL HARM RISK - HARM RISK INTERVENTIONS

## 2025-04-04 NOTE — SWALLOW BEDSIDE ASSESSMENT ADULT - SLP PERTINENT HISTORY OF CURRENT PROBLEM
89M, from home, w/ HHA 24hr/7d, w/ PMH of HTN, DM, AFib on Eliquis, Hypothyroidism, Depression, BPH, HLD was brought in by family friend, who is taking care of him, for rigor and urinary incontinence. Admitted for further evaluation.

## 2025-04-04 NOTE — PROGRESS NOTE ADULT - ASSESSMENT
89y/M, from home, PMH of HTN, DM, Afib on eliquis, Hypothyroidism, Depression, BPH, HLD was brought in by family friend, who is taking care of him, for rigor and urinary incontinence. labs showing elevated white count. Admitted for further evaluation.     NEEDS MED REC 89y/M, from home, PMH of HTN, DM, Afib on eliquis, Hypothyroidism, Depression, BPH, HLD was brought in by family friend, who is taking care of him, for rigor and urinary incontinence. labs showing elevated white count. Admitted for further evaluation.

## 2025-04-04 NOTE — H&P ADULT - HISTORY OF PRESENT ILLNESS
89y/M, from home, PMH of HTN, DM, Afib on eliquis, Hypothyroidism, Depression, BPH, HLD was brought in by family friend, who is taking care of him, for rigor and urinary incontinence. No fever noted. Patient is poor historian, unable to reach family over phone 89y/M, from home, PMH of HTN, DM, Afib on eliquis, Hypothyroidism, Depression, BPH, HLD was brought in by family friend, who is taking care of him, for rigor and urinary incontinence X1 day. No fever noted. Patient is poor historian, unable to reach family over phone. number in chart in not in service. Patient denies abdominal pain, nausea vomiting, chest pain, SOB, palpitation, diarrhea or constipation.   In ED, vitals wnl, labs revealing leucocytosis. UA negative  s/p Ceftriaxone, Azithromycin, 1L IV bolus in ED

## 2025-04-04 NOTE — CONSULT NOTE ADULT - SUBJECTIVE AND OBJECTIVE BOX
PULMONARY CONSULT NOTE      CINDY CASTILLO  MRN-[id number]    History of Present Illness:  Reason for Admission: Pneumonia  History of Present Illness:   89y/M, from home, PMH of HTN, DM, Afib on eliquis, Hypothyroidism, Depression, BPH, HLD was brought in by family friend, who is taking care of him, for rigor and urinary incontinence X1 day. No fever noted. Patient is poor historian, unable to reach family over phone. number in chart in not in service. Patient denies abdominal pain, nausea vomiting, chest pain, SOB, palpitation, diarrhea or constipation.   In ED, vitals wnl, labs revealing leucocytosis. UA negative  s/p Ceftriaxone, Azithromycin, 1L IV bolus in ED      HISTORY OF PRESENT ILLNESS: As Above. Awake, alert, comfortable in bed in NAD    MEDICATIONS  (STANDING):  albuterol/ipratropium for Nebulization 3 milliLiter(s) Nebulizer every 12 hours  apixaban 2.5 milliGRAM(s) Oral every 12 hours  aspirin  chewable 81 milliGRAM(s) Oral daily  atorvastatin 40 milliGRAM(s) Oral at bedtime  azithromycin  IVPB 500 milliGRAM(s) IV Intermittent every 24 hours  cefTRIAXone   IVPB 1000 milliGRAM(s) IV Intermittent every 24 hours  dextrose 5%. 1000 milliLiter(s) (100 mL/Hr) IV Continuous <Continuous>  dextrose 5%. 1000 milliLiter(s) (50 mL/Hr) IV Continuous <Continuous>  dextrose 50% Injectable 25 Gram(s) IV Push once  dextrose 50% Injectable 12.5 Gram(s) IV Push once  dextrose 50% Injectable 25 Gram(s) IV Push once  digoxin     Tablet 125 MICROGram(s) Oral every other day  escitalopram 5 milliGRAM(s) Oral daily  glucagon  Injectable 1 milliGRAM(s) IntraMuscular once  guaiFENesin  milliGRAM(s) Oral every 12 hours  insulin lispro (ADMELOG) corrective regimen sliding scale   SubCutaneous three times a day before meals  insulin lispro (ADMELOG) corrective regimen sliding scale   SubCutaneous at bedtime  lactated ringers. 1000 milliLiter(s) (70 mL/Hr) IV Continuous <Continuous>  levothyroxine 75 MICROGram(s) Oral daily  sodium chloride 0.9% for Nebulization 3 milliLiter(s) Nebulizer every 12 hours  tamsulosin 0.4 milliGRAM(s) Oral at bedtime      MEDICATIONS  (PRN):  acetaminophen     Tablet .. 650 milliGRAM(s) Oral every 6 hours PRN Temp greater or equal to 38C (100.4F), Mild Pain (1 - 3)  dextrose Oral Gel 15 Gram(s) Oral once PRN Blood Glucose LESS THAN 70 milliGRAM(s)/deciliter      Allergies    No Known Allergies    Intolerances        PAST MEDICAL & SURGICAL HISTORY:  Diabetes      HTN (hypertension)      Hypothyroid      GERD (gastroesophageal reflux disease)      HLD (hyperlipidemia)      Hypothyroidism      H/O heart bypass surgery      H/O partial nephrectomy      S/P TURP          FAMILY HISTORY:  Family history of hypertension (Father, Mother)        SOCIAL HISTORY  Smoking History:     REVIEW OF SYSTEMS:    CONSTITUTIONAL:  No fevers, chills, sweats    HEENT:  Eyes:  No diplopia or blurred vision. ENT:  No earache, sore throat or runny nose.    CARDIOVASCULAR:  No pressure, squeezing, tightness, or heaviness about the chest; no palpitations.    RESPIRATORY:  Per HPI    GASTROINTESTINAL:  No abdominal pain, nausea, vomiting or diarrhea.    GENITOURINARY:  No dysuria, frequency or urgency.    NEUROLOGIC:  No paresthesias, fasciculations, seizures or weakness.    PSYCHIATRIC:  No disorder of thought or mood.    Vital Signs Last 24 Hrs  T(C): 36.4 (04 Apr 2025 05:00), Max: 37.3 (03 Apr 2025 18:54)  T(F): 97.6 (04 Apr 2025 05:00), Max: 99.2 (03 Apr 2025 18:54)  HR: 71 (04 Apr 2025 05:00) (70 - 86)  BP: 103/49 (04 Apr 2025 05:00) (93/55 - 127/70)  BP(mean): 63 (04 Apr 2025 05:00) (63 - 66)  RR: 18 (04 Apr 2025 05:00) (16 - 18)  SpO2: 95% (04 Apr 2025 05:00) (95% - 99%)    Parameters below as of 04 Apr 2025 05:00  Patient On (Oxygen Delivery Method): room air      I&O's Detail    03 Apr 2025 07:01  -  04 Apr 2025 07:00  --------------------------------------------------------  IN:  Total IN: 0 mL    OUT:    Voided (mL): 400 mL  Total OUT: 400 mL    Total NET: -400 mL          PHYSICAL EXAMINATION:    GENERAL: The patient is a well-developed, well-nourished _____in no apparent distress.     HEENT: Head is normocephalic and atraumatic. Extraocular muscles are intact. Mucous membranes are moist.     NECK: Supple.     LUNGS: Clear to auscultation without wheezing, rales, or rhonchi. Respirations unlabored    HEART: Regular rate and rhythm without murmur.    ABDOMEN: Soft, nontender, and nondistended.  No hepatosplenomegaly is noted.    EXTREMITIES: Without any cyanosis, clubbing, rash, lesions or edema.    NEUROLOGIC: Grossly intact.      LABS:                        10.7   13.82 )-----------( 169      ( 04 Apr 2025 05:45 )             33.1     04-04    135  |  106  |  36[H]  ----------------------------<  226[H]  4.1   |  20[L]  |  2.01[H]    Ca    8.7      04 Apr 2025 05:45  Phos  2.2     04-04  Mg     2.0     04-04    TPro  5.7[L]  /  Alb  2.7[L]  /  TBili  0.4  /  DBili  x   /  AST  11  /  ALT  14  /  AlkPhos  70  04-04      Urinalysis Basic - ( 04 Apr 2025 05:45 )    Color: x / Appearance: x / SG: x / pH: x  Gluc: 226 mg/dL / Ketone: x  / Bili: x / Urobili: x   Blood: x / Protein: x / Nitrite: x   Leuk Esterase: x / RBC: x / WBC x   Sq Epi: x / Non Sq Epi: x / Bacteria: x                Lactate, Blood: 1.2 mmol/L (04-03-25 @ 21:50)        MICROBIOLOGY:    RADIOLOGY & ADDITIONAL STUDIES:  < from: CT Chest No Cont (04.04.25 @ 00:58) >  IMPRESSION:  Tubular opacity in the right lower lobe and bilateral tree-in-bud   opacities, most consistent with mucous impacted airways.    Stable size of right retroperitoneal mass which now demonstrates a large   soft tissue component and peripheral calcification. Findings are favored   to reflect postoperative change.    Bilateral bowel containing inguinal hernia, as described.        < end of copied text >    CXR:    Ct scan chest;    ekg;    echo:

## 2025-04-04 NOTE — H&P ADULT - PROBLEM SELECTOR PLAN 1
p/w WBC 15k, rigors, urinary incontinence, cough  no noted fever, did not meet SIRs criteria  CT chest: mucus plugging (prelim read)  UA negative  possible PNA    -Start azithromycin 500mg qd + rocephin 1g qd  -started mucinex, duoneb, NS nebs for airway clearance  -f/u strep ag, legionella, RVP, procalcitonin, mycoplasma igm  -tylenol prn  -f/u CT official read, CXR official read  -f/u

## 2025-04-04 NOTE — H&P ADULT - PROBLEM SELECTOR PLAN 4
CTAP: Right inguinal hernia containing fat, small bowel, right colon and fluid. Left internal hernia containing fat, small bowel and fluid.   non tender  ?possible cause of elevated lipase  f/u offical read CTAP: Right inguinal hernia containing fat, small bowel, right colon and fluid. Left internal hernia containing fat, small bowel and fluid.   non tender  ?possible cause of elevated lipase  f/u official read

## 2025-04-04 NOTE — ED ADULT NURSE NOTE - NS ED NURSE TRANSPORT WITH
Cardiac Monitor/Defib/ACLS/Rescue Kit/O2/BVM Hydroxychloroquine Pregnancy And Lactation Text: This medication has been shown to cause fetal harm but it isn't assigned a Pregnancy Risk Category. There are small amounts excreted in breast milk.

## 2025-04-04 NOTE — PROGRESS NOTE ADULT - SUBJECTIVE AND OBJECTIVE BOX
Progress Note discussed with attending    MS TEAMS AUTHOR OF THIS NOTE TILL 5:00 PM  PLEASE CONTACT ON CALL TEAM:  - On Call Team (Please refer to Jasper) FROM 5:00 PM - 8:30PM  - Nightfloat Team FROM 8:30 -7:30 AM    CHIEF COMPLAINT & BRIEF HOSPITAL COURSE:    INTERVAL HPI/OVERNIGHT EVENTS:   MEDICATIONS  (STANDING):  albuterol/ipratropium for Nebulization 3 milliLiter(s) Nebulizer every 12 hours  apixaban 2.5 milliGRAM(s) Oral every 12 hours  aspirin  chewable 81 milliGRAM(s) Oral daily  atorvastatin 40 milliGRAM(s) Oral at bedtime  azithromycin  IVPB 500 milliGRAM(s) IV Intermittent every 24 hours  cefTRIAXone   IVPB 1000 milliGRAM(s) IV Intermittent every 24 hours  dextrose 5%. 1000 milliLiter(s) (100 mL/Hr) IV Continuous <Continuous>  dextrose 5%. 1000 milliLiter(s) (50 mL/Hr) IV Continuous <Continuous>  dextrose 50% Injectable 25 Gram(s) IV Push once  dextrose 50% Injectable 12.5 Gram(s) IV Push once  dextrose 50% Injectable 25 Gram(s) IV Push once  digoxin     Tablet 125 MICROGram(s) Oral every other day  escitalopram 5 milliGRAM(s) Oral daily  glucagon  Injectable 1 milliGRAM(s) IntraMuscular once  guaiFENesin  milliGRAM(s) Oral every 12 hours  insulin lispro (ADMELOG) corrective regimen sliding scale   SubCutaneous three times a day before meals  insulin lispro (ADMELOG) corrective regimen sliding scale   SubCutaneous at bedtime  lactated ringers. 1000 milliLiter(s) (70 mL/Hr) IV Continuous <Continuous>  levothyroxine 75 MICROGram(s) Oral daily  sodium chloride 0.9% for Nebulization 3 milliLiter(s) Nebulizer every 12 hours  tamsulosin 0.4 milliGRAM(s) Oral at bedtime    MEDICATIONS  (PRN):  acetaminophen     Tablet .. 650 milliGRAM(s) Oral every 6 hours PRN Temp greater or equal to 38C (100.4F), Mild Pain (1 - 3)  dextrose Oral Gel 15 Gram(s) Oral once PRN Blood Glucose LESS THAN 70 milliGRAM(s)/deciliter      REVIEW OF SYSTEMS:  CONSTITUTIONAL: No fever, weight loss, or fatigue  RESPIRATORY: No shortness of breath  CARDIOVASCULAR: No chest pain  GASTROINTESTINAL: No abdominal pain.  GENITOURINARY: No dysuria  NEUROLOGICAL: No headaches  SKIN: No itching, burning, rashes    Vital Signs Last 24 Hrs  T(C): 36.4 (04 Apr 2025 05:00), Max: 37.3 (03 Apr 2025 18:54)  T(F): 97.6 (04 Apr 2025 05:00), Max: 99.2 (03 Apr 2025 18:54)  HR: 71 (04 Apr 2025 05:00) (70 - 86)  BP: 103/49 (04 Apr 2025 05:00) (93/55 - 127/70)  BP(mean): 63 (04 Apr 2025 05:00) (63 - 66)  RR: 18 (04 Apr 2025 05:00) (16 - 18)  SpO2: 95% (04 Apr 2025 05:00) (95% - 99%)    Parameters below as of 04 Apr 2025 05:00  Patient On (Oxygen Delivery Method): room air        PHYSICAL EXAMINATION:  GENERAL: NAD, well built  HEAD:  Atraumatic, Normocephalic  EYES:  conjunctiva and sclera clear  CHEST/LUNG: Clear to auscultation. No rales, rhonchi, wheezing, or rubs  HEART: Regular rate and rhythm; No murmurs, rubs, or gallops  ABDOMEN: Soft, Nontender, Nondistended; Bowel sounds present  NERVOUS SYSTEM:  Alert & Oriented X3,    EXTREMITIES:  2+ Peripheral Pulses, No clubbing, cyanosis, or edema  SKIN: warm dry                          10.7   13.82 )-----------( 169      ( 04 Apr 2025 05:45 )             33.1     04-04    135  |  106  |  36[H]  ----------------------------<  226[H]  4.1   |  20[L]  |  2.01[H]    Ca    8.7      04 Apr 2025 05:45  Phos  2.2     04-04  Mg     2.0     04-04    TPro  5.7[L]  /  Alb  2.7[L]  /  TBili  0.4  /  DBili  x   /  AST  11  /  ALT  14  /  AlkPhos  70  04-04    LIVER FUNCTIONS - ( 04 Apr 2025 05:45 )  Alb: 2.7 g/dL / Pro: 5.7 g/dL / ALK PHOS: 70 U/L / ALT: 14 U/L DA / AST: 11 U/L / GGT: x                   CAPILLARY BLOOD GLUCOSE      RADIOLOGY & ADDITIONAL TESTS:                   Progress Note discussed with attending    MS TEAMS AUTHOR OF THIS NOTE TILL 5:00 PM  PLEASE CONTACT ON CALL TEAM:  - On Call Team (Please refer to Jasper) FROM 5:00 PM - 8:30PM  - Nightfloat Team FROM 8:30 -7:30 AM        INTERVAL HPI/OVERNIGHT EVENTS: No acute events overnight.  Patient examined at bedside this AM.  Patient minimally responsive only to substernal rub, unable to obtain any information.     Spoke to edward Smith at bedside later on during the day, patient appeared more alert, according to jorge alberto, patient is fluent in English, however, for about a year, and due to his advance dementia, he started to speak only his mother tongue which is Ivorian. w/ assistance of granddaughter, patient denied any complains except for urinary incontinence which has been ongoing for quite sometime. Patient has a 24hr aid/assistance.          MEDICATIONS  (STANDING):  albuterol/ipratropium for Nebulization 3 milliLiter(s) Nebulizer every 12 hours  apixaban 2.5 milliGRAM(s) Oral every 12 hours  aspirin  chewable 81 milliGRAM(s) Oral daily  atorvastatin 40 milliGRAM(s) Oral at bedtime  azithromycin  IVPB 500 milliGRAM(s) IV Intermittent every 24 hours  cefTRIAXone   IVPB 1000 milliGRAM(s) IV Intermittent every 24 hours  dextrose 5%. 1000 milliLiter(s) (100 mL/Hr) IV Continuous <Continuous>  dextrose 5%. 1000 milliLiter(s) (50 mL/Hr) IV Continuous <Continuous>  dextrose 50% Injectable 25 Gram(s) IV Push once  dextrose 50% Injectable 12.5 Gram(s) IV Push once  dextrose 50% Injectable 25 Gram(s) IV Push once  digoxin     Tablet 125 MICROGram(s) Oral every other day  escitalopram 5 milliGRAM(s) Oral daily  glucagon  Injectable 1 milliGRAM(s) IntraMuscular once  guaiFENesin  milliGRAM(s) Oral every 12 hours  insulin lispro (ADMELOG) corrective regimen sliding scale   SubCutaneous three times a day before meals  insulin lispro (ADMELOG) corrective regimen sliding scale   SubCutaneous at bedtime  lactated ringers. 1000 milliLiter(s) (70 mL/Hr) IV Continuous <Continuous>  levothyroxine 75 MICROGram(s) Oral daily  sodium chloride 0.9% for Nebulization 3 milliLiter(s) Nebulizer every 12 hours  tamsulosin 0.4 milliGRAM(s) Oral at bedtime    MEDICATIONS  (PRN):  acetaminophen     Tablet .. 650 milliGRAM(s) Oral every 6 hours PRN Temp greater or equal to 38C (100.4F), Mild Pain (1 - 3)  dextrose Oral Gel 15 Gram(s) Oral once PRN Blood Glucose LESS THAN 70 milliGRAM(s)/deciliter      REVIEW OF SYSTEMS:  CONSTITUTIONAL: No fever, weight loss, or fatigue  RESPIRATORY: No shortness of breath  CARDIOVASCULAR: No chest pain  GASTROINTESTINAL: No abdominal pain.  GENITOURINARY: No dysuria  NEUROLOGICAL: No headaches  SKIN: No itching, burning, rashes    Vital Signs Last 24 Hrs  T(C): 36.4 (04 Apr 2025 05:00), Max: 37.3 (03 Apr 2025 18:54)  T(F): 97.6 (04 Apr 2025 05:00), Max: 99.2 (03 Apr 2025 18:54)  HR: 71 (04 Apr 2025 05:00) (70 - 86)  BP: 103/49 (04 Apr 2025 05:00) (93/55 - 127/70)  BP(mean): 63 (04 Apr 2025 05:00) (63 - 66)  RR: 18 (04 Apr 2025 05:00) (16 - 18)  SpO2: 95% (04 Apr 2025 05:00) (95% - 99%)    Parameters below as of 04 Apr 2025 05:00  Patient On (Oxygen Delivery Method): room air        PHYSICAL EXAMINATION:  GENERAL: NAD, well built  HEAD:  Atraumatic, Normocephalic  EYES:  conjunctiva and sclera clear  CHEST/LUNG: Clear to auscultation. No rales, rhonchi, wheezing, or rubs  HEART: Regular rate and rhythm; No murmurs, rubs, or gallops  ABDOMEN: Soft, Nontender, Nondistended; Bowel sounds present  NERVOUS SYSTEM:  Alert & Oriented X3,    EXTREMITIES:  2+ Peripheral Pulses, No clubbing, cyanosis, or edema  SKIN: warm dry                          10.7   13.82 )-----------( 169      ( 04 Apr 2025 05:45 )             33.1     04-04    135  |  106  |  36[H]  ----------------------------<  226[H]  4.1   |  20[L]  |  2.01[H]    Ca    8.7      04 Apr 2025 05:45  Phos  2.2     04-04  Mg     2.0     04-04    TPro  5.7[L]  /  Alb  2.7[L]  /  TBili  0.4  /  DBili  x   /  AST  11  /  ALT  14  /  AlkPhos  70  04-04    LIVER FUNCTIONS - ( 04 Apr 2025 05:45 )  Alb: 2.7 g/dL / Pro: 5.7 g/dL / ALK PHOS: 70 U/L / ALT: 14 U/L DA / AST: 11 U/L / GGT: x                   CAPILLARY BLOOD GLUCOSE      RADIOLOGY & ADDITIONAL TESTS:

## 2025-04-04 NOTE — SWALLOW BEDSIDE ASSESSMENT ADULT - COMMENTS
Family present at bedside & translated exam in [place] citizen. AA+Ox2; HOB elevated to 90°. Baseline congestion & weak wet cough; oral suction given, independently expectorated sputum/phlegm.

## 2025-04-05 PROBLEM — E03.9 HYPOTHYROIDISM, UNSPECIFIED: Chronic | Status: ACTIVE | Noted: 2022-11-02

## 2025-04-05 PROBLEM — E78.5 HYPERLIPIDEMIA, UNSPECIFIED: Chronic | Status: ACTIVE | Noted: 2022-11-02

## 2025-04-05 LAB
ANION GAP SERPL CALC-SCNC: 5 MMOL/L — SIGNIFICANT CHANGE UP (ref 5–17)
BASOPHILS # BLD AUTO: 0.06 K/UL — SIGNIFICANT CHANGE UP (ref 0–0.2)
BASOPHILS NFR BLD AUTO: 0.6 % — SIGNIFICANT CHANGE UP (ref 0–2)
BLD GP AB SCN SERPL QL: SIGNIFICANT CHANGE UP
BUN SERPL-MCNC: 26 MG/DL — HIGH (ref 7–18)
CALCIUM SERPL-MCNC: 8.7 MG/DL — SIGNIFICANT CHANGE UP (ref 8.4–10.5)
CHLORIDE SERPL-SCNC: 108 MMOL/L — SIGNIFICANT CHANGE UP (ref 96–108)
CO2 SERPL-SCNC: 24 MMOL/L — SIGNIFICANT CHANGE UP (ref 22–31)
CREAT SERPL-MCNC: 1.6 MG/DL — HIGH (ref 0.5–1.3)
CULTURE RESULTS: NO GROWTH — SIGNIFICANT CHANGE UP
CULTURE RESULTS: NO GROWTH — SIGNIFICANT CHANGE UP
EGFR: 39 ML/MIN/1.73M2 — LOW
EGFR: 39 ML/MIN/1.73M2 — LOW
EOSINOPHIL # BLD AUTO: 0.17 K/UL — SIGNIFICANT CHANGE UP (ref 0–0.5)
EOSINOPHIL NFR BLD AUTO: 1.7 % — SIGNIFICANT CHANGE UP (ref 0–6)
GLUCOSE BLDC GLUCOMTR-MCNC: 124 MG/DL — HIGH (ref 70–99)
GLUCOSE BLDC GLUCOMTR-MCNC: 152 MG/DL — HIGH (ref 70–99)
GLUCOSE BLDC GLUCOMTR-MCNC: 187 MG/DL — HIGH (ref 70–99)
GLUCOSE BLDC GLUCOMTR-MCNC: 197 MG/DL — HIGH (ref 70–99)
GLUCOSE SERPL-MCNC: 133 MG/DL — HIGH (ref 70–99)
HCT VFR BLD CALC: 34.4 % — LOW (ref 39–50)
HCT VFR BLD CALC: 37.3 % — LOW (ref 39–50)
HGB BLD-MCNC: 11.1 G/DL — LOW (ref 13–17)
HGB BLD-MCNC: 12.2 G/DL — LOW (ref 13–17)
IMM GRANULOCYTES NFR BLD AUTO: 0.4 % — SIGNIFICANT CHANGE UP (ref 0–0.9)
LYMPHOCYTES # BLD AUTO: 1.46 K/UL — SIGNIFICANT CHANGE UP (ref 1–3.3)
LYMPHOCYTES # BLD AUTO: 14.7 % — SIGNIFICANT CHANGE UP (ref 13–44)
M PNEUMO IGM SER-ACNC: 0.11 INDEX — SIGNIFICANT CHANGE UP (ref 0–0.9)
MAGNESIUM SERPL-MCNC: 2.1 MG/DL — SIGNIFICANT CHANGE UP (ref 1.6–2.6)
MCHC RBC-ENTMCNC: 31.9 PG — SIGNIFICANT CHANGE UP (ref 27–34)
MCHC RBC-ENTMCNC: 31.9 PG — SIGNIFICANT CHANGE UP (ref 27–34)
MCHC RBC-ENTMCNC: 32.3 G/DL — SIGNIFICANT CHANGE UP (ref 32–36)
MCHC RBC-ENTMCNC: 32.7 G/DL — SIGNIFICANT CHANGE UP (ref 32–36)
MCV RBC AUTO: 97.4 FL — SIGNIFICANT CHANGE UP (ref 80–100)
MCV RBC AUTO: 98.9 FL — SIGNIFICANT CHANGE UP (ref 80–100)
MONOCYTES # BLD AUTO: 0.59 K/UL — SIGNIFICANT CHANGE UP (ref 0–0.9)
MONOCYTES NFR BLD AUTO: 5.9 % — SIGNIFICANT CHANGE UP (ref 2–14)
MYCOPLASMA AG SPEC QL: NEGATIVE — SIGNIFICANT CHANGE UP
NEUTROPHILS # BLD AUTO: 7.63 K/UL — HIGH (ref 1.8–7.4)
NEUTROPHILS NFR BLD AUTO: 76.7 % — SIGNIFICANT CHANGE UP (ref 43–77)
NRBC BLD AUTO-RTO: 0 /100 WBCS — SIGNIFICANT CHANGE UP (ref 0–0)
NRBC BLD AUTO-RTO: 0 /100 WBCS — SIGNIFICANT CHANGE UP (ref 0–0)
PHOSPHATE SERPL-MCNC: 2.7 MG/DL — SIGNIFICANT CHANGE UP (ref 2.5–4.5)
PLATELET # BLD AUTO: 174 K/UL — SIGNIFICANT CHANGE UP (ref 150–400)
PLATELET # BLD AUTO: 188 K/UL — SIGNIFICANT CHANGE UP (ref 150–400)
POTASSIUM SERPL-MCNC: 4.5 MMOL/L — SIGNIFICANT CHANGE UP (ref 3.5–5.3)
POTASSIUM SERPL-SCNC: 4.5 MMOL/L — SIGNIFICANT CHANGE UP (ref 3.5–5.3)
RBC # BLD: 3.48 M/UL — LOW (ref 4.2–5.8)
RBC # BLD: 3.83 M/UL — LOW (ref 4.2–5.8)
RBC # FLD: 13.7 % — SIGNIFICANT CHANGE UP (ref 10.3–14.5)
RBC # FLD: 14 % — SIGNIFICANT CHANGE UP (ref 10.3–14.5)
SODIUM SERPL-SCNC: 137 MMOL/L — SIGNIFICANT CHANGE UP (ref 135–145)
SPECIMEN SOURCE: SIGNIFICANT CHANGE UP
SPECIMEN SOURCE: SIGNIFICANT CHANGE UP
UUN UR-MCNC: 452 MG/DL — SIGNIFICANT CHANGE UP
WBC # BLD: 11.88 K/UL — HIGH (ref 3.8–10.5)
WBC # BLD: 9.95 K/UL — SIGNIFICANT CHANGE UP (ref 3.8–10.5)
WBC # FLD AUTO: 11.88 K/UL — HIGH (ref 3.8–10.5)
WBC # FLD AUTO: 9.95 K/UL — SIGNIFICANT CHANGE UP (ref 3.8–10.5)

## 2025-04-05 RX ORDER — SODIUM CHLORIDE 9 G/1000ML
1000 INJECTION, SOLUTION INTRAVENOUS
Refills: 0 | Status: DISCONTINUED | OUTPATIENT
Start: 2025-04-05 | End: 2025-04-06

## 2025-04-05 RX ADMIN — Medication 3 MILLILITER(S): at 20:09

## 2025-04-05 RX ADMIN — Medication 250 MILLIGRAM(S): at 22:11

## 2025-04-05 RX ADMIN — INSULIN LISPRO 1: 100 INJECTION, SOLUTION INTRAVENOUS; SUBCUTANEOUS at 17:47

## 2025-04-05 RX ADMIN — SODIUM CHLORIDE 75 MILLILITER(S): 9 INJECTION, SOLUTION INTRAVENOUS at 06:13

## 2025-04-05 RX ADMIN — APIXABAN 2.5 MILLIGRAM(S): 2.5 TABLET, FILM COATED ORAL at 06:13

## 2025-04-05 RX ADMIN — SODIUM CHLORIDE 75 MILLILITER(S): 9 INJECTION, SOLUTION INTRAVENOUS at 10:30

## 2025-04-05 RX ADMIN — TAMSULOSIN HYDROCHLORIDE 0.4 MILLIGRAM(S): 0.4 CAPSULE ORAL at 21:25

## 2025-04-05 RX ADMIN — CEFTRIAXONE 100 MILLIGRAM(S): 500 INJECTION, POWDER, FOR SOLUTION INTRAMUSCULAR; INTRAVENOUS at 21:24

## 2025-04-05 RX ADMIN — SODIUM CHLORIDE 75 MILLILITER(S): 9 INJECTION, SOLUTION INTRAVENOUS at 21:24

## 2025-04-05 RX ADMIN — DEXTROMETHORPHAN HBR, GUAIFENESIN 600 MILLIGRAM(S): 200 LIQUID ORAL at 17:48

## 2025-04-05 RX ADMIN — Medication 75 MICROGRAM(S): at 06:12

## 2025-04-05 RX ADMIN — IPRATROPIUM BROMIDE AND ALBUTEROL SULFATE 3 MILLILITER(S): .5; 2.5 SOLUTION RESPIRATORY (INHALATION) at 08:08

## 2025-04-05 RX ADMIN — DEXTROMETHORPHAN HBR, GUAIFENESIN 600 MILLIGRAM(S): 200 LIQUID ORAL at 06:12

## 2025-04-05 RX ADMIN — IPRATROPIUM BROMIDE AND ALBUTEROL SULFATE 3 MILLILITER(S): .5; 2.5 SOLUTION RESPIRATORY (INHALATION) at 20:08

## 2025-04-05 RX ADMIN — ATORVASTATIN CALCIUM 40 MILLIGRAM(S): 80 TABLET, FILM COATED ORAL at 21:24

## 2025-04-05 RX ADMIN — ESCITALOPRAM OXALATE 5 MILLIGRAM(S): 20 TABLET ORAL at 12:21

## 2025-04-05 RX ADMIN — Medication 81 MILLIGRAM(S): at 12:21

## 2025-04-05 RX ADMIN — Medication 3 MILLILITER(S): at 08:09

## 2025-04-05 RX ADMIN — Medication 1 APPLICATION(S): at 12:21

## 2025-04-05 RX ADMIN — INSULIN LISPRO 1: 100 INJECTION, SOLUTION INTRAVENOUS; SUBCUTANEOUS at 12:20

## 2025-04-05 NOTE — PROGRESS NOTE ADULT - SUBJECTIVE AND OBJECTIVE BOX
INTERVAL HPI/OVERNIGHT EVENTS:  Patient seen,events noticed,doing better,more awake  VITAL SIGNS:  T(F): 98.1 (04-05-25 @ 05:35)  HR: 72 (04-05-25 @ 09:20)  BP: 119/84 (04-05-25 @ 09:20)  RR: 19 (04-05-25 @ 05:35)  SpO2: 99% (04-05-25 @ 09:20)  Wt(kg): --    PHYSICAL EXAM:  awake  Constitutional:  Eyes:  ENMT:perrla  Neck:  Respiratory:few rales  Cardiovascular:s1s2,m-none  Gastrointestinal:soft,bs pos  Extremities:  Vascular:  Neurological:no focal deficit  Musculoskeletal:    MEDICATIONS  (STANDING):  albuterol/ipratropium for Nebulization 3 milliLiter(s) Nebulizer every 12 hours  apixaban 2.5 milliGRAM(s) Oral every 12 hours  aspirin  chewable 81 milliGRAM(s) Oral daily  atorvastatin 40 milliGRAM(s) Oral at bedtime  azithromycin  IVPB 500 milliGRAM(s) IV Intermittent every 24 hours  cefTRIAXone   IVPB 1000 milliGRAM(s) IV Intermittent every 24 hours  chlorhexidine 2% Cloths 1 Application(s) Topical <User Schedule>  dextrose 5%. 1000 milliLiter(s) (100 mL/Hr) IV Continuous <Continuous>  dextrose 5%. 1000 milliLiter(s) (50 mL/Hr) IV Continuous <Continuous>  dextrose 50% Injectable 25 Gram(s) IV Push once  dextrose 50% Injectable 12.5 Gram(s) IV Push once  dextrose 50% Injectable 25 Gram(s) IV Push once  digoxin     Tablet 125 MICROGram(s) Oral every other day  escitalopram 5 milliGRAM(s) Oral daily  glucagon  Injectable 1 milliGRAM(s) IntraMuscular once  guaiFENesin  milliGRAM(s) Oral every 12 hours  insulin lispro (ADMELOG) corrective regimen sliding scale   SubCutaneous three times a day before meals  insulin lispro (ADMELOG) corrective regimen sliding scale   SubCutaneous at bedtime  lactated ringers. 1000 milliLiter(s) (75 mL/Hr) IV Continuous <Continuous>  levothyroxine 75 MICROGram(s) Oral daily  sodium chloride 0.9% for Nebulization 3 milliLiter(s) Nebulizer every 12 hours  tamsulosin 0.4 milliGRAM(s) Oral at bedtime    MEDICATIONS  (PRN):  acetaminophen     Tablet .. 650 milliGRAM(s) Oral every 6 hours PRN Temp greater or equal to 38C (100.4F), Mild Pain (1 - 3)  dextrose Oral Gel 15 Gram(s) Oral once PRN Blood Glucose LESS THAN 70 milliGRAM(s)/deciliter      Allergies    No Known Allergies    Intolerances        LABS:                        11.1   9.95  )-----------( 174      ( 05 Apr 2025 07:50 )             34.4     04-05    137  |  108  |  26[H]  ----------------------------<  133[H]  4.5   |  24  |  1.60[H]    Ca    8.7      05 Apr 2025 07:50  Phos  2.7     04-05  Mg     2.1     04-05    TPro  5.7[L]  /  Alb  2.7[L]  /  TBili  0.4  /  DBili  x   /  AST  11  /  ALT  14  /  AlkPhos  70  04-04      Urinalysis Basic - ( 05 Apr 2025 07:50 )    Color: x / Appearance: x / SG: x / pH: x  Gluc: 133 mg/dL / Ketone: x  / Bili: x / Urobili: x   Blood: x / Protein: x / Nitrite: x   Leuk Esterase: x / RBC: x / WBC x   Sq Epi: x / Non Sq Epi: x / Bacteria: x        RADIOLOGY & ADDITIONAL TESTS:        Assessment and Plan:   · Assessment	  89y/M, from home, PMH of HTN, DM, Afib on eliquis, Hypothyroidism, Depression, BPH, HLD was brought in by family friend, who is taking care of him, for rigor and urinary incontinence. labs showing elevated white count. Admitted for further evaluation.            Problem/Plan - 1:  ·  Problem: Leucocytosis/PNA-improved clinically   CT chest: mucus plugging (prelim read)  UA negative  possible PNA  -Start azithromycin 500mg qd + rocephin 1g qd  -started mucinex, duoneb, NS nebs for airway clearance  -tylenol prn  -f/u.     Problem/Plan - 2:  ·  Problem: Pneumonia.   ·  Plan: as above.     Problem/Plan - 3:  ·  Problem: Acute kidney injury superimposed on CKD.   ·  Plan: Scr 1.9 (baseline 1.5 in 2022)  pre renal vs post renal  s/p 1L IVF  -c/w IVF  -monitor Scr       Problem/Plan - 4:  ·  Problem: Bilateral inguinal hernia.   ·  Plan: CTAP: Right inguinal hernia containing fat, small bowel, right colon and fluid. Left internal hernia containing fat, small bowel and fluid.   non tender, no signs of incarcerated   ?possible cause of elevated lipase  No pancreatic pathology   no abdominal signs/symptoms.     Problem/Plan - 5:  ·  Problem: Chronic atrial fibrillation.   ·  Plan: as per surescipt, on coreg, digoxin and eliquis  c/w meds with holding parameters  Digoxin levels normal     - follow up digoxin levels every two days.     Problem/Plan - 6:  ·  Problem: DM (diabetes mellitus).   ·  Plan: on sitagliptin at home  started ISS  needs medrec  A1C 9.4%   Endo consulted Dr. Palencia     - f/u endo reccs.     Problem/Plan - 7:  ·  Problem: HLD (hyperlipidemia).   ·  Plan: has h/o HLD on Atorvastatin at home  -c/w home meds  -f/u lipid profile.     Problem/Plan - 8:  ·  Problem: BPH (benign prostatic hyperplasia).   ·  Plan: on tamsulosin  c/w home med.     Problem/Plan - 9:  ·  Problem: Hypothyroidism.   ·  Plan: on levothyroxine at home  c/w home med.     Problem/Plan - 10:  ·  Problem: Prophylactic measure.   ·  Plan; DVT prop: eliquis.

## 2025-04-05 NOTE — PROGRESS NOTE ADULT - ASSESSMENT
89y/M, from home, PMH of HTN, DM, Afib on eliquis, Hypothyroidism, Depression, BPH, HLD was brought in by family friend, who is taking care of him, for rigor and urinary incontinence. labs showing elevated white count. Admitted for further evaluation.      no

## 2025-04-05 NOTE — DIETITIAN INITIAL EVALUATION ADULT - ADD RECOMMEND
Recommend to liberalize current diet order to Consistent Carbohydrate (Evening Snacks) with No Sugar Added Mighty Shake BID (200 kcal, 7 g pro each) as medically feasible.

## 2025-04-05 NOTE — PHYSICAL THERAPY INITIAL EVALUATION ADULT - DIAGNOSIS, PT EVAL
(ICF Model) Pt. present w/deficits in Body Structures/Function (Impairments), incl: Strength, Balance, Endurance, Cognitive leading to deficits in performing the below noted Activities: transfers and ambulation.

## 2025-04-05 NOTE — DIETITIAN INITIAL EVALUATION ADULT - NS FNS DIET ORDER
Minced and Moist; Consistent Carbohydrate (Evening Snacks); DASH/TLC (Sodium & Cholesterol Restricted); Mildly Thick Liquids

## 2025-04-05 NOTE — PROGRESS NOTE ADULT - PROBLEM SELECTOR PLAN 5
as per surescipt, on coreg, digoxin and eliquis  c/w meds with holding parameters  Digoxin levels normal     - follow up digoxin levels every two days

## 2025-04-05 NOTE — PROGRESS NOTE ADULT - PROBLEM SELECTOR PLAN 4
CTAP: Right inguinal hernia containing fat, small bowel, right colon and fluid. Left internal hernia containing fat, small bowel and fluid.   non tender, no signs of incarcerated   ?possible cause of elevated lipase  No pancreatic pathology   no abdominal signs/symptoms

## 2025-04-05 NOTE — PHYSICAL THERAPY INITIAL EVALUATION ADULT - GENERAL OBSERVATIONS, REHAB EVAL
pt seen supine in bed with IV line, and complaint of pain in the scrotum and feeling weak. pt is A&Ox1 and was cooperative during the evaluation

## 2025-04-05 NOTE — DIETITIAN INITIAL EVALUATION ADULT - BODY MASS INDEX
Spoke with mom, plastic zip tie stuck around arm. Has been on for 5 mins. No c/o pain, numbness or tingling at this point. Mom believes a pair of scissors could get between the tie and his arm, but she only has a kitchen knife and childrens scissors. Nurse advised can try bringing to office to see if we can get off, if unsuccessful may have to send to ED. Appt scheduled.  Future Appointments   Date Time Provider Department Center   9/27/2024 11:40 AM [facility] NURSE [id number]ERNAND[facility]        23.8

## 2025-04-05 NOTE — DIETITIAN INITIAL EVALUATION ADULT - ORAL INTAKE PTA/DIET HISTORY
Pt is from home, alert however disorientated and with confusion/ poor historian; sleeping at time of visit. H/o hypothyroidism, HLD, DM, HTN, hypothyroidism, GERD; admitted for pneumonia. Pt is with good intake in-house consuming % of meals as per observation of breakfast meal. No chewing/ swallowing issues or GI distress reported. NKFA. NFPE completed with results below. Finger sticks range from 124-178 with HA1c 9.4% indicating poor glycemic control.

## 2025-04-05 NOTE — PHYSICAL THERAPY INITIAL EVALUATION ADULT - LIVES WITH, PROFILE
per pt, live with an aide in apt w/ stairs to negotiate; owns RW (pt is poor historian)/other relative

## 2025-04-05 NOTE — DIETITIAN INITIAL EVALUATION ADULT - PROBLEM/PLAN-4
Attempted to contact pt about referral to Dr. Restrepo's office. Anibal from Dr. Restrepo's office contacted us because they had opening for pt but has not been able to get in touch with pt. Pt did not answer phone call from [facility], [facility]. Instructed pt to please call [facility] back to get scheduled.   DISPLAY PLAN FREE TEXT

## 2025-04-05 NOTE — PROGRESS NOTE ADULT - SUBJECTIVE AND OBJECTIVE BOX
Patient is a 89y old  Male who presents with a chief complaint of Pneumonia (04 Apr 2025 11:26)  Awake, alert, lying in bed in NAD. Doing well on RA    INTERVAL HPI/OVERNIGHT EVENTS:      VITAL SIGNS:  T(F): 98.1 (04-05-25 @ 05:35)  HR: 74 (04-05-25 @ 05:35)  BP: 131/82 (04-05-25 @ 05:35)  RR: 19 (04-05-25 @ 05:35)  SpO2: 99% (04-05-25 @ 05:35)  Wt(kg): --  I&O's Detail    04 Apr 2025 07:01  -  05 Apr 2025 07:00  --------------------------------------------------------  IN:  Total IN: 0 mL    OUT:    Indwelling Catheter - Urethral (mL): 350 mL    Voided (mL): 750 mL  Total OUT: 1100 mL    Total NET: -1100 mL              REVIEW OF SYSTEMS:    CONSTITUTIONAL:  No fevers, chills, sweats    HEENT:  Eyes:  No diplopia or blurred vision. ENT:  No earache, sore throat or runny nose.    CARDIOVASCULAR:  No pressure, squeezing, tightness, or heaviness about the chest; no palpitations.    RESPIRATORY:  Per HPI    GASTROINTESTINAL:  No abdominal pain, nausea, vomiting or diarrhea.    GENITOURINARY:  No dysuria, frequency or urgency.    NEUROLOGIC:  No paresthesias, fasciculations, seizures or weakness.    PSYCHIATRIC:  No disorder of thought or mood.      PHYSICAL EXAM:    Constitutional: Well developed and nourished  Eyes:Perrla  ENMT: normal  Neck:supple  Respiratory: good air entry  Cardiovascular: S1 S2 regular  Gastrointestinal: Soft, Non tender  Extremities: No edema  Vascular:normal  Neurological:Awake, alert,Ox3  Musculoskeletal:Normal      MEDICATIONS  (STANDING):  albuterol/ipratropium for Nebulization 3 milliLiter(s) Nebulizer every 12 hours  apixaban 2.5 milliGRAM(s) Oral every 12 hours  aspirin  chewable 81 milliGRAM(s) Oral daily  atorvastatin 40 milliGRAM(s) Oral at bedtime  azithromycin  IVPB 500 milliGRAM(s) IV Intermittent every 24 hours  cefTRIAXone   IVPB 1000 milliGRAM(s) IV Intermittent every 24 hours  chlorhexidine 2% Cloths 1 Application(s) Topical <User Schedule>  dextrose 5%. 1000 milliLiter(s) (100 mL/Hr) IV Continuous <Continuous>  dextrose 5%. 1000 milliLiter(s) (50 mL/Hr) IV Continuous <Continuous>  dextrose 50% Injectable 25 Gram(s) IV Push once  dextrose 50% Injectable 12.5 Gram(s) IV Push once  dextrose 50% Injectable 25 Gram(s) IV Push once  digoxin     Tablet 125 MICROGram(s) Oral every other day  escitalopram 5 milliGRAM(s) Oral daily  glucagon  Injectable 1 milliGRAM(s) IntraMuscular once  guaiFENesin  milliGRAM(s) Oral every 12 hours  insulin lispro (ADMELOG) corrective regimen sliding scale   SubCutaneous three times a day before meals  insulin lispro (ADMELOG) corrective regimen sliding scale   SubCutaneous at bedtime  lactated ringers. 1000 milliLiter(s) (75 mL/Hr) IV Continuous <Continuous>  levothyroxine 75 MICROGram(s) Oral daily  sodium chloride 0.9% for Nebulization 3 milliLiter(s) Nebulizer every 12 hours  tamsulosin 0.4 milliGRAM(s) Oral at bedtime    MEDICATIONS  (PRN):  acetaminophen     Tablet .. 650 milliGRAM(s) Oral every 6 hours PRN Temp greater or equal to 38C (100.4F), Mild Pain (1 - 3)  dextrose Oral Gel 15 Gram(s) Oral once PRN Blood Glucose LESS THAN 70 milliGRAM(s)/deciliter      Allergies    No Known Allergies    Intolerances        LABS:                        11.1   9.95  )-----------( 174      ( 05 Apr 2025 07:50 )             34.4     04-05    137  |  108  |  26[H]  ----------------------------<  133[H]  4.5   |  24  |  1.60[H]    Ca    8.7      05 Apr 2025 07:50  Phos  2.7     04-05  Mg     2.1     04-05    TPro  5.7[L]  /  Alb  2.7[L]  /  TBili  0.4  /  DBili  x   /  AST  11  /  ALT  14  /  AlkPhos  70  04-04      Urinalysis Basic - ( 05 Apr 2025 07:50 )    Color: x / Appearance: x / SG: x / pH: x  Gluc: 133 mg/dL / Ketone: x  / Bili: x / Urobili: x   Blood: x / Protein: x / Nitrite: x   Leuk Esterase: x / RBC: x / WBC x   Sq Epi: x / Non Sq Epi: x / Bacteria: x            CAPILLARY BLOOD GLUCOSE      POCT Blood Glucose.: 124 mg/dL (05 Apr 2025 08:15)  POCT Blood Glucose.: 162 mg/dL (04 Apr 2025 21:56)  POCT Blood Glucose.: 133 mg/dL (04 Apr 2025 16:48)  POCT Blood Glucose.: 156 mg/dL (04 Apr 2025 12:32)  POCT Blood Glucose.: 178 mg/dL (04 Apr 2025 11:01)        RADIOLOGY & ADDITIONAL TESTS:  < from: CT Chest No Cont (04.04.25 @ 00:58) >  IMPRESSION:  Tubular opacity in the right lower lobe and bilateral tree-in-bud   opacities, most consistent with mucous impacted airways.    Stable size of right retroperitoneal mass which now demonstrates a large   soft tissue component and peripheral calcification. Findings are favored   to reflect postoperative change.    Bilateral bowel containing inguinal hernia, as described.    < end of copied text >    CXR:    Ct scan chest:    ekg;    echo:

## 2025-04-05 NOTE — CHART NOTE - NSCHARTNOTEFT_GEN_A_CORE
Notified by RN that patient had red-colored urine draining from garza catheter. As per RN, urine this morning was katina colored. Chart reviewed. Patient examined at bedside with PCA Annita translating in Norwegian. Patient denies lightheadedness, dizziness, CP, or SOB. Denies pulling of Garza catheter. VS at bedside: afebrile, HR 74, /87, O2 98 on RA.     STAT CBC and T&S ordered. D/C aspirin and Eliquis, will place patient on SCDs for now. Will re-assess.

## 2025-04-05 NOTE — PROGRESS NOTE ADULT - PROBLEM SELECTOR PLAN 6
on sitagliptin at home  started ISS  needs medrec  A1C 9.4%   Endo consulted Dr. Palencia     - f/u endo reccs

## 2025-04-05 NOTE — PHYSICAL THERAPY INITIAL EVALUATION ADULT - PERTINENT HX OF CURRENT PROBLEM, REHAB EVAL
89 y/o male w/ PMHx of HTN, DM, Afib on eliquis, Hypothyroidism, Depression, BPH, and HLD. Pt was admitted due to generalized weakness.

## 2025-04-06 LAB
ANION GAP SERPL CALC-SCNC: 5 MMOL/L — SIGNIFICANT CHANGE UP (ref 5–17)
ANION GAP SERPL CALC-SCNC: 6 MMOL/L — SIGNIFICANT CHANGE UP (ref 5–17)
BASOPHILS # BLD AUTO: 0.03 K/UL — SIGNIFICANT CHANGE UP (ref 0–0.2)
BASOPHILS NFR BLD AUTO: 0.3 % — SIGNIFICANT CHANGE UP (ref 0–2)
BUN SERPL-MCNC: 28 MG/DL — HIGH (ref 7–18)
BUN SERPL-MCNC: 30 MG/DL — HIGH (ref 7–18)
CALCIUM SERPL-MCNC: 9 MG/DL — SIGNIFICANT CHANGE UP (ref 8.4–10.5)
CALCIUM SERPL-MCNC: 9.1 MG/DL — SIGNIFICANT CHANGE UP (ref 8.4–10.5)
CHLORIDE SERPL-SCNC: 105 MMOL/L — SIGNIFICANT CHANGE UP (ref 96–108)
CHLORIDE SERPL-SCNC: 105 MMOL/L — SIGNIFICANT CHANGE UP (ref 96–108)
CO2 SERPL-SCNC: 26 MMOL/L — SIGNIFICANT CHANGE UP (ref 22–31)
CO2 SERPL-SCNC: 27 MMOL/L — SIGNIFICANT CHANGE UP (ref 22–31)
CREAT SERPL-MCNC: 1.5 MG/DL — HIGH (ref 0.5–1.3)
CREAT SERPL-MCNC: 1.61 MG/DL — HIGH (ref 0.5–1.3)
DIGOXIN SERPL-MCNC: 1 NG/ML — SIGNIFICANT CHANGE UP (ref 0.8–2)
EGFR: 39 ML/MIN/1.73M2 — LOW
EGFR: 39 ML/MIN/1.73M2 — LOW
EGFR: 43 ML/MIN/1.73M2 — LOW
EGFR: 43 ML/MIN/1.73M2 — LOW
EOSINOPHIL # BLD AUTO: 0.12 K/UL — SIGNIFICANT CHANGE UP (ref 0–0.5)
EOSINOPHIL NFR BLD AUTO: 1.2 % — SIGNIFICANT CHANGE UP (ref 0–6)
FLUAV AG NPH QL: SIGNIFICANT CHANGE UP
FLUBV AG NPH QL: SIGNIFICANT CHANGE UP
GLUCOSE BLDC GLUCOMTR-MCNC: 130 MG/DL — HIGH (ref 70–99)
GLUCOSE BLDC GLUCOMTR-MCNC: 157 MG/DL — HIGH (ref 70–99)
GLUCOSE BLDC GLUCOMTR-MCNC: 187 MG/DL — HIGH (ref 70–99)
GLUCOSE BLDC GLUCOMTR-MCNC: 188 MG/DL — HIGH (ref 70–99)
GLUCOSE SERPL-MCNC: 185 MG/DL — HIGH (ref 70–99)
GLUCOSE SERPL-MCNC: 198 MG/DL — HIGH (ref 70–99)
HCT VFR BLD CALC: 33.1 % — LOW (ref 39–50)
HCT VFR BLD CALC: 33.7 % — LOW (ref 39–50)
HGB BLD-MCNC: 10.7 G/DL — LOW (ref 13–17)
HGB BLD-MCNC: 11 G/DL — LOW (ref 13–17)
IMM GRANULOCYTES NFR BLD AUTO: 0.4 % — SIGNIFICANT CHANGE UP (ref 0–0.9)
LYMPHOCYTES # BLD AUTO: 1.27 K/UL — SIGNIFICANT CHANGE UP (ref 1–3.3)
LYMPHOCYTES # BLD AUTO: 13.1 % — SIGNIFICANT CHANGE UP (ref 13–44)
MAGNESIUM SERPL-MCNC: 1.8 MG/DL — SIGNIFICANT CHANGE UP (ref 1.6–2.6)
MAGNESIUM SERPL-MCNC: 2 MG/DL — SIGNIFICANT CHANGE UP (ref 1.6–2.6)
MCHC RBC-ENTMCNC: 31.2 PG — SIGNIFICANT CHANGE UP (ref 27–34)
MCHC RBC-ENTMCNC: 31.6 PG — SIGNIFICANT CHANGE UP (ref 27–34)
MCHC RBC-ENTMCNC: 32.3 G/DL — SIGNIFICANT CHANGE UP (ref 32–36)
MCHC RBC-ENTMCNC: 32.6 G/DL — SIGNIFICANT CHANGE UP (ref 32–36)
MCV RBC AUTO: 96.5 FL — SIGNIFICANT CHANGE UP (ref 80–100)
MCV RBC AUTO: 96.8 FL — SIGNIFICANT CHANGE UP (ref 80–100)
MONOCYTES # BLD AUTO: 0.6 K/UL — SIGNIFICANT CHANGE UP (ref 0–0.9)
MONOCYTES NFR BLD AUTO: 6.2 % — SIGNIFICANT CHANGE UP (ref 2–14)
NEUTROPHILS # BLD AUTO: 7.61 K/UL — HIGH (ref 1.8–7.4)
NEUTROPHILS NFR BLD AUTO: 78.8 % — HIGH (ref 43–77)
NRBC BLD AUTO-RTO: 0 /100 WBCS — SIGNIFICANT CHANGE UP (ref 0–0)
NRBC BLD AUTO-RTO: 0 /100 WBCS — SIGNIFICANT CHANGE UP (ref 0–0)
PHOSPHATE SERPL-MCNC: 2.7 MG/DL — SIGNIFICANT CHANGE UP (ref 2.5–4.5)
PHOSPHATE SERPL-MCNC: 2.7 MG/DL — SIGNIFICANT CHANGE UP (ref 2.5–4.5)
PLATELET # BLD AUTO: 170 K/UL — SIGNIFICANT CHANGE UP (ref 150–400)
PLATELET # BLD AUTO: 180 K/UL — SIGNIFICANT CHANGE UP (ref 150–400)
POTASSIUM SERPL-MCNC: 3.8 MMOL/L — SIGNIFICANT CHANGE UP (ref 3.5–5.3)
POTASSIUM SERPL-MCNC: 4.1 MMOL/L — SIGNIFICANT CHANGE UP (ref 3.5–5.3)
POTASSIUM SERPL-SCNC: 3.8 MMOL/L — SIGNIFICANT CHANGE UP (ref 3.5–5.3)
POTASSIUM SERPL-SCNC: 4.1 MMOL/L — SIGNIFICANT CHANGE UP (ref 3.5–5.3)
RBC # BLD: 3.43 M/UL — LOW (ref 4.2–5.8)
RBC # BLD: 3.48 M/UL — LOW (ref 4.2–5.8)
RBC # FLD: 13.8 % — SIGNIFICANT CHANGE UP (ref 10.3–14.5)
RBC # FLD: 13.8 % — SIGNIFICANT CHANGE UP (ref 10.3–14.5)
RSV RNA NPH QL NAA+NON-PROBE: SIGNIFICANT CHANGE UP
SARS-COV-2 RNA SPEC QL NAA+PROBE: SIGNIFICANT CHANGE UP
SODIUM SERPL-SCNC: 137 MMOL/L — SIGNIFICANT CHANGE UP (ref 135–145)
SODIUM SERPL-SCNC: 137 MMOL/L — SIGNIFICANT CHANGE UP (ref 135–145)
SOURCE RESPIRATORY: SIGNIFICANT CHANGE UP
WBC # BLD: 8.39 K/UL — SIGNIFICANT CHANGE UP (ref 3.8–10.5)
WBC # BLD: 9.67 K/UL — SIGNIFICANT CHANGE UP (ref 3.8–10.5)
WBC # FLD AUTO: 8.39 K/UL — SIGNIFICANT CHANGE UP (ref 3.8–10.5)
WBC # FLD AUTO: 9.67 K/UL — SIGNIFICANT CHANGE UP (ref 3.8–10.5)

## 2025-04-06 RX ORDER — SODIUM CHLORIDE 9 G/1000ML
1000 INJECTION, SOLUTION INTRAVENOUS
Refills: 0 | Status: DISCONTINUED | OUTPATIENT
Start: 2025-04-06 | End: 2025-04-09

## 2025-04-06 RX ORDER — NYSTATIN 100000 [USP'U]/G
1 CREAM TOPICAL THREE TIMES A DAY
Refills: 0 | Status: DISCONTINUED | OUTPATIENT
Start: 2025-04-06 | End: 2025-04-09

## 2025-04-06 RX ADMIN — DEXTROMETHORPHAN HBR, GUAIFENESIN 600 MILLIGRAM(S): 200 LIQUID ORAL at 05:54

## 2025-04-06 RX ADMIN — Medication 650 MILLIGRAM(S): at 08:10

## 2025-04-06 RX ADMIN — IPRATROPIUM BROMIDE AND ALBUTEROL SULFATE 3 MILLILITER(S): .5; 2.5 SOLUTION RESPIRATORY (INHALATION) at 20:27

## 2025-04-06 RX ADMIN — TAMSULOSIN HYDROCHLORIDE 0.4 MILLIGRAM(S): 0.4 CAPSULE ORAL at 21:21

## 2025-04-06 RX ADMIN — ATORVASTATIN CALCIUM 40 MILLIGRAM(S): 80 TABLET, FILM COATED ORAL at 21:21

## 2025-04-06 RX ADMIN — Medication 3 MILLILITER(S): at 09:22

## 2025-04-06 RX ADMIN — Medication 75 MICROGRAM(S): at 05:55

## 2025-04-06 RX ADMIN — Medication 3 MILLILITER(S): at 20:28

## 2025-04-06 RX ADMIN — INSULIN LISPRO 1: 100 INJECTION, SOLUTION INTRAVENOUS; SUBCUTANEOUS at 12:07

## 2025-04-06 RX ADMIN — SODIUM CHLORIDE 75 MILLILITER(S): 9 INJECTION, SOLUTION INTRAVENOUS at 12:04

## 2025-04-06 RX ADMIN — SODIUM CHLORIDE 75 MILLILITER(S): 9 INJECTION, SOLUTION INTRAVENOUS at 21:22

## 2025-04-06 RX ADMIN — NYSTATIN 1 APPLICATION(S): 100000 CREAM TOPICAL at 21:20

## 2025-04-06 RX ADMIN — CEFTRIAXONE 100 MILLIGRAM(S): 500 INJECTION, POWDER, FOR SOLUTION INTRAMUSCULAR; INTRAVENOUS at 21:20

## 2025-04-06 RX ADMIN — DIGOXIN 125 MICROGRAM(S): 250 TABLET ORAL at 11:43

## 2025-04-06 RX ADMIN — Medication 250 MILLIGRAM(S): at 22:12

## 2025-04-06 RX ADMIN — ESCITALOPRAM OXALATE 5 MILLIGRAM(S): 20 TABLET ORAL at 11:42

## 2025-04-06 RX ADMIN — DEXTROMETHORPHAN HBR, GUAIFENESIN 600 MILLIGRAM(S): 200 LIQUID ORAL at 18:17

## 2025-04-06 RX ADMIN — INSULIN LISPRO 1: 100 INJECTION, SOLUTION INTRAVENOUS; SUBCUTANEOUS at 08:07

## 2025-04-06 RX ADMIN — IPRATROPIUM BROMIDE AND ALBUTEROL SULFATE 3 MILLILITER(S): .5; 2.5 SOLUTION RESPIRATORY (INHALATION) at 09:23

## 2025-04-06 RX ADMIN — Medication 1 APPLICATION(S): at 05:54

## 2025-04-06 NOTE — PROGRESS NOTE ADULT - ASSESSMENT
89y/M, from home, PMH of HTN, DM, Afib on eliquis, Hypothyroidism, Depression, BPH, HLD was brought in by family friend, who is taking care of him, for rigor and urinary incontinence. CT chest concerning for mucus plugging, likely in the setting of aspiration. Patient admitted for further management.

## 2025-04-06 NOTE — PROGRESS NOTE ADULT - SUBJECTIVE AND OBJECTIVE BOX
Patient is a 89y old  Male who presents with a chief complaint of Pneumonia (05 Apr 2025 13:08)  Awake, alert, lying in bed in NAD. doing well on RA    INTERVAL HPI/OVERNIGHT EVENTS:      VITAL SIGNS:  T(F): 98 (04-06-25 @ 05:17)  HR: 70 (04-06-25 @ 05:17)  BP: 144/74 (04-06-25 @ 05:17)  RR: 18 (04-06-25 @ 05:17)  SpO2: 97% (04-06-25 @ 05:17)  Wt(kg): --  I&O's Detail    05 Apr 2025 07:01  -  06 Apr 2025 07:00  --------------------------------------------------------  IN:  Total IN: 0 mL    OUT:    Indwelling Catheter - Urethral (mL): 2025 mL  Total OUT: 2025 mL    Total NET: -2025 mL              REVIEW OF SYSTEMS:    CONSTITUTIONAL:  No fevers, chills, sweats    HEENT:  Eyes:  No diplopia or blurred vision. ENT:  No earache, sore throat or runny nose.    CARDIOVASCULAR:  No pressure, squeezing, tightness, or heaviness about the chest; no palpitations.    RESPIRATORY:  Per HPI    GASTROINTESTINAL:  No abdominal pain, nausea, vomiting or diarrhea.    GENITOURINARY:  No dysuria, frequency or urgency.    NEUROLOGIC:  No paresthesias, fasciculations, seizures or weakness.    PSYCHIATRIC:  No disorder of thought or mood.      PHYSICAL EXAM:    Constitutional: Well developed and nourished  Eyes:Perrla  ENMT: normal  Neck:supple  Respiratory: good air entry  Cardiovascular: S1 S2 regular  Gastrointestinal: Soft, Non tender  Extremities: No edema  Vascular:normal  Neurological:Awake, alert,Ox3  Musculoskeletal:Normal      MEDICATIONS  (STANDING):  albuterol/ipratropium for Nebulization 3 milliLiter(s) Nebulizer every 12 hours  atorvastatin 40 milliGRAM(s) Oral at bedtime  azithromycin  IVPB 500 milliGRAM(s) IV Intermittent every 24 hours  cefTRIAXone   IVPB 1000 milliGRAM(s) IV Intermittent every 24 hours  chlorhexidine 2% Cloths 1 Application(s) Topical <User Schedule>  dextrose 5%. 1000 milliLiter(s) (100 mL/Hr) IV Continuous <Continuous>  dextrose 5%. 1000 milliLiter(s) (50 mL/Hr) IV Continuous <Continuous>  dextrose 50% Injectable 25 Gram(s) IV Push once  dextrose 50% Injectable 12.5 Gram(s) IV Push once  dextrose 50% Injectable 25 Gram(s) IV Push once  digoxin     Tablet 125 MICROGram(s) Oral every other day  escitalopram 5 milliGRAM(s) Oral daily  glucagon  Injectable 1 milliGRAM(s) IntraMuscular once  guaiFENesin  milliGRAM(s) Oral every 12 hours  insulin lispro (ADMELOG) corrective regimen sliding scale   SubCutaneous three times a day before meals  insulin lispro (ADMELOG) corrective regimen sliding scale   SubCutaneous at bedtime  lactated ringers. 1000 milliLiter(s) (75 mL/Hr) IV Continuous <Continuous>  levothyroxine 75 MICROGram(s) Oral daily  sodium chloride 0.9% for Nebulization 3 milliLiter(s) Nebulizer every 12 hours  tamsulosin 0.4 milliGRAM(s) Oral at bedtime    MEDICATIONS  (PRN):  acetaminophen     Tablet .. 650 milliGRAM(s) Oral every 6 hours PRN Temp greater or equal to 38C (100.4F), Mild Pain (1 - 3)  dextrose Oral Gel 15 Gram(s) Oral once PRN Blood Glucose LESS THAN 70 milliGRAM(s)/deciliter      Allergies    No Known Allergies    Intolerances        LABS:                        11.0   9.67  )-----------( 180      ( 06 Apr 2025 06:26 )             33.7     04-06    137  |  105  |  30[H]  ----------------------------<  185[H]  4.1   |  27  |  1.61[H]    Ca    9.1      06 Apr 2025 06:26  Phos  2.7     04-06  Mg     2.0     04-06        Urinalysis Basic - ( 06 Apr 2025 06:26 )    Color: x / Appearance: x / SG: x / pH: x  Gluc: 185 mg/dL / Ketone: x  / Bili: x / Urobili: x   Blood: x / Protein: x / Nitrite: x   Leuk Esterase: x / RBC: x / WBC x   Sq Epi: x / Non Sq Epi: x / Bacteria: x    Culture - Urine (04.04.25 @ 03:36)   Specimen Source: Catheterized Catheterized  Culture Results:   No growth    Culture - Urine (04.03.25 @ 23:16)   Specimen Source: Clean Catch Clean Catch (Midstream)  Culture Results:   No growth    Culture - Blood (04.03.25 @ 21:41)   Specimen Source: Blood Blood  Culture Results:   No growth at 48 HoursCulture - Blood (04.03.25 @ 21:31)   Specimen Source: Blood Blood  Culture Results:   No growth at 48 Hours        CAPILLARY BLOOD GLUCOSE      POCT Blood Glucose.: 157 mg/dL (06 Apr 2025 07:44)  POCT Blood Glucose.: 197 mg/dL (05 Apr 2025 21:53)  POCT Blood Glucose.: 187 mg/dL (05 Apr 2025 17:01)  POCT Blood Glucose.: 152 mg/dL (05 Apr 2025 11:52)        RADIOLOGY & ADDITIONAL TESTS:    CXR:    Ct scan chest:    ekg;    echo:

## 2025-04-06 NOTE — PROGRESS NOTE ADULT - SUBJECTIVE AND OBJECTIVE BOX
Progress Note discussed with attending    MS TEAMS AUTHOR OF THIS NOTE TILL 5:00 PM  PLEASE CONTACT ON CALL TEAM:  - On Call Team (Please refer to [place]) FROM 5:00 PM - 8:30PM  - Nightfloat Team FROM 8:30 -7:30 AM    CHIEF COMPLAINT & BRIEF HOSPITAL COURSE:    INTERVAL HPI/OVERNIGHT EVENTS:   MEDICATIONS  (STANDING):  albuterol/ipratropium for Nebulization 3 milliLiter(s) Nebulizer every 12 hours  atorvastatin 40 milliGRAM(s) Oral at bedtime  azithromycin  IVPB 500 milliGRAM(s) IV Intermittent every 24 hours  cefTRIAXone   IVPB 1000 milliGRAM(s) IV Intermittent every 24 hours  chlorhexidine 2% Cloths 1 Application(s) Topical <User Schedule>  dextrose 5%. 1000 milliLiter(s) (100 mL/Hr) IV Continuous <Continuous>  dextrose 5%. 1000 milliLiter(s) (50 mL/Hr) IV Continuous <Continuous>  dextrose 50% Injectable 25 Gram(s) IV Push once  dextrose 50% Injectable 12.5 Gram(s) IV Push once  dextrose 50% Injectable 25 Gram(s) IV Push once  digoxin     Tablet 125 MICROGram(s) Oral every other day  escitalopram 5 milliGRAM(s) Oral daily  glucagon  Injectable 1 milliGRAM(s) IntraMuscular once  guaiFENesin  milliGRAM(s) Oral every 12 hours  insulin lispro (ADMELOG) corrective regimen sliding scale   SubCutaneous three times a day before meals  insulin lispro (ADMELOG) corrective regimen sliding scale   SubCutaneous at bedtime  lactated ringers. 1000 milliLiter(s) (75 mL/Hr) IV Continuous <Continuous>  levothyroxine 75 MICROGram(s) Oral daily  sodium chloride 0.9% for Nebulization 3 milliLiter(s) Nebulizer every 12 hours  tamsulosin 0.4 milliGRAM(s) Oral at bedtime    MEDICATIONS  (PRN):  acetaminophen     Tablet .. 650 milliGRAM(s) Oral every 6 hours PRN Temp greater or equal to 38C (100.4F), Mild Pain (1 - 3)  dextrose Oral Gel 15 Gram(s) Oral once PRN Blood Glucose LESS THAN 70 milliGRAM(s)/deciliter      REVIEW OF SYSTEMS:  CONSTITUTIONAL: No fever, weight loss, or fatigue  RESPIRATORY: No shortness of breath  CARDIOVASCULAR: No chest pain  GASTROINTESTINAL: No abdominal pain.  GENITOURINARY: No dysuria  NEUROLOGICAL: No headaches  SKIN: No itching, burning, rashes    Vital Signs Last 24 Hrs  T(C): 36.7 (06 Apr 2025 05:17), Max: 36.8 (05 Apr 2025 20:55)  T(F): 98 (06 Apr 2025 05:17), Max: 98.2 (05 Apr 2025 20:55)  HR: 70 (06 Apr 2025 05:17) (69 - 74)  BP: 144/74 (06 Apr 2025 05:17) (137/69 - 155/72)  BP(mean): 97 (06 Apr 2025 05:17) (92 - 97)  RR: 18 (06 Apr 2025 05:17) (18 - 20)  SpO2: 97% (06 Apr 2025 05:17) (96% - 98%)    Parameters below as of 06 Apr 2025 05:17  Patient On (Oxygen Delivery Method): room air        PHYSICAL EXAMINATION:  GENERAL: NAD, well built  HEAD:  Atraumatic, Normocephalic  EYES:  conjunctiva and sclera clear  CHEST/LUNG: Clear to auscultation. No rales, rhonchi, wheezing, or rubs  HEART: Regular rate and rhythm; No murmurs, rubs, or gallops  ABDOMEN: Soft, Nontender, Nondistended; Bowel sounds present  NERVOUS SYSTEM:  Alert & Oriented X3,    EXTREMITIES:  2+ Peripheral Pulses, No clubbing, cyanosis, or edema  SKIN: warm dry                          11.0   9.67  )-----------( 180      ( 06 Apr 2025 06:26 )             33.7     04-06    137  |  105  |  30[H]  ----------------------------<  185[H]  4.1   |  27  |  1.61[H]    Ca    9.1      06 Apr 2025 06:26  Phos  2.7     04-06  Mg     2.0     04-06                CAPILLARY BLOOD GLUCOSE      RADIOLOGY & ADDITIONAL TESTS:                   Progress Note discussed with attending    MS TEAMS AUTHOR OF THIS NOTE TILL 5:00 PM  PLEASE CONTACT ON CALL TEAM:  - On Call Team (Please refer to [place]) FROM 5:00 PM - 8:30PM  - Nightfloat Team FROM 8:30 -7:30 AM      INTERVAL HPI/OVERNIGHT EVENTS: Patient has an episode of hematuria yesterday,   MEDICATIONS  (STANDING):  albuterol/ipratropium for Nebulization 3 milliLiter(s) Nebulizer every 12 hours  atorvastatin 40 milliGRAM(s) Oral at bedtime  azithromycin  IVPB 500 milliGRAM(s) IV Intermittent every 24 hours  cefTRIAXone   IVPB 1000 milliGRAM(s) IV Intermittent every 24 hours  chlorhexidine 2% Cloths 1 Application(s) Topical <User Schedule>  dextrose 5%. 1000 milliLiter(s) (100 mL/Hr) IV Continuous <Continuous>  dextrose 5%. 1000 milliLiter(s) (50 mL/Hr) IV Continuous <Continuous>  dextrose 50% Injectable 25 Gram(s) IV Push once  dextrose 50% Injectable 12.5 Gram(s) IV Push once  dextrose 50% Injectable 25 Gram(s) IV Push once  digoxin     Tablet 125 MICROGram(s) Oral every other day  escitalopram 5 milliGRAM(s) Oral daily  glucagon  Injectable 1 milliGRAM(s) IntraMuscular once  guaiFENesin  milliGRAM(s) Oral every 12 hours  insulin lispro (ADMELOG) corrective regimen sliding scale   SubCutaneous three times a day before meals  insulin lispro (ADMELOG) corrective regimen sliding scale   SubCutaneous at bedtime  lactated ringers. 1000 milliLiter(s) (75 mL/Hr) IV Continuous <Continuous>  levothyroxine 75 MICROGram(s) Oral daily  sodium chloride 0.9% for Nebulization 3 milliLiter(s) Nebulizer every 12 hours  tamsulosin 0.4 milliGRAM(s) Oral at bedtime    MEDICATIONS  (PRN):  acetaminophen     Tablet .. 650 milliGRAM(s) Oral every 6 hours PRN Temp greater or equal to 38C (100.4F), Mild Pain (1 - 3)  dextrose Oral Gel 15 Gram(s) Oral once PRN Blood Glucose LESS THAN 70 milliGRAM(s)/deciliter      REVIEW OF SYSTEMS:  CONSTITUTIONAL: No fever, weight loss, or fatigue  RESPIRATORY: No shortness of breath  CARDIOVASCULAR: No chest pain  GASTROINTESTINAL: No abdominal pain.  GENITOURINARY: No dysuria  NEUROLOGICAL: No headaches  SKIN: No itching, burning, rashes    Vital Signs Last 24 Hrs  T(C): 36.7 (06 Apr 2025 05:17), Max: 36.8 (05 Apr 2025 20:55)  T(F): 98 (06 Apr 2025 05:17), Max: 98.2 (05 Apr 2025 20:55)  HR: 70 (06 Apr 2025 05:17) (69 - 74)  BP: 144/74 (06 Apr 2025 05:17) (137/69 - 155/72)  BP(mean): 97 (06 Apr 2025 05:17) (92 - 97)  RR: 18 (06 Apr 2025 05:17) (18 - 20)  SpO2: 97% (06 Apr 2025 05:17) (96% - 98%)    Parameters below as of 06 Apr 2025 05:17  Patient On (Oxygen Delivery Method): room air        PHYSICAL EXAMINATION:  GENERAL: NAD, well built  HEAD:  Atraumatic, Normocephalic  EYES:  conjunctiva and sclera clear  CHEST/LUNG: Clear to auscultation. No rales, rhonchi, wheezing, or rubs  HEART: Regular rate and rhythm; No murmurs, rubs, or gallops  ABDOMEN: Soft, Nontender, Nondistended; Bowel sounds present  NERVOUS SYSTEM:  Alert & Oriented X3,    EXTREMITIES:  2+ Peripheral Pulses, No clubbing, cyanosis, or edema  SKIN: warm dry                          11.0   9.67  )-----------( 180      ( 06 Apr 2025 06:26 )             33.7     04-06    137  |  105  |  30[H]  ----------------------------<  185[H]  4.1   |  27  |  1.61[H]    Ca    9.1      06 Apr 2025 06:26  Phos  2.7     04-06  Mg     2.0     04-06                CAPILLARY BLOOD GLUCOSE      RADIOLOGY & ADDITIONAL TESTS:                   Progress Note discussed with attending    MS TEAMS AUTHOR OF THIS NOTE TILL 5:00 PM  PLEASE CONTACT ON CALL TEAM:  - On Call Team (Please refer to [place]) FROM 5:00 PM - 8:30PM  - Nightfloat Team FROM 8:30 -7:30 AM      INTERVAL HPI/OVERNIGHT EVENTS: Patient has an episode of hematuria yesterday, Aspirin and Eliquis d/c'd. Patient's hgb stable. This morning patient appears somnolence, responsive to touch and stimuli but less awake than prior examinations.         MEDICATIONS  (STANDING):  albuterol/ipratropium for Nebulization 3 milliLiter(s) Nebulizer every 12 hours  atorvastatin 40 milliGRAM(s) Oral at bedtime  azithromycin  IVPB 500 milliGRAM(s) IV Intermittent every 24 hours  cefTRIAXone   IVPB 1000 milliGRAM(s) IV Intermittent every 24 hours  chlorhexidine 2% Cloths 1 Application(s) Topical <User Schedule>  dextrose 5%. 1000 milliLiter(s) (100 mL/Hr) IV Continuous <Continuous>  dextrose 5%. 1000 milliLiter(s) (50 mL/Hr) IV Continuous <Continuous>  dextrose 50% Injectable 25 Gram(s) IV Push once  dextrose 50% Injectable 12.5 Gram(s) IV Push once  dextrose 50% Injectable 25 Gram(s) IV Push once  digoxin     Tablet 125 MICROGram(s) Oral every other day  escitalopram 5 milliGRAM(s) Oral daily  glucagon  Injectable 1 milliGRAM(s) IntraMuscular once  guaiFENesin  milliGRAM(s) Oral every 12 hours  insulin lispro (ADMELOG) corrective regimen sliding scale   SubCutaneous three times a day before meals  insulin lispro (ADMELOG) corrective regimen sliding scale   SubCutaneous at bedtime  lactated ringers. 1000 milliLiter(s) (75 mL/Hr) IV Continuous <Continuous>  levothyroxine 75 MICROGram(s) Oral daily  sodium chloride 0.9% for Nebulization 3 milliLiter(s) Nebulizer every 12 hours  tamsulosin 0.4 milliGRAM(s) Oral at bedtime    MEDICATIONS  (PRN):  acetaminophen     Tablet .. 650 milliGRAM(s) Oral every 6 hours PRN Temp greater or equal to 38C (100.4F), Mild Pain (1 - 3)  dextrose Oral Gel 15 Gram(s) Oral once PRN Blood Glucose LESS THAN 70 milliGRAM(s)/deciliter      REVIEW OF SYSTEMS: unable to obtain given patient's mental status.     Vital Signs Last 24 Hrs  T(C): 36.7 (06 Apr 2025 05:17), Max: 36.8 (05 Apr 2025 20:55)  T(F): 98 (06 Apr 2025 05:17), Max: 98.2 (05 Apr 2025 20:55)  HR: 70 (06 Apr 2025 05:17) (69 - 74)  BP: 144/74 (06 Apr 2025 05:17) (137/69 - 155/72)  BP(mean): 97 (06 Apr 2025 05:17) (92 - 97)  RR: 18 (06 Apr 2025 05:17) (18 - 20)  SpO2: 97% (06 Apr 2025 05:17) (96% - 98%)    Parameters below as of 06 Apr 2025 05:17  Patient On (Oxygen Delivery Method): room air        PHYSICAL EXAMINATION:  GENERAL: laying in bed, sleeping, comfortable, responsive to stimuli   HEAD:  Atraumatic, Normocephalic  EYES:  conjunctiva and sclera clear  CHEST/LUNG: Clear to auscultation. No rales, rhonchi, wheezing, or rubs  HEART: Regular rate and rhythm; No murmurs, rubs, or gallops  ABDOMEN: Soft, Nontender, Nondistended; Bowel sounds present  NERVOUS SYSTEM:  Alert & Oriented X1,    EXTREMITIES:  2+ Peripheral Pulses, No clubbing, cyanosis, or edema  SKIN: warm dry                          11.0   9.67  )-----------( 180      ( 06 Apr 2025 06:26 )             33.7     04-06    137  |  105  |  30[H]  ----------------------------<  185[H]  4.1   |  27  |  1.61[H]    Ca    9.1      06 Apr 2025 06:26  Phos  2.7     04-06  Mg     2.0     04-06                CAPILLARY BLOOD GLUCOSE      RADIOLOGY & ADDITIONAL TESTS:    < from: CT Chest No Cont (04.04.25 @ 00:58) >  IMPRESSION:  Tubular opacity in the right lower lobe and bilateral tree-in-bud   opacities, most consistent with mucous impacted airways.    Stable size of right retroperitoneal mass which now demonstrates a large   soft tissue component and peripheral calcification. Findings are favored   to reflect postoperative change.    Bilateral bowel containing inguinal hernia, as described.    < end of copied text >

## 2025-04-07 LAB
ANION GAP SERPL CALC-SCNC: 7 MMOL/L — SIGNIFICANT CHANGE UP (ref 5–17)
APTT BLD: 32.3 SEC — SIGNIFICANT CHANGE UP (ref 24.5–35.6)
BASOPHILS # BLD AUTO: 0.04 K/UL — SIGNIFICANT CHANGE UP (ref 0–0.2)
BASOPHILS # BLD AUTO: 0.05 K/UL — SIGNIFICANT CHANGE UP (ref 0–0.2)
BASOPHILS NFR BLD AUTO: 0.4 % — SIGNIFICANT CHANGE UP (ref 0–2)
BASOPHILS NFR BLD AUTO: 0.7 % — SIGNIFICANT CHANGE UP (ref 0–2)
BLD GP AB SCN SERPL QL: SIGNIFICANT CHANGE UP
BUN SERPL-MCNC: 25 MG/DL — HIGH (ref 7–18)
CALCIUM SERPL-MCNC: 9.3 MG/DL — SIGNIFICANT CHANGE UP (ref 8.4–10.5)
CHLORIDE SERPL-SCNC: 106 MMOL/L — SIGNIFICANT CHANGE UP (ref 96–108)
CHOLEST SERPL-MCNC: 125 MG/DL — SIGNIFICANT CHANGE UP
CO2 SERPL-SCNC: 25 MMOL/L — SIGNIFICANT CHANGE UP (ref 22–31)
CREAT SERPL-MCNC: 1.51 MG/DL — HIGH (ref 0.5–1.3)
EGFR: 42 ML/MIN/1.73M2 — LOW
EGFR: 42 ML/MIN/1.73M2 — LOW
EOSINOPHIL # BLD AUTO: 0.13 K/UL — SIGNIFICANT CHANGE UP (ref 0–0.5)
EOSINOPHIL # BLD AUTO: 0.16 K/UL — SIGNIFICANT CHANGE UP (ref 0–0.5)
EOSINOPHIL NFR BLD AUTO: 1.4 % — SIGNIFICANT CHANGE UP (ref 0–6)
EOSINOPHIL NFR BLD AUTO: 2.3 % — SIGNIFICANT CHANGE UP (ref 0–6)
GLUCOSE BLDC GLUCOMTR-MCNC: 144 MG/DL — HIGH (ref 70–99)
GLUCOSE BLDC GLUCOMTR-MCNC: 226 MG/DL — HIGH (ref 70–99)
GLUCOSE BLDC GLUCOMTR-MCNC: 232 MG/DL — HIGH (ref 70–99)
GLUCOSE BLDC GLUCOMTR-MCNC: 304 MG/DL — HIGH (ref 70–99)
GLUCOSE SERPL-MCNC: 155 MG/DL — HIGH (ref 70–99)
HCT VFR BLD CALC: 35.1 % — LOW (ref 39–50)
HCT VFR BLD CALC: 36.1 % — LOW (ref 39–50)
HDLC SERPL-MCNC: 40 MG/DL — LOW
HGB BLD-MCNC: 11.5 G/DL — LOW (ref 13–17)
HGB BLD-MCNC: 11.7 G/DL — LOW (ref 13–17)
IMM GRANULOCYTES NFR BLD AUTO: 0.5 % — SIGNIFICANT CHANGE UP (ref 0–0.9)
IMM GRANULOCYTES NFR BLD AUTO: 0.6 % — SIGNIFICANT CHANGE UP (ref 0–0.9)
INR BLD: 0.98 RATIO — SIGNIFICANT CHANGE UP (ref 0.85–1.16)
LDLC SERPL-MCNC: 61 MG/DL — SIGNIFICANT CHANGE UP
LEGIONELLA AG UR QL: NEGATIVE — SIGNIFICANT CHANGE UP
LIPID PNL WITH DIRECT LDL SERPL: 61 MG/DL — SIGNIFICANT CHANGE UP
LYMPHOCYTES # BLD AUTO: 1.06 K/UL — SIGNIFICANT CHANGE UP (ref 1–3.3)
LYMPHOCYTES # BLD AUTO: 1.17 K/UL — SIGNIFICANT CHANGE UP (ref 1–3.3)
LYMPHOCYTES # BLD AUTO: 11.1 % — LOW (ref 13–44)
LYMPHOCYTES # BLD AUTO: 16.5 % — SIGNIFICANT CHANGE UP (ref 13–44)
MAGNESIUM SERPL-MCNC: 2 MG/DL — SIGNIFICANT CHANGE UP (ref 1.6–2.6)
MCHC RBC-ENTMCNC: 31.6 PG — SIGNIFICANT CHANGE UP (ref 27–34)
MCHC RBC-ENTMCNC: 31.9 PG — SIGNIFICANT CHANGE UP (ref 27–34)
MCHC RBC-ENTMCNC: 32.4 G/DL — SIGNIFICANT CHANGE UP (ref 32–36)
MCHC RBC-ENTMCNC: 32.8 G/DL — SIGNIFICANT CHANGE UP (ref 32–36)
MCV RBC AUTO: 97.5 FL — SIGNIFICANT CHANGE UP (ref 80–100)
MCV RBC AUTO: 97.6 FL — SIGNIFICANT CHANGE UP (ref 80–100)
MONOCYTES # BLD AUTO: 0.56 K/UL — SIGNIFICANT CHANGE UP (ref 0–0.9)
MONOCYTES # BLD AUTO: 0.58 K/UL — SIGNIFICANT CHANGE UP (ref 0–0.9)
MONOCYTES NFR BLD AUTO: 6.1 % — SIGNIFICANT CHANGE UP (ref 2–14)
MONOCYTES NFR BLD AUTO: 7.9 % — SIGNIFICANT CHANGE UP (ref 2–14)
NEUTROPHILS # BLD AUTO: 5.12 K/UL — SIGNIFICANT CHANGE UP (ref 1.8–7.4)
NEUTROPHILS # BLD AUTO: 7.65 K/UL — HIGH (ref 1.8–7.4)
NEUTROPHILS NFR BLD AUTO: 72 % — SIGNIFICANT CHANGE UP (ref 43–77)
NEUTROPHILS NFR BLD AUTO: 80.5 % — HIGH (ref 43–77)
NONHDLC SERPL-MCNC: 85 MG/DL — SIGNIFICANT CHANGE UP
NRBC BLD AUTO-RTO: 0 /100 WBCS — SIGNIFICANT CHANGE UP (ref 0–0)
NRBC BLD AUTO-RTO: 0 /100 WBCS — SIGNIFICANT CHANGE UP (ref 0–0)
PHOSPHATE SERPL-MCNC: 3 MG/DL — SIGNIFICANT CHANGE UP (ref 2.5–4.5)
PLATELET # BLD AUTO: 182 K/UL — SIGNIFICANT CHANGE UP (ref 150–400)
PLATELET # BLD AUTO: 186 K/UL — SIGNIFICANT CHANGE UP (ref 150–400)
POTASSIUM SERPL-MCNC: 4.1 MMOL/L — SIGNIFICANT CHANGE UP (ref 3.5–5.3)
POTASSIUM SERPL-SCNC: 4.1 MMOL/L — SIGNIFICANT CHANGE UP (ref 3.5–5.3)
PROTHROM AB SERPL-ACNC: 11.4 SEC — SIGNIFICANT CHANGE UP (ref 9.9–13.4)
RBC # BLD: 3.6 M/UL — LOW (ref 4.2–5.8)
RBC # BLD: 3.7 M/UL — LOW (ref 4.2–5.8)
RBC # FLD: 13.6 % — SIGNIFICANT CHANGE UP (ref 10.3–14.5)
RBC # FLD: 13.8 % — SIGNIFICANT CHANGE UP (ref 10.3–14.5)
S PNEUM AG UR QL: NEGATIVE — SIGNIFICANT CHANGE UP
SODIUM SERPL-SCNC: 138 MMOL/L — SIGNIFICANT CHANGE UP (ref 135–145)
TRIGL SERPL-MCNC: 139 MG/DL — SIGNIFICANT CHANGE UP
WBC # BLD: 7.1 K/UL — SIGNIFICANT CHANGE UP (ref 3.8–10.5)
WBC # BLD: 9.51 K/UL — SIGNIFICANT CHANGE UP (ref 3.8–10.5)
WBC # FLD AUTO: 7.1 K/UL — SIGNIFICANT CHANGE UP (ref 3.8–10.5)
WBC # FLD AUTO: 9.51 K/UL — SIGNIFICANT CHANGE UP (ref 3.8–10.5)

## 2025-04-07 PROCEDURE — 99223 1ST HOSP IP/OBS HIGH 75: CPT

## 2025-04-07 RX ORDER — CEFTRIAXONE 500 MG/1
1000 INJECTION, POWDER, FOR SOLUTION INTRAMUSCULAR; INTRAVENOUS ONCE
Refills: 0 | Status: COMPLETED | OUTPATIENT
Start: 2025-04-07 | End: 2025-04-07

## 2025-04-07 RX ORDER — APIXABAN 2.5 MG/1
2.5 TABLET, FILM COATED ORAL EVERY 12 HOURS
Refills: 0 | Status: DISCONTINUED | OUTPATIENT
Start: 2025-04-07 | End: 2025-04-07

## 2025-04-07 RX ORDER — ASPIRIN 325 MG
81 TABLET ORAL DAILY
Refills: 0 | Status: DISCONTINUED | OUTPATIENT
Start: 2025-04-07 | End: 2025-04-07

## 2025-04-07 RX ADMIN — DEXTROMETHORPHAN HBR, GUAIFENESIN 600 MILLIGRAM(S): 200 LIQUID ORAL at 05:42

## 2025-04-07 RX ADMIN — INSULIN LISPRO 2: 100 INJECTION, SOLUTION INTRAVENOUS; SUBCUTANEOUS at 17:43

## 2025-04-07 RX ADMIN — Medication 75 MICROGRAM(S): at 05:42

## 2025-04-07 RX ADMIN — INSULIN LISPRO 2: 100 INJECTION, SOLUTION INTRAVENOUS; SUBCUTANEOUS at 12:00

## 2025-04-07 RX ADMIN — Medication 3 MILLILITER(S): at 20:21

## 2025-04-07 RX ADMIN — ATORVASTATIN CALCIUM 40 MILLIGRAM(S): 80 TABLET, FILM COATED ORAL at 21:24

## 2025-04-07 RX ADMIN — IPRATROPIUM BROMIDE AND ALBUTEROL SULFATE 3 MILLILITER(S): .5; 2.5 SOLUTION RESPIRATORY (INHALATION) at 08:21

## 2025-04-07 RX ADMIN — ESCITALOPRAM OXALATE 5 MILLIGRAM(S): 20 TABLET ORAL at 11:56

## 2025-04-07 RX ADMIN — Medication 650 MILLIGRAM(S): at 20:21

## 2025-04-07 RX ADMIN — TAMSULOSIN HYDROCHLORIDE 0.4 MILLIGRAM(S): 0.4 CAPSULE ORAL at 21:23

## 2025-04-07 RX ADMIN — NYSTATIN 1 APPLICATION(S): 100000 CREAM TOPICAL at 05:43

## 2025-04-07 RX ADMIN — Medication 81 MILLIGRAM(S): at 11:56

## 2025-04-07 RX ADMIN — Medication 1 APPLICATION(S): at 05:44

## 2025-04-07 RX ADMIN — Medication 650 MILLIGRAM(S): at 19:05

## 2025-04-07 RX ADMIN — IPRATROPIUM BROMIDE AND ALBUTEROL SULFATE 3 MILLILITER(S): .5; 2.5 SOLUTION RESPIRATORY (INHALATION) at 20:22

## 2025-04-07 RX ADMIN — NYSTATIN 1 APPLICATION(S): 100000 CREAM TOPICAL at 16:36

## 2025-04-07 RX ADMIN — NYSTATIN 1 APPLICATION(S): 100000 CREAM TOPICAL at 21:24

## 2025-04-07 RX ADMIN — CEFTRIAXONE 100 MILLIGRAM(S): 500 INJECTION, POWDER, FOR SOLUTION INTRAMUSCULAR; INTRAVENOUS at 22:39

## 2025-04-07 RX ADMIN — DEXTROMETHORPHAN HBR, GUAIFENESIN 600 MILLIGRAM(S): 200 LIQUID ORAL at 16:36

## 2025-04-07 RX ADMIN — Medication 250 MILLIGRAM(S): at 21:23

## 2025-04-07 RX ADMIN — INSULIN LISPRO 2: 100 INJECTION, SOLUTION INTRAVENOUS; SUBCUTANEOUS at 22:39

## 2025-04-07 NOTE — PROGRESS NOTE ADULT - CONVERSATION DETAILS
Extensive conversation at bedside with pt, friend/caretaker, and [place]  LE Hagen. Discussed goals of care and MOLST. Pt would like to be FULL CODE.

## 2025-04-07 NOTE — PHARMACOTHERAPY INTERVENTION NOTE - COMMENTS
Recommended to discontinue the azithromycin order after PM dose since today is day 5 of azithromycin therapy.    Frankie Escobar, PharmD, BCIDP  Clinical Pharmacy Specialist, Infectious Diseases  Tele-Antimicrobial Stewardship Program (Tele-ASP)  Available on [employer] Teams  
Recommended to discontinue the ceftriaxone order after the PM dose since today is day 5 of ceftriaxone therapy.    Frankie Escobar, PharmD, BCIDP  Clinical Pharmacy Specialist, Infectious Diseases  Tele-Antimicrobial Stewardship Program (Tele-ASP)  Available on [employer] Teams

## 2025-04-07 NOTE — CONSULT NOTE ADULT - SUBJECTIVE AND OBJECTIVE BOX
Endocrine initial consult ntoe     89 year old  Male who presents with a chief complaint of Pneumonia (07 Apr 2025 09:53)    HPI:  89y/M, from home, PMH of HTN, DM, Afib on eliquis, Hypothyroidism, Depression, BPH, HLD was brought in by family friend, who is taking care of him, for rigor and urinary incontinence X1 day. No fever noted. Patient is poor historian, unable to reach family over phone. number in chart in not in service. Patient denies abdominal pain, nausea vomiting, chest pain, SOB, palpitation, diarrhea or constipation. In ED, vitals wnl, labs revealing leucocytosis. UA negative s/p Ceftriaxone, Azithromycin, 1L IV bolus in ED (04 Apr 2025 01:14). Endocrinology is consulted for glycemic management      Diabetes History:  Diagnosis:  Home regimen:  Home fingersticks/ CGM:  Hypoglycemia events:  Retinopathy/most recent opthalmology:  Peripheral Neuropathy:  Nephropathy:  Cardiovascular disease:  Peripheral vascular disease:  Diet:  Recent A1C   PCP  Endocrinologist:    Review of systems:  Constitutional: No fever, weight loss, fatigue, low energy, generalized weakness, poor appetite  Cardiovascular/ Respiratory: No palpitations,, no chest pain, no shortness of breath, no exercise intolerance, no cough, no leg/ ankle swelling  Gastrointestinal: No abdominal pain, no bloating, no nausea, no vomiting, no constipation, no diarrhea, no frequent bowel movements  Neurological: No headaches, no change in vision, no dizziness/ lightheadedness, no tremors, no numbness/ tingling in feet  Endocrine: No Frequent urination, excessive urination, excessive thirst, symptoms     Past Medical and Surgical Hx:  Type 2 Diabetes  HTN (hypertension)  GERD (gastroesophageal reflux disease)  HLD (hyperlipidemia)  Hypothyroidism  H/O heart bypass surgery  H/O partial nephrectomy  S/P TURP    Family hx:  Family history of hypertension (Father, Mother)      Social History:  Tobacco:  Alcohol:  illicit drug abuse:  Health insurance status:    Medications (Standing):  albuterol/ipratropium for Nebulization 3 milliLiter(s) Nebulizer every 12 hours  apixaban 2.5 milliGRAM(s) Oral every 12 hours  aspirin  chewable 81 milliGRAM(s) Oral daily  atorvastatin 40 milliGRAM(s) Oral at bedtime  azithromycin  IVPB 500 milliGRAM(s) IV Intermittent every 24 hours  cefTRIAXone   IVPB 1000 milliGRAM(s) IV Intermittent every 24 hours  chlorhexidine 2% Cloths 1 Application(s) Topical <User Schedule>  dextrose 5%. 1000 milliLiter(s) (100 mL/Hr) IV Continuous <Continuous>  dextrose 5%. 1000 milliLiter(s) (50 mL/Hr) IV Continuous <Continuous>  dextrose 50% Injectable 25 Gram(s) IV Push once  dextrose 50% Injectable 12.5 Gram(s) IV Push once  dextrose 50% Injectable 25 Gram(s) IV Push once  digoxin     Tablet 125 MICROGram(s) Oral every other day  escitalopram 5 milliGRAM(s) Oral daily  glucagon  Injectable 1 milliGRAM(s) IntraMuscular once  guaiFENesin  milliGRAM(s) Oral every 12 hours  insulin lispro (ADMELOG) corrective regimen sliding scale   SubCutaneous three times a day before meals  insulin lispro (ADMELOG) corrective regimen sliding scale   SubCutaneous at bedtime  lactated ringers. 1000 milliLiter(s) (75 mL/Hr) IV Continuous <Continuous>  levothyroxine 75 MICROGram(s) Oral daily  nystatin Powder 1 Application(s) Topical three times a day  sodium chloride 0.9% for Nebulization 3 milliLiter(s) Nebulizer every 12 hours  tamsulosin 0.4 milliGRAM(s) Oral at bedtime    Medications (PRN):  acetaminophen     Tablet .. 650 milliGRAM(s) Oral every 6 hours PRN Temp greater or equal to 38C (100.4F), Mild Pain (1 - 3)  dextrose Oral Gel 15 Gram(s) Oral once PRN Blood Glucose LESS THAN 70 milliGRAM(s)/deciliter      Physical Examination  Vital Signs Last 24 Hrs  T(C): 36.3 (07 Apr 2025 05:00), Max: 36.8 (06 Apr 2025 21:22)  T(F): 97.4 (07 Apr 2025 05:00), Max: 98.3 (06 Apr 2025 21:22)  HR: 71 (07 Apr 2025 05:00) (68 - 76)  BP: 149/83 (07 Apr 2025 05:00) (120/61 - 170/74)  BP(mean): 105 (07 Apr 2025 05:00) (105 - 105)  RR: 18 (07 Apr 2025 05:00) (18 - 18)  SpO2: 97% (07 Apr 2025 05:00) (97% - 98%)    Constitutional: No acute distress, ill- appearing  HEENT: Moist mucous membranes  Neck:  No JVD, bruits or thyromegaly, No thyroid nodules palpable, no LAD  Gastrointestinal: Soft, non tender without hepatosplenomegaly and masses, no abdominal obesity  Extremities: Sensation intact to monofilament in feet, no cyanosis, clubbing or edema, positive pedal pulses  Neurological:  Oriented to person, place and time    Labs:                      11.5   7.10  )-----------( 182      ( 07 Apr 2025 06:36 )             35.1   04-07  138  |  106  |  25[H]  ----------------------------<  155[H]  4.1   |  25  |  1.51[H]  Ca    9.3      07 Apr 2025 06:36  Phos  3.0     04-07  Mg     2.0     04-07    Capillary Blood Glucose:  POCT Blood Glucose.: 144 mg/dL (07 Apr 2025 07:53)  POCT Blood Glucose.: 187 mg/dL (06 Apr 2025 21:47)  POCT Blood Glucose.: 130 mg/dL (06 Apr 2025 17:03)  POCT Blood Glucose.: 188 mg/dL (06 Apr 2025 11:48)    A1C with Estimated Average Glucose Result: 9.4 % (04.04.25 @ 05:45)      Assessment and Plan:   89y/M, from home, PMH of HTN, DM, Afib on eliquis, Hypothyroidism, Depression, BPH, HLD was brought in by family friend, who is taking care of him, for rigor and urinary incontinence X1 day  Endocrinology is consulted for elevated A1C    1) Poorly controlled Type 2 diabetes:  A1C is above goal based upon his age  Inpatient BS are better controlled.  Patient is eating well  Will continue the same insulin regimen    Inpatient Recommendations:  Basal Insulin:   none    Nutritional Insulin:  none    Correctional Insulin:  Continue low Lispro ( Admelog) correctional scale with meals and bedtime    Oral Diabetes Medications:  None in the hospital     Endocrine initial consult note     89 year old  Male who presents with a chief complaint of Pneumonia (07 Apr 2025 09:53)    HPI:  89y/M, from home, PMH of HTN, DM, Afib on eliquis, Hypothyroidism, Depression, BPH, HLD was brought in by family friend, who is taking care of him, for rigor and urinary incontinence X1 day. No fever noted. Patient is poor historian, unable to reach family over phone. number in chart in not in service. Patient denies abdominal pain, nausea vomiting, chest pain, SOB, palpitation, diarrhea or constipation. In ED, vitals wnl, labs revealing leucocytosis. UA negative s/p Ceftriaxone, Azithromycin, 1L IV bolus in ED (04 Apr 2025 01:14). Endocrinology is consulted for glycemic management    Patient seen at the bedside, not a good historian, reports eating well. A family friend is at the bedside providing limited hx.    Diabetes History:  Diagnosis: Unknown how many years he was diagnosed with diabetes  Home regimen: Remembers he take Januvia 25 mg daily and Farxiga 10 mg daily  Diabetes is managed by PCP    Review of systems:  Constitutional: No fever, yes weight loss, yes fatigue, yes low energy,  yes generalized weakness, no poor appetite  Cardiovascular/ Respiratory: No palpitations,, no chest pain, no shortness of breath, yes exercise intolerance, no cough, no leg/ ankle swelling  Gastrointestinal: No abdominal pain, no bloating, no nausea, no vomiting, no constipation, no diarrhea  Neurological: No headaches, no change in vision, no dizziness/ lightheadedness, no tremors, no numbness/ tingling in feet  Endocrine: No Frequent urination, excessive urination, excessive thirst, symptoms     Past Medical and Surgical Hx:  Type 2 Diabetes  HTN (hypertension)  GERD (gastroesophageal reflux disease)  HLD (hyperlipidemia)  Hypothyroidism  H/O heart bypass surgery  H/O partial nephrectomy  S/P TURP    Family hx:  Family history of hypertension (Father, Mother)  Family Hx of type 2 diabetes ( Mother)      Social History:  Tobacco: Denies  Alcohol:  illicit drug abuse:  Health insurance status:    Medications (Standing):  albuterol/ipratropium for Nebulization 3 milliLiter(s) Nebulizer every 12 hours  apixaban 2.5 milliGRAM(s) Oral every 12 hours  aspirin  chewable 81 milliGRAM(s) Oral daily  atorvastatin 40 milliGRAM(s) Oral at bedtime  azithromycin  IVPB 500 milliGRAM(s) IV Intermittent every 24 hours  cefTRIAXone   IVPB 1000 milliGRAM(s) IV Intermittent every 24 hours  chlorhexidine 2% Cloths 1 Application(s) Topical <User Schedule>  dextrose 5%. 1000 milliLiter(s) (100 mL/Hr) IV Continuous <Continuous>  dextrose 5%. 1000 milliLiter(s) (50 mL/Hr) IV Continuous <Continuous>  dextrose 50% Injectable 25 Gram(s) IV Push once  dextrose 50% Injectable 12.5 Gram(s) IV Push once  dextrose 50% Injectable 25 Gram(s) IV Push once  digoxin     Tablet 125 MICROGram(s) Oral every other day  escitalopram 5 milliGRAM(s) Oral daily  glucagon  Injectable 1 milliGRAM(s) IntraMuscular once  guaiFENesin  milliGRAM(s) Oral every 12 hours  insulin lispro (ADMELOG) corrective regimen sliding scale   SubCutaneous three times a day before meals  insulin lispro (ADMELOG) corrective regimen sliding scale   SubCutaneous at bedtime  lactated ringers. 1000 milliLiter(s) (75 mL/Hr) IV Continuous <Continuous>  levothyroxine 75 MICROGram(s) Oral daily  nystatin Powder 1 Application(s) Topical three times a day  sodium chloride 0.9% for Nebulization 3 milliLiter(s) Nebulizer every 12 hours  tamsulosin 0.4 milliGRAM(s) Oral at bedtime    Medications (PRN):  acetaminophen     Tablet .. 650 milliGRAM(s) Oral every 6 hours PRN Temp greater or equal to 38C (100.4F), Mild Pain (1 - 3)  dextrose Oral Gel 15 Gram(s) Oral once PRN Blood Glucose LESS THAN 70 milliGRAM(s)/deciliter      Physical Examination  Vital Signs Last 24 Hrs  T(C): 36.3 (07 Apr 2025 05:00), Max: 36.8 (06 Apr 2025 21:22)  T(F): 97.4 (07 Apr 2025 05:00), Max: 98.3 (06 Apr 2025 21:22)  HR: 71 (07 Apr 2025 05:00) (68 - 76)  BP: 149/83 (07 Apr 2025 05:00) (120/61 - 170/74)  BP(mean): 105 (07 Apr 2025 05:00) (105 - 105)  RR: 18 (07 Apr 2025 05:00) (18 - 18)  SpO2: 97% (07 Apr 2025 05:00) (97% - 98%)    Constitutional: No acute distress, ill- appearing  HEENT: Moist mucous membranes  Neck:  No JVD, bruits or thyromegaly, No thyroid nodules palpable, no LAD  Gastrointestinal: Soft, non tender without hepatosplenomegaly and masses, no abdominal obesity  Extremities: Sensation intact to monofilament in feet, no cyanosis, clubbing or edema, positive pedal pulses  Neurological:  Oriented to person, place and time    Labs:                      11.5   7.10  )-----------( 182      ( 07 Apr 2025 06:36 )             35.1   04-07  138  |  106  |  25[H]  ----------------------------<  155[H]  4.1   |  25  |  1.51[H]  Ca    9.3      07 Apr 2025 06:36  Phos  3.0     04-07  Mg     2.0     04-07    Capillary Blood Glucose:  POCT Blood Glucose.: 144 mg/dL (07 Apr 2025 07:53)  POCT Blood Glucose.: 187 mg/dL (06 Apr 2025 21:47)  POCT Blood Glucose.: 130 mg/dL (06 Apr 2025 17:03)  POCT Blood Glucose.: 188 mg/dL (06 Apr 2025 11:48)    A1C with Estimated Average Glucose Result: 9.4 % (04.04.25 @ 05:45)      Assessment and Plan:   89y/M, from home, PMH of HTN, DM, Afib on eliquis, Hypothyroidism, Depression, BPH, HLD was brought in by family friend, who is taking care of him, for rigor and urinary incontinence X1 day  Endocrinology is consulted for elevated A1C    1) Poorly controlled Type 2 diabetes:  A1C is above goal based upon his age  Inpatient BS are better controlled.  Patient is eating well  Will continue the same insulin regimen    Inpatient Recommendations:  Basal Insulin:   none    Nutritional Insulin:  none    Correctional Insulin:  Continue low Lispro ( Admelog) correctional scale with meals and bedtime    Oral Diabetes Medications:  None in the hospital

## 2025-04-07 NOTE — PROGRESS NOTE ADULT - SUBJECTIVE AND OBJECTIVE BOX
Patient is a 89y old  Male who presents with a chief complaint of Pneumonia (07 Apr 2025 09:14)  Awake, alert, comfortable in bed in NAD    INTERVAL HPI/OVERNIGHT EVENTS:      VITAL SIGNS:  T(F): 97.4 (04-07-25 @ 05:00)  HR: 71 (04-07-25 @ 05:00)  BP: 149/83 (04-07-25 @ 05:00)  RR: 18 (04-07-25 @ 05:00)  SpO2: 97% (04-07-25 @ 05:00)  Wt(kg): --  I&O's Detail    06 Apr 2025 07:01  -  07 Apr 2025 07:00  --------------------------------------------------------  IN:  Total IN: 0 mL    OUT:    Indwelling Catheter - Urethral (mL): 2400 mL  Total OUT: 2400 mL    Total NET: -2400 mL              REVIEW OF SYSTEMS:    CONSTITUTIONAL:  No fevers, chills, sweats    HEENT:  Eyes:  No diplopia or blurred vision. ENT:  No earache, sore throat or runny nose.    CARDIOVASCULAR:  No pressure, squeezing, tightness, or heaviness about the chest; no palpitations.    RESPIRATORY:  Per HPI    GASTROINTESTINAL:  No abdominal pain, nausea, vomiting or diarrhea.    GENITOURINARY:  No dysuria, frequency or urgency.    NEUROLOGIC:  No paresthesias, fasciculations, seizures or weakness.    PSYCHIATRIC:  No disorder of thought or mood.      PHYSICAL EXAM:    Constitutional: Well developed and nourished  Eyes:Perrla  ENMT: normal  Neck:supple  Respiratory: good air entry  Cardiovascular: S1 S2 regular  Gastrointestinal: Soft, Non tender  Extremities: No edema  Vascular:normal  Neurological:Awake, alert,Ox3  Musculoskeletal:Normal      MEDICATIONS  (STANDING):  albuterol/ipratropium for Nebulization 3 milliLiter(s) Nebulizer every 12 hours  atorvastatin 40 milliGRAM(s) Oral at bedtime  azithromycin  IVPB 500 milliGRAM(s) IV Intermittent every 24 hours  cefTRIAXone   IVPB 1000 milliGRAM(s) IV Intermittent every 24 hours  chlorhexidine 2% Cloths 1 Application(s) Topical <User Schedule>  dextrose 5%. 1000 milliLiter(s) (100 mL/Hr) IV Continuous <Continuous>  dextrose 5%. 1000 milliLiter(s) (50 mL/Hr) IV Continuous <Continuous>  dextrose 50% Injectable 25 Gram(s) IV Push once  dextrose 50% Injectable 12.5 Gram(s) IV Push once  dextrose 50% Injectable 25 Gram(s) IV Push once  digoxin     Tablet 125 MICROGram(s) Oral every other day  escitalopram 5 milliGRAM(s) Oral daily  glucagon  Injectable 1 milliGRAM(s) IntraMuscular once  guaiFENesin  milliGRAM(s) Oral every 12 hours  insulin lispro (ADMELOG) corrective regimen sliding scale   SubCutaneous three times a day before meals  insulin lispro (ADMELOG) corrective regimen sliding scale   SubCutaneous at bedtime  lactated ringers. 1000 milliLiter(s) (75 mL/Hr) IV Continuous <Continuous>  levothyroxine 75 MICROGram(s) Oral daily  nystatin Powder 1 Application(s) Topical three times a day  sodium chloride 0.9% for Nebulization 3 milliLiter(s) Nebulizer every 12 hours  tamsulosin 0.4 milliGRAM(s) Oral at bedtime    MEDICATIONS  (PRN):  acetaminophen     Tablet .. 650 milliGRAM(s) Oral every 6 hours PRN Temp greater or equal to 38C (100.4F), Mild Pain (1 - 3)  dextrose Oral Gel 15 Gram(s) Oral once PRN Blood Glucose LESS THAN 70 milliGRAM(s)/deciliter      Allergies    No Known Allergies    Intolerances        LABS:                        11.5   7.10  )-----------( 182      ( 07 Apr 2025 06:36 )             35.1     04-07    138  |  106  |  25[H]  ----------------------------<  155[H]  4.1   |  25  |  1.51[H]    Ca    9.3      07 Apr 2025 06:36  Phos  3.0     04-07  Mg     2.0     04-07        Urinalysis Basic - ( 07 Apr 2025 06:36 )    Color: x / Appearance: x / SG: x / pH: x  Gluc: 155 mg/dL / Ketone: x  / Bili: x / Urobili: x   Blood: x / Protein: x / Nitrite: x   Leuk Esterase: x / RBC: x / WBC x   Sq Epi: x / Non Sq Epi: x / Bacteria: x            CAPILLARY BLOOD GLUCOSE      POCT Blood Glucose.: 144 mg/dL (07 Apr 2025 07:53)  POCT Blood Glucose.: 187 mg/dL (06 Apr 2025 21:47)  POCT Blood Glucose.: 130 mg/dL (06 Apr 2025 17:03)  POCT Blood Glucose.: 188 mg/dL (06 Apr 2025 11:48)        RADIOLOGY & ADDITIONAL TESTS:    CXR:  < from: CT Chest No Cont (04.04.25 @ 00:58) >  IMPRESSION:  Tubular opacity in the right lower lobe and bilateral tree-in-bud   opacities, most consistent with mucous impacted airways.    Stable size of right retroperitoneal mass which now demonstrates a large   soft tissue component and peripheral calcification. Findings are favored   to reflect postoperative change.    Bilateral bowel containing inguinal hernia, as described.      < end of copied text >    Ct scan chest:    ekg;    echo:

## 2025-04-07 NOTE — PROGRESS NOTE ADULT - PROBLEM SELECTOR PLAN 5
as per surescipt, on coreg, digoxin and eliquis  c/w meds with holding parameters  Digoxin levels normal     - follow up digoxin levels every two days  -restarting Eliquis as it was held due to hematuria, no more episodes of hematuria since then

## 2025-04-07 NOTE — CHART NOTE - NSCHARTNOTEFT_GEN_A_CORE
Informed by RN that pt had 1 episode of dark melena like stool and hematuria. Pt examined at bedside. No complaints. Denies dizziness, chest pain, SOB, lightheaded, or feeling weak. Chart reviewed. H/H stable this am Hb 11.7. Eliquis has been held due to hematuria episode on 4/5. Will order CBC, T&S, and PT/PTT/INR. Informed by RN that pt had 1 episode of dark melena like stool and hematuria. Pt examined at bedside. No complaints. Denies dizziness, chest pain, SOB, lightheaded, or feeling weak. Chart reviewed. H/H stable this am Hb 11.7. Eliquis has been held due to hematuria episode on 4/5. Vitals: T98, HR 72, /80, RR 18 on RA. Will order CBC, T&S, and PT/PTT/INR. Informed by RN that pt had 1 episode of dark melena like stool and hematuria. Pt examined at bedside. No complaints. Denies dizziness, chest pain, SOB, lightheaded, or feeling weak. Chart reviewed. H/H stable this am Hb 11.7. Eliquis has been held due to hematuria episode on 4/5. Vitals: T98, HR 72, /80, RR 18 on RA. Will order CBC, T&S, and PT/PTT/INR.    Addendum:    Rep CBC revealed stable Hb at 11.7. Will continue to monitor.

## 2025-04-07 NOTE — PROGRESS NOTE ADULT - SUBJECTIVE AND OBJECTIVE BOX
Progress Note discussed with attending    MS TEAMS Rasta Gonzales OF THIS NOTE TILL 5:00 PM  PLEASE CONTACT ON CALL TEAM:  - On Call Team (Please refer to [place]) FROM 5:00 PM - 8:30PM  - Nightfloat Team FROM 8:30 -7:30 AM    INTERVAL HPI/OVERNIGHT EVENTS: Pt examined at bedside this am. No overnight events. No complaints. Sitting out of bed to chair.       MEDICATIONS  (STANDING):  albuterol/ipratropium for Nebulization 3 milliLiter(s) Nebulizer every 12 hours  apixaban 2.5 milliGRAM(s) Oral every 12 hours  aspirin  chewable 81 milliGRAM(s) Oral daily  atorvastatin 40 milliGRAM(s) Oral at bedtime  azithromycin  IVPB 500 milliGRAM(s) IV Intermittent every 24 hours  cefTRIAXone   IVPB 1000 milliGRAM(s) IV Intermittent every 24 hours  chlorhexidine 2% Cloths 1 Application(s) Topical <User Schedule>  dextrose 5%. 1000 milliLiter(s) (100 mL/Hr) IV Continuous <Continuous>  dextrose 5%. 1000 milliLiter(s) (50 mL/Hr) IV Continuous <Continuous>  dextrose 50% Injectable 25 Gram(s) IV Push once  dextrose 50% Injectable 12.5 Gram(s) IV Push once  dextrose 50% Injectable 25 Gram(s) IV Push once  digoxin     Tablet 125 MICROGram(s) Oral every other day  escitalopram 5 milliGRAM(s) Oral daily  glucagon  Injectable 1 milliGRAM(s) IntraMuscular once  guaiFENesin  milliGRAM(s) Oral every 12 hours  insulin lispro (ADMELOG) corrective regimen sliding scale   SubCutaneous three times a day before meals  insulin lispro (ADMELOG) corrective regimen sliding scale   SubCutaneous at bedtime  lactated ringers. 1000 milliLiter(s) (75 mL/Hr) IV Continuous <Continuous>  levothyroxine 75 MICROGram(s) Oral daily  nystatin Powder 1 Application(s) Topical three times a day  sodium chloride 0.9% for Nebulization 3 milliLiter(s) Nebulizer every 12 hours  tamsulosin 0.4 milliGRAM(s) Oral at bedtime    MEDICATIONS  (PRN):  acetaminophen     Tablet .. 650 milliGRAM(s) Oral every 6 hours PRN Temp greater or equal to 38C (100.4F), Mild Pain (1 - 3)  dextrose Oral Gel 15 Gram(s) Oral once PRN Blood Glucose LESS THAN 70 milliGRAM(s)/deciliter      REVIEW OF SYSTEMS:  CONSTITUTIONAL: No fever, weight loss, or fatigue  RESPIRATORY: No shortness of breath  CARDIOVASCULAR: No chest pain  GASTROINTESTINAL: No abdominal pain.  GENITOURINARY: No dysuria  NEUROLOGICAL: No headaches  SKIN: No itching, burning, rashes    Vital Signs Last 24 Hrs  T(C): 36.3 (07 Apr 2025 05:00), Max: 36.8 (06 Apr 2025 21:22)  T(F): 97.4 (07 Apr 2025 05:00), Max: 98.3 (06 Apr 2025 21:22)  HR: 71 (07 Apr 2025 05:00) (68 - 76)  BP: 149/83 (07 Apr 2025 05:00) (120/61 - 170/74)  BP(mean): 105 (07 Apr 2025 05:00) (105 - 105)  RR: 18 (07 Apr 2025 05:00) (18 - 18)  SpO2: 97% (07 Apr 2025 05:00) (97% - 98%)    Parameters below as of 07 Apr 2025 05:00  Patient On (Oxygen Delivery Method): room air        PHYSICAL EXAMINATION:  GENERAL: OOBC, comfortable,   HEAD:  Atraumatic, Normocephalic  EYES:  conjunctiva and sclera clear  CHEST/LUNG: Clear to auscultation. No rales, rhonchi, wheezing, or rubs  HEART: Regular rate and rhythm; No murmurs, rubs, or gallops  ABDOMEN: Soft, Nontender, Nondistended; Bowel sounds present  NERVOUS SYSTEM:  Alert & Oriented X3,    EXTREMITIES:  2+ Peripheral Pulses, No clubbing, cyanosis, or edema  SKIN: warm dry                          11.5   7.10  )-----------( 182      ( 07 Apr 2025 06:36 )             35.1     04-07    138  |  106  |  25[H]  ----------------------------<  155[H]  4.1   |  25  |  1.51[H]    Ca    9.3      07 Apr 2025 06:36  Phos  3.0     04-07  Mg     2.0     04-07                CAPILLARY BLOOD GLUCOSE      RADIOLOGY & ADDITIONAL TESTS:                   Progress Note discussed with attending    MS TEAMS Rasta Gonzales OF THIS NOTE TILL 5:00 PM  PLEASE CONTACT ON CALL TEAM:  - On Call Team (Please refer to [place]) FROM 5:00 PM - 8:30PM  - Nightfloat Team FROM 8:30 -7:30 AM    INTERVAL HPI/OVERNIGHT EVENTS: Pt examined at bedside this am. No overnight events. No complaints. Sitting out of bed to chair. TOV today.      MEDICATIONS  (STANDING):  albuterol/ipratropium for Nebulization 3 milliLiter(s) Nebulizer every 12 hours  apixaban 2.5 milliGRAM(s) Oral every 12 hours  aspirin  chewable 81 milliGRAM(s) Oral daily  atorvastatin 40 milliGRAM(s) Oral at bedtime  azithromycin  IVPB 500 milliGRAM(s) IV Intermittent every 24 hours  cefTRIAXone   IVPB 1000 milliGRAM(s) IV Intermittent every 24 hours  chlorhexidine 2% Cloths 1 Application(s) Topical <User Schedule>  dextrose 5%. 1000 milliLiter(s) (100 mL/Hr) IV Continuous <Continuous>  dextrose 5%. 1000 milliLiter(s) (50 mL/Hr) IV Continuous <Continuous>  dextrose 50% Injectable 25 Gram(s) IV Push once  dextrose 50% Injectable 12.5 Gram(s) IV Push once  dextrose 50% Injectable 25 Gram(s) IV Push once  digoxin     Tablet 125 MICROGram(s) Oral every other day  escitalopram 5 milliGRAM(s) Oral daily  glucagon  Injectable 1 milliGRAM(s) IntraMuscular once  guaiFENesin  milliGRAM(s) Oral every 12 hours  insulin lispro (ADMELOG) corrective regimen sliding scale   SubCutaneous three times a day before meals  insulin lispro (ADMELOG) corrective regimen sliding scale   SubCutaneous at bedtime  lactated ringers. 1000 milliLiter(s) (75 mL/Hr) IV Continuous <Continuous>  levothyroxine 75 MICROGram(s) Oral daily  nystatin Powder 1 Application(s) Topical three times a day  sodium chloride 0.9% for Nebulization 3 milliLiter(s) Nebulizer every 12 hours  tamsulosin 0.4 milliGRAM(s) Oral at bedtime    MEDICATIONS  (PRN):  acetaminophen     Tablet .. 650 milliGRAM(s) Oral every 6 hours PRN Temp greater or equal to 38C (100.4F), Mild Pain (1 - 3)  dextrose Oral Gel 15 Gram(s) Oral once PRN Blood Glucose LESS THAN 70 milliGRAM(s)/deciliter      REVIEW OF SYSTEMS:  CONSTITUTIONAL: No fever, weight loss, or fatigue  RESPIRATORY: No shortness of breath  CARDIOVASCULAR: No chest pain  GASTROINTESTINAL: No abdominal pain.  GENITOURINARY: No dysuria  NEUROLOGICAL: No headaches  SKIN: No itching, burning, rashes    Vital Signs Last 24 Hrs  T(C): 36.3 (07 Apr 2025 05:00), Max: 36.8 (06 Apr 2025 21:22)  T(F): 97.4 (07 Apr 2025 05:00), Max: 98.3 (06 Apr 2025 21:22)  HR: 71 (07 Apr 2025 05:00) (68 - 76)  BP: 149/83 (07 Apr 2025 05:00) (120/61 - 170/74)  BP(mean): 105 (07 Apr 2025 05:00) (105 - 105)  RR: 18 (07 Apr 2025 05:00) (18 - 18)  SpO2: 97% (07 Apr 2025 05:00) (97% - 98%)    Parameters below as of 07 Apr 2025 05:00  Patient On (Oxygen Delivery Method): room air        PHYSICAL EXAMINATION:  GENERAL: OOBC, comfortable,   HEAD:  Atraumatic, Normocephalic  EYES:  conjunctiva and sclera clear  CHEST/LUNG: Clear to auscultation. No rales, rhonchi, wheezing, or rubs  HEART: Regular rate and rhythm; No murmurs, rubs, or gallops  ABDOMEN: Soft, Nontender, Nondistended; Bowel sounds present  NERVOUS SYSTEM:  Alert & Oriented X3,    EXTREMITIES:  2+ Peripheral Pulses, No clubbing, cyanosis, or edema  SKIN: warm dry                          11.5   7.10  )-----------( 182      ( 07 Apr 2025 06:36 )             35.1     04-07    138  |  106  |  25[H]  ----------------------------<  155[H]  4.1   |  25  |  1.51[H]    Ca    9.3      07 Apr 2025 06:36  Phos  3.0     04-07  Mg     2.0     04-07                CAPILLARY BLOOD GLUCOSE      RADIOLOGY & ADDITIONAL TESTS:

## 2025-04-08 LAB
ALBUMIN SERPL ELPH-MCNC: 2.6 G/DL — LOW (ref 3.5–5)
ALP SERPL-CCNC: 337 U/L — HIGH (ref 40–120)
ALT FLD-CCNC: 468 U/L DA — HIGH (ref 10–60)
ANION GAP SERPL CALC-SCNC: 5 MMOL/L — SIGNIFICANT CHANGE UP (ref 5–17)
AST SERPL-CCNC: 651 U/L — HIGH (ref 10–40)
BASOPHILS # BLD AUTO: 0.02 K/UL — SIGNIFICANT CHANGE UP (ref 0–0.2)
BASOPHILS NFR BLD AUTO: 0.3 % — SIGNIFICANT CHANGE UP (ref 0–2)
BILIRUB SERPL-MCNC: 1.1 MG/DL — SIGNIFICANT CHANGE UP (ref 0.2–1.2)
BUN SERPL-MCNC: 30 MG/DL — HIGH (ref 7–18)
CALCIUM SERPL-MCNC: 9.5 MG/DL — SIGNIFICANT CHANGE UP (ref 8.4–10.5)
CHLORIDE SERPL-SCNC: 103 MMOL/L — SIGNIFICANT CHANGE UP (ref 96–108)
CO2 SERPL-SCNC: 27 MMOL/L — SIGNIFICANT CHANGE UP (ref 22–31)
CREAT SERPL-MCNC: 1.52 MG/DL — HIGH (ref 0.5–1.3)
DIGOXIN SERPL-MCNC: 0.9 NG/ML — SIGNIFICANT CHANGE UP (ref 0.8–2)
EGFR: 42 ML/MIN/1.73M2 — LOW
EGFR: 42 ML/MIN/1.73M2 — LOW
EOSINOPHIL # BLD AUTO: 0.17 K/UL — SIGNIFICANT CHANGE UP (ref 0–0.5)
EOSINOPHIL NFR BLD AUTO: 2.4 % — SIGNIFICANT CHANGE UP (ref 0–6)
GLUCOSE BLDC GLUCOMTR-MCNC: 140 MG/DL — HIGH (ref 70–99)
GLUCOSE BLDC GLUCOMTR-MCNC: 140 MG/DL — HIGH (ref 70–99)
GLUCOSE BLDC GLUCOMTR-MCNC: 166 MG/DL — HIGH (ref 70–99)
GLUCOSE BLDC GLUCOMTR-MCNC: 182 MG/DL — HIGH (ref 70–99)
GLUCOSE SERPL-MCNC: 160 MG/DL — HIGH (ref 70–99)
HCT VFR BLD CALC: 34.4 % — LOW (ref 39–50)
HGB BLD-MCNC: 11.2 G/DL — LOW (ref 13–17)
IMM GRANULOCYTES NFR BLD AUTO: 0.4 % — SIGNIFICANT CHANGE UP (ref 0–0.9)
LYMPHOCYTES # BLD AUTO: 0.91 K/UL — LOW (ref 1–3.3)
LYMPHOCYTES # BLD AUTO: 12.8 % — LOW (ref 13–44)
MAGNESIUM SERPL-MCNC: 2 MG/DL — SIGNIFICANT CHANGE UP (ref 1.6–2.6)
MCHC RBC-ENTMCNC: 31.3 PG — SIGNIFICANT CHANGE UP (ref 27–34)
MCHC RBC-ENTMCNC: 32.6 G/DL — SIGNIFICANT CHANGE UP (ref 32–36)
MCV RBC AUTO: 96.1 FL — SIGNIFICANT CHANGE UP (ref 80–100)
MONOCYTES # BLD AUTO: 0.63 K/UL — SIGNIFICANT CHANGE UP (ref 0–0.9)
MONOCYTES NFR BLD AUTO: 8.9 % — SIGNIFICANT CHANGE UP (ref 2–14)
NEUTROPHILS # BLD AUTO: 5.33 K/UL — SIGNIFICANT CHANGE UP (ref 1.8–7.4)
NEUTROPHILS NFR BLD AUTO: 75.2 % — SIGNIFICANT CHANGE UP (ref 43–77)
NRBC BLD AUTO-RTO: 0 /100 WBCS — SIGNIFICANT CHANGE UP (ref 0–0)
PHOSPHATE SERPL-MCNC: 3.1 MG/DL — SIGNIFICANT CHANGE UP (ref 2.5–4.5)
PLATELET # BLD AUTO: 185 K/UL — SIGNIFICANT CHANGE UP (ref 150–400)
POTASSIUM SERPL-MCNC: 4 MMOL/L — SIGNIFICANT CHANGE UP (ref 3.5–5.3)
POTASSIUM SERPL-SCNC: 4 MMOL/L — SIGNIFICANT CHANGE UP (ref 3.5–5.3)
PROT SERPL-MCNC: 5.7 G/DL — LOW (ref 6–8.3)
RBC # BLD: 3.58 M/UL — LOW (ref 4.2–5.8)
RBC # FLD: 13.7 % — SIGNIFICANT CHANGE UP (ref 10.3–14.5)
SODIUM SERPL-SCNC: 135 MMOL/L — SIGNIFICANT CHANGE UP (ref 135–145)
WBC # BLD: 7.09 K/UL — SIGNIFICANT CHANGE UP (ref 3.8–10.5)
WBC # FLD AUTO: 7.09 K/UL — SIGNIFICANT CHANGE UP (ref 3.8–10.5)

## 2025-04-08 PROCEDURE — 99232 SBSQ HOSP IP/OBS MODERATE 35: CPT

## 2025-04-08 RX ADMIN — Medication 1 APPLICATION(S): at 06:14

## 2025-04-08 RX ADMIN — NYSTATIN 1 APPLICATION(S): 100000 CREAM TOPICAL at 21:54

## 2025-04-08 RX ADMIN — Medication 75 MICROGRAM(S): at 06:14

## 2025-04-08 RX ADMIN — DEXTROMETHORPHAN HBR, GUAIFENESIN 600 MILLIGRAM(S): 200 LIQUID ORAL at 17:11

## 2025-04-08 RX ADMIN — IPRATROPIUM BROMIDE AND ALBUTEROL SULFATE 3 MILLILITER(S): .5; 2.5 SOLUTION RESPIRATORY (INHALATION) at 08:00

## 2025-04-08 RX ADMIN — TAMSULOSIN HYDROCHLORIDE 0.4 MILLIGRAM(S): 0.4 CAPSULE ORAL at 21:53

## 2025-04-08 RX ADMIN — INSULIN LISPRO 1: 100 INJECTION, SOLUTION INTRAVENOUS; SUBCUTANEOUS at 12:13

## 2025-04-08 RX ADMIN — NYSTATIN 1 APPLICATION(S): 100000 CREAM TOPICAL at 06:14

## 2025-04-08 RX ADMIN — NYSTATIN 1 APPLICATION(S): 100000 CREAM TOPICAL at 15:44

## 2025-04-08 RX ADMIN — ESCITALOPRAM OXALATE 5 MILLIGRAM(S): 20 TABLET ORAL at 12:12

## 2025-04-08 RX ADMIN — IPRATROPIUM BROMIDE AND ALBUTEROL SULFATE 3 MILLILITER(S): .5; 2.5 SOLUTION RESPIRATORY (INHALATION) at 20:55

## 2025-04-08 RX ADMIN — Medication 3 MILLILITER(S): at 08:35

## 2025-04-08 RX ADMIN — DIGOXIN 125 MICROGRAM(S): 250 TABLET ORAL at 12:11

## 2025-04-08 RX ADMIN — ATORVASTATIN CALCIUM 40 MILLIGRAM(S): 80 TABLET, FILM COATED ORAL at 21:53

## 2025-04-08 RX ADMIN — DEXTROMETHORPHAN HBR, GUAIFENESIN 600 MILLIGRAM(S): 200 LIQUID ORAL at 06:14

## 2025-04-08 RX ADMIN — INSULIN LISPRO 1: 100 INJECTION, SOLUTION INTRAVENOUS; SUBCUTANEOUS at 08:20

## 2025-04-08 NOTE — PROGRESS NOTE ADULT - SUBJECTIVE AND OBJECTIVE BOX
Progress Note discussed with attending    MS TEAMS Rasta Gonzales OF THIS NOTE TILL 5:00 PM  PLEASE CONTACT ON CALL TEAM:  - On Call Team (Please refer to [place]) FROM 5:00 PM - 8:30PM  - Nightfloat Team FROM 8:30 -7:30 AM    INTERVAL HPI/OVERNIGHT EVENTS: Pt examined at bedside this am. No overnight events. No complaints. Sitting out of bed to chair. Passed TOV. Still having episodes of hematuria. Eliquis held.     MEDICATIONS  (STANDING):  albuterol/ipratropium for Nebulization 3 milliLiter(s) Nebulizer every 12 hours  atorvastatin 40 milliGRAM(s) Oral at bedtime  chlorhexidine 2% Cloths 1 Application(s) Topical <User Schedule>  dextrose 5%. 1000 milliLiter(s) (100 mL/Hr) IV Continuous <Continuous>  dextrose 5%. 1000 milliLiter(s) (50 mL/Hr) IV Continuous <Continuous>  dextrose 50% Injectable 25 Gram(s) IV Push once  dextrose 50% Injectable 12.5 Gram(s) IV Push once  dextrose 50% Injectable 25 Gram(s) IV Push once  digoxin     Tablet 125 MICROGram(s) Oral every other day  escitalopram 5 milliGRAM(s) Oral daily  glucagon  Injectable 1 milliGRAM(s) IntraMuscular once  guaiFENesin  milliGRAM(s) Oral every 12 hours  insulin lispro (ADMELOG) corrective regimen sliding scale   SubCutaneous three times a day before meals  insulin lispro (ADMELOG) corrective regimen sliding scale   SubCutaneous at bedtime  lactated ringers. 1000 milliLiter(s) (75 mL/Hr) IV Continuous <Continuous>  levothyroxine 75 MICROGram(s) Oral daily  nystatin Powder 1 Application(s) Topical three times a day  sodium chloride 0.9% for Nebulization 3 milliLiter(s) Nebulizer every 12 hours  tamsulosin 0.4 milliGRAM(s) Oral at bedtime    MEDICATIONS  (PRN):  acetaminophen     Tablet .. 650 milliGRAM(s) Oral every 6 hours PRN Temp greater or equal to 38C (100.4F), Mild Pain (1 - 3)  dextrose Oral Gel 15 Gram(s) Oral once PRN Blood Glucose LESS THAN 70 milliGRAM(s)/deciliter      REVIEW OF SYSTEMS:  CONSTITUTIONAL: No fever, weight loss, or fatigue  RESPIRATORY: No shortness of breath  CARDIOVASCULAR: No chest pain  GASTROINTESTINAL: No abdominal pain.  GENITOURINARY: No dysuria  NEUROLOGICAL: No headaches  SKIN: No itching, burning, rashes    Vital Signs Last 24 Hrs  T(C): 36.4 (08 Apr 2025 05:27), Max: 36.7 (07 Apr 2025 13:35)  T(F): 97.6 (08 Apr 2025 05:27), Max: 98 (07 Apr 2025 13:35)  HR: 70 (08 Apr 2025 05:27) (69 - 76)  BP: 123/59 (08 Apr 2025 05:27) (112/63 - 155/80)  BP(mean): 73 (08 Apr 2025 05:27) (73 - 76)  RR: 17 (08 Apr 2025 05:27) (17 - 18)  SpO2: 98% (08 Apr 2025 05:27) (96% - 98%)    Parameters below as of 08 Apr 2025 05:27  Patient On (Oxygen Delivery Method): room air        PHYSICAL EXAMINATION:  GENERAL: OOBC, comfortable,   HEAD:  Atraumatic, Normocephalic  EYES:  conjunctiva and sclera clear  CHEST/LUNG: Clear to auscultation. No rales, rhonchi, wheezing, or rubs  HEART: Regular rate and rhythm; No murmurs, rubs, or gallops  ABDOMEN: Soft, Nontender, Nondistended; Bowel sounds present  NERVOUS SYSTEM:  Alert & Oriented X3,    EXTREMITIES:  2+ Peripheral Pulses, No clubbing, cyanosis, or edema  SKIN: warm dry                          11.2   7.09  )-----------( 185      ( 08 Apr 2025 05:11 )             34.4     04-08    135  |  103  |  30[H]  ----------------------------<  160[H]  4.0   |  27  |  1.52[H]    Ca    9.5      08 Apr 2025 05:11  Phos  3.1     04-08  Mg     2.0     04-08    TPro  5.7[L]  /  Alb  2.6[L]  /  TBili  1.1  /  DBili  x   /  AST  651[H]  /  ALT  468[H]  /  AlkPhos  337[H]  04-08    LIVER FUNCTIONS - ( 08 Apr 2025 05:11 )  Alb: 2.6 g/dL / Pro: 5.7 g/dL / ALK PHOS: 337 U/L / ALT: 468 U/L DA / AST: 651 U/L / GGT: x               PT/INR - ( 07 Apr 2025 15:30 )   PT: 11.4 sec;   INR: 0.98 ratio         PTT - ( 07 Apr 2025 15:30 )  PTT:32.3 sec    CAPILLARY BLOOD GLUCOSE      RADIOLOGY & ADDITIONAL TESTS:

## 2025-04-08 NOTE — PROGRESS NOTE ADULT - ATTENDING COMMENTS
I agree with the resident progress note/outlined plan of care. I have the edited the above note as needed. My independent findings and conclusions are documented.    Patient seen at the bedside with family, reports he is eating better, drinking Glucerna today. Denies any major complaints.   As the BS are labile will continue the same insulin regimen today

## 2025-04-08 NOTE — DISCHARGE NOTE PROVIDER - NSDCCPCAREPLAN_GEN_ALL_CORE_FT
PRINCIPAL DISCHARGE DIAGNOSIS  Diagnosis: PNA (pneumonia)  Assessment and Plan of Treatment: You presented with an elevated white blood cell count (marker of infection) and a cough. CT scan imaging of your chest revealed mucus plugging. You completed a course of antibiotics throughout your stay.   Please FOLLOW UP with your primary care physician regarding this recent hospitalization and for further management.        SECONDARY DISCHARGE DIAGNOSES  Diagnosis: Chronic atrial fibrillation  Assessment and Plan of Treatment: You have a history of atrial fibrillation. Your Eliquis medication was held for a period of time during the hospitalization because you were experiencing hematuria (blood in the urine).    Diagnosis: Hypothyroidism  Assessment and Plan of Treatment:      PRINCIPAL DISCHARGE DIAGNOSIS  Diagnosis: PNA (pneumonia)  Assessment and Plan of Treatment: You presented with an elevated white blood cell count (marker of infection) and a cough. CT scan imaging of your chest revealed mucus plugging. You completed a course of antibiotics throughout your stay.   Please FOLLOW UP with your primary care physician regarding this recent hospitalization and for further management.        SECONDARY DISCHARGE DIAGNOSES  Diagnosis: Chronic atrial fibrillation  Assessment and Plan of Treatment: You have a history of atrial fibrillation. Your Eliquis medication was held for a period of time during the hospitalization because you were experiencing hematuria (blood in the urine).    Diagnosis: Hypothyroidism  Assessment and Plan of Treatment: You have a history of hypothyroidism. Please CONTINUE your home medication regimen.    Diagnosis: HLD (hyperlipidemia)  Assessment and Plan of Treatment: You have hyperlipidemia. Please continue to take your cholesterol medications. Maintain a healthy diet that consist of low sugar, low fat, low sodium. Decrease the amount of fried foods that you consume. Exercise frequently if possible. Please follow up with  your primary care provider within 1 week of your discharge.  You are recommended a DASH Diet that emphasizes mostly vegetables, fruits, and fat-free or low-fat dairy products. Please limit intake of sodium, sweets, beverages w/ high sugar/carbohydrate content.      Diagnosis: DM (diabetes mellitus)  Assessment and Plan of Treatment: Continue with your blood sugar medication. HbA1C on admission was 9.4%. This is a reflection of your blood sugar level over the last 3 months.  Please check your blood sugar levels twice daily - Morning/Afternoon, and Lunch/Bedtime the following day. Keep a record of your blood sugar readings  You must maintain a healthy diet that consists of low sugar and low fat. Your diet must consist of mostly vegetables. Please limit your intake of high sugar and high carbohydrate foods such as pasta, rice, and bread. Exercise frequently if possible. Follow up with primary care physician within one week of your discharge to further manage your diabetes and monitor your Hemoglobin A1c levels after 3 months to evaluate your diabetes control.       PRINCIPAL DISCHARGE DIAGNOSIS  Diagnosis: PNA (pneumonia)  Assessment and Plan of Treatment: You presented with an elevated white blood cell count (marker of infection) and a cough. CT scan imaging of your chest revealed mucus plugging. You completed a course of antibiotics throughout your stay.   Please FOLLOW UP with your primary care physician regarding this recent hospitalization and for further management.        SECONDARY DISCHARGE DIAGNOSES  Diagnosis: Chronic atrial fibrillation  Assessment and Plan of Treatment: You have a history of atrial fibrillation. Your Eliquis medication was held for a period of time during the hospitalization because you were experiencing hematuria (blood in the urine).  Please STOP taking your Eliquis medication until Dr. Huitron evaluates you at the facility in a week.    Diagnosis: Transaminitis  Assessment and Plan of Treatment: You were found to have elevated liver enzymes, but they have been downtrending. It could have been medication induced, so we stopped your statin medication. Dr. Huitron will continue to follow your Liver enzymes in the facility.    Diagnosis: Hypothyroidism  Assessment and Plan of Treatment: You have a history of hypothyroidism. Please CONTINUE your home medication regimen.    Diagnosis: HLD (hyperlipidemia)  Assessment and Plan of Treatment: You have hyperlipidemia. Please continue to take your cholesterol medications. Maintain a healthy diet that consist of low sugar, low fat, low sodium. Decrease the amount of fried foods that you consume. Exercise frequently if possible. Please follow up with  your primary care provider within 1 week of your discharge.  You are recommended a DASH Diet that emphasizes mostly vegetables, fruits, and fat-free or low-fat dairy products. Please limit intake of sodium, sweets, beverages w/ high sugar/carbohydrate content.      Diagnosis: DM (diabetes mellitus)  Assessment and Plan of Treatment: Continue with your blood sugar medication. HbA1C on admission was 9.4%. This is a reflection of your blood sugar level over the last 3 months.  Please check your blood sugar levels twice daily - Morning/Afternoon, and Lunch/Bedtime the following day. Keep a record of your blood sugar readings  You must maintain a healthy diet that consists of low sugar and low fat. Your diet must consist of mostly vegetables. Please limit your intake of high sugar and high carbohydrate foods such as pasta, rice, and bread. Exercise frequently if possible. Follow up with primary care physician within one week of your discharge to further manage your diabetes and monitor your Hemoglobin A1c levels after 3 months to evaluate your diabetes control.       PRINCIPAL DISCHARGE DIAGNOSIS  Diagnosis: PNA (pneumonia)  Assessment and Plan of Treatment: You presented with an elevated white blood cell count (marker of infection) and a cough. CT scan imaging of your chest revealed mucus plugging and suspicion for pneumonia for which you completed a course of IV Ceftriaxone and Azithromycin. This resolved your symptoms and there is no further interventions to be done at the hospital at this time. Please FOLLOW UP with your primary care physician regarding this recent hospitalization and for further monitoring.         SECONDARY DISCHARGE DIAGNOSES  Diagnosis: Transaminitis  Assessment and Plan of Treatment: You were found to have elevated liver enzymes. Possible cause of this elevation is suspected to be related to your atorvastatin medication. Your liver enzyme levels are now downtrending. Please continue to hold atorvastatin until you see Dr. Huitron within a week from discharge since he would determine if you need further workup if this levels do not improve.    Diagnosis: Chronic atrial fibrillation  Assessment and Plan of Treatment: You have a history of atrial fibrillation. Your Eliquis medication was held for a period of time during the hospitalization because you were experiencing hematuria (blood in the urine).  Please STOP taking your Eliquis medication until Dr. Huitron evaluates you at the facility in a week to make sure you do not have another episode of bleeding in the urine.    Diagnosis: Hypothyroidism  Assessment and Plan of Treatment: You have a history of hypothyroidism. Please CONTINUE your home medication regimen.    Diagnosis: DM (diabetes mellitus)  Assessment and Plan of Treatment: Continue with your blood sugar medication. HbA1C on admission was 9.4%. This is a reflection of your blood sugar level over the last 3 months.  Please check your blood sugar levels twice daily - Morning/Afternoon, and Lunch/Bedtime the following day. Keep a record of your blood sugar readings  You must maintain a healthy diet that consists of low sugar and low fat. Your diet must consist of mostly vegetables. Please limit your intake of high sugar and high carbohydrate foods such as pasta, rice, and bread. Exercise frequently if possible. Follow up with primary care physician within one week of your discharge to further manage your diabetes and monitor your Hemoglobin A1c levels after 3 months to evaluate your diabetes control.      Diagnosis: HLD (hyperlipidemia)  Assessment and Plan of Treatment: You have hyperlipidemia. Please continue to take your cholesterol medications. Maintain a healthy diet that consist of low sugar, low fat, low sodium. Decrease the amount of fried foods that you consume. Exercise frequently if possible. Please follow up with  your primary care provider within 1 week of your discharge.  You are recommended a DASH Diet that emphasizes mostly vegetables, fruits, and fat-free or low-fat dairy products. Please limit intake of sodium, sweets, beverages w/ high sugar/carbohydrate content.

## 2025-04-08 NOTE — PROGRESS NOTE ADULT - PROBLEM SELECTOR PLAN 5
as per surescipt, on coreg, digoxin and eliquis  c/w meds with holding parameters  Digoxin levels normal     - follow up digoxin levels every two days  -eliquis held iso hematuria

## 2025-04-08 NOTE — PROGRESS NOTE ADULT - SUBJECTIVE AND OBJECTIVE BOX
Subjective:  Chart Notes, Work list Manager, and fingersticks reviewed.    Review of Systems:  Constitutional: No fever  Eyes: No blurry vision  Neuro: No tremors  HEENT: No pain  Cardiovascular: No chest pain, palpitations  Respiratory: No SOB, no cough  GI: No nausea, vomiting, abdominal pain  : No dysuria  Skin: no rash  Psych: no depression  Endocrine: no polyuria, polydipsia  Hem/lymph: no swelling  Osteoporosis: no fractures    ALL OTHER SYSTEMS REVIEWED AND NEGATIVE    UNABLE TO OBTAIN    MEDICATIONS  (STANDING):  albuterol/ipratropium for Nebulization 3 milliLiter(s) Nebulizer every 12 hours  atorvastatin 40 milliGRAM(s) Oral at bedtime  chlorhexidine 2% Cloths 1 Application(s) Topical <User Schedule>  dextrose 5%. 1000 milliLiter(s) (100 mL/Hr) IV Continuous <Continuous>  dextrose 5%. 1000 milliLiter(s) (50 mL/Hr) IV Continuous <Continuous>  dextrose 50% Injectable 25 Gram(s) IV Push once  dextrose 50% Injectable 12.5 Gram(s) IV Push once  dextrose 50% Injectable 25 Gram(s) IV Push once  digoxin     Tablet 125 MICROGram(s) Oral every other day  escitalopram 5 milliGRAM(s) Oral daily  glucagon  Injectable 1 milliGRAM(s) IntraMuscular once  guaiFENesin  milliGRAM(s) Oral every 12 hours  insulin lispro (ADMELOG) corrective regimen sliding scale   SubCutaneous three times a day before meals  insulin lispro (ADMELOG) corrective regimen sliding scale   SubCutaneous at bedtime  lactated ringers. 1000 milliLiter(s) (75 mL/Hr) IV Continuous <Continuous>  levothyroxine 75 MICROGram(s) Oral daily  nystatin Powder 1 Application(s) Topical three times a day  sodium chloride 0.9% for Nebulization 3 milliLiter(s) Nebulizer every 12 hours  tamsulosin 0.4 milliGRAM(s) Oral at bedtime    MEDICATIONS  (PRN):  acetaminophen     Tablet .. 650 milliGRAM(s) Oral every 6 hours PRN Temp greater or equal to 38C (100.4F), Mild Pain (1 - 3)  dextrose Oral Gel 15 Gram(s) Oral once PRN Blood Glucose LESS THAN 70 milliGRAM(s)/deciliter      PHYSICAL EXAM:  VITALS: T(C): 36.4 (04-08-25 @ 05:27)  T(F): 97.6 (04-08-25 @ 05:27), Max: 98 (04-07-25 @ 13:35)  HR: 70 (04-08-25 @ 05:27) (69 - 76)  BP: 123/59 (04-08-25 @ 05:27) (112/63 - 155/80)  RR:  (17 - 18)  SpO2:  (96% - 98%)  Wt(kg): --  GENERAL: NAD,   HEENT:  Atraumatic, Normocephalic, drymucous membranes  THYROID: Normal size, no palpable nodules  RESPIRATORY: Clear to auscultation bilaterally; No rales, rhonchi, wheezing  CARDIOVASCULAR: Regular rate and rhythm; No murmurs; no peripheral edema  GI: Soft, nontender, non distended, +ve abdominal obesity  EXTREMITIES: +ve peripheral pulses, -ve pedal edema  SKIN: Dry, intact, No rashes or lesions  PSYCH: Alert and oriented x 3    CAPILLARY BLOOD GLUCOSE      POCT Blood Glucose.: 182 mg/dL (08 Apr 2025 07:59)  POCT Blood Glucose.: 304 mg/dL (07 Apr 2025 22:36)  POCT Blood Glucose.: 232 mg/dL (07 Apr 2025 17:24)  POCT Blood Glucose.: 226 mg/dL (07 Apr 2025 11:32)    04-08    135  |  103  |  30[H]  ----------------------------<  160[H]  4.0   |  27  |  1.52[H]    eGFR: 42[L]    Ca    9.5      04-08  Mg     2.0     04-08  Phos  3.1     04-08    TPro  5.7[L]  /  Alb  2.6[L]  /  TBili  1.1  /  DBili  x   /  AST  651[H]  /  ALT  468[H]  /  AlkPhos  337[H]  04-08    Thyroid Function Tests:  04-04 @ 05:45 TSH 3.85 FreeT4 -- T3 -- Anti TPO -- Anti Thyroglobulin Ab -- TSI --        Assessment and Plan:    1) Type 2 diabetes:      Recommendations:  - Basal Insulin:   Glargine  (Lantus) units once daily    - Nutritional Insulin:  Lispro (Admelog) units with breakfast, hold if NPO or eating <50% of meals  Lispro (Admelog) units with lunch, hold if NPO or eating <50% of meals  Lispro (Admelog) units with dinner, hold if NPO or eating <50% of meals    - Correctional (Sliding) Insulin:  Low Lispro (Admelog) sliding scale with meals and bedtime    - Oral Medications:  None in the hospital               Subjective:  Chart Notes, Work list Manager, and fingersticks reviewed.  Pt is a limited narrator, but has no complaints, says he feels fine, a little sleepy    Review of Systems:  Constitutional: No fever  Eyes: No blurry vision  Neuro: No tremors  HEENT: No pain  Cardiovascular: No chest pain, palpitations  Respiratory: No SOB, no cough  GI: No nausea, vomiting, abdominal pain  : No dysuria  Skin: no rash  Psych: no depression  Endocrine: no polyuria, polydipsia  Hem/lymph: no swelling  Osteoporosis: no fractures    ALL OTHER SYSTEMS REVIEWED AND NEGATIVE    UNABLE TO OBTAIN    MEDICATIONS  (STANDING):  albuterol/ipratropium for Nebulization 3 milliLiter(s) Nebulizer every 12 hours  atorvastatin 40 milliGRAM(s) Oral at bedtime  chlorhexidine 2% Cloths 1 Application(s) Topical <User Schedule>  dextrose 5%. 1000 milliLiter(s) (100 mL/Hr) IV Continuous <Continuous>  dextrose 5%. 1000 milliLiter(s) (50 mL/Hr) IV Continuous <Continuous>  dextrose 50% Injectable 25 Gram(s) IV Push once  dextrose 50% Injectable 12.5 Gram(s) IV Push once  dextrose 50% Injectable 25 Gram(s) IV Push once  digoxin     Tablet 125 MICROGram(s) Oral every other day  escitalopram 5 milliGRAM(s) Oral daily  glucagon  Injectable 1 milliGRAM(s) IntraMuscular once  guaiFENesin  milliGRAM(s) Oral every 12 hours  insulin lispro (ADMELOG) corrective regimen sliding scale   SubCutaneous three times a day before meals  insulin lispro (ADMELOG) corrective regimen sliding scale   SubCutaneous at bedtime  lactated ringers. 1000 milliLiter(s) (75 mL/Hr) IV Continuous <Continuous>  levothyroxine 75 MICROGram(s) Oral daily  nystatin Powder 1 Application(s) Topical three times a day  sodium chloride 0.9% for Nebulization 3 milliLiter(s) Nebulizer every 12 hours  tamsulosin 0.4 milliGRAM(s) Oral at bedtime    MEDICATIONS  (PRN):  acetaminophen     Tablet .. 650 milliGRAM(s) Oral every 6 hours PRN Temp greater or equal to 38C (100.4F), Mild Pain (1 - 3)  dextrose Oral Gel 15 Gram(s) Oral once PRN Blood Glucose LESS THAN 70 milliGRAM(s)/deciliter      PHYSICAL EXAM:  VITALS: T(C): 36.4 (04-08-25 @ 05:27)  T(F): 97.6 (04-08-25 @ 05:27), Max: 98 (04-07-25 @ 13:35)  HR: 70 (04-08-25 @ 05:27) (69 - 76)  BP: 123/59 (04-08-25 @ 05:27) (112/63 - 155/80)  RR:  (17 - 18)  SpO2:  (96% - 98%)  Wt(kg): --  GENERAL: NAD, elderly, frail  HEENT:  Atraumatic, Normocephalic, drymucous membranes  THYROID: Normal size, no palpable nodules  RESPIRATORY: Clear to auscultation bilaterally; No rales, rhonchi, wheezing  CARDIOVASCULAR: Regular rate and rhythm; No murmurs; no peripheral edema  GI: Soft, nontender, non distended, +ve abdominal obesity  EXTREMITIES:  -ve pedal edema  SKIN: Dry, intact, purpura on arms b/l  PSYCH: Alert and oriented x 3    CAPILLARY BLOOD GLUCOSE      POCT Blood Glucose.: 182 mg/dL (08 Apr 2025 07:59)  POCT Blood Glucose.: 304 mg/dL (07 Apr 2025 22:36)  POCT Blood Glucose.: 232 mg/dL (07 Apr 2025 17:24)  POCT Blood Glucose.: 226 mg/dL (07 Apr 2025 11:32)    04-08    135  |  103  |  30[H]  ----------------------------<  160[H]  4.0   |  27  |  1.52[H]    eGFR: 42[L]    Ca    9.5      04-08  Mg     2.0     04-08  Phos  3.1     04-08    TPro  5.7[L]  /  Alb  2.6[L]  /  TBili  1.1  /  DBili  x   /  AST  651[H]  /  ALT  468[H]  /  AlkPhos  337[H]  04-08    Thyroid Function Tests:  04-04 @ 05:45 TSH 3.85 FreeT4 -- T3 -- Anti TPO -- Anti Thyroglobulin Ab -- TSI --        Assessment and Plan:   89y/M, from home, PMH of HTN, DM, Afib on eliquis, Hypothyroidism, Depression, BPH, HLD was brought in by family friend, who is taking care of him, for rigor and urinary incontinence X1 day  Endocrinology is consulted for elevated A1C    1) Poorly controlled Type 2 diabetes:  A1C is above goal based upon his age  Inpatient BS are suboptimally controlled, up to 300s  Patient is eating well  Will change the same insulin regimen as below:    Inpatient Recommendations:  Basal Insulin:   none    Nutritional Insulin:  none    Correctional Insulin:  Continue low Lispro ( Admelog) correctional scale with meals and bedtime    Oral Diabetes Medications:  None in the hospital     Subjective:  Chart Notes, Work list Manager, and fingersticks reviewed.  Pt is a limited narrator, but has no complaints, says he feels fine, a little sleepy    Review of Systems:  Constitutional: No fever  Eyes: No blurry vision  Neuro: No tremors  HEENT: No pain  Cardiovascular: No chest pain, palpitations  Respiratory: No SOB, no cough  GI: No nausea, vomiting, abdominal pain  : No dysuria  Skin: no rash  Psych: no depression  Endocrine: no polyuria, polydipsia  Hem/lymph: no swelling  Osteoporosis: no fractures    ALL OTHER SYSTEMS REVIEWED AND NEGATIVE    UNABLE TO OBTAIN    MEDICATIONS  (STANDING):  albuterol/ipratropium for Nebulization 3 milliLiter(s) Nebulizer every 12 hours  atorvastatin 40 milliGRAM(s) Oral at bedtime  chlorhexidine 2% Cloths 1 Application(s) Topical <User Schedule>  dextrose 5%. 1000 milliLiter(s) (100 mL/Hr) IV Continuous <Continuous>  dextrose 5%. 1000 milliLiter(s) (50 mL/Hr) IV Continuous <Continuous>  dextrose 50% Injectable 25 Gram(s) IV Push once  dextrose 50% Injectable 12.5 Gram(s) IV Push once  dextrose 50% Injectable 25 Gram(s) IV Push once  digoxin     Tablet 125 MICROGram(s) Oral every other day  escitalopram 5 milliGRAM(s) Oral daily  glucagon  Injectable 1 milliGRAM(s) IntraMuscular once  guaiFENesin  milliGRAM(s) Oral every 12 hours  insulin lispro (ADMELOG) corrective regimen sliding scale   SubCutaneous three times a day before meals  insulin lispro (ADMELOG) corrective regimen sliding scale   SubCutaneous at bedtime  lactated ringers. 1000 milliLiter(s) (75 mL/Hr) IV Continuous <Continuous>  levothyroxine 75 MICROGram(s) Oral daily  nystatin Powder 1 Application(s) Topical three times a day  sodium chloride 0.9% for Nebulization 3 milliLiter(s) Nebulizer every 12 hours  tamsulosin 0.4 milliGRAM(s) Oral at bedtime    MEDICATIONS  (PRN):  acetaminophen     Tablet .. 650 milliGRAM(s) Oral every 6 hours PRN Temp greater or equal to 38C (100.4F), Mild Pain (1 - 3)  dextrose Oral Gel 15 Gram(s) Oral once PRN Blood Glucose LESS THAN 70 milliGRAM(s)/deciliter      PHYSICAL EXAM:  VITALS: T(C): 36.4 (04-08-25 @ 05:27)  T(F): 97.6 (04-08-25 @ 05:27), Max: 98 (04-07-25 @ 13:35)  HR: 70 (04-08-25 @ 05:27) (69 - 76)  BP: 123/59 (04-08-25 @ 05:27) (112/63 - 155/80)  RR:  (17 - 18)  SpO2:  (96% - 98%)  Wt(kg): --  GENERAL: NAD, elderly, frail  HEENT:  Atraumatic, Normocephalic, drymucous membranes  THYROID: Normal size, no palpable nodules  RESPIRATORY: Clear to auscultation bilaterally; No rales, rhonchi, wheezing  CARDIOVASCULAR: Regular rate and rhythm; No murmurs; no peripheral edema  GI: Soft, nontender, non distended, +ve abdominal obesity  EXTREMITIES:  -ve pedal edema  SKIN: Dry, intact, purpura on arms b/l  PSYCH: Alert and oriented x 3    CAPILLARY BLOOD GLUCOSE      POCT Blood Glucose.: 182 mg/dL (08 Apr 2025 07:59)  POCT Blood Glucose.: 304 mg/dL (07 Apr 2025 22:36)  POCT Blood Glucose.: 232 mg/dL (07 Apr 2025 17:24)  POCT Blood Glucose.: 226 mg/dL (07 Apr 2025 11:32)    04-08    135  |  103  |  30[H]  ----------------------------<  160[H]  4.0   |  27  |  1.52[H]    eGFR: 42[L]    Ca    9.5      04-08  Mg     2.0     04-08  Phos  3.1     04-08    TPro  5.7[L]  /  Alb  2.6[L]  /  TBili  1.1  /  DBili  x   /  AST  651[H]  /  ALT  468[H]  /  AlkPhos  337[H]  04-08    Thyroid Function Tests:  04-04 @ 05:45 TSH 3.85 FreeT4 -- T3 -- Anti TPO -- Anti Thyroglobulin Ab -- TSI --        Assessment and Plan:   89y/M, from home, PMH of HTN, DM, Afib on eliquis, Hypothyroidism, Depression, BPH, HLD was brought in by family friend, who is taking care of him, for rigor and urinary incontinence X1 day  Endocrinology is consulted for elevated A1C    1) Poorly controlled Type 2 diabetes:  A1C is above goal based upon his age  Patient says he is eating well, but unclear if this is true  Inpatient BS are suboptimally controlled, up to 300s last night, but given variability in sugars and that sugars are in 160s-180s now today, will continue the same insulin regimen, low dose correctional scale lispro ACHS      Inpatient Recommendations:  Basal Insulin:   none    Nutritional Insulin:  none    Correctional Insulin:  Continue low Lispro ( Admelog) correctional scale with meals and bedtime    Oral Diabetes Medications:  None in the hospital        Discussed with Dr. Palencia    THIS NOTE IS INCOMPLETE AND ALL RECOMMENDATIONS ARE PRELIMINARY UNTIL SIGNED BY ATTENDING.     Subjective:  Chart Notes, Work list Manager, and fingersticks reviewed.  Pt is a limited narrator, but has no complaints, says he feels fine, a little sleepy    Review of Systems:  Constitutional: No fever  Cardiovascular: No chest pain, palpitations  Respiratory: No SOB, no cough  GI: No nausea, vomiting, abdominal pain  Endocrine: no polyuria, polydipsia    Medications  (Standing):  albuterol/ipratropium for Nebulization 3 milliLiter(s) Nebulizer every 12 hours  atorvastatin 40 milliGRAM(s) Oral at bedtime  chlorhexidine 2% Cloths 1 Application(s) Topical <User Schedule>  dextrose 5%. 1000 milliLiter(s) (100 mL/Hr) IV Continuous <Continuous>  dextrose 5%. 1000 milliLiter(s) (50 mL/Hr) IV Continuous <Continuous>  dextrose 50% Injectable 25 Gram(s) IV Push once  dextrose 50% Injectable 12.5 Gram(s) IV Push once  dextrose 50% Injectable 25 Gram(s) IV Push once  digoxin     Tablet 125 MICROGram(s) Oral every other day  escitalopram 5 milliGRAM(s) Oral daily  glucagon  Injectable 1 milliGRAM(s) IntraMuscular once  guaiFENesin  milliGRAM(s) Oral every 12 hours  insulin lispro (ADMELOG) corrective regimen sliding scale   SubCutaneous three times a day before meals  insulin lispro (ADMELOG) corrective regimen sliding scale   SubCutaneous at bedtime  lactated ringers. 1000 milliLiter(s) (75 mL/Hr) IV Continuous <Continuous>  levothyroxine 75 MICROGram(s) Oral daily  nystatin Powder 1 Application(s) Topical three times a day  sodium chloride 0.9% for Nebulization 3 milliLiter(s) Nebulizer every 12 hours  tamsulosin 0.4 milliGRAM(s) Oral at bedtime    Medications  (PRN):  acetaminophen     Tablet .. 650 milliGRAM(s) Oral every 6 hours PRN Temp greater or equal to 38C (100.4F), Mild Pain (1 - 3)  dextrose Oral Gel 15 Gram(s) Oral once PRN Blood Glucose LESS THAN 70 milliGRAM(s)/deciliter      Physical examination:  VITALS: T(C): 36.4 (04-08-25 @ 05:27)  T(F): 97.6 (04-08-25 @ 05:27), Max: 98 (04-07-25 @ 13:35)  HR: 70 (04-08-25 @ 05:27) (69 - 76)  BP: 123/59 (04-08-25 @ 05:27) (112/63 - 155/80)  RR:  (17 - 18)  SpO2:  (96% - 98%)    GENERAL: NAD, elderly, frail  HEENT:  Dry mucous membranes  THYROID: Normal size, no palpable nodules  RESPIRATORY: Clear to auscultation bilaterally; No rales, rhonchi, wheezing  CARDIOVASCULAR: Regular rate and rhythm; No murmurs; no peripheral edema  GI: Soft, nontender, non distended, +ve abdominal obesity  EXTREMITIES:  -ve pedal edema  SKIN: Dry, intact, purpura on arms b/l  PSYCH: Alert and oriented x 3    Labs:  Capillary Blood Glucose:  POCT Blood Glucose.: 182 mg/dL (08 Apr 2025 07:59)  POCT Blood Glucose.: 304 mg/dL (07 Apr 2025 22:36)  POCT Blood Glucose.: 232 mg/dL (07 Apr 2025 17:24)  POCT Blood Glucose.: 226 mg/dL (07 Apr 2025 11:32)    04-08  135  |  103  |  30[H]  ----------------------------<  160[H]  4.0   |  27  |  1.52[H]  eGFR: 42[L]  Ca    9.5      04-08  Mg     2.0     04-08  Phos  3.1     04-08  A1C with Estimated Average Glucose Result: 9.4 % (04.04.25 @ 05:45)    Assessment and Plan:   89y/M, from home, PMH of HTN, DM, Afib on eliquis, Hypothyroidism, Depression, BPH, HLD was brought in by family friend, who is taking care of him, for rigor and urinary incontinence X1 day.  Endocrinology is consulted for elevated A1C    1) Poorly controlled Type 2 diabetes:  A1C is above goal based upon his age  Patient says he is eating well, but unclear if this is true  Inpatient BS are suboptimally controlled, up to 300s last night, but given variability in sugars and that sugars are in 160s-180s now today, will continue the same insulin regimen, low dose correctional scale lispro ACHS    Inpatient Recommendations:  Basal Insulin and Nutritional Insulin  none    Correctional Insulin:  Continue low Lispro ( Admelog) correctional scale with meals and bedtime    Oral Diabetes Medications:  None in the hospital    Discharge recommendations:  Continue the same low lispro correctional scale with meals and bedtime in the rehab    Discussed with Dr. Palencia

## 2025-04-08 NOTE — DISCHARGE NOTE PROVIDER - CARE PROVIDER_API CALL
Rudy Cortes  Internal Medicine  [address], [facility] [address]  [place] [address]  Phone: [phone]  Fax: [phone]  Follow Up Time:     Gianna Palencia  Endocrinology/Metab/Diabetes  [address], [facility] [address]  [place] [address]  Phone: [phone]  Fax: [phone]  Follow Up Time:

## 2025-04-08 NOTE — DISCHARGE NOTE PROVIDER - NSDCMRMEDTOKEN_GEN_ALL_CORE_FT
apixaban 2.5 mg oral tablet: 1 tab(s) orally 2 times a day  aspirin 81 mg oral tablet, chewable: 1 tab(s) orally once a day  atorvastatin 40 mg oral tablet: 1 tab(s) orally once a day  Coreg 3.125 mg oral tablet: 1 tab(s) orally 2 times a day  digoxin 125 mcg (0.125 mg) oral tablet: 1 tab(s) orally every other day  Entresto 24 mg-26 mg oral tablet: 1 tab(s) orally 2 times a day  escitalopram 5 mg oral tablet: 1 tab(s) orally once a day  Januvia 25 mg oral tablet: 1 tab(s) orally once a day  levothyroxine 75 mcg (0.075 mg) oral capsule: 1 cap(s) orally once a day  tamsulosin 0.4 mg oral capsule: 1 cap(s) orally once a day (at bedtime)   Coreg 3.125 mg oral tablet: 1 tab(s) orally 2 times a day  digoxin 125 mcg (0.125 mg) oral tablet: 1 tab(s) orally every other day  Entresto 24 mg-26 mg oral tablet: 1 tab(s) orally 2 times a day  escitalopram 5 mg oral tablet: 1 tab(s) orally once a day  Januvia 25 mg oral tablet: 1 tab(s) orally once a day  levothyroxine 75 mcg (0.075 mg) oral capsule: 1 cap(s) orally once a day  tamsulosin 0.4 mg oral capsule: 1 cap(s) orally once a day (at bedtime)

## 2025-04-08 NOTE — PROGRESS NOTE ADULT - SUBJECTIVE AND OBJECTIVE BOX
Patient is a 89y old  Male who presents with a chief complaint of Pneumonia (08 Apr 2025 09:19)  Asleep, laying in bed in NAD. Doing well on RA    INTERVAL HPI/OVERNIGHT EVENTS:      VITAL SIGNS:  T(F): 97.6 (04-08-25 @ 05:27)  HR: 70 (04-08-25 @ 05:27)  BP: 123/59 (04-08-25 @ 05:27)  RR: 17 (04-08-25 @ 05:27)  SpO2: 98% (04-08-25 @ 05:27)  Wt(kg): --  I&O's Detail          REVIEW OF SYSTEMS:    CONSTITUTIONAL:  No fevers, chills, sweats    HEENT:  Eyes:  No diplopia or blurred vision. ENT:  No earache, sore throat or runny nose.    CARDIOVASCULAR:  No pressure, squeezing, tightness, or heaviness about the chest; no palpitations.    RESPIRATORY:  Per HPI    GASTROINTESTINAL:  No abdominal pain, nausea, vomiting or diarrhea.    GENITOURINARY:  No dysuria, frequency or urgency.    NEUROLOGIC:  No paresthesias, fasciculations, seizures or weakness.    PSYCHIATRIC:  No disorder of thought or mood.      PHYSICAL EXAM:    Constitutional: Well developed and nourished  Eyes:Perrla  ENMT: normal  Neck:supple  Respiratory: good air entry  Cardiovascular: S1 S2 regular  Gastrointestinal: Soft, Non tender  Extremities: No edema  Vascular:normal  Neurological:Awake, alert,Ox3  Musculoskeletal:Normal      MEDICATIONS  (STANDING):  albuterol/ipratropium for Nebulization 3 milliLiter(s) Nebulizer every 12 hours  atorvastatin 40 milliGRAM(s) Oral at bedtime  chlorhexidine 2% Cloths 1 Application(s) Topical <User Schedule>  dextrose 5%. 1000 milliLiter(s) (100 mL/Hr) IV Continuous <Continuous>  dextrose 5%. 1000 milliLiter(s) (50 mL/Hr) IV Continuous <Continuous>  dextrose 50% Injectable 25 Gram(s) IV Push once  dextrose 50% Injectable 12.5 Gram(s) IV Push once  dextrose 50% Injectable 25 Gram(s) IV Push once  digoxin     Tablet 125 MICROGram(s) Oral every other day  escitalopram 5 milliGRAM(s) Oral daily  glucagon  Injectable 1 milliGRAM(s) IntraMuscular once  guaiFENesin  milliGRAM(s) Oral every 12 hours  insulin lispro (ADMELOG) corrective regimen sliding scale   SubCutaneous three times a day before meals  insulin lispro (ADMELOG) corrective regimen sliding scale   SubCutaneous at bedtime  lactated ringers. 1000 milliLiter(s) (75 mL/Hr) IV Continuous <Continuous>  levothyroxine 75 MICROGram(s) Oral daily  nystatin Powder 1 Application(s) Topical three times a day  sodium chloride 0.9% for Nebulization 3 milliLiter(s) Nebulizer every 12 hours  tamsulosin 0.4 milliGRAM(s) Oral at bedtime    MEDICATIONS  (PRN):  acetaminophen     Tablet .. 650 milliGRAM(s) Oral every 6 hours PRN Temp greater or equal to 38C (100.4F), Mild Pain (1 - 3)  dextrose Oral Gel 15 Gram(s) Oral once PRN Blood Glucose LESS THAN 70 milliGRAM(s)/deciliter      Allergies    No Known Allergies    Intolerances        LABS:                        11.2   7.09  )-----------( 185      ( 08 Apr 2025 05:11 )             34.4     04-08    135  |  103  |  30[H]  ----------------------------<  160[H]  4.0   |  27  |  1.52[H]    Ca    9.5      08 Apr 2025 05:11  Phos  3.1     04-08  Mg     2.0     04-08    TPro  5.7[L]  /  Alb  2.6[L]  /  TBili  1.1  /  DBili  x   /  AST  651[H]  /  ALT  468[H]  /  AlkPhos  337[H]  04-08    PT/INR - ( 07 Apr 2025 15:30 )   PT: 11.4 sec;   INR: 0.98 ratio         PTT - ( 07 Apr 2025 15:30 )  PTT:32.3 sec  Urinalysis Basic - ( 08 Apr 2025 05:11 )    Color: x / Appearance: x / SG: x / pH: x  Gluc: 160 mg/dL / Ketone: x  / Bili: x / Urobili: x   Blood: x / Protein: x / Nitrite: x   Leuk Esterase: x / RBC: x / WBC x   Sq Epi: x / Non Sq Epi: x / Bacteria: x            CAPILLARY BLOOD GLUCOSE      POCT Blood Glucose.: 182 mg/dL (08 Apr 2025 07:59)  POCT Blood Glucose.: 304 mg/dL (07 Apr 2025 22:36)  POCT Blood Glucose.: 232 mg/dL (07 Apr 2025 17:24)  POCT Blood Glucose.: 226 mg/dL (07 Apr 2025 11:32)        RADIOLOGY & ADDITIONAL TESTS:  < from: CT Chest No Cont (04.04.25 @ 00:58) >  IMPRESSION:  Tubular opacity in the right lower lobe and bilateral tree-in-bud   opacities, most consistent with mucous impacted airways.    Stable size of right retroperitoneal mass which now demonstrates a large   soft tissue component and peripheral calcification. Findings are favored   to reflect postoperative change.    Bilateral bowel containing inguinal hernia, as described.        < end of copied text >    CXR:    Ct scan chest:    ekg;    echo:

## 2025-04-08 NOTE — DISCHARGE NOTE PROVIDER - NSDCFUADDAPPT_GEN_ALL_CORE_FT
APPTS ARE READY TO BE MADE: [X] YES    Best Family or Patient Contact (if needed):    Additional Information about above appointments (if needed):    1: consider follow up with primary care Dr. Cortes  2: consider follow up with endocrinology Dr. Palencia  3:     Other comments or requests:   APPTS ARE READY TO BE MADE: [X] YES    Best Family or Patient Contact (if needed):    Additional Information about above appointments (if needed):    1: consider follow up with primary care Dr. Cortes  2: consider follow up with endocrinology Dr. Palencia  3:     Other comments or requests:          Patient is being discharged to [facility] for [facility]. Caregiver will arrange follow up.

## 2025-04-08 NOTE — DISCHARGE NOTE PROVIDER - CARE PROVIDERS DIRECT ADDRESSES
[email or web address]

## 2025-04-08 NOTE — DISCHARGE NOTE PROVIDER - HOSPITAL COURSE
89y/M, from home, PMH of HTN, DM, Afib on eliquis, Hypothyroidism, Depression, BPH, HLD was brought in by family friend, who is taking care of him, for rigor and urinary incontinence. CT chest concerning for mucus plugging, likely in the setting of aspiration. Patient admitted for further management.    CXR (-). UA (-). CT Chest: Tubular opacity in the right lower lobe and bilateral tree-in-bud opacities, most consistent with mucous impacted airways. Completed course of CTX + Azithro. Hospital course complicated by hematuria, thus Eliquis had been held. Hemoglobin has remained stable. PT evaluated pt, and recommends [facility]A.        89y/M, from home, PMH of HTN, DM, Afib on eliquis, Hypothyroidism, Depression, BPH, HLD was brought in by family friend, who is taking care of him, for rigor and urinary incontinence. CT chest concerning for mucus plugging, likely in the setting of aspiration. Patient admitted for further management.    CXR (-). UA (-). CT Chest: Tubular opacity in the right lower lobe and bilateral tree-in-bud opacities, most consistent with mucous impacted airways. Completed course of CTX + Azithro. Hospital course complicated by hematuria, thus Eliquis had been held. Hemoglobin has remained stable.     LFTs have been downtrending, DC'd statin. Recommending to trend LFTs next week at facility. Eliquis held iso hematuria, consider re-starting in a week after consulting Dr. Huitron.    PT evaluated pt, and recommends ZAINA.             89y/M, from home, PMH of HTN, DM, Afib on eliquis, Hypothyroidism, Depression, BPH, HLD was brought in by family friend, who is taking care of him, for rigor and urinary incontinence. CT chest concerning for mucus plugging, likely in the setting of aspiration. Patient admitted for further management.    CXR (-). UA (-). CT Chest: Tubular opacity in the right lower lobe and bilateral tree-in-bud opacities, most consistent with mucous impacted airways. Completed course of CTX + Azithro. Hospital course complicated by hematuria, thus Eliquis had been held. Hemoglobin has remained stable.     LFTs have been downtrending, DC'd statin. Recommending to trend LFTs next week at facility. Eliquis held iso hematuria, consider re-starting in a week. Dr. Huitron will be following and managing the LFTs and Eliquis.     PT evaluated pt, and recommends ZAINA.     Patient was medically optimized, stable and ready for discharge. Plan of care and return precautions were discussed with the patient who verbally stated understanding.            89y/M, from home, PMH of HTN, DM, Afib on eliquis, Hypothyroidism, Depression, BPH, HLD was brought in by family friend, who is taking care of him, for rigor and urinary incontinence. However, CT showed Tubular opacity in the right lower lobe and bilateral tree-in-bud opacities, most consistent with mucous impacted airways. Concern for developing PNA in the setting of aspiration after review of imaging. Admitted to medicine for further management. Patient was treated for PNA with CTX + Azithro for which he completed during his admission. Atypical PNA workup was negative. His hospital course was complicated by urinary retention for which a Gonzalez was placed and removed prior to discharge. His course also got complicated by hematuria for which Eliquis was held with resolution of bleeding. After discussion with attending, plan was to continue holding Eliquis until patient follow as outpatient. Hemoglobin remained stable during his admission. His course got further complicated by acute transaminitis for which statin was held with last LFT starting to downtrend. Discussed with attending, and decision was to continue holding statin therapy and follow up with him as outpatient for further eval if necessary after discontinuation of statin. Patient was seen by PT who recommended [facility]. Patient is medically stable and cleared to continue care at [facility] place. Plan of care and return precautions were discussed with the patient who verbally stated understanding.

## 2025-04-09 ENCOUNTER — TRANSCRIPTION ENCOUNTER (OUTPATIENT)
Age: 89
End: 2025-04-09

## 2025-04-09 VITALS
HEART RATE: 70 BPM | RESPIRATION RATE: 17 BRPM | DIASTOLIC BLOOD PRESSURE: 54 MMHG | TEMPERATURE: 97 F | OXYGEN SATURATION: 97 % | SYSTOLIC BLOOD PRESSURE: 100 MMHG

## 2025-04-09 LAB
ALBUMIN SERPL ELPH-MCNC: 2.5 G/DL — LOW (ref 3.5–5)
ALP SERPL-CCNC: 412 U/L — HIGH (ref 40–120)
ALT FLD-CCNC: 535 U/L DA — HIGH (ref 10–60)
ANION GAP SERPL CALC-SCNC: 5 MMOL/L — SIGNIFICANT CHANGE UP (ref 5–17)
AST SERPL-CCNC: 463 U/L — HIGH (ref 10–40)
BASOPHILS # BLD AUTO: 0.06 K/UL — SIGNIFICANT CHANGE UP (ref 0–0.2)
BASOPHILS NFR BLD AUTO: 0.8 % — SIGNIFICANT CHANGE UP (ref 0–2)
BILIRUB SERPL-MCNC: 1.2 MG/DL — SIGNIFICANT CHANGE UP (ref 0.2–1.2)
BUN SERPL-MCNC: 29 MG/DL — HIGH (ref 7–18)
CALCIUM SERPL-MCNC: 9 MG/DL — SIGNIFICANT CHANGE UP (ref 8.4–10.5)
CHLORIDE SERPL-SCNC: 104 MMOL/L — SIGNIFICANT CHANGE UP (ref 96–108)
CO2 SERPL-SCNC: 28 MMOL/L — SIGNIFICANT CHANGE UP (ref 22–31)
CREAT SERPL-MCNC: 1.52 MG/DL — HIGH (ref 0.5–1.3)
CULTURE RESULTS: SIGNIFICANT CHANGE UP
CULTURE RESULTS: SIGNIFICANT CHANGE UP
EGFR: 42 ML/MIN/1.73M2 — LOW
EGFR: 42 ML/MIN/1.73M2 — LOW
EOSINOPHIL # BLD AUTO: 0.18 K/UL — SIGNIFICANT CHANGE UP (ref 0–0.5)
EOSINOPHIL NFR BLD AUTO: 2.4 % — SIGNIFICANT CHANGE UP (ref 0–6)
GLUCOSE BLDC GLUCOMTR-MCNC: 150 MG/DL — HIGH (ref 70–99)
GLUCOSE BLDC GLUCOMTR-MCNC: 199 MG/DL — HIGH (ref 70–99)
GLUCOSE SERPL-MCNC: 131 MG/DL — HIGH (ref 70–99)
HCT VFR BLD CALC: 33.7 % — LOW (ref 39–50)
HGB BLD-MCNC: 10.9 G/DL — LOW (ref 13–17)
IMM GRANULOCYTES NFR BLD AUTO: 0.7 % — SIGNIFICANT CHANGE UP (ref 0–0.9)
LYMPHOCYTES # BLD AUTO: 1.15 K/UL — SIGNIFICANT CHANGE UP (ref 1–3.3)
LYMPHOCYTES # BLD AUTO: 15.6 % — SIGNIFICANT CHANGE UP (ref 13–44)
MAGNESIUM SERPL-MCNC: 2 MG/DL — SIGNIFICANT CHANGE UP (ref 1.6–2.6)
MCHC RBC-ENTMCNC: 31.9 PG — SIGNIFICANT CHANGE UP (ref 27–34)
MCHC RBC-ENTMCNC: 32.3 G/DL — SIGNIFICANT CHANGE UP (ref 32–36)
MCV RBC AUTO: 98.5 FL — SIGNIFICANT CHANGE UP (ref 80–100)
MONOCYTES # BLD AUTO: 0.57 K/UL — SIGNIFICANT CHANGE UP (ref 0–0.9)
MONOCYTES NFR BLD AUTO: 7.7 % — SIGNIFICANT CHANGE UP (ref 2–14)
NEUTROPHILS # BLD AUTO: 5.36 K/UL — SIGNIFICANT CHANGE UP (ref 1.8–7.4)
NEUTROPHILS NFR BLD AUTO: 72.8 % — SIGNIFICANT CHANGE UP (ref 43–77)
NRBC BLD AUTO-RTO: 0 /100 WBCS — SIGNIFICANT CHANGE UP (ref 0–0)
PHOSPHATE SERPL-MCNC: 3.4 MG/DL — SIGNIFICANT CHANGE UP (ref 2.5–4.5)
PLATELET # BLD AUTO: 192 K/UL — SIGNIFICANT CHANGE UP (ref 150–400)
POTASSIUM SERPL-MCNC: 4 MMOL/L — SIGNIFICANT CHANGE UP (ref 3.5–5.3)
POTASSIUM SERPL-SCNC: 4 MMOL/L — SIGNIFICANT CHANGE UP (ref 3.5–5.3)
PROT SERPL-MCNC: 5.7 G/DL — LOW (ref 6–8.3)
RBC # BLD: 3.42 M/UL — LOW (ref 4.2–5.8)
RBC # FLD: 14.1 % — SIGNIFICANT CHANGE UP (ref 10.3–14.5)
SODIUM SERPL-SCNC: 137 MMOL/L — SIGNIFICANT CHANGE UP (ref 135–145)
SPECIMEN SOURCE: SIGNIFICANT CHANGE UP
SPECIMEN SOURCE: SIGNIFICANT CHANGE UP
WBC # BLD: 7.37 K/UL — SIGNIFICANT CHANGE UP (ref 3.8–10.5)
WBC # FLD AUTO: 7.37 K/UL — SIGNIFICANT CHANGE UP (ref 3.8–10.5)

## 2025-04-09 PROCEDURE — 83735 ASSAY OF MAGNESIUM: CPT

## 2025-04-09 PROCEDURE — 92526 ORAL FUNCTION THERAPY: CPT

## 2025-04-09 PROCEDURE — 74176 CT ABD & PELVIS W/O CONTRAST: CPT | Mod: MC

## 2025-04-09 PROCEDURE — 83605 ASSAY OF LACTIC ACID: CPT

## 2025-04-09 PROCEDURE — 93005 ELECTROCARDIOGRAM TRACING: CPT

## 2025-04-09 PROCEDURE — 80048 BASIC METABOLIC PNL TOTAL CA: CPT

## 2025-04-09 PROCEDURE — 99285 EMERGENCY DEPT VISIT HI MDM: CPT

## 2025-04-09 PROCEDURE — 80061 LIPID PANEL: CPT

## 2025-04-09 PROCEDURE — 87637 SARSCOV2&INF A&B&RSV AMP PRB: CPT

## 2025-04-09 PROCEDURE — 85730 THROMBOPLASTIN TIME PARTIAL: CPT

## 2025-04-09 PROCEDURE — 80053 COMPREHEN METABOLIC PANEL: CPT

## 2025-04-09 PROCEDURE — 87899 AGENT NOS ASSAY W/OPTIC: CPT

## 2025-04-09 PROCEDURE — 87086 URINE CULTURE/COLONY COUNT: CPT

## 2025-04-09 PROCEDURE — 94640 AIRWAY INHALATION TREATMENT: CPT

## 2025-04-09 PROCEDURE — 84540 ASSAY OF URINE/UREA-N: CPT

## 2025-04-09 PROCEDURE — 83690 ASSAY OF LIPASE: CPT

## 2025-04-09 PROCEDURE — 81001 URINALYSIS AUTO W/SCOPE: CPT

## 2025-04-09 PROCEDURE — 85610 PROTHROMBIN TIME: CPT

## 2025-04-09 PROCEDURE — 85025 COMPLETE CBC W/AUTO DIFF WBC: CPT

## 2025-04-09 PROCEDURE — 71045 X-RAY EXAM CHEST 1 VIEW: CPT

## 2025-04-09 PROCEDURE — 86738 MYCOPLASMA ANTIBODY: CPT

## 2025-04-09 PROCEDURE — 92610 EVALUATE SWALLOWING FUNCTION: CPT

## 2025-04-09 PROCEDURE — 84443 ASSAY THYROID STIM HORMONE: CPT

## 2025-04-09 PROCEDURE — 36415 COLL VENOUS BLD VENIPUNCTURE: CPT

## 2025-04-09 PROCEDURE — 82962 GLUCOSE BLOOD TEST: CPT

## 2025-04-09 PROCEDURE — 83880 ASSAY OF NATRIURETIC PEPTIDE: CPT

## 2025-04-09 PROCEDURE — 84133 ASSAY OF URINE POTASSIUM: CPT

## 2025-04-09 PROCEDURE — 84145 PROCALCITONIN (PCT): CPT

## 2025-04-09 PROCEDURE — 87040 BLOOD CULTURE FOR BACTERIA: CPT

## 2025-04-09 PROCEDURE — 84484 ASSAY OF TROPONIN QUANT: CPT

## 2025-04-09 PROCEDURE — 71250 CT THORAX DX C-: CPT | Mod: MC

## 2025-04-09 PROCEDURE — 86850 RBC ANTIBODY SCREEN: CPT

## 2025-04-09 PROCEDURE — 84300 ASSAY OF URINE SODIUM: CPT

## 2025-04-09 PROCEDURE — 82550 ASSAY OF CK (CPK): CPT

## 2025-04-09 PROCEDURE — 86901 BLOOD TYPING SEROLOGIC RH(D): CPT

## 2025-04-09 PROCEDURE — 96374 THER/PROPH/DIAG INJ IV PUSH: CPT

## 2025-04-09 PROCEDURE — 80162 ASSAY OF DIGOXIN TOTAL: CPT

## 2025-04-09 PROCEDURE — 85027 COMPLETE CBC AUTOMATED: CPT

## 2025-04-09 PROCEDURE — 96375 TX/PRO/DX INJ NEW DRUG ADDON: CPT

## 2025-04-09 PROCEDURE — 84156 ASSAY OF PROTEIN URINE: CPT

## 2025-04-09 PROCEDURE — 83935 ASSAY OF URINE OSMOLALITY: CPT

## 2025-04-09 PROCEDURE — 83036 HEMOGLOBIN GLYCOSYLATED A1C: CPT

## 2025-04-09 PROCEDURE — 99231 SBSQ HOSP IP/OBS SF/LOW 25: CPT

## 2025-04-09 PROCEDURE — 84100 ASSAY OF PHOSPHORUS: CPT

## 2025-04-09 PROCEDURE — 97162 PT EVAL MOD COMPLEX 30 MIN: CPT

## 2025-04-09 PROCEDURE — 82570 ASSAY OF URINE CREATININE: CPT

## 2025-04-09 PROCEDURE — 86900 BLOOD TYPING SEROLOGIC ABO: CPT

## 2025-04-09 RX ADMIN — ESCITALOPRAM OXALATE 5 MILLIGRAM(S): 20 TABLET ORAL at 11:46

## 2025-04-09 RX ADMIN — Medication 1 APPLICATION(S): at 06:14

## 2025-04-09 RX ADMIN — Medication 3 MILLILITER(S): at 08:44

## 2025-04-09 RX ADMIN — IPRATROPIUM BROMIDE AND ALBUTEROL SULFATE 3 MILLILITER(S): .5; 2.5 SOLUTION RESPIRATORY (INHALATION) at 08:44

## 2025-04-09 RX ADMIN — NYSTATIN 1 APPLICATION(S): 100000 CREAM TOPICAL at 06:14

## 2025-04-09 RX ADMIN — Medication 75 MICROGRAM(S): at 06:15

## 2025-04-09 RX ADMIN — DEXTROMETHORPHAN HBR, GUAIFENESIN 600 MILLIGRAM(S): 200 LIQUID ORAL at 06:15

## 2025-04-09 NOTE — PROGRESS NOTE ADULT - PROBLEM SELECTOR PLAN 8
on tamsulosin  c/w home med

## 2025-04-09 NOTE — PROGRESS NOTE ADULT - PROVIDER SPECIALTY LIST ADULT
Internal Medicine
Internal Medicine
Pulmonology
Endocrinology
Endocrinology
Internal Medicine
Pulmonology
Internal Medicine
Internal Medicine

## 2025-04-09 NOTE — PROGRESS NOTE ADULT - PROBLEM SELECTOR PLAN 2
as above
cultures noted  antibiotics  Monitor temp and WBC  follow up CT chest
as above
check pending cultures  antibiotics  Monitor temp and WBC  follow up CT chest
cultures noted  antibiotics  Monitor temp and WBC  follow up CT chest
cultures noted  antibiotics completed  Monitor temp and WBC  follow up CT chest
as above
cultures noted  antibiotics  Monitor temp and WBC  follow up CT chest
as above

## 2025-04-09 NOTE — DISCHARGE NOTE NURSING/CASE MANAGEMENT/SOCIAL WORK - PATIENT PORTAL LINK FT
Positive
You can access the FollowMyHealth Patient Portal offered by [facility] by registering at the following website: [email or web address]. By joining [employer]’s FollowMyHealth portal, you will also be able to view your health information using other applications (apps) compatible with our system.

## 2025-04-09 NOTE — PROGRESS NOTE ADULT - PROBLEM SELECTOR PROBLEM 3
Chronic atrial fibrillation
Acute kidney injury superimposed on CKD
Chronic atrial fibrillation
Acute kidney injury superimposed on CKD
Acute kidney injury superimposed on CKD
Chronic atrial fibrillation
Acute kidney injury superimposed on CKD
Chronic atrial fibrillation
Chronic atrial fibrillation
Acute kidney injury superimposed on CKD
Acute kidney injury superimposed on CKD

## 2025-04-09 NOTE — DISCHARGE NOTE NURSING/CASE MANAGEMENT/SOCIAL WORK - NURSING SECTION COMPLETE
[id number]  H&P  Name: Annelise Barth 70 y.o. female I MRN: [id number]  Unit/Bed#: ED 08 I Date of Admission: 8/30/2023   Date of Service: 8/30/2023 I Hospital Day: 0      Assessment/Plan   * Sacro-iliac pain  Assessment & Plan  Patient presents with left buttock pain started a month ago with radiation down to left leg at times, right buttock pain started on Sunday 2 days ago after trying to get up from the couch and caught herself from falling and reports heard a sound at the time. Patient reports unable to stand on right leg due to pain and having trouble walking started today. Patient suspects left buttock pain might have be related to yard work. Denies low back pain. · History of left breast cancer, diagnosed in April 2021, status post modified radical mastectomy, patient underwent chemoradiation, currently on anastrozole. Recent right breast ultrasound was unremarkable. · CT lumbar spine showed - There is abnormal lytic destruction of the left sacrum extending across the left sacroiliac joint and involving the left medial iliac bone. Associated pathological fracture of the left sacrum and mild soft tissue density mass in the left presacral region. This could be secondary to focal osseous metastatic disease/myeloma. · CT pelvis pending  · Pain control with ATC Tylenol, lidocaine patch, tramadol and low-dose Dilaudid as needed  · Bed rest tonight  · Consult orthopedic surgery    Benign essential hypertension  Assessment & Plan  · Continue metoprolol  · BP elevated in ED, likely due to pain  · We will order hydralazine as needed    Obesity (BMI 30-39. 9)  Assessment & Plan  · Diet and lifestyle modification    Malignant neoplasm of upper-outer quadrant of left breast in female, estrogen receptor positive ([address])  Assessment & Plan  · As above          VTE Prophylaxis: Enoxaparin (Lovenox)  / reason for no mechanical VTE prophylaxis ON Lovenox   Code Status: Full code  POLST: POLST form is not discussed and not completed at this time. Anticipated Length of Stay:  Patient will be admitted on an Inpatient basis with an anticipated length of stay of  > 2 midnights. Justification for Hospital Stay:     Total Time for Visit, including Counseling / Coordination of Care: 45 minutes. Greater than 50% of this total time spent on direct patient counseling and coordination of care. Chief Complaint:   Bilateral buttock pain    History of Present Illness:    Barbara Banda is a 70 y.o. female with PMH of left breast cancer, hypertension, obesity who presents with left buttock pain started a month ago with radiation down to left leg at times, right buttock pain started on Sunday 2 days ago after trying to get up from the couch and caught herself from falling and reports heard a sound at the time. Patient reports unable to stand on right leg due to pain and having trouble walking started today. Patient suspects left buttock pain might have be related to yard work. Denies low back pain. Denies numbness tingling to lower extremities. Patient reports pain is more constant now, severe, movement aggravates the pain. Reports taking Tylenol and Advil at home for pain. Patient denies chest pain, headache, dizziness, nausea vomiting diarrhea constipation, fever, chills. Review of Systems:    Review of Systems   Constitutional: Positive for activity change. Musculoskeletal:        BL buttock pain, radiates down to legs at times. All other systems reviewed and are negative. Past Medical and Surgical History:     Past Medical History:   Diagnosis Date   • BRCA gene mutation negative 05/19/2021    [employer]; 36 gene panel   • Breast cancer ([address])     Left breast   • Colon polyp    • Dental crowns present    • Exercise involving walking     the dogs   • History of angina     per pt "years ago and related to stress"   • History of chemotherapy     breast cancer (left) 2021.    • History of radiation therapy    • [place] (hard of hearing)     wears hearing aids/will wear DOS bilat   • Hypertension    • Limb alert care status     No BP/IV Left Arm   • Lymphedema of left arm    • Prediabetes    • Wears glasses        Past Surgical History:   Procedure Laterality Date   • AXILLARY SURGERY Right     sweat glands removed -    • BREAST CYST EXCISION Right 2000    benign   • BREAST SURGERY Right    • CHOLECYSTECTOMY     • COLONOSCOPY     • DILATION AND CURETTAGE OF UTERUS     • MASTECTOMY Left 06/08/2021   • CO BREAST REDUCTION N/A 03/18/2022    Procedure: R BREAST REDUCTION; L BREAST EXCISION STANDING SKIN DEFORMITY; LOCAL FLAP;  Surgeon: Marlon Bourne MD;  Location: [facility];  Service: Plastics   • CO MAST MODF RAD W/AX LYMPH NOD W/WO PECT/NERI MIN Left 06/08/2021    Procedure: BREAST MODIFIED RADICAL MASTECTOMY; ANAND  DIRECTED AXILLARY DISSECTION;  Surgeon: Atiya Bertrand MD;  Location: [facility];  Service: Surgical Oncology   • REDUCTION MAMMAPLASTY Right     March 2022   • US BREAST NEEDLE LOC LEFT Left 06/02/2021   • US GUIDED BREAST BIOPSY LEFT COMPLETE Left 04/19/2021   • US GUIDED BREAST LYMPH NODE BIOPSY LEFT Left 04/19/2021   • WISDOM TOOTH EXTRACTION         Meds/Allergies:    Prior to Admission medications    Medication Sig Start Date End Date Taking?  Authorizing Provider   anastrozole (ARIMIDEX) 1 mg tablet TAKE ONE TABLET BY MOUTH EVERY DAY 7/11/22  Yes Bill Wolff MD   Ascorbic Acid (VITAMIN C PO) Take 500 mg by mouth in the morning   Yes Historical Provider, MD   metoprolol tartrate (LOPRESSOR) 50 mg tablet Take 1 tablet (50 mg total) by mouth daily 6/8/23  Yes Eli Sutton MD   Multiple Vitamins-Minerals (MULTIVITAMIN ADULT PO) Take by mouth daily 7/10/08  Yes Historical Provider, MD   VITAMIN D PO Take by mouth in the morning   Yes Historical Provider, MD   albuterol (ProAir HFA) 90 mcg/act inhaler Inhale 2 puffs every 4 (four) hours as needed for wheezing or shortness of breath 2/3/23 DANILO Light   fluticasone (FLONASE) 50 mcg/act nasal spray 2 puffs into each nostril daily 11/7/11   Historical Provider, MD   naproxen (NAPROSYN) 500 mg tablet Take 1 tablet (500 mg total) by mouth 2 (two) times a day with meals for 14 days 7/31/23 8/14/23  Elisa Adams MD     I have reviewed home medications with patient personally. Allergies: No Known Allergies    Social History:     Marital Status: /Civil Union   Occupation: Retired  Patient Pre-hospital Living Situation:   Patient Pre-hospital Level of Mobility: Cane  Patient Pre-hospital Diet Restrictions: Cardiac diet  Substance Use History:   Social History     Substance and Sexual Activity   Alcohol Use No     Social History     Tobacco Use   Smoking Status Never   • Passive exposure: Never   Smokeless Tobacco Never     Social History     Substance and Sexual Activity   Drug Use No       Family History:    non-contributory    Physical Exam:     Vitals:   Blood Pressure: (!) 191/88 (08/30/23 0330)  Pulse: 90 (08/30/23 0330)  Temperature: 97.8 °F (36.6 °C) (08/30/23 0030)  Temp Source: Oral (08/30/23 0030)  Respirations: 18 (08/30/23 0330)  Weight - Scale: 97.1 kg (214 lb) (08/30/23 0030)  SpO2: 98 % (08/30/23 0330)    Physical Exam  Vitals and nursing note reviewed. Constitutional:       Appearance: She is well-developed. She is obese. HENT:      Head: Normocephalic and atraumatic. Neck:      Thyroid: No thyromegaly. Vascular: No JVD. Trachea: No tracheal deviation. Cardiovascular:      Rate and Rhythm: Normal rate and regular rhythm. Heart sounds: Normal heart sounds. Comments: Left mastectomy. Pulmonary:      Effort: Pulmonary effort is normal. No respiratory distress. Breath sounds: Normal breath sounds. No wheezing or rales. Abdominal:      General: Bowel sounds are normal. There is no distension. Palpations: Abdomen is soft. Tenderness: There is no abdominal tenderness.  There is no guarding. Musculoskeletal:         General: Tenderness present. Right lower leg: No edema. Left lower leg: No edema. Comments: B/L buttocks tender. No evidence of soft tissue infection. Skin:     General: Skin is warm and dry. Neurological:      General: No focal deficit present. Mental Status: She is alert and oriented to person, place, and time. Psychiatric:         Mood and Affect: Mood normal.         Judgment: Judgment normal.         Additional Data:     Lab Results: I have personally reviewed pertinent reports. Results from last 7 days   Lab Units 08/30/23  0047   WBC Thousand/uL 8.38   HEMOGLOBIN g/dL 15.0   HEMATOCRIT % 42.8   PLATELETS Thousands/uL 227   NEUTROS PCT % 81*   LYMPHS PCT % 11*   MONOS PCT % 7   EOS PCT % 1     Results from last 7 days   Lab Units 08/30/23  0047   POTASSIUM mmol/L 3.6   CHLORIDE mmol/L 102   CO2 mmol/L 24   BUN mg/dL 20   CREATININE mg/dL 0.58*   CALCIUM mg/dL 9.6   ALK PHOS U/L 97   ALT U/L 20   AST U/L 17           Imaging: I have personally reviewed pertinent reports. CT spine lumbar without contrast    Result Date: 8/30/2023  Narrative: CT LUMBAR SPINE INDICATION:   Low back pain, trauma back pain. COMPARISON: None. TECHNIQUE:  Contiguous axial images through the lumbar spine were obtained. Sagittal and coronal reconstructions were performed. IV Contrast: Radiation dose length product (DLP) for this visit:  1200.49 mGy-cm . This examination, like all CT scans performed in the [facility], was performed utilizing techniques to minimize radiation dose exposure, including the use of iterative reconstruction and automated exposure control. IMAGE QUALITY:  Diagnostic. FINDINGS: ALIGNMENT:  There are 5 lumbar type vertebral bodies. Normal alignment of the lumbar spine. No spondylolysis or spondylolisthesis. VERTEBRAE: No acute lumbar spine fracture. Vertebral body heights are preserved.  There is abnormal lytic destruction of the left sacrum extending across the left sacroiliac joint and involving the left medial iliac bone. Associated pathological fracture of the left sacrum and mild soft tissue density mass in the left presacral region. This could be secondary to focal osseous metastatic disease/myeloma. Severe osteomyelitis with associated bony destruction is also possible but felt to be less likely. DEGENERATIVE CHANGES: Lower Thoracic spine:  Normal lower thoracic disc spaces. L1-2:  Normal disc height. No herniation. Normal facet joints. No canal or foraminal stenosis. L2-3:  Normal disc height. No herniation. Normal facet joints. No canal or foraminal stenosis. L3-4:  Normal disc height. No herniation. Normal facet joints. No canal or foraminal stenosis. L4-5:  Normal disc height. No herniation. Normal facet joints. No canal or foraminal stenosis. L5-S1:  Normal disc height. No herniation. Normal facet joints. No canal or foraminal stenosis. PARASPINAL SOFT TISSUES:   Normal.     Impression: There is abnormal lytic destruction of the left sacrum extending across the left sacroiliac joint and involving the left medial iliac bone. Associated pathological fracture of the left sacrum and mild soft tissue density mass in the left presacral region. This could be secondary to focal osseous metastatic disease/myeloma. Severe osteomyelitis with associated bony destruction is also possible but felt to be less likely. Recommend clinical correlation. Workstation performed: [id number]       EKG, Pathology, and Other Studies Reviewed on Admission:   · EKG: NA    Allscripts Records Reviewed: Yes     ** Please Note: Dragon 360 Dictation voice to text software may have been used in the creation of this document.  ** Patient/Caregiver provided printed discharge information.

## 2025-04-09 NOTE — PROGRESS NOTE ADULT - PROBLEM SELECTOR PROBLEM 4
Hypothyroidism
Bilateral inguinal hernia
Hypothyroidism
Bilateral inguinal hernia
Hypothyroidism
Bilateral inguinal hernia

## 2025-04-09 NOTE — DISCHARGE NOTE NURSING/CASE MANAGEMENT/SOCIAL WORK - FINANCIAL ASSISTANCE
[facility] provides services at a reduced cost to those who are determined to be eligible through [facility]’s financial assistance program. Information regarding [facility]’s financial assistance program can be found by going to [email or web address] or by calling [phone].

## 2025-04-09 NOTE — PROGRESS NOTE ADULT - SUBJECTIVE AND OBJECTIVE BOX
Patient is a 89y old  Male who presents with a chief complaint of Pneumonia (09 Apr 2025 08:22)  Awake, alert, comfortable in bed in NAD. Doing well on RA    INTERVAL HPI/OVERNIGHT EVENTS:      VITAL SIGNS:  T(F): 97.1 (04-09-25 @ 11:40)  HR: 70 (04-09-25 @ 11:40)  BP: 100/54 (04-09-25 @ 11:40)  RR: 17 (04-09-25 @ 11:40)  SpO2: 97% (04-09-25 @ 11:40)  Wt(kg): --  I&O's Detail    08 Apr 2025 07:01  -  09 Apr 2025 07:00  --------------------------------------------------------  IN:  Total IN: 0 mL    OUT:    Voided (mL): 700 mL  Total OUT: 700 mL    Total NET: -700 mL              REVIEW OF SYSTEMS:    CONSTITUTIONAL:  No fevers, chills, sweats    HEENT:  Eyes:  No diplopia or blurred vision. ENT:  No earache, sore throat or runny nose.    CARDIOVASCULAR:  No pressure, squeezing, tightness, or heaviness about the chest; no palpitations.    RESPIRATORY:  Per HPI    GASTROINTESTINAL:  No abdominal pain, nausea, vomiting or diarrhea.    GENITOURINARY:  No dysuria, frequency or urgency.    NEUROLOGIC:  No paresthesias, fasciculations, seizures or weakness.    PSYCHIATRIC:  No disorder of thought or mood.      PHYSICAL EXAM:    Constitutional: Well developed and nourished  Eyes:Perrla  ENMT: normal  Neck:supple  Respiratory: good air entry  Cardiovascular: S1 S2 regular  Gastrointestinal: Soft, Non tender  Extremities: No edema  Vascular:normal  Neurological:Awake, alert,Ox3  Musculoskeletal:Normal      MEDICATIONS  (STANDING):  albuterol/ipratropium for Nebulization 3 milliLiter(s) Nebulizer every 12 hours  chlorhexidine 2% Cloths 1 Application(s) Topical <User Schedule>  dextrose 5%. 1000 milliLiter(s) (50 mL/Hr) IV Continuous <Continuous>  dextrose 5%. 1000 milliLiter(s) (100 mL/Hr) IV Continuous <Continuous>  dextrose 50% Injectable 25 Gram(s) IV Push once  dextrose 50% Injectable 12.5 Gram(s) IV Push once  dextrose 50% Injectable 25 Gram(s) IV Push once  digoxin     Tablet 125 MICROGram(s) Oral every other day  escitalopram 5 milliGRAM(s) Oral daily  glucagon  Injectable 1 milliGRAM(s) IntraMuscular once  guaiFENesin  milliGRAM(s) Oral every 12 hours  insulin lispro (ADMELOG) corrective regimen sliding scale   SubCutaneous three times a day before meals  insulin lispro (ADMELOG) corrective regimen sliding scale   SubCutaneous at bedtime  levothyroxine 75 MICROGram(s) Oral daily  nystatin Powder 1 Application(s) Topical three times a day  sodium chloride 0.9% for Nebulization 3 milliLiter(s) Nebulizer every 12 hours  tamsulosin 0.4 milliGRAM(s) Oral at bedtime    MEDICATIONS  (PRN):  acetaminophen     Tablet .. 650 milliGRAM(s) Oral every 6 hours PRN Temp greater or equal to 38C (100.4F), Mild Pain (1 - 3)  dextrose Oral Gel 15 Gram(s) Oral once PRN Blood Glucose LESS THAN 70 milliGRAM(s)/deciliter      Allergies    No Known Allergies    Intolerances        LABS:                        10.9   7.37  )-----------( 192      ( 09 Apr 2025 05:16 )             33.7     04-09    137  |  104  |  29[H]  ----------------------------<  131[H]  4.0   |  28  |  1.52[H]    Ca    9.0      09 Apr 2025 05:16  Phos  3.4     04-09  Mg     2.0     04-09    TPro  5.7[L]  /  Alb  2.5[L]  /  TBili  1.2  /  DBili  x   /  AST  463[H]  /  ALT  535[H]  /  AlkPhos  412[H]  04-09    PT/INR - ( 07 Apr 2025 15:30 )   PT: 11.4 sec;   INR: 0.98 ratio         PTT - ( 07 Apr 2025 15:30 )  PTT:32.3 sec  Urinalysis Basic - ( 09 Apr 2025 05:16 )    Color: x / Appearance: x / SG: x / pH: x  Gluc: 131 mg/dL / Ketone: x  / Bili: x / Urobili: x   Blood: x / Protein: x / Nitrite: x   Leuk Esterase: x / RBC: x / WBC x   Sq Epi: x / Non Sq Epi: x / Bacteria: x            CAPILLARY BLOOD GLUCOSE      POCT Blood Glucose.: 199 mg/dL (09 Apr 2025 11:14)  POCT Blood Glucose.: 150 mg/dL (09 Apr 2025 07:57)  POCT Blood Glucose.: 140 mg/dL (08 Apr 2025 21:28)  POCT Blood Glucose.: 140 mg/dL (08 Apr 2025 16:51)        RADIOLOGY & ADDITIONAL TESTS:  < from: CT Chest No Cont (04.04.25 @ 00:58) >  IMPRESSION:  Tubular opacity in the right lower lobe and bilateral tree-in-bud   opacities, most consistent with mucous impacted airways.    Stable size of right retroperitoneal mass which now demonstrates a large   soft tissue component and peripheral calcification. Findings are favored   to reflect postoperative change.    Bilateral bowel containing inguinal hernia, as described.      < end of copied text >    CXR:    Ct scan chest:    ekg;    echo:

## 2025-04-09 NOTE — PROGRESS NOTE ADULT - REASON FOR ADMISSION
Pneumonia

## 2025-04-09 NOTE — PROGRESS NOTE ADULT - PROBLEM SELECTOR PLAN 10
DVT prop: holding ELIQUIS in the setting of hematuria
DVT prop: eliquis held iso hematuria
DVT prop: Eliquis
DVT prop: eliquis
DVT prop: eliquis held iso hematuria
DVT prop: eliquis

## 2025-04-09 NOTE — DISCHARGE NOTE NURSING/CASE MANAGEMENT/SOCIAL WORK - NSDCPEFALRISK_GEN_ALL_CORE
For information on Fall & Injury Prevention, visit: [email or web address] OR  [email or web address] OR  https://www.cdc.gov/steadi/patient.html oral

## 2025-04-09 NOTE — PROGRESS NOTE ADULT - PROBLEM SELECTOR PROBLEM 7
Acute kidney injury superimposed on CKD
HLD (hyperlipidemia)
HLD (hyperlipidemia)
Acute kidney injury superimposed on CKD
HLD (hyperlipidemia)
Acute kidney injury superimposed on CKD
HLD (hyperlipidemia)

## 2025-04-09 NOTE — PROGRESS NOTE ADULT - NUTRITIONAL ASSESSMENT
This patient has been assessed with a concern for Malnutrition and has been determined to have a diagnosis/diagnoses of Moderate protein-calorie malnutrition.    This patient is being managed with:   Diet Minced and Moist-  Consistent Carbohydrate {Evening Snacks}  Mildly Thick Liquids (MILDTHICKLIQS)  Supplement Feeding Modality:  Oral  Glucerna Shake Cans or Servings Per Day:  2       Frequency:  Daily  Health Shake No Sugar Cans or Servings Per Day:  1       Frequency:  Two Times a day  Entered: Apr 6 2025 12:12PM  
This patient has been assessed with a concern for Malnutrition and has been determined to have a diagnosis/diagnoses of Moderate protein-calorie malnutrition.    This patient is being managed with:   Diet Minced and Moist-  Consistent Carbohydrate {Evening Snacks}  Mildly Thick Liquids (MILDTHICKLIQS)  Supplement Feeding Modality:  Oral  Glucerna Shake Cans or Servings Per Day:  2       Frequency:  Daily  Health Shake No Sugar Cans or Servings Per Day:  1       Frequency:  Two Times a day  Entered: Apr 6 2025 12:12PM  
This patient has been assessed with a concern for Malnutrition and has been determined to have a diagnosis/diagnoses of Moderate protein-calorie malnutrition.    This patient is being managed with:   Diet Minced and Moist-  Consistent Carbohydrate {Evening Snacks}  DASH/TLC {Sodium & Cholesterol Restricted}  Mildly Thick Liquids (MILDTHICKLIQS)  Entered: Apr 4 2025  7:10PM    The following pending diet order is being considered for treatment of Moderate protein-calorie malnutrition:  Diet Minced and Moist-  Consistent Carbohydrate {Evening Snacks}  Mildly Thick Liquids (MILDTHICKLIQS)  Supplement Feeding Modality:  Oral  Health Shake No Sugar Cans or Servings Per Day:  1       Frequency:  Two Times a day  Entered: Apr 5 2025 12:49PM  
This patient has been assessed with a concern for Malnutrition and has been determined to have a diagnosis/diagnoses of Moderate protein-calorie malnutrition.    This patient is being managed with:   Diet Minced and Moist-  Consistent Carbohydrate {Evening Snacks}  Mildly Thick Liquids (MILDTHICKLIQS)  Supplement Feeding Modality:  Oral  Glucerna Shake Cans or Servings Per Day:  2       Frequency:  Daily  Health Shake No Sugar Cans or Servings Per Day:  1       Frequency:  Two Times a day  Entered: Apr 6 2025 12:12PM  
This patient has been assessed with a concern for Malnutrition and has been determined to have a diagnosis/diagnoses of Moderate protein-calorie malnutrition.    This patient is being managed with:   Diet Minced and Moist-  Consistent Carbohydrate {Evening Snacks}  DASH/TLC {Sodium & Cholesterol Restricted}  Mildly Thick Liquids (MILDTHICKLIQS)  Entered: Apr 4 2025  7:10PM    The following pending diet order is being considered for treatment of Moderate protein-calorie malnutrition:  Diet Minced and Moist-  Consistent Carbohydrate {Evening Snacks}  Mildly Thick Liquids (MILDTHICKLIQS)  Supplement Feeding Modality:  Oral  Health Shake No Sugar Cans or Servings Per Day:  1       Frequency:  Two Times a day  Entered: Apr 5 2025 12:49PM  
Diet, Minced and Moist:   Consistent Carbohydrate {Evening Snacks}  DASH/TLC {Sodium & Cholesterol Restricted}  Mildly Thick Liquids (MILDTHICKLIQS) (04-04-25 @ 19:10) [Active]

## 2025-04-09 NOTE — PROGRESS NOTE ADULT - PROBLEM SELECTOR PLAN 1
Bronchodilators  steroids  Mucomyst 10% neb BID  PFTs as OP
p/w WBC 15k, rigors, urinary incontinence, cough  no noted fever, did not meet SIRs criteria  CT chest: mucus plugging   Chest Xray w/o consolidation  UA negative  PNA Cannot be excluded given mucus plugging and high risk for aspiration   Mycoplasma negative   procal elevated   Pulm following Dr. Basurto     -c/w azithromycin 500mg qd + rocephin 1g qd  - c/w  mucinex, duoneb, NS nebs for airway clearance    -WBC 7.10  ====RESOLVED===
p/w WBC 15k, rigors, urinary incontinence, cough  no noted fever, did not meet SIRs criteria  CT chest: mucus plugging   Chest Xray w/o consolidation  UA negative  PNA Cannot be excluded given mucus plugging and high risk for aspiration   Mycoplasma negative   procal elevated   Pulm following Dr. Basurto     -c/w azithromycin 500mg qd + rocephin 1g qd  - c/w  mucinex, duoneb, NS nebs for airway clearance  -f/u strep ag, legionella, RVP  -tylenol prn
p/w WBC 15k, rigors, urinary incontinence, cough  no noted fever, did not meet SIRs criteria  CT chest: mucus plugging (prelim read)  UA negative  possible PNA    -c/w azithromycin 500mg qd + rocephin 1g qd  -c/w mucinex, duoneb, NS nebs for airway clearance  -f/u strep ag, legionella, RVP, procalcitonin, mycoplasma igm  -tylenol prn  -f/u CT official read, CXR official read  -f/u
p/w WBC 15k, rigors, urinary incontinence, cough  no noted fever, did not meet SIRs criteria  CT chest: mucus plugging   Chest Xray w/o consolidation  UA negative  PNA Cannot be excluded given mucus plugging and high risk for aspiration   Mycoplasma negative   procal elevated   Pulm following Dr. Basurto     -c/w azithromycin 500mg qd + rocephin 1g qd  - c/w  mucinex, duoneb, NS nebs for airway clearance    -WBC 7.10  ====RESOLVED===
p/w WBC 15k, rigors, urinary incontinence, cough  no noted fever, did not meet SIRs criteria  CT chest: mucus plugging   Chest Xray w/o consolidation  UA negative  PNA Cannot be excluded given mucus plugging and high risk for aspiration   Mycoplasma negative   procal elevated   Pulm following Dr. Basurto     -c/w azithromycin 500mg qd + rocephin 1g qd  - c/w  mucinex, duoneb, NS nebs for airway clearance    -WBC 7.10  ====RESOLVED===
p/w WBC 15k, rigors, urinary incontinence, cough  no noted fever, did not meet SIRs criteria  CT chest: mucus plugging (prelim read)  UA negative  possible PNA    -Start azithromycin 500mg qd + rocephin 1g qd  -started mucinex, duoneb, NS nebs for airway clearance  -f/u strep ag, legionella, RVP, procalcitonin, mycoplasma igm  -tylenol prn  -f/u CT official read, CXR official read  -f/u
No

## 2025-04-09 NOTE — DISCHARGE NOTE NURSING/CASE MANAGEMENT/SOCIAL WORK - NSDCFUADDAPPT_GEN_ALL_CORE_FT
APPTS ARE READY TO BE MADE: [X] YES    Best Family or Patient Contact (if needed):    Additional Information about above appointments (if needed):    1: consider follow up with primary care Dr. Cortes  2: consider follow up with endocrinology Dr. Palencia  3:     Other comments or requests:          Patient is being discharged to [facility] for [facility]. Caregiver will arrange follow up.

## 2025-04-09 NOTE — PROGRESS NOTE ADULT - PROBLEM SELECTOR PROBLEM 1
COPD (chronic obstructive pulmonary disease) with chronic bronchitis
COPD (chronic obstructive pulmonary disease) with chronic bronchitis
Leucocytosis
Leucocytosis
COPD (chronic obstructive pulmonary disease) with chronic bronchitis
COPD (chronic obstructive pulmonary disease) with chronic bronchitis
Leucocytosis
COPD (chronic obstructive pulmonary disease) with chronic bronchitis
Leucocytosis

## 2025-04-09 NOTE — PROGRESS NOTE ADULT - SUBJECTIVE AND OBJECTIVE BOX
Subjective:  Chart Notes, Work list Manager, and fingersticks reviewed.  He has no complaints today  His family member (cousin) ad family friend at bedside    Review of Systems:  Constitutional: No fever  Eyes: No blurry vision  Neuro: No tremors  HEENT: No pain  Cardiovascular: No chest pain, palpitations  Respiratory: No SOB, no cough  GI: No nausea, vomiting, abdominal pain  : No dysuria  Skin: no rash  Psych: no depression  Endocrine: no polyuria, polydipsia  Hem/lymph: no swelling  Osteoporosis: no fractures    ALL OTHER SYSTEMS REVIEWED AND NEGATIVE    UNABLE TO OBTAIN    MEDICATIONS  (STANDING):  albuterol/ipratropium for Nebulization 3 milliLiter(s) Nebulizer every 12 hours  chlorhexidine 2% Cloths 1 Application(s) Topical <User Schedule>  dextrose 5%. 1000 milliLiter(s) (50 mL/Hr) IV Continuous <Continuous>  dextrose 5%. 1000 milliLiter(s) (100 mL/Hr) IV Continuous <Continuous>  dextrose 50% Injectable 25 Gram(s) IV Push once  dextrose 50% Injectable 12.5 Gram(s) IV Push once  dextrose 50% Injectable 25 Gram(s) IV Push once  digoxin     Tablet 125 MICROGram(s) Oral every other day  escitalopram 5 milliGRAM(s) Oral daily  glucagon  Injectable 1 milliGRAM(s) IntraMuscular once  guaiFENesin  milliGRAM(s) Oral every 12 hours  insulin lispro (ADMELOG) corrective regimen sliding scale   SubCutaneous three times a day before meals  insulin lispro (ADMELOG) corrective regimen sliding scale   SubCutaneous at bedtime  levothyroxine 75 MICROGram(s) Oral daily  nystatin Powder 1 Application(s) Topical three times a day  sodium chloride 0.9% for Nebulization 3 milliLiter(s) Nebulizer every 12 hours  tamsulosin 0.4 milliGRAM(s) Oral at bedtime    MEDICATIONS  (PRN):  acetaminophen     Tablet .. 650 milliGRAM(s) Oral every 6 hours PRN Temp greater or equal to 38C (100.4F), Mild Pain (1 - 3)  dextrose Oral Gel 15 Gram(s) Oral once PRN Blood Glucose LESS THAN 70 milliGRAM(s)/deciliter      PHYSICAL EXAM:  VITALS: T(C): 36.2 (04-09-25 @ 11:40)  T(F): 97.1 (04-09-25 @ 11:40), Max: 98.2 (04-08-25 @ 21:18)  HR: 70 (04-09-25 @ 11:40) (67 - 70)  BP: 100/54 (04-09-25 @ 11:40) (100/54 - 113/64)  RR:  (17 - 19)  SpO2:  (96% - 97%)  Wt(kg): --  GENERAL: NAD, elderly, frail  HEENT:  Dry mucous membranes  THYROID: Normal size, no palpable nodules  RESPIRATORY: Clear to auscultation bilaterally; No rales, rhonchi, wheezing  CARDIOVASCULAR: Regular rate and rhythm; No murmurs; no peripheral edema  GI: Soft, nontender, non distended, +ve abdominal obesity  EXTREMITIES:  -ve pedal edema  SKIN: Dry, intact, purpura on arms b/l  PSYCH: Alert and oriented x 3    CAPILLARY BLOOD GLUCOSE      POCT Blood Glucose.: 199 mg/dL (09 Apr 2025 11:14)  POCT Blood Glucose.: 150 mg/dL (09 Apr 2025 07:57)  POCT Blood Glucose.: 140 mg/dL (08 Apr 2025 21:28)  POCT Blood Glucose.: 140 mg/dL (08 Apr 2025 16:51)    04-09    137  |  104  |  29[H]  ----------------------------<  131[H]  4.0   |  28  |  1.52[H]    eGFR: 42[L]    Ca    9.0      04-09  Mg     2.0     04-09  Phos  3.4     04-09    TPro  5.7[L]  /  Alb  2.5[L]  /  TBili  1.2  /  DBili  x   /  AST  463[H]  /  ALT  535[H]  /  AlkPhos  412[H]  04-09    Thyroid Function Tests:  04-04 @ 05:45 TSH 3.85 FreeT4 -- T3 -- Anti TPO -- Anti Thyroglobulin Ab -- TSI --        89y/M, from home, PMH of HTN, DM, Afib on eliquis, Hypothyroidism, Depression, BPH, HLD was brought in by family friend, who is taking care of him, for rigor and urinary incontinence X1 day.  Endocrinology is consulted for elevated A1C    1) Poorly controlled Type 2 diabetes:  A1C is above goal based upon his age  Patient says he is eating well, but unclear if this is true  Inpatient BS are under acceptable control 160s-180s now , will continue the same insulin regimen, low dose correctional scale lispro ACHS    Inpatient Recommendations:  Basal Insulin and Nutritional Insulin  none    Correctional Insulin:  Continue low Lispro ( Admelog) correctional scale with meals and bedtime    Oral Diabetes Medications:  None in the hospital    Discharge recommendations:  Continue the same low lispro correctional scale with meals and bedtime in the rehab    Discussed with Dr. Palencia  THIS NOTE IS INCOMPLETE AND ALL RECOMMENDATIONS ARE PRELIMINARY UNTIL SIGNED BY ATTENDING.

## 2025-04-09 NOTE — PROGRESS NOTE ADULT - PROBLEM SELECTOR PLAN 7
Avoid Nephrotoxins  Monitor BMP  Renal Eval
has h/o HLD on Atorvastatin at home  -c/w home meds  -f/u lipid profile
Avoid Nephrotoxins  Monitor BMP  Renal Eval
has h/o HLD on Atorvastatin at home  -c/w home meds  -f/u lipid profile
Avoid Nephrotoxins  Monitor BMP  Renal Eval
has h/o HLD on Atorvastatin at home  -c/w home meds  -f/u lipid profile
Avoid Nephrotoxins  Monitor BMP  Renal Eval
has h/o HLD on Atorvastatin at home  -c/w home meds  -f/u lipid profile
Avoid Nephrotoxins  Monitor BMP  Renal Eval

## 2025-04-09 NOTE — PROGRESS NOTE ADULT - PROBLEM SELECTOR PLAN 9
on levothyroxine at home  c/w home med

## 2025-04-09 NOTE — PROGRESS NOTE ADULT - SUBJECTIVE AND OBJECTIVE BOX
Progress Note discussed with attending    MS TEAMS Rasta Gonzales OF THIS NOTE TILL 5:00 PM  PLEASE CONTACT ON CALL TEAM:  - On Call Team (Please refer to [place]) FROM 5:00 PM - 8:30PM  - Nightfloat Team FROM 8:30 -7:30 AM    INTERVAL HPI/OVERNIGHT EVENTS: Pt examined at bedside this am. No overnight events. No complaints. No further episodes of hematuria.       MEDICATIONS  (STANDING):  albuterol/ipratropium for Nebulization 3 milliLiter(s) Nebulizer every 12 hours  chlorhexidine 2% Cloths 1 Application(s) Topical <User Schedule>  dextrose 5%. 1000 milliLiter(s) (50 mL/Hr) IV Continuous <Continuous>  dextrose 5%. 1000 milliLiter(s) (100 mL/Hr) IV Continuous <Continuous>  dextrose 50% Injectable 25 Gram(s) IV Push once  dextrose 50% Injectable 12.5 Gram(s) IV Push once  dextrose 50% Injectable 25 Gram(s) IV Push once  digoxin     Tablet 125 MICROGram(s) Oral every other day  escitalopram 5 milliGRAM(s) Oral daily  glucagon  Injectable 1 milliGRAM(s) IntraMuscular once  guaiFENesin  milliGRAM(s) Oral every 12 hours  insulin lispro (ADMELOG) corrective regimen sliding scale   SubCutaneous three times a day before meals  insulin lispro (ADMELOG) corrective regimen sliding scale   SubCutaneous at bedtime  levothyroxine 75 MICROGram(s) Oral daily  nystatin Powder 1 Application(s) Topical three times a day  sodium chloride 0.9% for Nebulization 3 milliLiter(s) Nebulizer every 12 hours  tamsulosin 0.4 milliGRAM(s) Oral at bedtime    MEDICATIONS  (PRN):  acetaminophen     Tablet .. 650 milliGRAM(s) Oral every 6 hours PRN Temp greater or equal to 38C (100.4F), Mild Pain (1 - 3)  dextrose Oral Gel 15 Gram(s) Oral once PRN Blood Glucose LESS THAN 70 milliGRAM(s)/deciliter      REVIEW OF SYSTEMS:  CONSTITUTIONAL: No fever, weight loss, or fatigue  RESPIRATORY: No shortness of breath  CARDIOVASCULAR: No chest pain  GASTROINTESTINAL: No abdominal pain.  GENITOURINARY: No dysuria  NEUROLOGICAL: No headaches  SKIN: No itching, burning, rashes    Vital Signs Last 24 Hrs  T(C): 36.4 (09 Apr 2025 05:27), Max: 37.4 (08 Apr 2025 13:36)  T(F): 97.6 (09 Apr 2025 05:27), Max: 99.3 (08 Apr 2025 13:36)  HR: 67 (09 Apr 2025 05:27) (67 - 69)  BP: 113/64 (09 Apr 2025 05:27) (108/62 - 119/64)  BP(mean): 80 (09 Apr 2025 05:27) (77 - 80)  RR: 19 (09 Apr 2025 05:27) (18 - 19)  SpO2: 96% (09 Apr 2025 05:27) (95% - 96%)    Parameters below as of 09 Apr 2025 05:27  Patient On (Oxygen Delivery Method): room air        PHYSICAL EXAMINATION:  GENERAL: OOBC, comfortable,   HEAD:  Atraumatic, Normocephalic  EYES:  conjunctiva and sclera clear  CHEST/LUNG: Clear to auscultation. No rales, rhonchi, wheezing, or rubs  HEART: Regular rate and rhythm; No murmurs, rubs, or gallops  ABDOMEN: Soft, Nontender, Nondistended; Bowel sounds present  NERVOUS SYSTEM:  Alert & Oriented X3,    EXTREMITIES:  2+ Peripheral Pulses, No clubbing, cyanosis, or edema  SKIN: warm dr                          10.9   7.37  )-----------( 192      ( 09 Apr 2025 05:16 )             33.7     04-09    137  |  104  |  29[H]  ----------------------------<  131[H]  4.0   |  28  |  1.52[H]    Ca    9.0      09 Apr 2025 05:16  Phos  3.4     04-09  Mg     2.0     04-09    TPro  5.7[L]  /  Alb  2.5[L]  /  TBili  1.2  /  DBili  x   /  AST  463[H]  /  ALT  535[H]  /  AlkPhos  412[H]  04-09    LIVER FUNCTIONS - ( 09 Apr 2025 05:16 )  Alb: 2.5 g/dL / Pro: 5.7 g/dL / ALK PHOS: 412 U/L / ALT: 535 U/L DA / AST: 463 U/L / GGT: x               PT/INR - ( 07 Apr 2025 15:30 )   PT: 11.4 sec;   INR: 0.98 ratio         PTT - ( 07 Apr 2025 15:30 )  PTT:32.3 sec    CAPILLARY BLOOD GLUCOSE      RADIOLOGY & ADDITIONAL TESTS:

## 2025-04-09 NOTE — PROGRESS NOTE ADULT - PROBLEM SELECTOR PLAN 3
Cont meds  Cardio eval
Scr 1.9 (baseline 1.5 in 2022)  pre renal vs post renal  s/p 1L IVF  -c/w IVF  -monitor Scr  - monitor for hematuria
Cont meds  Cardio eval
Cont meds  Cardio eval
Scr 1.9 (baseline 1.5 in 2022)  pre renal vs post renal  s/p 1L IVF  -c/w IVF  -monitor Scr  -f/u PVR scan
Scr 1.9 (baseline 1.5 in 2022)  pre renal vs post renal  s/p 1L IVF  -c/w IVF  -monitor Scr  -f/u PVR scan
Scr 1.9 (baseline 1.5 in 2022)  pre renal vs post renal  s/p 1L IVF  -c/w IVF  -monitor Scr  - monitor for hematuria

## 2025-04-09 NOTE — PROGRESS NOTE ADULT - PROBLEM SELECTOR PROBLEM 5
BPH (benign prostatic hyperplasia)
BPH (benign prostatic hyperplasia)
Chronic atrial fibrillation
BPH (benign prostatic hyperplasia)
Chronic atrial fibrillation
BPH (benign prostatic hyperplasia)
BPH (benign prostatic hyperplasia)
Chronic atrial fibrillation

## 2025-04-11 NOTE — H&P ADULT - PROBLEM SELECTOR PLAN 2
BPH (benign prostatic hyperplasia)
Pt presenting with weakness likely in setting of dehydration  f/u orthostatic v/s  supportive care with IVF  Monitor for signs of volume overload

## 2025-04-17 ENCOUNTER — TRANSCRIPTION ENCOUNTER (OUTPATIENT)
Age: 89
End: 2025-04-17

## 2025-04-24 ENCOUNTER — EMERGENCY (EMERGENCY)
Facility: HOSPITAL | Age: 89
LOS: 1 days | End: 2025-04-24
Attending: EMERGENCY MEDICINE
Payer: MEDICARE

## 2025-04-24 VITALS
SYSTOLIC BLOOD PRESSURE: 148 MMHG | RESPIRATION RATE: 18 BRPM | OXYGEN SATURATION: 98 % | DIASTOLIC BLOOD PRESSURE: 72 MMHG | HEART RATE: 70 BPM | TEMPERATURE: 98 F

## 2025-04-24 VITALS
WEIGHT: 130.07 LBS | SYSTOLIC BLOOD PRESSURE: 123 MMHG | TEMPERATURE: 98 F | HEART RATE: 86 BPM | DIASTOLIC BLOOD PRESSURE: 70 MMHG | RESPIRATION RATE: 18 BRPM | OXYGEN SATURATION: 95 % | HEIGHT: 63 IN

## 2025-04-24 DIAGNOSIS — Z90.79 ACQUIRED ABSENCE OF OTHER GENITAL ORGAN(S): Chronic | ICD-10-CM

## 2025-04-24 DIAGNOSIS — Z95.1 PRESENCE OF AORTOCORONARY BYPASS GRAFT: Chronic | ICD-10-CM

## 2025-04-24 DIAGNOSIS — Z90.5 ACQUIRED ABSENCE OF KIDNEY: Chronic | ICD-10-CM

## 2025-04-24 LAB
ALBUMIN SERPL ELPH-MCNC: 2.8 G/DL — LOW (ref 3.5–5)
ALP SERPL-CCNC: 316 U/L — HIGH (ref 40–120)
ALT FLD-CCNC: 80 U/L DA — HIGH (ref 10–60)
ANION GAP SERPL CALC-SCNC: 2 MMOL/L — LOW (ref 5–17)
AST SERPL-CCNC: 28 U/L — SIGNIFICANT CHANGE UP (ref 10–40)
BASOPHILS # BLD AUTO: 0.03 K/UL — SIGNIFICANT CHANGE UP (ref 0–0.2)
BASOPHILS NFR BLD AUTO: 0.5 % — SIGNIFICANT CHANGE UP (ref 0–2)
BILIRUB SERPL-MCNC: 0.4 MG/DL — SIGNIFICANT CHANGE UP (ref 0.2–1.2)
BUN SERPL-MCNC: 25 MG/DL — HIGH (ref 7–18)
CALCIUM SERPL-MCNC: 9.3 MG/DL — SIGNIFICANT CHANGE UP (ref 8.4–10.5)
CHLORIDE SERPL-SCNC: 106 MMOL/L — SIGNIFICANT CHANGE UP (ref 96–108)
CO2 SERPL-SCNC: 29 MMOL/L — SIGNIFICANT CHANGE UP (ref 22–31)
CREAT SERPL-MCNC: 1.45 MG/DL — HIGH (ref 0.5–1.3)
DIGOXIN SERPL-MCNC: 0.4 NG/ML — LOW (ref 0.8–2)
EGFR: 44 ML/MIN/1.73M2 — LOW
EGFR: 44 ML/MIN/1.73M2 — LOW
EOSINOPHIL # BLD AUTO: 0.14 K/UL — SIGNIFICANT CHANGE UP (ref 0–0.5)
EOSINOPHIL NFR BLD AUTO: 2.4 % — SIGNIFICANT CHANGE UP (ref 0–6)
FLUAV AG NPH QL: SIGNIFICANT CHANGE UP
FLUBV AG NPH QL: SIGNIFICANT CHANGE UP
GLUCOSE SERPL-MCNC: 189 MG/DL — HIGH (ref 70–99)
HCT VFR BLD CALC: 37.6 % — LOW (ref 39–50)
HGB BLD-MCNC: 12 G/DL — LOW (ref 13–17)
IMM GRANULOCYTES NFR BLD AUTO: 0.9 % — SIGNIFICANT CHANGE UP (ref 0–0.9)
LYMPHOCYTES # BLD AUTO: 1.14 K/UL — SIGNIFICANT CHANGE UP (ref 1–3.3)
LYMPHOCYTES # BLD AUTO: 19.4 % — SIGNIFICANT CHANGE UP (ref 13–44)
MCHC RBC-ENTMCNC: 31.4 PG — SIGNIFICANT CHANGE UP (ref 27–34)
MCHC RBC-ENTMCNC: 31.9 G/DL — LOW (ref 32–36)
MCV RBC AUTO: 98.4 FL — SIGNIFICANT CHANGE UP (ref 80–100)
MONOCYTES # BLD AUTO: 0.51 K/UL — SIGNIFICANT CHANGE UP (ref 0–0.9)
MONOCYTES NFR BLD AUTO: 8.7 % — SIGNIFICANT CHANGE UP (ref 2–14)
NEUTROPHILS # BLD AUTO: 4 K/UL — SIGNIFICANT CHANGE UP (ref 1.8–7.4)
NEUTROPHILS NFR BLD AUTO: 68.1 % — SIGNIFICANT CHANGE UP (ref 43–77)
NRBC BLD AUTO-RTO: 0 /100 WBCS — SIGNIFICANT CHANGE UP (ref 0–0)
NT-PROBNP SERPL-SCNC: 1371 PG/ML — HIGH (ref 0–450)
PLATELET # BLD AUTO: 235 K/UL — SIGNIFICANT CHANGE UP (ref 150–400)
POTASSIUM SERPL-MCNC: 4.6 MMOL/L — SIGNIFICANT CHANGE UP (ref 3.5–5.3)
POTASSIUM SERPL-SCNC: 4.6 MMOL/L — SIGNIFICANT CHANGE UP (ref 3.5–5.3)
PROT SERPL-MCNC: 6.6 G/DL — SIGNIFICANT CHANGE UP (ref 6–8.3)
RBC # BLD: 3.82 M/UL — LOW (ref 4.2–5.8)
RBC # FLD: 14 % — SIGNIFICANT CHANGE UP (ref 10.3–14.5)
RSV RNA NPH QL NAA+NON-PROBE: SIGNIFICANT CHANGE UP
SARS-COV-2 RNA SPEC QL NAA+PROBE: DETECTED
SODIUM SERPL-SCNC: 137 MMOL/L — SIGNIFICANT CHANGE UP (ref 135–145)
SOURCE RESPIRATORY: SIGNIFICANT CHANGE UP
TROPONIN I, HIGH SENSITIVITY RESULT: 23.9 NG/L — SIGNIFICANT CHANGE UP
WBC # BLD: 5.87 K/UL — SIGNIFICANT CHANGE UP (ref 3.8–10.5)
WBC # FLD AUTO: 5.87 K/UL — SIGNIFICANT CHANGE UP (ref 3.8–10.5)

## 2025-04-24 PROCEDURE — 83880 ASSAY OF NATRIURETIC PEPTIDE: CPT

## 2025-04-24 PROCEDURE — 93010 ELECTROCARDIOGRAM REPORT: CPT

## 2025-04-24 PROCEDURE — 99285 EMERGENCY DEPT VISIT HI MDM: CPT

## 2025-04-24 PROCEDURE — 99285 EMERGENCY DEPT VISIT HI MDM: CPT | Mod: 25

## 2025-04-24 PROCEDURE — 96374 THER/PROPH/DIAG INJ IV PUSH: CPT

## 2025-04-24 PROCEDURE — 87637 SARSCOV2&INF A&B&RSV AMP PRB: CPT

## 2025-04-24 PROCEDURE — 84484 ASSAY OF TROPONIN QUANT: CPT

## 2025-04-24 PROCEDURE — 85025 COMPLETE CBC W/AUTO DIFF WBC: CPT

## 2025-04-24 PROCEDURE — 71045 X-RAY EXAM CHEST 1 VIEW: CPT

## 2025-04-24 PROCEDURE — 71045 X-RAY EXAM CHEST 1 VIEW: CPT | Mod: 26

## 2025-04-24 PROCEDURE — 80053 COMPREHEN METABOLIC PANEL: CPT

## 2025-04-24 PROCEDURE — 36415 COLL VENOUS BLD VENIPUNCTURE: CPT

## 2025-04-24 PROCEDURE — 96375 TX/PRO/DX INJ NEW DRUG ADDON: CPT

## 2025-04-24 PROCEDURE — 80162 ASSAY OF DIGOXIN TOTAL: CPT

## 2025-04-24 PROCEDURE — 93005 ELECTROCARDIOGRAM TRACING: CPT

## 2025-04-24 RX ORDER — DEXAMETHASONE 0.5 MG/1
6 TABLET ORAL ONCE
Refills: 0 | Status: COMPLETED | OUTPATIENT
Start: 2025-04-24 | End: 2025-04-24

## 2025-04-24 RX ORDER — MAGNESIUM, ALUMINUM HYDROXIDE 200-200 MG
30 TABLET,CHEWABLE ORAL ONCE
Refills: 0 | Status: COMPLETED | OUTPATIENT
Start: 2025-04-24 | End: 2025-04-24

## 2025-04-24 RX ORDER — SACUBITRIL AND VALSARTAN 6; 6 MG/1; MG/1
1 PELLET ORAL
Refills: 0 | DISCHARGE

## 2025-04-24 RX ORDER — DEXAMETHASONE 0.5 MG/1
6 TABLET ORAL ONCE
Refills: 0 | Status: DISCONTINUED | OUTPATIENT
Start: 2025-04-24 | End: 2025-04-24

## 2025-04-24 RX ORDER — SITAGLIPTIN 100 MG/1
1 TABLET, FILM COATED ORAL
Refills: 0 | DISCHARGE

## 2025-04-24 RX ORDER — ESCITALOPRAM OXALATE 20 MG/1
1 TABLET ORAL
Refills: 0 | DISCHARGE

## 2025-04-24 RX ORDER — CARVEDILOL 3.12 MG/1
1 TABLET, FILM COATED ORAL
Refills: 0 | DISCHARGE

## 2025-04-24 RX ADMIN — Medication 30 MILLILITER(S): at 11:00

## 2025-04-24 RX ADMIN — DEXAMETHASONE 6 MILLIGRAM(S): 0.5 TABLET ORAL at 12:56

## 2025-04-24 RX ADMIN — Medication 20 MILLIGRAM(S): at 11:03

## 2025-04-28 ENCOUNTER — TRANSCRIPTION ENCOUNTER (OUTPATIENT)
Age: 89
End: 2025-04-28

## 2025-05-08 ENCOUNTER — TRANSCRIPTION ENCOUNTER (OUTPATIENT)
Age: 89
End: 2025-05-08

## 2025-06-11 NOTE — PHYSICAL THERAPY INITIAL EVALUATION ADULT - MUSCLE TONE ASSESSMENT, REHAB EVAL
hip mobiltiy, normalize gait pattern.     Problems:    [x] ? Pain:  [x] ? ROM:  [x] ? Strength:  [x] ? Function:  [] Other:          Goals  MET NOT MET ON-  GOING  Details   Date Addressed:            LTG: To be met in 10 treatments            1. ? Pain: Decrease pain levels to 5/10 to ease ADL progression. []  []  []      2. ? ROM: Increase flexibility and AROM limitations throughout to equal bilat to reduce difficulty with ADLs []  []  []      3. ? Strength: Increase 30 second STS from 2 repetitions to 8 to show improved strength and transitional tolerance. []  []  []      4. Independent with Home Exercise Programs with ability to demonstrate exercises without cueing for technique.  []  []  []      5. Improve score on assessment tool ISAIAS from 40% impairment to less than 25% impairment to demonstrate improved functional mobility []  []  []        []  []  []        []  []  []                        Patient goals:Decrease pain and move better.       Pt. Education:  [x] Yes  [] No  [] Reviewed Prior HEP/Ed  Method of Education: [x] Verbal  [x] Demo  [x] Written 6.11.25 Printed Ansible HEP  Comprehension of Education:  [x] Verbalizes understanding.  [] Demonstrates understanding.  [] Needs review.  [] Demonstrates/verbalizes HEP/Ed previously given.     Plan: [x] Continue current frequency toward long and short term goals.    [x] Specific Instructions for subsequent treatments:   - general strengthening  - core and hip strengthening  - LE stretching and lumbar mobility.  - Manual therapy PRN      Time In: 808 am            Time Out: 900    Electronically signed by:  Mary Ferguson PTA       bilateral upper extremities/bilateral lower extremities/normal

## 2025-08-24 ENCOUNTER — INPATIENT (INPATIENT)
Facility: HOSPITAL | Age: 89
LOS: 3 days | Discharge: HOME | DRG: 204 | End: 2025-08-28
Attending: INTERNAL MEDICINE | Admitting: INTERNAL MEDICINE
Payer: MEDICARE

## 2025-08-24 VITALS
OXYGEN SATURATION: 95 % | HEART RATE: 72 BPM | WEIGHT: 121.92 LBS | TEMPERATURE: 98 F | SYSTOLIC BLOOD PRESSURE: 121 MMHG | DIASTOLIC BLOOD PRESSURE: 66 MMHG | HEIGHT: 63 IN | RESPIRATION RATE: 18 BRPM

## 2025-08-24 DIAGNOSIS — E11.9 TYPE 2 DIABETES MELLITUS WITHOUT COMPLICATIONS: ICD-10-CM

## 2025-08-24 DIAGNOSIS — I10 ESSENTIAL (PRIMARY) HYPERTENSION: ICD-10-CM

## 2025-08-24 DIAGNOSIS — N17.9 ACUTE KIDNEY FAILURE, UNSPECIFIED: ICD-10-CM

## 2025-08-24 DIAGNOSIS — I48.20 CHRONIC ATRIAL FIBRILLATION, UNSPECIFIED: ICD-10-CM

## 2025-08-24 DIAGNOSIS — Z29.9 ENCOUNTER FOR PROPHYLACTIC MEASURES, UNSPECIFIED: ICD-10-CM

## 2025-08-24 DIAGNOSIS — Z95.1 PRESENCE OF AORTOCORONARY BYPASS GRAFT: Chronic | ICD-10-CM

## 2025-08-24 DIAGNOSIS — R05.9 COUGH, UNSPECIFIED: ICD-10-CM

## 2025-08-24 DIAGNOSIS — E03.9 HYPOTHYROIDISM, UNSPECIFIED: ICD-10-CM

## 2025-08-24 DIAGNOSIS — Z90.79 ACQUIRED ABSENCE OF OTHER GENITAL ORGAN(S): Chronic | ICD-10-CM

## 2025-08-24 DIAGNOSIS — E78.5 HYPERLIPIDEMIA, UNSPECIFIED: ICD-10-CM

## 2025-08-24 DIAGNOSIS — I24.9 ACUTE ISCHEMIC HEART DISEASE, UNSPECIFIED: ICD-10-CM

## 2025-08-24 DIAGNOSIS — Z90.5 ACQUIRED ABSENCE OF KIDNEY: Chronic | ICD-10-CM

## 2025-08-24 LAB
ALBUMIN SERPL ELPH-MCNC: 2.7 G/DL — LOW (ref 3.5–5)
ALP SERPL-CCNC: 82 U/L — SIGNIFICANT CHANGE UP (ref 40–120)
ALT FLD-CCNC: 15 U/L DA — SIGNIFICANT CHANGE UP (ref 10–60)
ANION GAP SERPL CALC-SCNC: 1 MMOL/L — LOW (ref 5–17)
APTT BLD: 25.7 SEC — LOW (ref 26.1–36.8)
AST SERPL-CCNC: 15 U/L — SIGNIFICANT CHANGE UP (ref 10–40)
BASE EXCESS BLDV CALC-SCNC: -0.4 MMOL/L — SIGNIFICANT CHANGE UP
BASOPHILS # BLD AUTO: 0.03 K/UL — SIGNIFICANT CHANGE UP (ref 0–0.2)
BASOPHILS NFR BLD AUTO: 0.4 % — SIGNIFICANT CHANGE UP (ref 0–2)
BILIRUB SERPL-MCNC: 0.4 MG/DL — SIGNIFICANT CHANGE UP (ref 0.2–1.2)
BUN SERPL-MCNC: 42 MG/DL — HIGH (ref 7–18)
CALCIUM SERPL-MCNC: 8.7 MG/DL — SIGNIFICANT CHANGE UP (ref 8.4–10.5)
CHLORIDE SERPL-SCNC: 110 MMOL/L — HIGH (ref 96–108)
CO2 SERPL-SCNC: 27 MMOL/L — SIGNIFICANT CHANGE UP (ref 22–31)
CREAT SERPL-MCNC: 2.38 MG/DL — HIGH (ref 0.5–1.3)
DIGOXIN SERPL-MCNC: 2.2 NG/ML — HIGH (ref 0.8–2)
EGFR: 24 ML/MIN/1.73M2 — LOW
EGFR: 24 ML/MIN/1.73M2 — LOW
EOSINOPHIL # BLD AUTO: 0.15 K/UL — SIGNIFICANT CHANGE UP (ref 0–0.5)
EOSINOPHIL NFR BLD AUTO: 2 % — SIGNIFICANT CHANGE UP (ref 0–6)
FLUAV AG NPH QL: SIGNIFICANT CHANGE UP
FLUBV AG NPH QL: SIGNIFICANT CHANGE UP
GLUCOSE SERPL-MCNC: 168 MG/DL — HIGH (ref 70–99)
HCO3 BLDV-SCNC: 25 MMOL/L — SIGNIFICANT CHANGE UP (ref 22–29)
HCT VFR BLD CALC: 37.3 % — LOW (ref 39–50)
HGB BLD-MCNC: 11.8 G/DL — LOW (ref 13–17)
IMM GRANULOCYTES NFR BLD AUTO: 0.7 % — SIGNIFICANT CHANGE UP (ref 0–0.9)
INR BLD: 1.17 RATIO — HIGH (ref 0.85–1.16)
LACTATE SERPL-SCNC: 1.1 MMOL/L — SIGNIFICANT CHANGE UP (ref 0.7–2)
LYMPHOCYTES # BLD AUTO: 1.28 K/UL — SIGNIFICANT CHANGE UP (ref 1–3.3)
LYMPHOCYTES # BLD AUTO: 16.7 % — SIGNIFICANT CHANGE UP (ref 13–44)
MAGNESIUM SERPL-MCNC: 2.2 MG/DL — SIGNIFICANT CHANGE UP (ref 1.6–2.6)
MCHC RBC-ENTMCNC: 31.6 G/DL — LOW (ref 32–36)
MCHC RBC-ENTMCNC: 31.7 PG — SIGNIFICANT CHANGE UP (ref 27–34)
MCV RBC AUTO: 100.3 FL — HIGH (ref 80–100)
MONOCYTES # BLD AUTO: 0.53 K/UL — SIGNIFICANT CHANGE UP (ref 0–0.9)
MONOCYTES NFR BLD AUTO: 6.9 % — SIGNIFICANT CHANGE UP (ref 2–14)
NEUTROPHILS # BLD AUTO: 5.63 K/UL — SIGNIFICANT CHANGE UP (ref 1.8–7.4)
NEUTROPHILS NFR BLD AUTO: 73.3 % — SIGNIFICANT CHANGE UP (ref 43–77)
NRBC BLD AUTO-RTO: 0 /100 WBCS — SIGNIFICANT CHANGE UP (ref 0–0)
NT-PROBNP SERPL-SCNC: 677 PG/ML — HIGH (ref 0–450)
PCO2 BLDV: 45 MMHG — SIGNIFICANT CHANGE UP (ref 42–55)
PH BLDV: 7.36 — SIGNIFICANT CHANGE UP (ref 7.32–7.43)
PLATELET # BLD AUTO: SIGNIFICANT CHANGE UP K/UL (ref 150–400)
PO2 BLDV: 35 MMHG — SIGNIFICANT CHANGE UP
POTASSIUM SERPL-MCNC: 5 MMOL/L — SIGNIFICANT CHANGE UP (ref 3.5–5.3)
POTASSIUM SERPL-SCNC: 5 MMOL/L — SIGNIFICANT CHANGE UP (ref 3.5–5.3)
PROT SERPL-MCNC: 6.2 G/DL — SIGNIFICANT CHANGE UP (ref 6–8.3)
PROTHROM AB SERPL-ACNC: 13.5 SEC — HIGH (ref 9.9–13.4)
RBC # BLD: 3.72 M/UL — LOW (ref 4.2–5.8)
RBC # FLD: 14.5 % — SIGNIFICANT CHANGE UP (ref 10.3–14.5)
RSV RNA NPH QL NAA+NON-PROBE: SIGNIFICANT CHANGE UP
SAO2 % BLDV: 65.7 % — SIGNIFICANT CHANGE UP
SARS-COV-2 RNA SPEC QL NAA+PROBE: SIGNIFICANT CHANGE UP
SODIUM SERPL-SCNC: 138 MMOL/L — SIGNIFICANT CHANGE UP (ref 135–145)
SOURCE RESPIRATORY: SIGNIFICANT CHANGE UP
TROPONIN I, HIGH SENSITIVITY RESULT: 22.4 NG/L — SIGNIFICANT CHANGE UP
TROPONIN I, HIGH SENSITIVITY RESULT: 25 NG/L — SIGNIFICANT CHANGE UP
WBC # BLD: 7.67 K/UL — SIGNIFICANT CHANGE UP (ref 3.8–10.5)
WBC # FLD AUTO: 7.67 K/UL — SIGNIFICANT CHANGE UP (ref 3.8–10.5)

## 2025-08-24 PROCEDURE — 70450 CT HEAD/BRAIN W/O DYE: CPT

## 2025-08-24 PROCEDURE — 85025 COMPLETE CBC W/AUTO DIFF WBC: CPT

## 2025-08-24 PROCEDURE — 71250 CT THORAX DX C-: CPT | Mod: 26

## 2025-08-24 PROCEDURE — 85610 PROTHROMBIN TIME: CPT

## 2025-08-24 PROCEDURE — 71250 CT THORAX DX C-: CPT

## 2025-08-24 PROCEDURE — 80053 COMPREHEN METABOLIC PANEL: CPT

## 2025-08-24 PROCEDURE — 36415 COLL VENOUS BLD VENIPUNCTURE: CPT

## 2025-08-24 PROCEDURE — 85730 THROMBOPLASTIN TIME PARTIAL: CPT

## 2025-08-24 PROCEDURE — 71045 X-RAY EXAM CHEST 1 VIEW: CPT | Mod: 26

## 2025-08-24 PROCEDURE — 87637 SARSCOV2&INF A&B&RSV AMP PRB: CPT

## 2025-08-24 PROCEDURE — 84484 ASSAY OF TROPONIN QUANT: CPT

## 2025-08-24 PROCEDURE — 83605 ASSAY OF LACTIC ACID: CPT

## 2025-08-24 PROCEDURE — 71045 X-RAY EXAM CHEST 1 VIEW: CPT

## 2025-08-24 PROCEDURE — 99285 EMERGENCY DEPT VISIT HI MDM: CPT

## 2025-08-24 PROCEDURE — 80162 ASSAY OF DIGOXIN TOTAL: CPT

## 2025-08-24 PROCEDURE — 82803 BLOOD GASES ANY COMBINATION: CPT

## 2025-08-24 PROCEDURE — 70450 CT HEAD/BRAIN W/O DYE: CPT | Mod: 26

## 2025-08-24 PROCEDURE — 99223 1ST HOSP IP/OBS HIGH 75: CPT | Mod: GC

## 2025-08-24 PROCEDURE — 83880 ASSAY OF NATRIURETIC PEPTIDE: CPT

## 2025-08-24 PROCEDURE — 83735 ASSAY OF MAGNESIUM: CPT

## 2025-08-24 RX ORDER — LEVOTHYROXINE SODIUM 300 MCG
75 TABLET ORAL DAILY
Refills: 0 | Status: DISCONTINUED | OUTPATIENT
Start: 2025-08-24 | End: 2025-08-26

## 2025-08-24 RX ORDER — MELATONIN 5 MG
3 TABLET ORAL AT BEDTIME
Refills: 0 | Status: DISCONTINUED | OUTPATIENT
Start: 2025-08-24 | End: 2025-08-28

## 2025-08-24 RX ORDER — CARVEDILOL 3.12 MG/1
3.12 TABLET, FILM COATED ORAL EVERY 12 HOURS
Refills: 0 | Status: DISCONTINUED | OUTPATIENT
Start: 2025-08-24 | End: 2025-08-25

## 2025-08-24 RX ORDER — TAMSULOSIN HYDROCHLORIDE 0.4 MG/1
0.4 CAPSULE ORAL AT BEDTIME
Refills: 0 | Status: DISCONTINUED | OUTPATIENT
Start: 2025-08-24 | End: 2025-08-28

## 2025-08-24 RX ORDER — MAGNESIUM, ALUMINUM HYDROXIDE 200-200 MG
30 TABLET,CHEWABLE ORAL EVERY 4 HOURS
Refills: 0 | Status: DISCONTINUED | OUTPATIENT
Start: 2025-08-24 | End: 2025-08-28

## 2025-08-24 RX ORDER — CYST/ALA/Q10/PHOS.SER/DHA/BROC 100-20-50
1 POWDER (GRAM) ORAL DAILY
Refills: 0 | Status: DISCONTINUED | OUTPATIENT
Start: 2025-08-24 | End: 2025-08-28

## 2025-08-24 RX ORDER — ACETAMINOPHEN 500 MG/5ML
650 LIQUID (ML) ORAL EVERY 6 HOURS
Refills: 0 | Status: DISCONTINUED | OUTPATIENT
Start: 2025-08-24 | End: 2025-08-28

## 2025-08-24 RX ORDER — ONDANSETRON HCL/PF 4 MG/2 ML
4 VIAL (ML) INJECTION EVERY 8 HOURS
Refills: 0 | Status: DISCONTINUED | OUTPATIENT
Start: 2025-08-24 | End: 2025-08-28

## 2025-08-24 RX ORDER — SACUBITRIL AND VALSARTAN 6; 6 MG/1; MG/1
1 PELLET ORAL
Refills: 0 | Status: DISCONTINUED | OUTPATIENT
Start: 2025-08-24 | End: 2025-08-25

## 2025-08-24 RX ORDER — HEPARIN SODIUM 1000 [USP'U]/ML
5000 INJECTION INTRAVENOUS; SUBCUTANEOUS EVERY 8 HOURS
Refills: 0 | Status: DISCONTINUED | OUTPATIENT
Start: 2025-08-24 | End: 2025-08-25

## 2025-08-24 RX ADMIN — Medication 500 MILLILITER(S): at 22:48

## 2025-08-24 RX ADMIN — Medication 500 MILLILITER(S): at 18:57

## 2025-08-25 ENCOUNTER — RESULT REVIEW (OUTPATIENT)
Age: 89
End: 2025-08-25

## 2025-08-25 DIAGNOSIS — I50.22 CHRONIC SYSTOLIC (CONGESTIVE) HEART FAILURE: ICD-10-CM

## 2025-08-25 DIAGNOSIS — R13.10 DYSPHAGIA, UNSPECIFIED: ICD-10-CM

## 2025-08-25 DIAGNOSIS — G93.41 METABOLIC ENCEPHALOPATHY: ICD-10-CM

## 2025-08-25 LAB
ALBUMIN SERPL ELPH-MCNC: 2.9 G/DL — LOW (ref 3.5–5)
ALP SERPL-CCNC: 75 U/L — SIGNIFICANT CHANGE UP (ref 40–120)
ALT FLD-CCNC: 13 U/L DA — SIGNIFICANT CHANGE UP (ref 10–60)
ANION GAP SERPL CALC-SCNC: 6 MMOL/L — SIGNIFICANT CHANGE UP (ref 5–17)
APPEARANCE UR: CLEAR — SIGNIFICANT CHANGE UP
AST SERPL-CCNC: 9 U/L — LOW (ref 10–40)
BACTERIA # UR AUTO: ABNORMAL /HPF
BASOPHILS # BLD AUTO: 0.03 K/UL — SIGNIFICANT CHANGE UP (ref 0–0.2)
BASOPHILS NFR BLD AUTO: 0.4 % — SIGNIFICANT CHANGE UP (ref 0–2)
BILIRUB SERPL-MCNC: 0.5 MG/DL — SIGNIFICANT CHANGE UP (ref 0.2–1.2)
BILIRUB UR-MCNC: NEGATIVE — SIGNIFICANT CHANGE UP
BUN SERPL-MCNC: 36 MG/DL — HIGH (ref 7–18)
CALCIUM SERPL-MCNC: 9.1 MG/DL — SIGNIFICANT CHANGE UP (ref 8.4–10.5)
CHLORIDE SERPL-SCNC: 111 MMOL/L — HIGH (ref 96–108)
CO2 SERPL-SCNC: 23 MMOL/L — SIGNIFICANT CHANGE UP (ref 22–31)
COLOR SPEC: YELLOW — SIGNIFICANT CHANGE UP
CREAT SERPL-MCNC: 1.65 MG/DL — HIGH (ref 0.5–1.3)
DIFF PNL FLD: NEGATIVE — SIGNIFICANT CHANGE UP
EGFR: 38 ML/MIN/1.73M2 — LOW
EGFR: 38 ML/MIN/1.73M2 — LOW
EOSINOPHIL # BLD AUTO: 0.1 K/UL — SIGNIFICANT CHANGE UP (ref 0–0.5)
EOSINOPHIL NFR BLD AUTO: 1.3 % — SIGNIFICANT CHANGE UP (ref 0–6)
EPI CELLS # UR: PRESENT
GLUCOSE BLDC GLUCOMTR-MCNC: 134 MG/DL — HIGH (ref 70–99)
GLUCOSE BLDC GLUCOMTR-MCNC: 142 MG/DL — HIGH (ref 70–99)
GLUCOSE BLDC GLUCOMTR-MCNC: 142 MG/DL — HIGH (ref 70–99)
GLUCOSE SERPL-MCNC: 141 MG/DL — HIGH (ref 70–99)
GLUCOSE UR QL: >=1000 MG/DL
HCT VFR BLD CALC: 37.8 % — LOW (ref 39–50)
HGB BLD-MCNC: 12.2 G/DL — LOW (ref 13–17)
IMM GRANULOCYTES NFR BLD AUTO: 0.4 % — SIGNIFICANT CHANGE UP (ref 0–0.9)
KETONES UR QL: NEGATIVE MG/DL — SIGNIFICANT CHANGE UP
LEUKOCYTE ESTERASE UR-ACNC: NEGATIVE — SIGNIFICANT CHANGE UP
LYMPHOCYTES # BLD AUTO: 1.28 K/UL — SIGNIFICANT CHANGE UP (ref 1–3.3)
LYMPHOCYTES # BLD AUTO: 16.4 % — SIGNIFICANT CHANGE UP (ref 13–44)
MAGNESIUM SERPL-MCNC: 2.4 MG/DL — SIGNIFICANT CHANGE UP (ref 1.6–2.6)
MCHC RBC-ENTMCNC: 31.6 PG — SIGNIFICANT CHANGE UP (ref 27–34)
MCHC RBC-ENTMCNC: 32.3 G/DL — SIGNIFICANT CHANGE UP (ref 32–36)
MCV RBC AUTO: 97.9 FL — SIGNIFICANT CHANGE UP (ref 80–100)
MONOCYTES # BLD AUTO: 0.52 K/UL — SIGNIFICANT CHANGE UP (ref 0–0.9)
MONOCYTES NFR BLD AUTO: 6.6 % — SIGNIFICANT CHANGE UP (ref 2–14)
NEUTROPHILS # BLD AUTO: 5.86 K/UL — SIGNIFICANT CHANGE UP (ref 1.8–7.4)
NEUTROPHILS NFR BLD AUTO: 74.9 % — SIGNIFICANT CHANGE UP (ref 43–77)
NITRITE UR-MCNC: NEGATIVE — SIGNIFICANT CHANGE UP
NRBC BLD AUTO-RTO: 0 /100 WBCS — SIGNIFICANT CHANGE UP (ref 0–0)
PH UR: 5.5 — SIGNIFICANT CHANGE UP (ref 5–8)
PHOSPHATE SERPL-MCNC: 2.7 MG/DL — SIGNIFICANT CHANGE UP (ref 2.5–4.5)
PLATELET # BLD AUTO: 169 K/UL — SIGNIFICANT CHANGE UP (ref 150–400)
POTASSIUM SERPL-MCNC: 4.5 MMOL/L — SIGNIFICANT CHANGE UP (ref 3.5–5.3)
POTASSIUM SERPL-SCNC: 4.5 MMOL/L — SIGNIFICANT CHANGE UP (ref 3.5–5.3)
PROT SERPL-MCNC: 6.3 G/DL — SIGNIFICANT CHANGE UP (ref 6–8.3)
PROT UR-MCNC: ABNORMAL MG/DL
RBC # BLD: 3.86 M/UL — LOW (ref 4.2–5.8)
RBC # FLD: 14.5 % — SIGNIFICANT CHANGE UP (ref 10.3–14.5)
RBC CASTS # UR COMP ASSIST: 0 /HPF — SIGNIFICANT CHANGE UP (ref 0–4)
SODIUM SERPL-SCNC: 140 MMOL/L — SIGNIFICANT CHANGE UP (ref 135–145)
SP GR SPEC: 1.02 — SIGNIFICANT CHANGE UP (ref 1–1.03)
TSH SERPL-MCNC: 6.78 UU/ML — HIGH (ref 0.34–4.82)
UROBILINOGEN FLD QL: 0.2 MG/DL — SIGNIFICANT CHANGE UP (ref 0.2–1)
WBC # BLD: 7.82 K/UL — SIGNIFICANT CHANGE UP (ref 3.8–10.5)
WBC # FLD AUTO: 7.82 K/UL — SIGNIFICANT CHANGE UP (ref 3.8–10.5)
WBC UR QL: 0 /HPF — SIGNIFICANT CHANGE UP (ref 0–5)

## 2025-08-25 PROCEDURE — 71045 X-RAY EXAM CHEST 1 VIEW: CPT

## 2025-08-25 PROCEDURE — 36415 COLL VENOUS BLD VENIPUNCTURE: CPT

## 2025-08-25 PROCEDURE — 81001 URINALYSIS AUTO W/SCOPE: CPT

## 2025-08-25 PROCEDURE — 71250 CT THORAX DX C-: CPT

## 2025-08-25 PROCEDURE — 84484 ASSAY OF TROPONIN QUANT: CPT

## 2025-08-25 PROCEDURE — 84443 ASSAY THYROID STIM HORMONE: CPT

## 2025-08-25 PROCEDURE — 82803 BLOOD GASES ANY COMBINATION: CPT

## 2025-08-25 PROCEDURE — 87040 BLOOD CULTURE FOR BACTERIA: CPT

## 2025-08-25 PROCEDURE — 83605 ASSAY OF LACTIC ACID: CPT

## 2025-08-25 PROCEDURE — 92610 EVALUATE SWALLOWING FUNCTION: CPT

## 2025-08-25 PROCEDURE — 84100 ASSAY OF PHOSPHORUS: CPT

## 2025-08-25 PROCEDURE — 87637 SARSCOV2&INF A&B&RSV AMP PRB: CPT

## 2025-08-25 PROCEDURE — 94640 AIRWAY INHALATION TREATMENT: CPT

## 2025-08-25 PROCEDURE — 83735 ASSAY OF MAGNESIUM: CPT

## 2025-08-25 PROCEDURE — 80162 ASSAY OF DIGOXIN TOTAL: CPT

## 2025-08-25 PROCEDURE — 85610 PROTHROMBIN TIME: CPT

## 2025-08-25 PROCEDURE — 85730 THROMBOPLASTIN TIME PARTIAL: CPT

## 2025-08-25 PROCEDURE — 80053 COMPREHEN METABOLIC PANEL: CPT

## 2025-08-25 PROCEDURE — 83880 ASSAY OF NATRIURETIC PEPTIDE: CPT

## 2025-08-25 PROCEDURE — 82962 GLUCOSE BLOOD TEST: CPT

## 2025-08-25 PROCEDURE — 99223 1ST HOSP IP/OBS HIGH 75: CPT

## 2025-08-25 PROCEDURE — 85025 COMPLETE CBC W/AUTO DIFF WBC: CPT

## 2025-08-25 PROCEDURE — 99233 SBSQ HOSP IP/OBS HIGH 50: CPT | Mod: GC

## 2025-08-25 PROCEDURE — 70450 CT HEAD/BRAIN W/O DYE: CPT

## 2025-08-25 PROCEDURE — 97161 PT EVAL LOW COMPLEX 20 MIN: CPT

## 2025-08-25 RX ORDER — APIXABAN 2.5 MG/1
5 TABLET, FILM COATED ORAL EVERY 12 HOURS
Refills: 0 | Status: DISCONTINUED | OUTPATIENT
Start: 2025-08-25 | End: 2025-08-25

## 2025-08-25 RX ORDER — SODIUM CHLORIDE 9 G/1000ML
1000 INJECTION, SOLUTION INTRAVENOUS
Refills: 0 | Status: DISCONTINUED | OUTPATIENT
Start: 2025-08-25 | End: 2025-08-28

## 2025-08-25 RX ORDER — ASPIRIN 325 MG
81 TABLET ORAL DAILY
Refills: 0 | Status: DISCONTINUED | OUTPATIENT
Start: 2025-08-25 | End: 2025-08-28

## 2025-08-25 RX ORDER — INSULIN LISPRO 100 U/ML
INJECTION, SOLUTION INTRAVENOUS; SUBCUTANEOUS AT BEDTIME
Refills: 0 | Status: DISCONTINUED | OUTPATIENT
Start: 2025-08-25 | End: 2025-08-28

## 2025-08-25 RX ORDER — LEVOTHYROXINE SODIUM 300 MCG
0 TABLET ORAL
Qty: 0 | Refills: 3 | DISCHARGE

## 2025-08-25 RX ORDER — DEXTROMETHORPHAN HBR, GUAIFENESIN 200 MG/10ML
600 LIQUID ORAL EVERY 12 HOURS
Refills: 0 | Status: DISCONTINUED | OUTPATIENT
Start: 2025-08-25 | End: 2025-08-28

## 2025-08-25 RX ORDER — APIXABAN 2.5 MG/1
2.5 TABLET, FILM COATED ORAL EVERY 12 HOURS
Refills: 0 | Status: DISCONTINUED | OUTPATIENT
Start: 2025-08-25 | End: 2025-08-28

## 2025-08-25 RX ORDER — INSULIN LISPRO 100 U/ML
INJECTION, SOLUTION INTRAVENOUS; SUBCUTANEOUS
Refills: 0 | Status: DISCONTINUED | OUTPATIENT
Start: 2025-08-25 | End: 2025-08-28

## 2025-08-25 RX ORDER — ATORVASTATIN CALCIUM 80 MG/1
40 TABLET, FILM COATED ORAL AT BEDTIME
Refills: 0 | Status: DISCONTINUED | OUTPATIENT
Start: 2025-08-25 | End: 2025-08-28

## 2025-08-25 RX ORDER — SACUBITRIL AND VALSARTAN 6; 6 MG/1; MG/1
1 PELLET ORAL
Refills: 0 | Status: DISCONTINUED | OUTPATIENT
Start: 2025-08-25 | End: 2025-08-28

## 2025-08-25 RX ORDER — CARVEDILOL 3.12 MG/1
3.12 TABLET, FILM COATED ORAL EVERY 12 HOURS
Refills: 0 | Status: DISCONTINUED | OUTPATIENT
Start: 2025-08-25 | End: 2025-08-28

## 2025-08-25 RX ADMIN — Medication 81 MILLIGRAM(S): at 13:33

## 2025-08-25 RX ADMIN — Medication 4 MILLILITER(S): at 02:51

## 2025-08-25 RX ADMIN — ATORVASTATIN CALCIUM 40 MILLIGRAM(S): 80 TABLET, FILM COATED ORAL at 23:17

## 2025-08-25 RX ADMIN — SODIUM CHLORIDE 65 MILLILITER(S): 9 INJECTION, SOLUTION INTRAVENOUS at 15:30

## 2025-08-25 RX ADMIN — SACUBITRIL AND VALSARTAN 1 TABLET(S): 6; 6 PELLET ORAL at 19:01

## 2025-08-25 RX ADMIN — Medication 4 MILLILITER(S): at 08:11

## 2025-08-25 RX ADMIN — TAMSULOSIN HYDROCHLORIDE 0.4 MILLIGRAM(S): 0.4 CAPSULE ORAL at 23:17

## 2025-08-25 RX ADMIN — APIXABAN 2.5 MILLIGRAM(S): 2.5 TABLET, FILM COATED ORAL at 19:00

## 2025-08-25 RX ADMIN — CARVEDILOL 3.12 MILLIGRAM(S): 3.12 TABLET, FILM COATED ORAL at 19:00

## 2025-08-25 RX ADMIN — Medication 1 TABLET(S): at 15:07

## 2025-08-25 RX ADMIN — DEXTROMETHORPHAN HBR, GUAIFENESIN 600 MILLIGRAM(S): 200 LIQUID ORAL at 19:01

## 2025-08-25 RX ADMIN — Medication 500 MILLIGRAM(S): at 13:33

## 2025-08-25 RX ADMIN — Medication 4 MILLILITER(S): at 20:08

## 2025-08-26 LAB
ALBUMIN SERPL ELPH-MCNC: 2.7 G/DL — LOW (ref 3.5–5)
ALP SERPL-CCNC: 66 U/L — SIGNIFICANT CHANGE UP (ref 40–120)
ALT FLD-CCNC: 13 U/L DA — SIGNIFICANT CHANGE UP (ref 10–60)
ANION GAP SERPL CALC-SCNC: 5 MMOL/L — SIGNIFICANT CHANGE UP (ref 5–17)
AST SERPL-CCNC: 10 U/L — SIGNIFICANT CHANGE UP (ref 10–40)
BASOPHILS # BLD AUTO: 0.05 K/UL — SIGNIFICANT CHANGE UP (ref 0–0.2)
BASOPHILS NFR BLD AUTO: 0.7 % — SIGNIFICANT CHANGE UP (ref 0–2)
BILIRUB SERPL-MCNC: 0.5 MG/DL — SIGNIFICANT CHANGE UP (ref 0.2–1.2)
BUN SERPL-MCNC: 38 MG/DL — HIGH (ref 7–18)
CALCIUM SERPL-MCNC: 8.7 MG/DL — SIGNIFICANT CHANGE UP (ref 8.4–10.5)
CHLORIDE SERPL-SCNC: 113 MMOL/L — HIGH (ref 96–108)
CO2 SERPL-SCNC: 24 MMOL/L — SIGNIFICANT CHANGE UP (ref 22–31)
CREAT SERPL-MCNC: 1.64 MG/DL — HIGH (ref 0.5–1.3)
EGFR: 38 ML/MIN/1.73M2 — LOW
EGFR: 38 ML/MIN/1.73M2 — LOW
EOSINOPHIL # BLD AUTO: 0.13 K/UL — SIGNIFICANT CHANGE UP (ref 0–0.5)
EOSINOPHIL NFR BLD AUTO: 1.8 % — SIGNIFICANT CHANGE UP (ref 0–6)
GLUCOSE BLDC GLUCOMTR-MCNC: 140 MG/DL — HIGH (ref 70–99)
GLUCOSE BLDC GLUCOMTR-MCNC: 168 MG/DL — HIGH (ref 70–99)
GLUCOSE BLDC GLUCOMTR-MCNC: 172 MG/DL — HIGH (ref 70–99)
GLUCOSE BLDC GLUCOMTR-MCNC: 174 MG/DL — HIGH (ref 70–99)
GLUCOSE SERPL-MCNC: 133 MG/DL — HIGH (ref 70–99)
HCT VFR BLD CALC: 37 % — LOW (ref 39–50)
HGB BLD-MCNC: 11.9 G/DL — LOW (ref 13–17)
IMM GRANULOCYTES NFR BLD AUTO: 0.4 % — SIGNIFICANT CHANGE UP (ref 0–0.9)
LYMPHOCYTES # BLD AUTO: 1.3 K/UL — SIGNIFICANT CHANGE UP (ref 1–3.3)
LYMPHOCYTES # BLD AUTO: 17.8 % — SIGNIFICANT CHANGE UP (ref 13–44)
MAGNESIUM SERPL-MCNC: 2.4 MG/DL — SIGNIFICANT CHANGE UP (ref 1.6–2.6)
MCHC RBC-ENTMCNC: 31.7 PG — SIGNIFICANT CHANGE UP (ref 27–34)
MCHC RBC-ENTMCNC: 32.2 G/DL — SIGNIFICANT CHANGE UP (ref 32–36)
MCV RBC AUTO: 98.7 FL — SIGNIFICANT CHANGE UP (ref 80–100)
MONOCYTES # BLD AUTO: 0.62 K/UL — SIGNIFICANT CHANGE UP (ref 0–0.9)
MONOCYTES NFR BLD AUTO: 8.5 % — SIGNIFICANT CHANGE UP (ref 2–14)
NEUTROPHILS # BLD AUTO: 5.18 K/UL — SIGNIFICANT CHANGE UP (ref 1.8–7.4)
NEUTROPHILS NFR BLD AUTO: 70.8 % — SIGNIFICANT CHANGE UP (ref 43–77)
NRBC BLD AUTO-RTO: 0 /100 WBCS — SIGNIFICANT CHANGE UP (ref 0–0)
PHOSPHATE SERPL-MCNC: 3.1 MG/DL — SIGNIFICANT CHANGE UP (ref 2.5–4.5)
PLATELET # BLD AUTO: 168 K/UL — SIGNIFICANT CHANGE UP (ref 150–400)
POTASSIUM SERPL-MCNC: 4.4 MMOL/L — SIGNIFICANT CHANGE UP (ref 3.5–5.3)
POTASSIUM SERPL-SCNC: 4.4 MMOL/L — SIGNIFICANT CHANGE UP (ref 3.5–5.3)
PROT SERPL-MCNC: 5.7 G/DL — LOW (ref 6–8.3)
RBC # BLD: 3.75 M/UL — LOW (ref 4.2–5.8)
RBC # FLD: 14 % — SIGNIFICANT CHANGE UP (ref 10.3–14.5)
SODIUM SERPL-SCNC: 142 MMOL/L — SIGNIFICANT CHANGE UP (ref 135–145)
WBC # BLD: 7.31 K/UL — SIGNIFICANT CHANGE UP (ref 3.8–10.5)
WBC # FLD AUTO: 7.31 K/UL — SIGNIFICANT CHANGE UP (ref 3.8–10.5)

## 2025-08-26 PROCEDURE — 87637 SARSCOV2&INF A&B&RSV AMP PRB: CPT

## 2025-08-26 PROCEDURE — 82803 BLOOD GASES ANY COMBINATION: CPT

## 2025-08-26 PROCEDURE — 94640 AIRWAY INHALATION TREATMENT: CPT

## 2025-08-26 PROCEDURE — 71045 X-RAY EXAM CHEST 1 VIEW: CPT

## 2025-08-26 PROCEDURE — 83605 ASSAY OF LACTIC ACID: CPT

## 2025-08-26 PROCEDURE — 99233 SBSQ HOSP IP/OBS HIGH 50: CPT | Mod: FS

## 2025-08-26 PROCEDURE — 93005 ELECTROCARDIOGRAM TRACING: CPT

## 2025-08-26 PROCEDURE — 85730 THROMBOPLASTIN TIME PARTIAL: CPT

## 2025-08-26 PROCEDURE — 99232 SBSQ HOSP IP/OBS MODERATE 35: CPT | Mod: GC

## 2025-08-26 PROCEDURE — 84100 ASSAY OF PHOSPHORUS: CPT

## 2025-08-26 PROCEDURE — 82962 GLUCOSE BLOOD TEST: CPT

## 2025-08-26 PROCEDURE — 92610 EVALUATE SWALLOWING FUNCTION: CPT

## 2025-08-26 PROCEDURE — 81001 URINALYSIS AUTO W/SCOPE: CPT

## 2025-08-26 PROCEDURE — 85610 PROTHROMBIN TIME: CPT

## 2025-08-26 PROCEDURE — 84443 ASSAY THYROID STIM HORMONE: CPT

## 2025-08-26 PROCEDURE — 36415 COLL VENOUS BLD VENIPUNCTURE: CPT

## 2025-08-26 PROCEDURE — 71250 CT THORAX DX C-: CPT

## 2025-08-26 PROCEDURE — 83880 ASSAY OF NATRIURETIC PEPTIDE: CPT

## 2025-08-26 PROCEDURE — 70450 CT HEAD/BRAIN W/O DYE: CPT

## 2025-08-26 PROCEDURE — 83735 ASSAY OF MAGNESIUM: CPT

## 2025-08-26 PROCEDURE — 80053 COMPREHEN METABOLIC PANEL: CPT

## 2025-08-26 PROCEDURE — 84484 ASSAY OF TROPONIN QUANT: CPT

## 2025-08-26 PROCEDURE — 85025 COMPLETE CBC W/AUTO DIFF WBC: CPT

## 2025-08-26 PROCEDURE — 97161 PT EVAL LOW COMPLEX 20 MIN: CPT

## 2025-08-26 PROCEDURE — 76775 US EXAM ABDO BACK WALL LIM: CPT | Mod: 26

## 2025-08-26 PROCEDURE — 76775 US EXAM ABDO BACK WALL LIM: CPT

## 2025-08-26 PROCEDURE — 87040 BLOOD CULTURE FOR BACTERIA: CPT

## 2025-08-26 PROCEDURE — 80162 ASSAY OF DIGOXIN TOTAL: CPT

## 2025-08-26 RX ORDER — LEVOTHYROXINE SODIUM 300 MCG
100 TABLET ORAL DAILY
Refills: 0 | Status: DISCONTINUED | OUTPATIENT
Start: 2025-08-27 | End: 2025-08-28

## 2025-08-26 RX ADMIN — Medication 75 MICROGRAM(S): at 06:27

## 2025-08-26 RX ADMIN — SACUBITRIL AND VALSARTAN 1 TABLET(S): 6; 6 PELLET ORAL at 17:35

## 2025-08-26 RX ADMIN — TAMSULOSIN HYDROCHLORIDE 0.4 MILLIGRAM(S): 0.4 CAPSULE ORAL at 21:14

## 2025-08-26 RX ADMIN — INSULIN LISPRO 1: 100 INJECTION, SOLUTION INTRAVENOUS; SUBCUTANEOUS at 17:35

## 2025-08-26 RX ADMIN — DEXTROMETHORPHAN HBR, GUAIFENESIN 600 MILLIGRAM(S): 200 LIQUID ORAL at 06:28

## 2025-08-26 RX ADMIN — INSULIN LISPRO 1: 100 INJECTION, SOLUTION INTRAVENOUS; SUBCUTANEOUS at 12:52

## 2025-08-26 RX ADMIN — ATORVASTATIN CALCIUM 40 MILLIGRAM(S): 80 TABLET, FILM COATED ORAL at 21:14

## 2025-08-26 RX ADMIN — DEXTROMETHORPHAN HBR, GUAIFENESIN 600 MILLIGRAM(S): 200 LIQUID ORAL at 17:35

## 2025-08-26 RX ADMIN — Medication 500 MILLIGRAM(S): at 12:49

## 2025-08-26 RX ADMIN — CARVEDILOL 3.12 MILLIGRAM(S): 3.12 TABLET, FILM COATED ORAL at 06:28

## 2025-08-26 RX ADMIN — APIXABAN 2.5 MILLIGRAM(S): 2.5 TABLET, FILM COATED ORAL at 06:28

## 2025-08-26 RX ADMIN — CARVEDILOL 3.12 MILLIGRAM(S): 3.12 TABLET, FILM COATED ORAL at 17:35

## 2025-08-26 RX ADMIN — APIXABAN 2.5 MILLIGRAM(S): 2.5 TABLET, FILM COATED ORAL at 17:35

## 2025-08-26 RX ADMIN — SODIUM CHLORIDE 65 MILLILITER(S): 9 INJECTION, SOLUTION INTRAVENOUS at 06:39

## 2025-08-26 RX ADMIN — Medication 1 TABLET(S): at 15:21

## 2025-08-26 RX ADMIN — Medication 4 MILLILITER(S): at 20:24

## 2025-08-26 RX ADMIN — Medication 4 MILLILITER(S): at 09:05

## 2025-08-26 RX ADMIN — Medication 3 MILLIGRAM(S): at 21:14

## 2025-08-26 RX ADMIN — Medication 81 MILLIGRAM(S): at 12:49

## 2025-08-26 RX ADMIN — SACUBITRIL AND VALSARTAN 1 TABLET(S): 6; 6 PELLET ORAL at 06:27

## 2025-08-27 ENCOUNTER — TRANSCRIPTION ENCOUNTER (OUTPATIENT)
Age: 89
End: 2025-08-27

## 2025-08-27 DIAGNOSIS — E44.0 MODERATE PROTEIN-CALORIE MALNUTRITION: ICD-10-CM

## 2025-08-27 LAB
GLUCOSE BLDC GLUCOMTR-MCNC: 197 MG/DL — HIGH (ref 70–99)
GLUCOSE BLDC GLUCOMTR-MCNC: 218 MG/DL — HIGH (ref 70–99)
GLUCOSE BLDC GLUCOMTR-MCNC: 230 MG/DL — HIGH (ref 70–99)
GLUCOSE BLDC GLUCOMTR-MCNC: 240 MG/DL — HIGH (ref 70–99)

## 2025-08-27 PROCEDURE — 99223 1ST HOSP IP/OBS HIGH 75: CPT

## 2025-08-27 PROCEDURE — 99497 ADVNCD CARE PLAN 30 MIN: CPT | Mod: 25

## 2025-08-27 PROCEDURE — 99232 SBSQ HOSP IP/OBS MODERATE 35: CPT | Mod: GC

## 2025-08-27 RX ADMIN — Medication 3 MILLIGRAM(S): at 22:28

## 2025-08-27 RX ADMIN — TAMSULOSIN HYDROCHLORIDE 0.4 MILLIGRAM(S): 0.4 CAPSULE ORAL at 22:28

## 2025-08-27 RX ADMIN — INSULIN LISPRO 2: 100 INJECTION, SOLUTION INTRAVENOUS; SUBCUTANEOUS at 12:15

## 2025-08-27 RX ADMIN — SACUBITRIL AND VALSARTAN 1 TABLET(S): 6; 6 PELLET ORAL at 05:49

## 2025-08-27 RX ADMIN — Medication 4 MILLILITER(S): at 20:19

## 2025-08-27 RX ADMIN — DEXTROMETHORPHAN HBR, GUAIFENESIN 600 MILLIGRAM(S): 200 LIQUID ORAL at 05:49

## 2025-08-27 RX ADMIN — CARVEDILOL 3.12 MILLIGRAM(S): 3.12 TABLET, FILM COATED ORAL at 05:49

## 2025-08-27 RX ADMIN — Medication 81 MILLIGRAM(S): at 12:17

## 2025-08-27 RX ADMIN — INSULIN LISPRO 2: 100 INJECTION, SOLUTION INTRAVENOUS; SUBCUTANEOUS at 17:49

## 2025-08-27 RX ADMIN — INSULIN LISPRO 1: 100 INJECTION, SOLUTION INTRAVENOUS; SUBCUTANEOUS at 08:15

## 2025-08-27 RX ADMIN — ATORVASTATIN CALCIUM 40 MILLIGRAM(S): 80 TABLET, FILM COATED ORAL at 22:29

## 2025-08-27 RX ADMIN — Medication 1 TABLET(S): at 12:17

## 2025-08-27 RX ADMIN — APIXABAN 2.5 MILLIGRAM(S): 2.5 TABLET, FILM COATED ORAL at 05:49

## 2025-08-27 RX ADMIN — DEXTROMETHORPHAN HBR, GUAIFENESIN 600 MILLIGRAM(S): 200 LIQUID ORAL at 18:00

## 2025-08-27 RX ADMIN — CARVEDILOL 3.12 MILLIGRAM(S): 3.12 TABLET, FILM COATED ORAL at 18:00

## 2025-08-27 RX ADMIN — Medication 100 MICROGRAM(S): at 05:54

## 2025-08-27 RX ADMIN — APIXABAN 2.5 MILLIGRAM(S): 2.5 TABLET, FILM COATED ORAL at 18:00

## 2025-08-27 RX ADMIN — Medication 4 MILLILITER(S): at 08:28

## 2025-08-27 RX ADMIN — Medication 500 MILLIGRAM(S): at 12:17

## 2025-08-28 ENCOUNTER — TRANSCRIPTION ENCOUNTER (OUTPATIENT)
Age: 89
End: 2025-08-28

## 2025-08-28 VITALS
SYSTOLIC BLOOD PRESSURE: 97 MMHG | HEART RATE: 71 BPM | DIASTOLIC BLOOD PRESSURE: 59 MMHG | OXYGEN SATURATION: 95 % | RESPIRATION RATE: 18 BRPM

## 2025-08-28 DIAGNOSIS — Z51.5 ENCOUNTER FOR PALLIATIVE CARE: ICD-10-CM

## 2025-08-28 DIAGNOSIS — R53.81 OTHER MALAISE: ICD-10-CM

## 2025-08-28 DIAGNOSIS — F01.B0 VASCULAR DEMENTIA, MODERATE, WITHOUT BEHAVIORAL DISTURBANCE, PSYCHOTIC DISTURBANCE, MOOD DISTURBANCE, AND ANXIETY: ICD-10-CM

## 2025-08-28 LAB
ALBUMIN SERPL ELPH-MCNC: 2.8 G/DL — LOW (ref 3.5–5)
ALP SERPL-CCNC: 70 U/L — SIGNIFICANT CHANGE UP (ref 40–120)
ALT FLD-CCNC: 13 U/L DA — SIGNIFICANT CHANGE UP (ref 10–60)
ANION GAP SERPL CALC-SCNC: 8 MMOL/L — SIGNIFICANT CHANGE UP (ref 5–17)
AST SERPL-CCNC: 8 U/L — LOW (ref 10–40)
BASOPHILS # BLD AUTO: 0.04 K/UL — SIGNIFICANT CHANGE UP (ref 0–0.2)
BASOPHILS NFR BLD AUTO: 0.5 % — SIGNIFICANT CHANGE UP (ref 0–2)
BILIRUB SERPL-MCNC: 0.5 MG/DL — SIGNIFICANT CHANGE UP (ref 0.2–1.2)
BUN SERPL-MCNC: 42 MG/DL — HIGH (ref 7–18)
CALCIUM SERPL-MCNC: 9.3 MG/DL — SIGNIFICANT CHANGE UP (ref 8.4–10.5)
CHLORIDE SERPL-SCNC: 108 MMOL/L — SIGNIFICANT CHANGE UP (ref 96–108)
CO2 SERPL-SCNC: 26 MMOL/L — SIGNIFICANT CHANGE UP (ref 22–31)
CREAT SERPL-MCNC: 1.72 MG/DL — HIGH (ref 0.5–1.3)
EGFR: 36 ML/MIN/1.73M2 — LOW
EGFR: 36 ML/MIN/1.73M2 — LOW
EOSINOPHIL # BLD AUTO: 0.14 K/UL — SIGNIFICANT CHANGE UP (ref 0–0.5)
EOSINOPHIL NFR BLD AUTO: 1.7 % — SIGNIFICANT CHANGE UP (ref 0–6)
GLUCOSE BLDC GLUCOMTR-MCNC: 208 MG/DL — HIGH (ref 70–99)
GLUCOSE SERPL-MCNC: 205 MG/DL — HIGH (ref 70–99)
HCT VFR BLD CALC: 37 % — LOW (ref 39–50)
HGB BLD-MCNC: 11.9 G/DL — LOW (ref 13–17)
IMM GRANULOCYTES NFR BLD AUTO: 0.5 % — SIGNIFICANT CHANGE UP (ref 0–0.9)
LYMPHOCYTES # BLD AUTO: 1.25 K/UL — SIGNIFICANT CHANGE UP (ref 1–3.3)
LYMPHOCYTES # BLD AUTO: 15.1 % — SIGNIFICANT CHANGE UP (ref 13–44)
MAGNESIUM SERPL-MCNC: 2.3 MG/DL — SIGNIFICANT CHANGE UP (ref 1.6–2.6)
MCHC RBC-ENTMCNC: 31.7 PG — SIGNIFICANT CHANGE UP (ref 27–34)
MCHC RBC-ENTMCNC: 32.2 G/DL — SIGNIFICANT CHANGE UP (ref 32–36)
MCV RBC AUTO: 98.7 FL — SIGNIFICANT CHANGE UP (ref 80–100)
MONOCYTES # BLD AUTO: 0.75 K/UL — SIGNIFICANT CHANGE UP (ref 0–0.9)
MONOCYTES NFR BLD AUTO: 9.1 % — SIGNIFICANT CHANGE UP (ref 2–14)
NEUTROPHILS # BLD AUTO: 6.06 K/UL — SIGNIFICANT CHANGE UP (ref 1.8–7.4)
NEUTROPHILS NFR BLD AUTO: 73.1 % — SIGNIFICANT CHANGE UP (ref 43–77)
NRBC BLD AUTO-RTO: 0 /100 WBCS — SIGNIFICANT CHANGE UP (ref 0–0)
PHOSPHATE SERPL-MCNC: 2.9 MG/DL — SIGNIFICANT CHANGE UP (ref 2.5–4.5)
PLATELET # BLD AUTO: 179 K/UL — SIGNIFICANT CHANGE UP (ref 150–400)
POTASSIUM SERPL-MCNC: 4.3 MMOL/L — SIGNIFICANT CHANGE UP (ref 3.5–5.3)
POTASSIUM SERPL-SCNC: 4.3 MMOL/L — SIGNIFICANT CHANGE UP (ref 3.5–5.3)
PROT SERPL-MCNC: 6.4 G/DL — SIGNIFICANT CHANGE UP (ref 6–8.3)
RBC # BLD: 3.75 M/UL — LOW (ref 4.2–5.8)
RBC # FLD: 14.1 % — SIGNIFICANT CHANGE UP (ref 10.3–14.5)
SODIUM SERPL-SCNC: 142 MMOL/L — SIGNIFICANT CHANGE UP (ref 135–145)
WBC # BLD: 8.28 K/UL — SIGNIFICANT CHANGE UP (ref 3.8–10.5)
WBC # FLD AUTO: 8.28 K/UL — SIGNIFICANT CHANGE UP (ref 3.8–10.5)

## 2025-08-28 PROCEDURE — 71250 CT THORAX DX C-: CPT

## 2025-08-28 PROCEDURE — 85025 COMPLETE CBC W/AUTO DIFF WBC: CPT

## 2025-08-28 PROCEDURE — 93005 ELECTROCARDIOGRAM TRACING: CPT

## 2025-08-28 PROCEDURE — 80053 COMPREHEN METABOLIC PANEL: CPT

## 2025-08-28 PROCEDURE — 83880 ASSAY OF NATRIURETIC PEPTIDE: CPT

## 2025-08-28 PROCEDURE — 82803 BLOOD GASES ANY COMBINATION: CPT

## 2025-08-28 PROCEDURE — 36415 COLL VENOUS BLD VENIPUNCTURE: CPT

## 2025-08-28 PROCEDURE — 97161 PT EVAL LOW COMPLEX 20 MIN: CPT

## 2025-08-28 PROCEDURE — 84484 ASSAY OF TROPONIN QUANT: CPT

## 2025-08-28 PROCEDURE — 76775 US EXAM ABDO BACK WALL LIM: CPT

## 2025-08-28 PROCEDURE — 94640 AIRWAY INHALATION TREATMENT: CPT

## 2025-08-28 PROCEDURE — 70450 CT HEAD/BRAIN W/O DYE: CPT

## 2025-08-28 PROCEDURE — 85610 PROTHROMBIN TIME: CPT

## 2025-08-28 PROCEDURE — 83735 ASSAY OF MAGNESIUM: CPT

## 2025-08-28 PROCEDURE — 99239 HOSP IP/OBS DSCHRG MGMT >30: CPT

## 2025-08-28 PROCEDURE — 93306 TTE W/DOPPLER COMPLETE: CPT

## 2025-08-28 PROCEDURE — 81001 URINALYSIS AUTO W/SCOPE: CPT

## 2025-08-28 PROCEDURE — 84443 ASSAY THYROID STIM HORMONE: CPT

## 2025-08-28 PROCEDURE — 71045 X-RAY EXAM CHEST 1 VIEW: CPT

## 2025-08-28 PROCEDURE — 87637 SARSCOV2&INF A&B&RSV AMP PRB: CPT

## 2025-08-28 PROCEDURE — 82962 GLUCOSE BLOOD TEST: CPT

## 2025-08-28 PROCEDURE — 87040 BLOOD CULTURE FOR BACTERIA: CPT

## 2025-08-28 PROCEDURE — 84100 ASSAY OF PHOSPHORUS: CPT

## 2025-08-28 PROCEDURE — 80162 ASSAY OF DIGOXIN TOTAL: CPT

## 2025-08-28 PROCEDURE — 92610 EVALUATE SWALLOWING FUNCTION: CPT

## 2025-08-28 PROCEDURE — 85730 THROMBOPLASTIN TIME PARTIAL: CPT

## 2025-08-28 PROCEDURE — 83605 ASSAY OF LACTIC ACID: CPT

## 2025-08-28 PROCEDURE — T1013: CPT

## 2025-08-28 PROCEDURE — 99285 EMERGENCY DEPT VISIT HI MDM: CPT | Mod: 25

## 2025-08-28 RX ORDER — MELATONIN 5 MG
1 TABLET ORAL
Qty: 0 | Refills: 0 | DISCHARGE
Start: 2025-08-28

## 2025-08-28 RX ORDER — ASPIRIN 325 MG
1 TABLET ORAL
Qty: 0 | Refills: 0 | DISCHARGE
Start: 2025-08-28

## 2025-08-28 RX ORDER — DEXTROMETHORPHAN HBR, GUAIFENESIN 200 MG/10ML
100 LIQUID ORAL EVERY 6 HOURS
Refills: 0 | Status: DISCONTINUED | OUTPATIENT
Start: 2025-08-28 | End: 2025-08-28

## 2025-08-28 RX ADMIN — Medication 100 MICROGRAM(S): at 05:40

## 2025-08-28 RX ADMIN — SACUBITRIL AND VALSARTAN 1 TABLET(S): 6; 6 PELLET ORAL at 05:39

## 2025-08-28 RX ADMIN — Medication 4 MILLILITER(S): at 09:08

## 2025-08-28 RX ADMIN — INSULIN LISPRO 2: 100 INJECTION, SOLUTION INTRAVENOUS; SUBCUTANEOUS at 08:23

## 2025-08-28 RX ADMIN — CARVEDILOL 3.12 MILLIGRAM(S): 3.12 TABLET, FILM COATED ORAL at 05:40

## 2025-08-28 RX ADMIN — DEXTROMETHORPHAN HBR, GUAIFENESIN 100 MILLIGRAM(S): 200 LIQUID ORAL at 05:39

## 2025-08-28 RX ADMIN — APIXABAN 2.5 MILLIGRAM(S): 2.5 TABLET, FILM COATED ORAL at 05:40

## 2025-08-30 LAB
CULTURE RESULTS: SIGNIFICANT CHANGE UP
CULTURE RESULTS: SIGNIFICANT CHANGE UP
SPECIMEN SOURCE: SIGNIFICANT CHANGE UP
SPECIMEN SOURCE: SIGNIFICANT CHANGE UP

## 2025-09-13 RX ORDER — LEVOTHYROXINE SODIUM 300 MCG
100 TABLET ORAL
Refills: 0 | DISCHARGE

## 2025-09-13 RX ORDER — CYST/ALA/Q10/PHOS.SER/DHA/BROC 100-20-50
1 POWDER (GRAM) ORAL
Refills: 0 | DISCHARGE

## 2025-09-13 RX ORDER — ATORVASTATIN CALCIUM 80 MG/1
0 TABLET, FILM COATED ORAL
Qty: 0 | Refills: 0 | DISCHARGE

## 2025-09-13 RX ORDER — APIXABAN 2.5 MG/1
0 TABLET, FILM COATED ORAL
Qty: 0 | Refills: 0 | DISCHARGE

## 2025-09-13 RX ORDER — ACETAMINOPHEN 500 MG/5ML
2 LIQUID (ML) ORAL
Refills: 0 | DISCHARGE

## 2025-09-13 RX ORDER — IPRATROPIUM BROMIDE AND ALBUTEROL SULFATE .5; 2.5 MG/3ML; MG/3ML
3 SOLUTION RESPIRATORY (INHALATION)
Refills: 0 | DISCHARGE

## 2025-09-13 RX ORDER — MEDPURA VITAMIN A AND D 95 G/100G
1 OINTMENT TOPICAL
Refills: 0 | DISCHARGE